# Patient Record
Sex: MALE | Race: WHITE | NOT HISPANIC OR LATINO | URBAN - METROPOLITAN AREA
[De-identification: names, ages, dates, MRNs, and addresses within clinical notes are randomized per-mention and may not be internally consistent; named-entity substitution may affect disease eponyms.]

---

## 2017-01-10 VITALS
SYSTOLIC BLOOD PRESSURE: 141 MMHG | TEMPERATURE: 97.3 F | DIASTOLIC BLOOD PRESSURE: 81 MMHG | DIASTOLIC BLOOD PRESSURE: 69 MMHG | RESPIRATION RATE: 28 BRPM | DIASTOLIC BLOOD PRESSURE: 79 MMHG | HEART RATE: 76 BPM | DIASTOLIC BLOOD PRESSURE: 75 MMHG | DIASTOLIC BLOOD PRESSURE: 56 MMHG | OXYGEN SATURATION: 98 % | RESPIRATION RATE: 16 BRPM | OXYGEN SATURATION: 99 % | SYSTOLIC BLOOD PRESSURE: 117 MMHG | SYSTOLIC BLOOD PRESSURE: 129 MMHG | DIASTOLIC BLOOD PRESSURE: 65 MMHG | HEART RATE: 61 BPM | OXYGEN SATURATION: 97 % | HEART RATE: 70 BPM | SYSTOLIC BLOOD PRESSURE: 109 MMHG | WEIGHT: 265 LBS | RESPIRATION RATE: 20 BRPM | SYSTOLIC BLOOD PRESSURE: 124 MMHG | DIASTOLIC BLOOD PRESSURE: 80 MMHG | HEART RATE: 77 BPM | OXYGEN SATURATION: 96 % | SYSTOLIC BLOOD PRESSURE: 115 MMHG | DIASTOLIC BLOOD PRESSURE: 71 MMHG | HEIGHT: 73 IN | HEART RATE: 74 BPM | DIASTOLIC BLOOD PRESSURE: 72 MMHG | DIASTOLIC BLOOD PRESSURE: 93 MMHG | HEART RATE: 84 BPM | TEMPERATURE: 97.6 F | HEART RATE: 69 BPM | HEART RATE: 75 BPM | OXYGEN SATURATION: 94 % | DIASTOLIC BLOOD PRESSURE: 76 MMHG | SYSTOLIC BLOOD PRESSURE: 139 MMHG | SYSTOLIC BLOOD PRESSURE: 140 MMHG | OXYGEN SATURATION: 93 % | SYSTOLIC BLOOD PRESSURE: 142 MMHG | HEART RATE: 67 BPM | OXYGEN SATURATION: 92 % | DIASTOLIC BLOOD PRESSURE: 82 MMHG | RESPIRATION RATE: 23 BRPM | SYSTOLIC BLOOD PRESSURE: 110 MMHG

## 2017-01-11 ENCOUNTER — OFFICE (AMBULATORY)
Dept: URBAN - METROPOLITAN AREA CLINIC 64 | Facility: CLINIC | Age: 62
End: 2017-01-11

## 2017-01-11 ENCOUNTER — AMBULATORY SURGICAL CENTER (AMBULATORY)
Dept: URBAN - METROPOLITAN AREA SURGERY 17 | Facility: SURGERY | Age: 62
End: 2017-01-11

## 2017-01-11 DIAGNOSIS — R63.4 ABNORMAL WEIGHT LOSS: ICD-10-CM

## 2017-01-11 DIAGNOSIS — K30 FUNCTIONAL DYSPEPSIA: ICD-10-CM

## 2017-01-11 DIAGNOSIS — K92.1 MELENA: ICD-10-CM

## 2017-01-11 DIAGNOSIS — D12.2 BENIGN NEOPLASM OF ASCENDING COLON: ICD-10-CM

## 2017-01-11 DIAGNOSIS — K57.30 DIVERTICULOSIS OF LARGE INTESTINE WITHOUT PERFORATION OR ABS: ICD-10-CM

## 2017-01-11 DIAGNOSIS — K64.4 RESIDUAL HEMORRHOIDAL SKIN TAGS: ICD-10-CM

## 2017-01-11 DIAGNOSIS — D17.79 BENIGN LIPOMATOUS NEOPLASM OF OTHER SITES: ICD-10-CM

## 2017-01-11 DIAGNOSIS — K21.0 GASTRO-ESOPHAGEAL REFLUX DISEASE WITH ESOPHAGITIS: ICD-10-CM

## 2017-01-11 DIAGNOSIS — Z80.0 FAMILY HISTORY OF MALIGNANT NEOPLASM OF DIGESTIVE ORGANS: ICD-10-CM

## 2017-01-11 DIAGNOSIS — K20.9 ESOPHAGITIS, UNSPECIFIED: ICD-10-CM

## 2017-01-11 LAB
GI HISTOLOGY: A. SELECT: (no result)
GI HISTOLOGY: B. UNSPECIFIED: (no result)
GI HISTOLOGY: PDF REPORT: (no result)

## 2017-01-11 PROCEDURE — 88305 TISSUE EXAM BY PATHOLOGIST: CPT | Performed by: INTERNAL MEDICINE

## 2017-01-11 PROCEDURE — 45385 COLONOSCOPY W/LESION REMOVAL: CPT | Performed by: INTERNAL MEDICINE

## 2017-01-11 PROCEDURE — 43239 EGD BIOPSY SINGLE/MULTIPLE: CPT | Performed by: INTERNAL MEDICINE

## 2017-01-11 RX ADMIN — PROPOFOL 100 MG: 10 INJECTION, EMULSION INTRAVENOUS at 11:27

## 2017-01-11 RX ADMIN — PROPOFOL 50 MG: 10 INJECTION, EMULSION INTRAVENOUS at 11:28

## 2017-01-11 RX ADMIN — PROPOFOL 50 MG: 10 INJECTION, EMULSION INTRAVENOUS at 11:40

## 2017-01-11 RX ADMIN — PROPOFOL 50 MG: 10 INJECTION, EMULSION INTRAVENOUS at 11:35

## 2017-01-11 RX ADMIN — PROPOFOL 50 MG: 10 INJECTION, EMULSION INTRAVENOUS at 11:30

## 2017-01-11 RX ADMIN — LIDOCAINE HYDROCHLORIDE 25 MG: 10 INJECTION, SOLUTION EPIDURAL; INFILTRATION; INTRACAUDAL; PERINEURAL at 11:26

## 2017-01-11 NOTE — SERVICENOTES
Patient understands associated risk, benefit of endoscopy including bleeding, infection, missed polyp or missed cancer, perforation, missed lesion, death. Withdrawal time was > / = 6 minutes.

If having any significant hemorrhoid problems he can f/u with surgeon ( today with resolving thrombosed hemorrhoid )
Will ask patient to stay on, continue PPI for now
No signs of worrisome GI bleeding on today's exam.
F/u in office if any other problems

## 2017-02-28 ENCOUNTER — OFFICE VISIT (OUTPATIENT)
Dept: FAMILY MEDICINE CLINIC | Facility: CLINIC | Age: 62
End: 2017-02-28

## 2017-02-28 VITALS
SYSTOLIC BLOOD PRESSURE: 123 MMHG | RESPIRATION RATE: 16 BRPM | BODY MASS INDEX: 38.19 KG/M2 | OXYGEN SATURATION: 99 % | TEMPERATURE: 97.7 F | HEIGHT: 72 IN | DIASTOLIC BLOOD PRESSURE: 71 MMHG | HEART RATE: 69 BPM | WEIGHT: 282 LBS

## 2017-02-28 DIAGNOSIS — I10 ESSENTIAL HYPERTENSION: ICD-10-CM

## 2017-02-28 DIAGNOSIS — M79.671 RIGHT FOOT PAIN: ICD-10-CM

## 2017-02-28 DIAGNOSIS — I25.10 ARTERIOSCLEROTIC HEART DISEASE: ICD-10-CM

## 2017-02-28 DIAGNOSIS — E11.9 CONTROLLED TYPE 2 DIABETES MELLITUS WITHOUT COMPLICATION, WITH LONG-TERM CURRENT USE OF INSULIN (HCC): ICD-10-CM

## 2017-02-28 DIAGNOSIS — Z01.818 PRE-OPERATIVE GENERAL PHYSICAL EXAMINATION: Primary | ICD-10-CM

## 2017-02-28 DIAGNOSIS — Z79.4 CONTROLLED TYPE 2 DIABETES MELLITUS WITHOUT COMPLICATION, WITH LONG-TERM CURRENT USE OF INSULIN (HCC): ICD-10-CM

## 2017-02-28 DIAGNOSIS — M19.071 ARTHRITIS OF FOOT, RIGHT: ICD-10-CM

## 2017-02-28 PROCEDURE — 99214 OFFICE O/P EST MOD 30 MIN: CPT | Performed by: FAMILY MEDICINE

## 2017-02-28 NOTE — PROGRESS NOTES
Subjective   Angel White is a 62 y.o. male.     History of Present Illness   Chief Complaint:   Chief Complaint   Patient presents with   • Foot Pain     right foot-surg clearance        Angel White 62 y.o. male who presents today for surgical clearance for surgery of his right foot. He has surgery scheduled on March 14 at [facility]. The doctor preforming the surgery is Dr. Cervantes.  he has a history of   Patient Active Problem List   Diagnosis   • Arteriosclerotic heart disease   • Essential hypertension   • DDD (degenerative disc disease), lumbosacral   • Osteoarthritis of knee   • Post-traumatic osteoarthritis of multiple joints   • Posterior tibialis tendon insufficiency   • Arthritis of foot, right   • Onychomycosis   • Tired   • Hyperlipidemia   • Controlled type 2 diabetes mellitus without complication, with long-term current use of insulin   .  Since the last visit, he has overall felt well.  he has been compliant with   Current Outpatient Prescriptions:   •  amLODIPine (NORVASC) 5 MG tablet, TAKE 1 TABLET (5 MG TOTAL) BY MOUTH DAILY FOR 90 DAYS., Disp: 90 tablet, Rfl: 1  •  aspirin 81 MG EC tablet, Take 81 mg by mouth daily., Disp: , Rfl:   •  atorvastatin (LIPITOR) 20 MG tablet, TAKE 1 TABLET (20 MG TOTAL) BY MOUTH DAILY FOR 90 DAYS., Disp: 90 tablet, Rfl: 1  •  clopidogrel (PLAVIX) 75 MG tablet, TAKE 1 TABLET (75 MG TOTAL) BY MOUTH DAILY FOR 90 DAYS., Disp: 90 tablet, Rfl: 1  •  irbesartan (AVAPRO) 300 MG tablet, Take 1 tablet by mouth Daily., Disp: 90 tablet, Rfl: 1  •  metFORMIN (GLUCOPHAGE) 500 MG tablet, TAKE ONE TABLET BY MOUTH TWICE DAILY WITH MEALS, Disp: 60 tablet, Rfl: 5  •  nabumetone (RELAFEN) 750 MG tablet, , Disp: , Rfl:   •  TOUJEO SOLOSTAR 300 UNIT/ML solution pen-injector, INJECT 10 TO 40 UNITS SUB-Q DAILY, Disp: , Rfl: 1.  he denies medication side effects.    All of the chronic condition(s) listed above are stable w/o issues.    Visit Vitals   • /71   •  "Pulse 69   • Temp 97.7 °F (36.5 °C) (Oral)   • Resp 16   • Ht 72\" (182.9 cm)   • Wt 282 lb (128 kg)   • SpO2 99%   • BMI 38.25 kg/m2       The following portions of the patient's history were reviewed and updated as appropriate: allergies, current medications, past family history, past medical history, past social history, past surgical history and problem list.    Review of Systems   Constitutional: Negative for activity change, appetite change and unexpected weight change.   Eyes: Negative for visual disturbance.   Respiratory: Negative for chest tightness and shortness of breath.    Cardiovascular: Negative for chest pain and palpitations.   Musculoskeletal:        Right foot pain   Skin: Negative for color change.   Neurological: Negative for syncope and speech difficulty.   Psychiatric/Behavioral: Negative for behavioral problems and confusion.       Objective   Physical Exam   Constitutional: He appears well-developed and well-nourished.   HENT:   Head: Normocephalic.   Right Ear: External ear normal.   Left Ear: External ear normal.   Mouth/Throat: Oropharynx is clear and moist.   Eyes: EOM are normal. Pupils are equal, round, and reactive to light.   Neck: Normal range of motion. Neck supple. No thyromegaly present.   Cardiovascular: Normal rate, regular rhythm, normal heart sounds and intact distal pulses.    Pulmonary/Chest: Effort normal and breath sounds normal.   Musculoskeletal: Normal range of motion.   Neurological: He is alert. He has normal reflexes.   Psychiatric: He has a normal mood and affect. His behavior is normal. Judgment and thought content normal.   Nursing note and vitals reviewed.      Assessment/Plan   Problems Addressed this Visit     None      Visit Diagnoses     Right foot pain    -  Primary                 "

## 2017-02-28 NOTE — PATIENT INSTRUCTIONS
Exercise 30 minutes most days of the week  Sleep 6-8 hours each night if possible  Low fat, low cholesterol diet   we discussed prescribed medications and how to take them   make sure you get results of any labs/studies ordered today  Low glycemic index diet ok for surgery   Need pre op labs  I REVIEWED LABS AND EKG. HE ALREADY GOT NOTE ABOUT STOPPING PLAVIX. SEE BMP, ,EKG  NORMAL.    HE DOES HAVE   TYPE 2 DIABETES    HE IS OK FOR SURGERY.

## 2017-05-11 ENCOUNTER — OFFICE VISIT (OUTPATIENT)
Dept: CARDIOLOGY | Facility: CLINIC | Age: 62
End: 2017-05-11

## 2017-05-11 VITALS
SYSTOLIC BLOOD PRESSURE: 152 MMHG | WEIGHT: 274 LBS | OXYGEN SATURATION: 98 % | HEART RATE: 63 BPM | BODY MASS INDEX: 36.31 KG/M2 | HEIGHT: 73 IN | DIASTOLIC BLOOD PRESSURE: 100 MMHG

## 2017-05-11 DIAGNOSIS — I10 ESSENTIAL HYPERTENSION: ICD-10-CM

## 2017-05-11 DIAGNOSIS — E78.5 HYPERLIPIDEMIA, UNSPECIFIED HYPERLIPIDEMIA TYPE: ICD-10-CM

## 2017-05-11 DIAGNOSIS — Z95.5 HISTORY OF CORONARY ARTERY STENT PLACEMENT: ICD-10-CM

## 2017-05-11 DIAGNOSIS — I25.10 ARTERIOSCLEROTIC HEART DISEASE: Primary | ICD-10-CM

## 2017-05-11 PROCEDURE — 93000 ELECTROCARDIOGRAM COMPLETE: CPT | Performed by: PHYSICIAN ASSISTANT

## 2017-05-11 PROCEDURE — 99214 OFFICE O/P EST MOD 30 MIN: CPT | Performed by: PHYSICIAN ASSISTANT

## 2017-05-17 DIAGNOSIS — I10 ESSENTIAL HYPERTENSION: ICD-10-CM

## 2017-05-17 RX ORDER — IRBESARTAN 300 MG/1
300 TABLET ORAL DAILY
Qty: 90 TABLET | Refills: 3 | Status: SHIPPED | OUTPATIENT
Start: 2017-05-17 | End: 2018-05-12 | Stop reason: SDUPTHER

## 2017-05-17 RX ORDER — AMLODIPINE BESYLATE 5 MG/1
5 TABLET ORAL DAILY
Qty: 90 TABLET | Refills: 3 | Status: SHIPPED | OUTPATIENT
Start: 2017-05-17 | End: 2017-11-06 | Stop reason: SDUPTHER

## 2017-06-07 ENCOUNTER — TELEPHONE (OUTPATIENT)
Dept: CARDIOLOGY | Facility: CLINIC | Age: 62
End: 2017-06-07

## 2017-06-07 DIAGNOSIS — I25.10 ARTERIOSCLEROTIC HEART DISEASE: ICD-10-CM

## 2017-06-07 DIAGNOSIS — I25.10 CORONARY ARTERY DISEASE INVOLVING NATIVE CORONARY ARTERY OF NATIVE HEART WITHOUT ANGINA PECTORIS: Primary | ICD-10-CM

## 2017-06-07 RX ORDER — ATORVASTATIN CALCIUM 20 MG/1
20 TABLET, FILM COATED ORAL DAILY
Qty: 90 TABLET | Refills: 3 | Status: SHIPPED | OUTPATIENT
Start: 2017-06-07 | End: 2017-11-06 | Stop reason: DRUGHIGH

## 2017-06-07 RX ORDER — CLOPIDOGREL BISULFATE 75 MG/1
75 TABLET ORAL DAILY
Qty: 90 TABLET | Refills: 3 | Status: SHIPPED | OUTPATIENT
Start: 2017-06-07 | End: 2018-05-27 | Stop reason: SDUPTHER

## 2017-11-06 ENCOUNTER — OFFICE VISIT (OUTPATIENT)
Dept: INTERNAL MEDICINE | Facility: CLINIC | Age: 62
End: 2017-11-06

## 2017-11-06 VITALS
WEIGHT: 288.6 LBS | HEART RATE: 70 BPM | TEMPERATURE: 98.7 F | BODY MASS INDEX: 38.25 KG/M2 | HEIGHT: 73 IN | DIASTOLIC BLOOD PRESSURE: 82 MMHG | SYSTOLIC BLOOD PRESSURE: 142 MMHG | OXYGEN SATURATION: 97 %

## 2017-11-06 DIAGNOSIS — E78.5 HYPERLIPIDEMIA, UNSPECIFIED HYPERLIPIDEMIA TYPE: ICD-10-CM

## 2017-11-06 DIAGNOSIS — E11.51 CONTROLLED TYPE 2 DIABETES MELLITUS WITH DIABETIC PERIPHERAL ANGIOPATHY WITHOUT GANGRENE, WITHOUT LONG-TERM CURRENT USE OF INSULIN (HCC): ICD-10-CM

## 2017-11-06 DIAGNOSIS — E66.01 MORBID OBESITY (HCC): ICD-10-CM

## 2017-11-06 DIAGNOSIS — E11.59 TYPE 2 DIABETES MELLITUS WITH OTHER CIRCULATORY COMPLICATION, WITHOUT LONG-TERM CURRENT USE OF INSULIN (HCC): ICD-10-CM

## 2017-11-06 DIAGNOSIS — Z95.5 HISTORY OF CORONARY ARTERY STENT PLACEMENT: ICD-10-CM

## 2017-11-06 DIAGNOSIS — I10 ESSENTIAL HYPERTENSION: Primary | ICD-10-CM

## 2017-11-06 PROBLEM — E11.9 DIABETES (HCC): Status: ACTIVE | Noted: 2017-11-06

## 2017-11-06 PROCEDURE — 99214 OFFICE O/P EST MOD 30 MIN: CPT | Performed by: FAMILY MEDICINE

## 2017-11-06 RX ORDER — AMLODIPINE BESYLATE 5 MG/1
5 TABLET ORAL DAILY
Qty: 90 TABLET | Refills: 3 | Status: SHIPPED | OUTPATIENT
Start: 2017-11-06 | End: 2018-10-28 | Stop reason: SDUPTHER

## 2017-11-06 RX ORDER — AMOXICILLIN 500 MG/1
CAPSULE ORAL
COMMUNITY
Start: 2017-08-03 | End: 2018-05-08

## 2017-11-06 RX ORDER — ATORVASTATIN CALCIUM 40 MG/1
40 TABLET, FILM COATED ORAL DAILY
COMMUNITY
End: 2018-08-01 | Stop reason: SDUPTHER

## 2017-11-06 NOTE — PROGRESS NOTES
Angel White is a 62 y.o. male.  Seen 11/06/2017    Assessment/Plan   Problem List Items Addressed This Visit        Cardiovascular and Mediastinum    Essential hypertension - Primary    Relevant Medications    amLODIPine (NORVASC) 5 MG tablet    Other Relevant Orders    Comprehensive Metabolic Panel    Hyperlipidemia    Relevant Medications    atorvastatin (LIPITOR) 40 MG tablet    History of coronary artery stent placement       Digestive    Morbid obesity       Endocrine    Controlled type 2 diabetes mellitus with diabetic peripheral angiopathy without gangrene, without long-term current use of insulin    Diabetes    Relevant Orders    Hemoglobin A1c    Microalbumin / Creatinine Urine Ratio - Urine, Clean Catch           Results of blood work continued attempts at weight loss since she's had successful capacity he thinks he can do it again refill medications as prescribed follow-up in 6 months or as needed.  Exercises shown for bilateral shoulder pain or increasing range of motion    Subjective     Chief Complaint   Patient presents with   • transferring from Dr. Mohan   • Hypertension   • Hyperlipidemia   • bilateral shoulder pain     Social History   Substance Use Topics   • Smoking status: Never Smoker   • Smokeless tobacco: Never Used   • Alcohol use No       History of Present Illness   New to This office Patient comes in for follow-up of hypertension hyperlipidemia diabetes bilateral shoulder pain he has no shortness of breath or fatigue he's been recently recuperating from surgery for foot surgeryand going slow he checks his blood pressure occasionally has been really watching his diet for diabetes and habits last A1c in December 2016 which was 7. 4 October 2016 his A1c was 11.1    Shoulder pain seems to be due to how he sleeps and he's been trying to not aggravate them and subsequently is not using very much is not dropping things and there is no radiating quality and or shortness of breath or  "dizziness specific traumatic event orders  The following portions of the patient's history were reviewed and updated as appropriate:PMHroutine: Social history , Past Medical History, Allergies, Current Medications, Active Problem List and Health Maintenance    Review of Systems   Constitutional: Negative for activity change, appetite change and unexpected weight change.   HENT: Negative for nosebleeds and trouble swallowing.    Eyes: Negative for pain and visual disturbance.   Respiratory: Negative for chest tightness, shortness of breath and wheezing.    Cardiovascular: Negative for chest pain and palpitations.   Gastrointestinal: Negative for abdominal pain and blood in stool.   Endocrine: Negative.    Genitourinary: Negative for difficulty urinating and hematuria.   Musculoskeletal: Positive for arthralgias and gait problem. Negative for joint swelling.   Skin: Negative for color change and rash.   Allergic/Immunologic: Negative.    Neurological: Negative for syncope and speech difficulty.   Hematological: Negative for adenopathy.   Psychiatric/Behavioral: Negative for agitation and confusion.   All other systems reviewed and are negative.      Objective   Vitals:    11/06/17 1432   BP: 142/82   BP Location: Right arm   Patient Position: Sitting   Cuff Size: Large Adult   Pulse: 70   Temp: 98.7 °F (37.1 °C)   TempSrc: Oral   SpO2: 97%   Weight: 288 lb 9.6 oz (131 kg)   Height: 73\" (185.4 cm)     Body mass index is 38.08 kg/(m^2).  Physical Exam   Constitutional: He is oriented to person, place, and time. He appears well-developed and well-nourished. No distress.   HENT:   Head: Normocephalic and atraumatic.   Eyes: Conjunctivae and EOM are normal. Pupils are equal, round, and reactive to light. Right eye exhibits no discharge. Left eye exhibits no discharge. No scleral icterus.   Neck: Normal range of motion. Neck supple. No tracheal deviation present. No thyromegaly present.   Cardiovascular: Normal rate, " regular rhythm, normal heart sounds, intact distal pulses and normal pulses.  Exam reveals no gallop.    No murmur heard.  Pulmonary/Chest: Effort normal and breath sounds normal. No respiratory distress. He has no wheezes. He has no rales.   Musculoskeletal: Normal range of motion.   cast right ankle  Little bit of impingement bilateral shoulders   Neurological: He is alert and oriented to person, place, and time. He exhibits normal muscle tone. Coordination normal.   Skin: Skin is warm. No rash noted. No erythema. No pallor.   Psychiatric: He has a normal mood and affect. His behavior is normal. Judgment and thought content normal.   Nursing note and vitals reviewed.    Reviewed Data:  No visits with results within 1 Month(s) from this visit.  Latest known visit with results is:    Abstract on 11/20/2015   Component Date Value Ref Range Status   •  Colonoscopy 11/08/2010 Dr. Mccall   Final   •  EYE EXAM 05/15/2015 with dilation   Final

## 2018-02-02 ENCOUNTER — TELEPHONE (OUTPATIENT)
Dept: INTERNAL MEDICINE | Facility: CLINIC | Age: 63
End: 2018-02-02

## 2018-02-02 RX ORDER — BETAMETHASONE DIPROPIONATE 0.5 MG/G
OINTMENT TOPICAL
Refills: 1 | COMMUNITY
Start: 2017-11-09 | End: 2018-02-19

## 2018-02-02 RX ORDER — ATORVASTATIN CALCIUM 20 MG/1
TABLET, FILM COATED ORAL
COMMUNITY
Start: 2017-11-30 | End: 2018-02-19 | Stop reason: DRUGHIGH

## 2018-02-02 NOTE — TELEPHONE ENCOUNTER
----- Message from Momo Meza MD sent at 2/2/2018  2:50 PM EST -----  Labs OK continue present meds

## 2018-02-19 ENCOUNTER — OFFICE VISIT (OUTPATIENT)
Dept: INTERNAL MEDICINE | Facility: CLINIC | Age: 63
End: 2018-02-19

## 2018-02-19 VITALS
WEIGHT: 294.8 LBS | SYSTOLIC BLOOD PRESSURE: 144 MMHG | TEMPERATURE: 98.7 F | HEIGHT: 73 IN | BODY MASS INDEX: 39.07 KG/M2 | DIASTOLIC BLOOD PRESSURE: 80 MMHG | OXYGEN SATURATION: 97 % | HEART RATE: 77 BPM

## 2018-02-19 DIAGNOSIS — I10 ESSENTIAL HYPERTENSION: Primary | ICD-10-CM

## 2018-02-19 DIAGNOSIS — E78.5 HYPERLIPIDEMIA, UNSPECIFIED HYPERLIPIDEMIA TYPE: ICD-10-CM

## 2018-02-19 DIAGNOSIS — K59.09 OTHER CONSTIPATION: ICD-10-CM

## 2018-02-19 PROCEDURE — 99214 OFFICE O/P EST MOD 30 MIN: CPT | Performed by: FAMILY MEDICINE

## 2018-02-19 RX ORDER — MELATONIN 10 MG
1 TABLET, SUBLINGUAL SUBLINGUAL DAILY
COMMUNITY
End: 2018-07-18

## 2018-02-19 RX ORDER — HYDROCHLOROTHIAZIDE 25 MG/1
25 TABLET ORAL DAILY
Qty: 90 TABLET | Refills: 1 | Status: SHIPPED | OUTPATIENT
Start: 2018-02-19 | End: 2018-08-12 | Stop reason: SDUPTHER

## 2018-02-19 RX ORDER — EXENATIDE 2 MG/.65ML
INJECTION, SUSPENSION, EXTENDED RELEASE SUBCUTANEOUS
Refills: 3 | COMMUNITY
Start: 2018-01-29 | End: 2018-05-08

## 2018-02-19 RX ORDER — ASCORBIC ACID 500 MG
500 TABLET ORAL 2 TIMES DAILY
COMMUNITY
End: 2018-05-08

## 2018-02-19 NOTE — PROGRESS NOTES
Angel White is a 63 y.o. male.      Assessment/Plan   Problem List Items Addressed This Visit        Cardiovascular and Mediastinum    Essential hypertension - Primary    Relevant Medications    hydrochlorothiazide (HYDRODIURIL) 25 MG tablet    Hyperlipidemia    Relevant Orders    Lipid Panel       Digestive    Other constipation    Relevant Orders    TSH    T4, Free           Chart hydrochlorothiazide 25 mg daily prescription sent locally monitor blood pressure goals so someone 140/90, Metamucil 4 intermittent constipation obtain TSH and T4 for further evaluation with Fasting lipid profile patient will then come in office in 2 months with results of blood work monitoring his blood pressure and results of BMP      Chief Complaint   Patient presents with   • follow up to hyperlipidemia   • follow up to hypertension   Obesity constipation  Social History   Substance Use Topics   • Smoking status: Never Smoker   • Smokeless tobacco: Never Used   • Alcohol use No       History of Present Illness   He comes in for follow-up of chronic medical problems of hyperlipidemia and hypertension he has blood pressure readings greater than 140/90 no chest pain or shortness of breath or increased fatigue he also has lipids managed by his endocrinologist with atorvastatin he has never taken a diuretic.  He'll suspect constipation that can worsen over the last couple months although and assist with small bowel pain he did have a colonoscopy recently and was unrevealing.  He tries to drink plenty of fluids throughout the day followingappropriateADAdiet  The following portions of the patient's history were reviewed and updated as appropriate:PMHroutine: Social history , Past Medical History, Allergies, Current Medications, Active Problem List, Family History and Health Maintenance    Review of Systems   Constitutional: Negative.    HENT: Negative.    Respiratory: Negative.    Cardiovascular: Negative.    Gastrointestinal: Negative.  "   Musculoskeletal: Negative.    Neurological: Negative.        Objective   Vitals:    02/19/18 1310   BP: 144/80   BP Location: Left arm   Patient Position: Sitting   Cuff Size: Large Adult   Pulse: 77   Temp: 98.7 °F (37.1 °C)   TempSrc: Oral   SpO2: 97%   Weight: 134 kg (294 lb 12.8 oz)   Height: 185.4 cm (73\")     Body mass index is 38.89 kg/(m^2).  Physical Exam   Constitutional: He is oriented to person, place, and time. He appears well-developed and well-nourished. No distress.   HENT:   Head: Normocephalic and atraumatic.   Eyes: Conjunctivae and EOM are normal. Pupils are equal, round, and reactive to light. Right eye exhibits no discharge. Left eye exhibits no discharge. No scleral icterus.   Neck: Normal range of motion. Neck supple. No tracheal deviation present. No thyromegaly present.   Cardiovascular: Normal rate, regular rhythm, normal heart sounds, intact distal pulses and normal pulses.  Exam reveals no gallop.    No murmur heard.  Pulmonary/Chest: Effort normal and breath sounds normal. No respiratory distress. He has no wheezes. He has no rales.   Musculoskeletal: Normal range of motion.   Neurological: He is alert and oriented to person, place, and time. He exhibits normal muscle tone. Coordination normal.   Skin: Skin is warm. No rash noted. No erythema. No pallor.   Psychiatric: He has a normal mood and affect. His behavior is normal. Judgment and thought content normal.   Nursing note and vitals reviewed.    Reviewed Data:  No visits with results within 1 Month(s) from this visit.  Latest known visit with results is:    Abstract on 11/20/2015   Component Date Value Ref Range Status   •  Colonoscopy 11/08/2010 Dr. Mccall   Final   •  EYE EXAM 05/15/2015 with dilation   Final         "

## 2018-05-08 ENCOUNTER — OFFICE VISIT (OUTPATIENT)
Dept: INTERNAL MEDICINE | Facility: CLINIC | Age: 63
End: 2018-05-08

## 2018-05-08 VITALS
DIASTOLIC BLOOD PRESSURE: 73 MMHG | RESPIRATION RATE: 18 BRPM | SYSTOLIC BLOOD PRESSURE: 112 MMHG | HEART RATE: 82 BPM | HEIGHT: 73 IN | BODY MASS INDEX: 36.71 KG/M2 | TEMPERATURE: 98.2 F | WEIGHT: 277 LBS | OXYGEN SATURATION: 97 %

## 2018-05-08 DIAGNOSIS — E11.51 CONTROLLED TYPE 2 DIABETES MELLITUS WITH DIABETIC PERIPHERAL ANGIOPATHY WITHOUT GANGRENE, WITHOUT LONG-TERM CURRENT USE OF INSULIN (HCC): ICD-10-CM

## 2018-05-08 DIAGNOSIS — E78.5 HYPERLIPIDEMIA, UNSPECIFIED HYPERLIPIDEMIA TYPE: Primary | ICD-10-CM

## 2018-05-08 DIAGNOSIS — I10 ESSENTIAL HYPERTENSION: ICD-10-CM

## 2018-05-08 PROCEDURE — 99214 OFFICE O/P EST MOD 30 MIN: CPT | Performed by: FAMILY MEDICINE

## 2018-05-08 RX ORDER — BLOOD-GLUCOSE METER
EACH MISCELLANEOUS SEE ADMIN INSTRUCTIONS
Refills: 0 | COMMUNITY
Start: 2018-03-08 | End: 2019-05-03

## 2018-05-08 RX ORDER — HYDROCHLOROTHIAZIDE 25 MG/1
25 TABLET ORAL DAILY
Qty: 90 TABLET | Refills: 1 | Status: CANCELLED | OUTPATIENT
Start: 2018-05-08

## 2018-05-08 RX ORDER — DAPAGLIFLOZIN 5 MG/1
5 TABLET, FILM COATED ORAL DAILY
COMMUNITY
Start: 2018-04-27 | End: 2019-02-07 | Stop reason: SDUPTHER

## 2018-05-08 RX ORDER — AMOXICILLIN 500 MG/1
500 CAPSULE ORAL 3 TIMES DAILY
Qty: 30 CAPSULE | Refills: 0 | Status: SHIPPED | OUTPATIENT
Start: 2018-05-08 | End: 2018-06-25

## 2018-05-08 RX ORDER — LIRAGLUTIDE 6 MG/ML
INJECTION SUBCUTANEOUS
Refills: 2 | COMMUNITY
Start: 2018-03-09 | End: 2018-05-08 | Stop reason: SDDI

## 2018-05-08 RX ORDER — LANCETS
EACH MISCELLANEOUS 2 TIMES DAILY
Refills: 2 | Status: ON HOLD | COMMUNITY
Start: 2018-03-08 | End: 2021-10-29

## 2018-05-08 RX ORDER — ATORVASTATIN CALCIUM 20 MG/1
20 TABLET, FILM COATED ORAL DAILY
Refills: 3 | COMMUNITY
Start: 2018-03-01 | End: 2018-05-08 | Stop reason: DRUGHIGH

## 2018-05-08 RX ORDER — BLOOD SUGAR DIAGNOSTIC
STRIP MISCELLANEOUS 2 TIMES DAILY
Refills: 2 | COMMUNITY
Start: 2018-03-08 | End: 2022-02-02

## 2018-05-08 RX ORDER — AMLODIPINE BESYLATE 5 MG/1
5 TABLET ORAL DAILY
Qty: 90 TABLET | Refills: 3 | Status: CANCELLED | OUTPATIENT
Start: 2018-05-08

## 2018-05-12 DIAGNOSIS — I10 ESSENTIAL HYPERTENSION: ICD-10-CM

## 2018-05-14 RX ORDER — IRBESARTAN 300 MG/1
300 TABLET ORAL DAILY
Qty: 90 TABLET | Refills: 0 | Status: SHIPPED | OUTPATIENT
Start: 2018-05-14 | End: 2018-08-10 | Stop reason: SDUPTHER

## 2018-05-27 DIAGNOSIS — I25.10 ARTERIOSCLEROTIC HEART DISEASE: ICD-10-CM

## 2018-05-29 RX ORDER — ATORVASTATIN CALCIUM 20 MG/1
20 TABLET, FILM COATED ORAL DAILY
Qty: 90 TABLET | Refills: 3 | Status: SHIPPED | OUTPATIENT
Start: 2018-05-29 | End: 2018-07-18 | Stop reason: DRUGHIGH

## 2018-05-29 RX ORDER — CLOPIDOGREL BISULFATE 75 MG/1
75 TABLET ORAL DAILY
Qty: 90 TABLET | Refills: 3 | Status: SHIPPED | OUTPATIENT
Start: 2018-05-29 | End: 2019-05-10 | Stop reason: SDUPTHER

## 2018-05-29 NOTE — PROGRESS NOTES
"Angel White is a 63 y.o. male.      Assessment/Plan   Problem List Items Addressed This Visit        Cardiovascular and Mediastinum    Essential hypertension    Hyperlipidemia - Primary       Endocrine    Controlled type 2 diabetes mellitus with diabetic peripheral angiopathy without gangrene, without long-term current use of insulin    Relevant Medications    FARXIGA 5 MG tablet tablet    Other Relevant Orders    Hemoglobin A1c         Follow up results of blood work and as needed otherwise in 6 months.         Chief Complaint   Patient presents with   • FOLLOW UP FOR HYPERTENSION   • FOLLOW UP FOR CHOLESTEROL   • FOLLOW UP FOR DIABETES   • Sinusitis     PRESSURE, DRAINAGE, EYES WATERING, COUGH, X 2 WEEKS     Social History   Substance Use Topics   • Smoking status: Never Smoker   • Smokeless tobacco: Never Used   • Alcohol use No       History of Present Illness   Patient visit for chronic medical problems. Hypertension, diabetes,lipids. Doing well no acute concerns. No chest pain, shortness of breath, or increasing fatigue. Monitors blood pressure at home, less than 140/90.   The following portions of the patient's history were reviewed and updated as appropriat    :PMHroutine: Social history , Past Medical History, Allergies, Current Medications, Active Problem List and Health Maintenance    Review of Systems   Constitutional: Negative.    HENT: Negative.    Respiratory: Negative.    Cardiovascular: Negative.    Gastrointestinal: Negative.    Musculoskeletal: Negative.    Neurological: Negative.        Objective   Vitals:    05/08/18 1139   BP: 112/73   BP Location: Left arm   Patient Position: Sitting   Cuff Size: Large Adult   Pulse: 82   Resp: 18   Temp: 98.2 °F (36.8 °C)   TempSrc: Oral   SpO2: 97%   Weight: 126 kg (277 lb)   Height: 185.4 cm (73\")     Body mass index is 36.55 kg/m².  Physical Exam   Constitutional: He is oriented to person, place, and time. He appears well-developed and well-nourished. " No distress.   HENT:   Head: Normocephalic and atraumatic.   Eyes: Conjunctivae and EOM are normal. Pupils are equal, round, and reactive to light. Right eye exhibits no discharge. Left eye exhibits no discharge. No scleral icterus.   Neck: Normal range of motion. Neck supple. No tracheal deviation present. No thyromegaly present.   Cardiovascular: Normal rate, regular rhythm, normal heart sounds, intact distal pulses and normal pulses.  Exam reveals no gallop.    No murmur heard.  Pulmonary/Chest: Effort normal and breath sounds normal. No respiratory distress. He has no wheezes. He has no rales.   Musculoskeletal: Normal range of motion.    Angel had a diabetic foot exam performed today.   During the foot exam he had a monofilament test performed.    Neurological Sensory Findings -  Unaltered sharp/dull right ankle/foot discrimination and unaltered sharp/dull left ankle/foot discrimination. No right ankle/foot altered proprioception and no left ankle/foot altered proprioception  Vascular Status -  His right foot exhibits normal foot vasculature  and no edema. His left foot exhibits normal foot vasculature  and no edema.  Skin Integrity  -  His right foot skin is intact.His left foot skin is intact..  Neurological: He is alert and oriented to person, place, and time. He exhibits normal muscle tone. Coordination normal.   Skin: Skin is warm. No rash noted. No erythema. No pallor.   Psychiatric: He has a normal mood and affect. His behavior is normal. Judgment and thought content normal.   Nursing note and vitals reviewed.    Reviewed Data:  No visits with results within 1 Month(s) from this visit.   Latest known visit with results is:   Abstract on 11/20/2015   Component Date Value Ref Range Status   •  Colonoscopy 11/08/2010 Dr. Mccall   Final   •  EYE EXAM 05/15/2015 with dilation   Final

## 2018-06-25 ENCOUNTER — OFFICE VISIT (OUTPATIENT)
Dept: INTERNAL MEDICINE | Facility: CLINIC | Age: 63
End: 2018-06-25

## 2018-06-25 VITALS
BODY MASS INDEX: 36.08 KG/M2 | RESPIRATION RATE: 20 BRPM | DIASTOLIC BLOOD PRESSURE: 72 MMHG | WEIGHT: 272.2 LBS | HEART RATE: 79 BPM | HEIGHT: 73 IN | OXYGEN SATURATION: 97 % | TEMPERATURE: 98 F | SYSTOLIC BLOOD PRESSURE: 131 MMHG

## 2018-06-25 DIAGNOSIS — K52.9 GASTROENTERITIS: Primary | ICD-10-CM

## 2018-06-25 PROBLEM — R11.2 NON-INTRACTABLE VOMITING WITH NAUSEA: Status: ACTIVE | Noted: 2018-06-25

## 2018-06-25 PROCEDURE — 99213 OFFICE O/P EST LOW 20 MIN: CPT | Performed by: FAMILY MEDICINE

## 2018-06-25 RX ORDER — ONDANSETRON HYDROCHLORIDE 8 MG/1
8 TABLET, FILM COATED ORAL EVERY 8 HOURS PRN
Qty: 21 TABLET | Refills: 0 | Status: SHIPPED | OUTPATIENT
Start: 2018-06-25 | End: 2018-11-08

## 2018-06-25 RX ORDER — ONDANSETRON HYDROCHLORIDE 8 MG/1
8 TABLET, FILM COATED ORAL EVERY 8 HOURS PRN
Qty: 21 TABLET | Refills: 0 | Status: SHIPPED | OUTPATIENT
Start: 2018-06-25 | End: 2018-06-25 | Stop reason: SDUPTHER

## 2018-06-25 RX ORDER — ONDANSETRON 4 MG/1
8 TABLET, FILM COATED ORAL EVERY 8 HOURS PRN
Qty: 20 TABLET | Refills: 0 | Status: SHIPPED | OUTPATIENT
Start: 2018-06-25 | End: 2018-06-25 | Stop reason: SDUPTHER

## 2018-06-25 NOTE — PROGRESS NOTES
Angel White is a 63 y.o. male.      Assessment/Plan   Problem List Items Addressed This Visit        Digestive    Gastroenteritis - Primary    Relevant Orders    CBC & Differential    Comprehensive Metabolic Panel           Patient will follow-up results of CBC and CMP since he had an episode of brownish vomit although he does like to drink diet Cokes Zofran for nausea Flonase 1 spray each nostril twice a day for a week then once daily for his head congestion follow-up if no continued improvement or symptoms worsen  Short-term stop hydrochlorothiazide    Chief Complaint   Patient presents with   • Sinusitis     drainage, some cough    • Vomiting     x 5 days   • Constipation     took ducolax last night     Social History   Substance Use Topics   • Smoking status: Never Smoker   • Smokeless tobacco: Never Used   • Alcohol use No       History of Present Illness   She's had 5 days of head congestion he vomited for the first 2 does not have a headache anymore and his been no fever he's been using over-the-counter medication with some minimal success is not had much T because of some nausea.  No diarrhea no constipation or black or bloody stool.  There is no chest pain he feels is on the mend although he still has some head congestion and draining a like medication for some nausea  The following portions of the patient's history were reviewed and updated as appropriate:PMHroutine: Social history , Past Medical History, Allergies, Current Medications, Active Problem List and Health Maintenance    Review of Systems   Constitutional: Negative.    HENT: Negative.    Eyes: Negative.    Respiratory: Negative.    Cardiovascular: Negative.    Gastrointestinal: Positive for nausea. Negative for abdominal pain, constipation, diarrhea and rectal pain.   Genitourinary: Negative.    Musculoskeletal: Negative.    Psychiatric/Behavioral: Negative.        Objective   Vitals:    06/25/18 1122   BP: 131/72   BP Location: Left arm  "  Patient Position: Sitting   Cuff Size: Large Adult   Pulse: 79   Resp: 20   Temp: 98 °F (36.7 °C)   TempSrc: Oral   SpO2: 97%   Weight: 123 kg (272 lb 3.2 oz)   Height: 185.4 cm (73\")     Body mass index is 35.91 kg/m².  Physical Exam   Constitutional: He is oriented to person, place, and time. He appears well-developed and well-nourished. No distress.   HENT:   Head: Normocephalic and atraumatic.   Right Ear: External ear normal.   Left Ear: External ear normal.   Mouth/Throat: Oropharynx is clear and moist.   Eyes: Conjunctivae and EOM are normal. Pupils are equal, round, and reactive to light. Right eye exhibits no discharge. Left eye exhibits no discharge. No scleral icterus.   Neck: Normal range of motion. Neck supple. No tracheal deviation present. No thyromegaly present.   Cardiovascular: Normal rate, regular rhythm, normal heart sounds, intact distal pulses and normal pulses.  Exam reveals no gallop.    No murmur heard.  Pulmonary/Chest: Effort normal and breath sounds normal. No respiratory distress. He has no wheezes. He has no rales.   Abdominal: Soft. Bowel sounds are normal. He exhibits no distension.   Musculoskeletal: Normal range of motion.   Neurological: He is alert and oriented to person, place, and time. He exhibits normal muscle tone. Coordination normal.   Skin: Skin is warm. No rash noted. No erythema. No pallor.   Psychiatric: He has a normal mood and affect. His behavior is normal. Judgment and thought content normal.   Nursing note and vitals reviewed.    Reviewed Data:  No visits with results within 1 Month(s) from this visit.   Latest known visit with results is:   Abstract on 11/20/2015   Component Date Value Ref Range Status   •  Colonoscopy 11/08/2010 Dr. Mccall   Final   •  EYE EXAM 05/15/2015 with dilation   Final         "

## 2018-06-29 ENCOUNTER — TELEPHONE (OUTPATIENT)
Dept: INTERNAL MEDICINE | Facility: CLINIC | Age: 63
End: 2018-06-29

## 2018-07-02 ENCOUNTER — TELEPHONE (OUTPATIENT)
Dept: INTERNAL MEDICINE | Facility: CLINIC | Age: 63
End: 2018-07-02

## 2018-07-18 ENCOUNTER — OFFICE VISIT (OUTPATIENT)
Dept: CARDIOLOGY | Facility: CLINIC | Age: 63
End: 2018-07-18

## 2018-07-18 VITALS
HEART RATE: 72 BPM | HEIGHT: 73 IN | DIASTOLIC BLOOD PRESSURE: 72 MMHG | WEIGHT: 280 LBS | BODY MASS INDEX: 37.11 KG/M2 | SYSTOLIC BLOOD PRESSURE: 134 MMHG

## 2018-07-18 DIAGNOSIS — I10 ESSENTIAL HYPERTENSION: ICD-10-CM

## 2018-07-18 DIAGNOSIS — I25.10 CORONARY ARTERY DISEASE INVOLVING NATIVE CORONARY ARTERY OF NATIVE HEART WITHOUT ANGINA PECTORIS: Primary | ICD-10-CM

## 2018-07-18 DIAGNOSIS — E78.5 HYPERLIPIDEMIA, UNSPECIFIED HYPERLIPIDEMIA TYPE: ICD-10-CM

## 2018-07-18 PROCEDURE — 93000 ELECTROCARDIOGRAM COMPLETE: CPT | Performed by: INTERNAL MEDICINE

## 2018-07-18 PROCEDURE — 99213 OFFICE O/P EST LOW 20 MIN: CPT | Performed by: INTERNAL MEDICINE

## 2018-07-18 RX ORDER — ATORVASTATIN CALCIUM 40 MG/1
40 TABLET, FILM COATED ORAL DAILY
COMMUNITY
End: 2018-08-01 | Stop reason: SDUPTHER

## 2018-07-18 NOTE — PROGRESS NOTES
Subjective:     Encounter Date:07/18/2018      Patient ID: Angel White is a 63 y.o. male.    Chief Complaint: CAD, hyperlipidemia, HTN    History of Present Illness    Dear Dr. Meza,     I had the pleasure of seeing Angel White in cardiac followup today.  As you well know, he is a tamia 63-year-old man with history of coronary artery disease status post multivessel stenting.  He has diabetes, hypertension and hyperlipidemia.      He comes in for his yearly followup.  Since I have last seen him, he has been under a lot of stress because his 40 year old adult son has moved back in with him and is causing a lot of problems with his wife.  Because of the stress, he is over eating and is having a lot of chest pain.      He is otherwise doing okay.  He had foot surgery a year ago and is still healing from that.          Review of Systems   All other systems reviewed and are negative.        ECG 12 Lead  Date/Time: 7/18/2018 9:03 AM  Performed by: DANNI SMITH  Authorized by: DANNI SMITH   Comparison: compared with previous ECG   Similar to previous ECG  Rhythm: sinus rhythm  BPM: 72  Clinical impression: normal ECG               Objective:     Physical Exam   Constitutional: He is oriented to person, place, and time. He appears well-developed and well-nourished.   HENT:   Head: Normocephalic and atraumatic.   Neck: Normal range of motion. Neck supple.   Cardiovascular: Normal rate, regular rhythm and normal heart sounds.    Pulmonary/Chest: Effort normal and breath sounds normal.   Abdominal: Soft. Bowel sounds are normal.   Musculoskeletal: Normal range of motion.   Neurological: He is alert and oriented to person, place, and time.   Skin: Skin is warm and dry.   Psychiatric: He has a normal mood and affect. His behavior is normal. Thought content normal.   Vitals reviewed.      Lab Review:       Assessment:          Diagnosis Plan   1. Coronary artery disease involving native coronary artery of native heart  without angina pectoris     2. Essential hypertension     3. Hyperlipidemia, unspecified hyperlipidemia type            Plan:       It was a pleasure to see your patient in cardiac followup today.  He is doing very well from the cardiac standpoint without any complaints of angina.  I think his symptoms are stress related.  He hopes this will be short-term.      I have made no changes to his medical regimen.  I would encourage him to stay on dual antiplatelet therapy indefinitely due to his multiple stents.  He will see me again in one year or sooner if symptoms warrant.      Coronary Artery Disease  Assessment  • The patient has no angina    Plan  • Lifestyle modifications discussed include adhering to a heart healthy diet, avoidance of tobacco products, maintenance of a healthy weight, medication compliance, regular exercise and regular monitoring of cholesterol and blood pressure    Subjective - Objective  • There is a history of past MI  • There has been a previous stent procedure using VIOLET  • Current antiplatelet therapy includes aspirin 81 mg and clopidogrel 75 mg

## 2018-07-30 ENCOUNTER — TELEPHONE (OUTPATIENT)
Dept: INTERNAL MEDICINE | Facility: CLINIC | Age: 63
End: 2018-07-30

## 2018-07-30 DIAGNOSIS — E78.5 HYPERLIPIDEMIA, UNSPECIFIED HYPERLIPIDEMIA TYPE: Primary | ICD-10-CM

## 2018-08-01 RX ORDER — ATORVASTATIN CALCIUM 40 MG/1
40 TABLET, FILM COATED ORAL DAILY
Qty: 90 TABLET | Refills: 1 | Status: SHIPPED | OUTPATIENT
Start: 2018-08-01 | End: 2019-01-21 | Stop reason: SDUPTHER

## 2018-08-01 NOTE — TELEPHONE ENCOUNTER
Prescription sent to [facility].  Patient will schedule the lab appointment when he sees Dr. Meza in November.

## 2018-08-10 DIAGNOSIS — I10 ESSENTIAL HYPERTENSION: ICD-10-CM

## 2018-08-10 RX ORDER — IRBESARTAN 300 MG/1
300 TABLET ORAL DAILY
Qty: 90 TABLET | Refills: 0 | Status: SHIPPED | OUTPATIENT
Start: 2018-08-10 | End: 2018-11-05 | Stop reason: SDUPTHER

## 2018-08-13 RX ORDER — HYDROCHLOROTHIAZIDE 25 MG/1
25 TABLET ORAL DAILY
Qty: 90 TABLET | Refills: 0 | Status: SHIPPED | OUTPATIENT
Start: 2018-08-13 | End: 2018-11-08 | Stop reason: SDUPTHER

## 2018-10-23 ENCOUNTER — TELEPHONE (OUTPATIENT)
Dept: INTERNAL MEDICINE | Facility: CLINIC | Age: 63
End: 2018-10-23

## 2018-10-23 NOTE — TELEPHONE ENCOUNTER
Adry Hills, RN -  with [facility] ([facility]) [phone] called as a FYI stating that patient has an appointment to see Dr. Meza on November 8, 2018.  She wanted to let Dr. Meza know that patient has been having memory issues but keeps forgetting to let Dr. Meza know at his appointments.

## 2018-10-28 DIAGNOSIS — I10 ESSENTIAL HYPERTENSION: ICD-10-CM

## 2018-10-29 RX ORDER — AMLODIPINE BESYLATE 5 MG/1
5 TABLET ORAL DAILY
Qty: 90 TABLET | Refills: 0 | Status: SHIPPED | OUTPATIENT
Start: 2018-10-29 | End: 2018-11-08 | Stop reason: SDUPTHER

## 2018-11-05 DIAGNOSIS — I10 ESSENTIAL HYPERTENSION: ICD-10-CM

## 2018-11-05 RX ORDER — IRBESARTAN 300 MG/1
300 TABLET ORAL DAILY
Qty: 90 TABLET | Refills: 2 | Status: SHIPPED | OUTPATIENT
Start: 2018-11-05 | End: 2018-11-08 | Stop reason: SDUPTHER

## 2018-11-08 ENCOUNTER — OFFICE VISIT (OUTPATIENT)
Dept: INTERNAL MEDICINE | Facility: CLINIC | Age: 63
End: 2018-11-08

## 2018-11-08 VITALS
HEART RATE: 76 BPM | BODY MASS INDEX: 37.79 KG/M2 | WEIGHT: 285.1 LBS | OXYGEN SATURATION: 98 % | RESPIRATION RATE: 18 BRPM | HEIGHT: 73 IN | SYSTOLIC BLOOD PRESSURE: 128 MMHG | DIASTOLIC BLOOD PRESSURE: 74 MMHG

## 2018-11-08 DIAGNOSIS — E11.51 CONTROLLED TYPE 2 DIABETES MELLITUS WITH DIABETIC PERIPHERAL ANGIOPATHY WITHOUT GANGRENE, WITHOUT LONG-TERM CURRENT USE OF INSULIN (HCC): ICD-10-CM

## 2018-11-08 DIAGNOSIS — E11.59 TYPE 2 DIABETES MELLITUS WITH OTHER CIRCULATORY COMPLICATION, WITHOUT LONG-TERM CURRENT USE OF INSULIN (HCC): ICD-10-CM

## 2018-11-08 DIAGNOSIS — I10 ESSENTIAL HYPERTENSION: ICD-10-CM

## 2018-11-08 DIAGNOSIS — E78.5 HYPERLIPIDEMIA, UNSPECIFIED HYPERLIPIDEMIA TYPE: Primary | ICD-10-CM

## 2018-11-08 PROCEDURE — 99214 OFFICE O/P EST MOD 30 MIN: CPT | Performed by: FAMILY MEDICINE

## 2018-11-08 RX ORDER — DIPHENHYDRAMINE HCL 25 MG
25 CAPSULE ORAL NIGHTLY PRN
COMMUNITY
End: 2020-12-28 | Stop reason: SINTOL

## 2018-11-08 RX ORDER — HYDROCHLOROTHIAZIDE 25 MG/1
25 TABLET ORAL DAILY
Qty: 90 TABLET | Refills: 2 | Status: SHIPPED | OUTPATIENT
Start: 2018-11-08 | End: 2018-11-12 | Stop reason: SDUPTHER

## 2018-11-08 RX ORDER — BENZONATATE 100 MG/1
CAPSULE ORAL
Refills: 0 | COMMUNITY
Start: 2018-09-17 | End: 2018-11-08

## 2018-11-08 RX ORDER — IRBESARTAN 300 MG/1
300 TABLET ORAL DAILY
Qty: 90 TABLET | Refills: 2 | Status: SHIPPED | OUTPATIENT
Start: 2018-11-08 | End: 2019-08-12 | Stop reason: SDUPTHER

## 2018-11-08 RX ORDER — AMLODIPINE BESYLATE 5 MG/1
5 TABLET ORAL DAILY
Qty: 90 TABLET | Refills: 2 | Status: SHIPPED | OUTPATIENT
Start: 2018-11-08 | End: 2019-08-29 | Stop reason: SDUPTHER

## 2018-11-08 RX ORDER — AMOXICILLIN AND CLAVULANATE POTASSIUM 875; 125 MG/1; MG/1
TABLET, FILM COATED ORAL
Refills: 0 | COMMUNITY
Start: 2018-09-17 | End: 2018-11-08

## 2018-11-08 RX ORDER — ALBUTEROL SULFATE 90 UG/1
AEROSOL, METERED RESPIRATORY (INHALATION)
COMMUNITY
Start: 2018-09-17 | End: 2018-11-08

## 2018-11-08 RX ORDER — LIRAGLUTIDE 6 MG/ML
INJECTION SUBCUTANEOUS
Refills: 6 | COMMUNITY
Start: 2018-10-21 | End: 2019-02-07 | Stop reason: SDDI

## 2018-11-08 NOTE — PROGRESS NOTES
Angel White is a 63 y.o. male.      Assessment/Plan   Problem List Items Addressed This Visit        Cardiovascular and Mediastinum    Essential hypertension    Relevant Medications    amLODIPine (NORVASC) 5 MG tablet    hydrochlorothiazide (HYDRODIURIL) 25 MG tablet    irbesartan (AVAPRO) 300 MG tablet    Other Relevant Orders    Comprehensive Metabolic Panel    Hyperlipidemia - Primary    Relevant Orders    Lipid Panel    Controlled type 2 diabetes mellitus with diabetic peripheral angiopathy without gangrene, without long-term current use of insulin (CMS/HCC)    Relevant Medications    dapagliflozin (FARXIGA) 5 MG tablet tablet    metFORMIN (GLUCOPHAGE) 500 MG tablet    VICTOZA 18 MG/3ML solution pen-injector injection       Endocrine    Diabetes (CMS/HCC)    Relevant Medications    dapagliflozin (FARXIGA) 5 MG tablet tablet    metFORMIN (GLUCOPHAGE) 500 MG tablet    VICTOZA 18 MG/3ML solution pen-injector injection    Other Relevant Orders    Hemoglobin A1c    Comprehensive Metabolic Panel           The present management of chronic medical problems as prescribed follow-up results of blood work for ongoing management follow up otherwise as needed or in 6 months recommended ADA 1400-calorie diet      Chief Complaint   Patient presents with   • Follow-up     for hypertension   • Follow-up     for cholesterol   • Follow-up     for diabetes     Social History   Substance Use Topics   • Smoking status: Never Smoker   • Smokeless tobacco: Never Used   • Alcohol use No       History of Present Illness   Patient follow-up for chronic problems of hypertension hyperlipidemia diabetes he is not checking his blood sugars is not losing weight in fact on his way here today he had stopped on pounds from plus biscuit we discussed weight loss is primary intervention for treatment of his diabetes is lipids and his hypertension senna chest pain no shortness of breath or increased fatigue.  Rashes is followed by  "ophthalmology  The following portions of the patient's history were reviewed and updated as appropriate:PMHroutine: Social history , Past Medical History, Surgical history , Allergies, Current Medications, Active Problem List and Health Maintenance    Review of Systems   Constitutional: Negative.    HENT: Negative.    Respiratory: Negative.    Cardiovascular: Negative.    Gastrointestinal: Negative.    Musculoskeletal: Negative.    Neurological: Negative.        Objective   Vitals:    11/08/18 1139   BP: 128/74   BP Location: Left arm   Patient Position: Sitting   Cuff Size: Large Adult   Pulse: 76   Resp: 18   SpO2: 98%   Weight: 129 kg (285 lb 1.6 oz)   Height: 185.4 cm (73\")     Body mass index is 37.61 kg/m².  Physical Exam   Constitutional: He is oriented to person, place, and time. He appears well-developed and well-nourished. No distress.   HENT:   Head: Normocephalic and atraumatic.   Eyes: Pupils are equal, round, and reactive to light. Conjunctivae and EOM are normal. Right eye exhibits no discharge. Left eye exhibits no discharge. No scleral icterus.   Neck: Normal range of motion. Neck supple. No tracheal deviation present. No thyromegaly present.   Cardiovascular: Normal rate, regular rhythm, normal heart sounds, intact distal pulses and normal pulses.  Exam reveals no gallop.    No murmur heard.  Pulmonary/Chest: Effort normal and breath sounds normal. No respiratory distress. He has no wheezes. He has no rales.   Musculoskeletal: Normal range of motion.   Neurological: He is alert and oriented to person, place, and time. He exhibits normal muscle tone. Coordination normal.   Skin: Skin is warm. No rash noted. No erythema. No pallor.   Psychiatric: He has a normal mood and affect. His behavior is normal. Judgment and thought content normal.   Nursing note and vitals reviewed.    Reviewed Data:  No visits with results within 1 Month(s) from this visit.   Latest known visit with results is:   Abstract on " 11/20/2015   Component Date Value Ref Range Status   •  Colonoscopy 11/08/2010 Dr. Mccall   Final   •  EYE EXAM 05/15/2015 with dilation   Final

## 2018-11-12 RX ORDER — HYDROCHLOROTHIAZIDE 25 MG/1
25 TABLET ORAL DAILY
Qty: 90 TABLET | Refills: 0 | Status: SHIPPED | OUTPATIENT
Start: 2018-11-12 | End: 2019-02-21 | Stop reason: SDUPTHER

## 2019-01-21 RX ORDER — ATORVASTATIN CALCIUM 40 MG/1
TABLET, FILM COATED ORAL
Qty: 90 TABLET | Refills: 1 | Status: SHIPPED | OUTPATIENT
Start: 2019-01-21 | End: 2019-05-10 | Stop reason: SDUPTHER

## 2019-01-25 DIAGNOSIS — I10 ESSENTIAL HYPERTENSION: ICD-10-CM

## 2019-01-25 RX ORDER — AMLODIPINE BESYLATE 5 MG/1
5 TABLET ORAL DAILY
Qty: 90 TABLET | Refills: 0 | Status: SHIPPED | OUTPATIENT
Start: 2019-01-25 | End: 2019-02-07 | Stop reason: SDUPTHER

## 2019-02-04 ENCOUNTER — RESULTS ENCOUNTER (OUTPATIENT)
Dept: INTERNAL MEDICINE | Facility: CLINIC | Age: 64
End: 2019-02-04

## 2019-02-04 DIAGNOSIS — E78.5 HYPERLIPIDEMIA, UNSPECIFIED HYPERLIPIDEMIA TYPE: ICD-10-CM

## 2019-02-07 ENCOUNTER — OFFICE VISIT (OUTPATIENT)
Dept: INTERNAL MEDICINE | Facility: CLINIC | Age: 64
End: 2019-02-07

## 2019-02-07 VITALS
TEMPERATURE: 98.3 F | RESPIRATION RATE: 18 BRPM | HEART RATE: 90 BPM | DIASTOLIC BLOOD PRESSURE: 70 MMHG | SYSTOLIC BLOOD PRESSURE: 122 MMHG | OXYGEN SATURATION: 97 % | WEIGHT: 273.6 LBS | BODY MASS INDEX: 36.26 KG/M2 | HEIGHT: 73 IN
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
## 2019-02-07 PROCEDURE — 99213 OFFICE O/P EST LOW 20 MIN: CPT | Performed by: FAMILY MEDICINE

## 2019-02-07 PROCEDURE — 90715 TDAP VACCINE 7 YRS/> IM: CPT | Performed by: FAMILY MEDICINE

## 2019-02-07 PROCEDURE — 90471 IMMUNIZATION ADMIN: CPT | Performed by: FAMILY MEDICINE

## 2019-02-07 RX ORDER — AMOXICILLIN AND CLAVULANATE POTASSIUM 875; 125 MG/1; MG/1
1 TABLET, FILM COATED ORAL EVERY 12 HOURS
COMMUNITY
Start: 2019-02-06 | End: 2019-02-16

## 2019-02-07 RX ORDER — FLURBIPROFEN SODIUM 0.3 MG/ML
SOLUTION/ DROPS OPHTHALMIC
Refills: 5 | COMMUNITY
Start: 2019-01-22 | End: 2019-05-03

## 2019-02-07 RX ORDER — PEN NEEDLE, DIABETIC 32GX 5/32"
NEEDLE, DISPOSABLE MISCELLANEOUS
Refills: 0 | COMMUNITY
Start: 2018-11-26 | End: 2019-05-03

## 2019-02-07 RX ORDER — AMOXICILLIN AND CLAVULANATE POTASSIUM 875; 125 MG/1; MG/1
TABLET, FILM COATED ORAL
COMMUNITY
Start: 2019-02-06 | End: 2019-02-07 | Stop reason: SDUPTHER

## 2019-02-07 RX ORDER — ONDANSETRON 4 MG/1
8 TABLET, FILM COATED ORAL DAILY PRN
COMMUNITY
Start: 2018-09-17 | End: 2020-01-06 | Stop reason: SDUPTHER

## 2019-02-07 NOTE — PROGRESS NOTES
"Angel White is a 64 y.o. male.      Assessment/Plan   Problem List Items Addressed This Visit        Other    Cat bite - Primary         line drawn for extent of erythema progresses patient to [facility] emergency department continue with Augmentin tetanus updated        Chief Complaint   Patient presents with   • Animal Bite     X 2 monday on right arm, yesterday left hand      Social History     Tobacco Use   • Smoking status: Never Smoker   • Smokeless tobacco: Never Used   Substance Use Topics   • Alcohol use: No   • Drug use: No       History of Present Illness   Cat bite yesterday noon then to urgent care 3:30 no tetanus update pateint's cat, aroussed by dog,  recently evaluated by vet for illness eye drop medicine for cat getting better.   Patient to urgent care was started on Augmentin did not received tetanus update  The following portions of the patient's history were reviewed and updated as appropriate:PMHroutine: Social history , Past Medical History, Surgical history , Allergies, Current Medications, Active Problem List and Health Maintenance    Review of Systems   Constitutional: Negative.    HENT: Negative.    Respiratory: Negative.    Cardiovascular: Negative.        Objective   Vitals:    02/07/19 1226   BP: 122/70   BP Location: Right arm   Patient Position: Sitting   Cuff Size: Large Adult   Pulse: 90   Resp: 18   Temp: 98.3 °F (36.8 °C)   TempSrc: Oral   SpO2: 97%   Weight: 124 kg (273 lb 9.6 oz)   Height: 185.4 cm (73\")     Body mass index is 36.1 kg/m².  Physical Exam   Constitutional: He appears well-developed and well-nourished.   Musculoskeletal:        Right wrist: He exhibits tenderness and swelling.   Nursing note and vitals reviewed.    Reviewed Data:  No visits with results within 1 Month(s) from this visit.   Latest known visit with results is:   Abstract on 11/20/2015   Component Date Value Ref Range Status   • HM Colonoscopy 11/08/2010 Dr. Mccall   Final   •  EYE EXAM " 05/15/2015 with dilation   Final

## 2019-02-21 RX ORDER — HYDROCHLOROTHIAZIDE 25 MG/1
25 TABLET ORAL DAILY
Qty: 90 TABLET | Refills: 0 | Status: SHIPPED | OUTPATIENT
Start: 2019-02-21 | End: 2019-05-18 | Stop reason: SDUPTHER

## 2019-04-29 DIAGNOSIS — I10 ESSENTIAL HYPERTENSION: ICD-10-CM

## 2019-04-29 RX ORDER — AMLODIPINE BESYLATE 5 MG/1
5 TABLET ORAL DAILY
Qty: 90 TABLET | Refills: 0 | Status: SHIPPED | OUTPATIENT
Start: 2019-04-29 | End: 2019-05-03 | Stop reason: SDUPTHER

## 2019-05-03 ENCOUNTER — OFFICE VISIT (OUTPATIENT)
Dept: INTERNAL MEDICINE | Facility: CLINIC | Age: 64
End: 2019-05-03

## 2019-05-03 VITALS
TEMPERATURE: 98.1 F | HEART RATE: 67 BPM | BODY MASS INDEX: 37.39 KG/M2 | WEIGHT: 283.4 LBS | DIASTOLIC BLOOD PRESSURE: 82 MMHG | OXYGEN SATURATION: 98 % | SYSTOLIC BLOOD PRESSURE: 120 MMHG

## 2019-05-03 DIAGNOSIS — K52.9 GASTROENTERITIS: Primary | ICD-10-CM

## 2019-05-03 DIAGNOSIS — E66.01 MORBID OBESITY (HCC): ICD-10-CM

## 2019-05-03 DIAGNOSIS — E78.5 HYPERLIPIDEMIA, UNSPECIFIED HYPERLIPIDEMIA TYPE: ICD-10-CM

## 2019-05-03 DIAGNOSIS — J30.9 ALLERGIC RHINITIS, UNSPECIFIED SEASONALITY, UNSPECIFIED TRIGGER: ICD-10-CM

## 2019-05-03 DIAGNOSIS — E11.51 CONTROLLED TYPE 2 DIABETES MELLITUS WITH DIABETIC PERIPHERAL ANGIOPATHY WITHOUT GANGRENE, WITHOUT LONG-TERM CURRENT USE OF INSULIN (HCC): ICD-10-CM

## 2019-05-03 DIAGNOSIS — I10 ESSENTIAL HYPERTENSION: ICD-10-CM

## 2019-05-03 PROBLEM — R11.2 NON-INTRACTABLE VOMITING WITH NAUSEA: Status: RESOLVED | Noted: 2018-06-25 | Resolved: 2019-05-03

## 2019-05-03 PROCEDURE — 99214 OFFICE O/P EST MOD 30 MIN: CPT | Performed by: FAMILY MEDICINE

## 2019-05-03 NOTE — PROGRESS NOTES
Angel White is a 64 y.o. male.      Assessment/Plan   Problem List Items Addressed This Visit        Cardiovascular and Mediastinum    Essential hypertension    Hyperlipidemia    Controlled type 2 diabetes mellitus with diabetic peripheral angiopathy without gangrene, without long-term current use of insulin (CMS/[facility])       Respiratory    Allergic rhinitis       Digestive    Morbid obesity (CMS/HCC)    Gastroenteritis - Primary         flonase pm claritin 10 mg am   0-calorie diet    Return in about 3 months (around 8/3/2019), or if symptoms worsen or fail to improve, for Recheck, Next scheduled follow up.      Chief Complaint   Patient presents with   • sinus congestion     skin issue right arm (inside)   • Vomiting   • Diarrhea   • stomach issues   • stomach feels bloated     Social History     Tobacco Use   • Smoking status: Never Smoker   • Smokeless tobacco: Never Used   Substance Use Topics   • Alcohol use: No   • Drug use: No       History of Present Illness   Vomit 6 times ast 2 weeks  Loose karis 2 tiems last week no black or bloody stool vomit not dark he is actually feeling mostly head congestion and drainage blood pressure is well controlled history labs reviewed from February 2019 all within normal limits based on treatment plan for chronic problems of hypertension hyperlipidemia obesity diabetes  The following portions of the patient's history were reviewed and updated as appropriate:PMHroutine: Social history , Allergies, Current Medications, Active Problem List and Health Maintenance  Doeas not check BS or BP  Review of Systems   Constitutional: Positive for fatigue. Negative for activity change and unexpected weight change.   HENT: Positive for congestion, postnasal drip and sore throat. Negative for ear pain.    Eyes: Negative for pain and discharge.   Respiratory: Positive for cough. Negative for chest tightness, shortness of breath and wheezing.    Cardiovascular: Negative for chest  pain and palpitations.   Gastrointestinal: Negative for abdominal pain, diarrhea and vomiting.   Endocrine: Negative.    Genitourinary: Negative.    Musculoskeletal: Negative for joint swelling.   Skin: Negative for color change, rash and wound.   Allergic/Immunologic: Negative.    Neurological: Negative for seizures and syncope.   Psychiatric/Behavioral: Negative.        Objective   Vitals:    05/03/19 1354   BP: 120/82   BP Location: Right arm   Patient Position: Sitting   Cuff Size: Large Adult   Pulse: 67   Temp: 98.1 °F (36.7 °C)   TempSrc: Oral   SpO2: 98%   Weight: 129 kg (283 lb 6.4 oz)     Body mass index is 37.39 kg/m².  Physical Exam   Constitutional: He is oriented to person, place, and time. He appears well-developed and well-nourished.  Non-toxic appearance. No distress.   HENT:   Head: Normocephalic and atraumatic. Hair is normal.   Right Ear: External ear normal. No drainage, swelling or tenderness. Tympanic membrane is retracted.   Left Ear: External ear normal. No drainage, swelling or tenderness. Tympanic membrane is retracted.   Nose: Mucosal edema present. No epistaxis.   Mouth/Throat: Uvula is midline and mucous membranes are normal. No oral lesions. No uvula swelling. Posterior oropharyngeal erythema present. No oropharyngeal exudate.   Eyes: Conjunctivae and EOM are normal. Pupils are equal, round, and reactive to light. Right eye exhibits no discharge. Left eye exhibits no discharge. No scleral icterus.   Neck: Normal range of motion. Neck supple.   Cardiovascular: Normal rate, regular rhythm and normal heart sounds. Exam reveals no gallop.   No murmur heard.  Pulmonary/Chest: Breath sounds normal. No stridor. No respiratory distress. He has no wheezes. He has no rales. He exhibits no tenderness.   Abdominal: Soft. There is no tenderness.   Lymphadenopathy:     He has cervical adenopathy.   Neurological: He is alert and oriented to person, place, and time. He exhibits normal muscle tone.    Skin: Skin is warm and dry. No rash noted. He is not diaphoretic.   Psychiatric: He has a normal mood and affect. His behavior is normal. Judgment and thought content normal.   Nursing note and vitals reviewed.    Reviewed Data:  No visits with results within 1 Month(s) from this visit.   Latest known visit with results is:   Abstract on 11/20/2015   Component Date Value Ref Range Status   •  Colonoscopy 11/08/2010 Dr. Mccall   Final   •  EYE EXAM 05/15/2015 with dilation   Final

## 2019-05-10 DIAGNOSIS — I25.10 ARTERIOSCLEROTIC HEART DISEASE: ICD-10-CM

## 2019-05-10 RX ORDER — CLOPIDOGREL BISULFATE 75 MG/1
75 TABLET ORAL DAILY
Qty: 90 TABLET | Refills: 1 | Status: SHIPPED | OUTPATIENT
Start: 2019-05-10 | End: 2019-11-07 | Stop reason: SDUPTHER

## 2019-05-10 RX ORDER — ATORVASTATIN CALCIUM 40 MG/1
40 TABLET, FILM COATED ORAL DAILY
Qty: 90 TABLET | Refills: 1 | Status: SHIPPED | OUTPATIENT
Start: 2019-05-10 | End: 2020-01-06 | Stop reason: SDUPTHER

## 2019-05-10 RX ORDER — ATORVASTATIN CALCIUM 20 MG/1
20 TABLET, FILM COATED ORAL DAILY
Qty: 90 TABLET | Refills: 1 | OUTPATIENT
Start: 2019-05-10

## 2019-05-16 ENCOUNTER — TELEPHONE (OUTPATIENT)
Dept: INTERNAL MEDICINE | Facility: CLINIC | Age: 64
End: 2019-05-16

## 2019-05-16 NOTE — TELEPHONE ENCOUNTER
----- Message from Momo Meza MD sent at 5/16/2019  5:03 PM EDT -----  Labs all good continue same medications follow-up in 6 months

## 2019-05-16 NOTE — TELEPHONE ENCOUNTER
Patient's wife, Jaylene, notified.  Results were gone over with patient at his office visit with Dr. Meza.

## 2019-05-20 RX ORDER — HYDROCHLOROTHIAZIDE 25 MG/1
25 TABLET ORAL DAILY
Qty: 90 TABLET | Refills: 0 | Status: SHIPPED | OUTPATIENT
Start: 2019-05-20 | End: 2019-08-15 | Stop reason: SDUPTHER

## 2019-07-15 ENCOUNTER — OFFICE VISIT (OUTPATIENT)
Dept: INTERNAL MEDICINE | Facility: CLINIC | Age: 64
End: 2019-07-15

## 2019-07-15 VITALS
OXYGEN SATURATION: 97 % | WEIGHT: 286 LBS | TEMPERATURE: 98.4 F | DIASTOLIC BLOOD PRESSURE: 80 MMHG | HEART RATE: 69 BPM | SYSTOLIC BLOOD PRESSURE: 114 MMHG | BODY MASS INDEX: 37.73 KG/M2

## 2019-07-15 DIAGNOSIS — I10 ESSENTIAL HYPERTENSION: Primary | ICD-10-CM

## 2019-07-15 DIAGNOSIS — E66.01 MORBID OBESITY (HCC): ICD-10-CM

## 2019-07-15 DIAGNOSIS — R10.11 RIGHT UPPER QUADRANT ABDOMINAL PAIN: ICD-10-CM

## 2019-07-15 PROBLEM — K52.9 GASTROENTERITIS: Status: RESOLVED | Noted: 2018-06-25 | Resolved: 2019-07-15

## 2019-07-15 PROCEDURE — 99214 OFFICE O/P EST MOD 30 MIN: CPT | Performed by: FAMILY MEDICINE

## 2019-07-15 RX ORDER — LIRAGLUTIDE 6 MG/ML
INJECTION SUBCUTANEOUS
Refills: 2 | COMMUNITY
Start: 2019-05-04 | End: 2019-07-15 | Stop reason: SINTOL

## 2019-07-15 RX ORDER — FLURBIPROFEN SODIUM 0.3 MG/ML
SOLUTION/ DROPS OPHTHALMIC
Refills: 5 | Status: ON HOLD | COMMUNITY
Start: 2019-05-06 | End: 2021-10-29

## 2019-07-15 NOTE — PROGRESS NOTES
Angel White Jr. is a 64 y.o. male.      Assessment/Plan   Problem List Items Addressed This Visit        Cardiovascular and Mediastinum    Essential hypertension - Primary    Relevant Orders    Comprehensive Metabolic Panel       Digestive    Morbid obesity (CMS/HCC)       Nervous and Auditory    Right upper quadrant abdominal pain    Relevant Orders    US Gallbladder    CBC & Differential         Start Flonase stop afrin   Benadryl at night, claritin in AM  Stop victoza       Return in about 1 week (around 7/22/2019), or if symptoms worsen or fail to improve, for Recheck, Next scheduled follow up.      Chief Complaint   Patient presents with   • stomach issues - vomiting in the morning about every other d   • sinus drainage     Social History     Tobacco Use   • Smoking status: Never Smoker   • Smokeless tobacco: Never Used   Substance Use Topics   • Alcohol use: No   • Drug use: No       History of Present Illness   Patient follows up appointment for persistent morning nausea and vomiting although he has not lost any weight he has no fever no sweats or chills he doses his Victoza in the evening he also takes NyQuil and Benadryl for sleep to help dry his draining he uses Afrin daily to help with his postnasal drip he does not have any blood pressure elevated readings there is no recurrence of symptoms consistent with his coronary disease relatable to nausea usually labor chest pain chest pressure and he has not had any since his stent placement  He does have some intermittent abdominal pain mostly related to meals so he eats small meals throughout the day no family history of gallbladder disease  The following portions of the patient's history were reviewed and updated as appropriate:PMHroutine: Social history , Allergies, Current Medications, Active Problem List and Health Maintenance    Review of Systems   Constitutional: Negative.    HENT: Positive for congestion and postnasal drip. Negative for dental  problem, ear pain and trouble swallowing.    Respiratory: Negative.    Cardiovascular: Negative.    Gastrointestinal: Positive for abdominal pain, nausea and vomiting. Negative for abdominal distention.   Genitourinary: Negative.    Musculoskeletal: Negative.    Hematological: Negative.    Psychiatric/Behavioral: Negative.        Objective   Vitals:    07/15/19 1259   BP: 114/80   BP Location: Right arm   Patient Position: Sitting   Cuff Size: Large Adult   Pulse: 69   Temp: 98.4 °F (36.9 °C)   TempSrc: Oral   SpO2: 97%   Weight: 130 kg (286 lb)     Body mass index is 37.73 kg/m².  Physical Exam   Constitutional: He is oriented to person, place, and time. He appears well-developed and well-nourished.   HENT:   Head: Normocephalic and atraumatic.   Eyes: Conjunctivae are normal. Pupils are equal, round, and reactive to light. Right eye exhibits no discharge. Left eye exhibits no discharge. No scleral icterus.   Neck: Normal range of motion. No thyromegaly present.   Cardiovascular: Normal rate and regular rhythm.   Pulmonary/Chest: Effort normal and breath sounds normal.   Abdominal: Soft. Bowel sounds are normal. He exhibits no distension and no mass. There is tenderness. There is no guarding.   Musculoskeletal: He exhibits no edema.   Neurological: He is alert and oriented to person, place, and time.   Skin: Skin is warm.   Psychiatric: He has a normal mood and affect. His behavior is normal. Judgment and thought content normal.   Nursing note and vitals reviewed.    Reviewed Data:  No visits with results within 1 Month(s) from this visit.   Latest known visit with results is:   Abstract on 11/20/2015   Component Date Value Ref Range Status   •  Colonoscopy 11/08/2010 Dr. Mccall   Final   •  EYE EXAM 05/15/2015 with dilation   Final

## 2019-07-16 LAB
ALBUMIN SERPL-MCNC: 4.5 G/DL (ref 3.5–5.2)
ALBUMIN/GLOB SERPL: 2.4 G/DL
ALP SERPL-CCNC: 93 U/L (ref 39–117)
ALT SERPL-CCNC: 16 U/L (ref 1–41)
AST SERPL-CCNC: 12 U/L (ref 1–40)
BASOPHILS # BLD AUTO: 0.1 10*3/MM3 (ref 0–0.2)
BASOPHILS NFR BLD AUTO: 1.2 % (ref 0–1.5)
BILIRUB SERPL-MCNC: 0.4 MG/DL (ref 0.2–1.2)
BUN SERPL-MCNC: 9 MG/DL (ref 8–23)
BUN/CREAT SERPL: 9.3 (ref 7–25)
CALCIUM SERPL-MCNC: 9.3 MG/DL (ref 8.6–10.5)
CHLORIDE SERPL-SCNC: 102 MMOL/L (ref 98–107)
CO2 SERPL-SCNC: 30.1 MMOL/L (ref 22–29)
CREAT SERPL-MCNC: 0.97 MG/DL (ref 0.76–1.27)
EOSINOPHIL # BLD AUTO: 0.46 10*3/MM3 (ref 0–0.4)
EOSINOPHIL NFR BLD AUTO: 5.5 % (ref 0.3–6.2)
ERYTHROCYTE [DISTWIDTH] IN BLOOD BY AUTOMATED COUNT: 13.2 % (ref 12.3–15.4)
GLOBULIN SER CALC-MCNC: 1.9 GM/DL
GLUCOSE SERPL-MCNC: 102 MG/DL (ref 65–99)
HCT VFR BLD AUTO: 43.9 % (ref 37.5–51)
HGB BLD-MCNC: 14.2 G/DL (ref 13–17.7)
IMM GRANULOCYTES # BLD AUTO: 0.02 10*3/MM3 (ref 0–0.05)
IMM GRANULOCYTES NFR BLD AUTO: 0.2 % (ref 0–0.5)
LYMPHOCYTES # BLD AUTO: 1.84 10*3/MM3 (ref 0.7–3.1)
LYMPHOCYTES NFR BLD AUTO: 22 % (ref 19.6–45.3)
MCH RBC QN AUTO: 28.8 PG (ref 26.6–33)
MCHC RBC AUTO-ENTMCNC: 32.3 G/DL (ref 31.5–35.7)
MCV RBC AUTO: 89 FL (ref 79–97)
MONOCYTES # BLD AUTO: 0.56 10*3/MM3 (ref 0.1–0.9)
MONOCYTES NFR BLD AUTO: 6.7 % (ref 5–12)
NEUTROPHILS # BLD AUTO: 5.39 10*3/MM3 (ref 1.7–7)
NEUTROPHILS NFR BLD AUTO: 64.4 % (ref 42.7–76)
NRBC BLD AUTO-RTO: 0 /100 WBC (ref 0–0.2)
PLATELET # BLD AUTO: 282 10*3/MM3 (ref 140–450)
POTASSIUM SERPL-SCNC: 4.5 MMOL/L (ref 3.5–5.2)
PROT SERPL-MCNC: 6.4 G/DL (ref 6–8.5)
RBC # BLD AUTO: 4.93 10*6/MM3 (ref 4.14–5.8)
SODIUM SERPL-SCNC: 143 MMOL/L (ref 136–145)
WBC # BLD AUTO: 8.37 10*3/MM3 (ref 3.4–10.8)

## 2019-07-19 ENCOUNTER — HOSPITAL ENCOUNTER (OUTPATIENT)
Dept: ULTRASOUND IMAGING | Facility: HOSPITAL | Age: 64
Discharge: HOME | End: 2019-07-19
Admitting: FAMILY MEDICINE

## 2019-07-19 DIAGNOSIS — R10.11 RIGHT UPPER QUADRANT ABDOMINAL PAIN: ICD-10-CM

## 2019-07-19 PROCEDURE — 76705 ECHO EXAM OF ABDOMEN: CPT

## 2019-07-22 ENCOUNTER — OFFICE VISIT (OUTPATIENT)
Dept: CARDIOLOGY | Facility: CLINIC | Age: 64
End: 2019-07-22

## 2019-07-22 VITALS
HEIGHT: 73 IN | HEART RATE: 70 BPM | BODY MASS INDEX: 38.04 KG/M2 | SYSTOLIC BLOOD PRESSURE: 130 MMHG | OXYGEN SATURATION: 98 % | DIASTOLIC BLOOD PRESSURE: 90 MMHG | WEIGHT: 287 LBS

## 2019-07-22 DIAGNOSIS — E78.5 HYPERLIPIDEMIA, UNSPECIFIED HYPERLIPIDEMIA TYPE: ICD-10-CM

## 2019-07-22 DIAGNOSIS — I25.10 CORONARY ARTERY DISEASE INVOLVING NATIVE CORONARY ARTERY OF NATIVE HEART WITHOUT ANGINA PECTORIS: Primary | ICD-10-CM

## 2019-07-22 DIAGNOSIS — I10 ESSENTIAL HYPERTENSION: ICD-10-CM

## 2019-07-22 PROCEDURE — 93000 ELECTROCARDIOGRAM COMPLETE: CPT | Performed by: NURSE PRACTITIONER

## 2019-07-22 PROCEDURE — 99214 OFFICE O/P EST MOD 30 MIN: CPT | Performed by: NURSE PRACTITIONER

## 2019-07-22 NOTE — PROGRESS NOTES
Date of Office Visit: 2019  Encounter Provider: TREY Matamoros  Place of Service: [facility] CARDIOLOGY  Patient Name: Angel White Jr.  :1955    Chief Complaint   Patient presents with   • Coronary Artery Disease     1 yr f/u   :     HPI: Angel White Jr. is a 64 y.o. male, new to me, who presents today for follow-up.  Old records have been obtained and reviewed by me.  He is a former patient of Dr. Hall's with a past cardiac history significant for hypertension, hyperlipidemia, and coronary artery disease.  In 2015, he presented with unstable angina and cardiac catheterization revealed an EF of 45%, normal left main, 80% proximal LAD, 80% mid LAD, totally occluded ramus, 20% diffuse circumflex stenosis, 80% OM1, 80% proximal RCA, and 80% PDA.  He underwent successful drug-eluting stenting of the proximal to mid LAD as well as the ramus.  On 2015, he returned for successful stenting of the PDA and proximal RCA.  He was last seen in the office on 2018 at which time he was doing well from a cardiac standpoint with no complaints of angina or heart failure.  He was under a lot of stress at home which was causing him to overeat and experience a lot of chest pain.  Dr. Hall felt his symptoms were completely related to stress and no changes were made to his medical regimen.  Dr. Hall recommended him staying on dual antiplatelet therapy indefinitely due to his multiple stents.  He was advised to follow-up in 1 year.   Over the past year he has been doing well from a cardiac standpoint.  He denies any chest pain, shortness of air, palpitations, edema, dizziness, or syncope.  He denies any bleeding difficulties.  He has recently been struggling with some sinus issues for which she has seen his PCP.  He has since started on Claritin and Flonase which she states have helped tremendously.  He has also been having some stomach issues for which he underwent a  gallbladder ultrasound.  Evidently he has been diagnosed with a fatty liver.  He admits that his diet has overall not been not great.  He is interested in maybe starting one of the diet regimens where they send you prepared meals.  He has a 90 pound goldendoodle who he takes on walks every day.  He is able to do this without any difficulty.    Past Medical History:   Diagnosis Date   • AP (angina pectoris) (CMS/HCC)     unstable   • CAD (coronary artery disease)    • DDD (degenerative disc disease), lumbosacral    • Diabetes (CMS/HCC)    • Essential hypertension    • Fatty liver    • Osteoarthritis of knee    • Screening for prostate cancer 2010    normal   • Sleep apnea     started wearing a CPAP on 10/19/16       Past Surgical History:   Procedure Laterality Date   • CORONARY ANGIOPLASTY WITH STENT PLACEMENT  08/28/2015    6 stents   • FOOT SURGERY Right 05/23/2017   • REPLACEMENT TOTAL KNEE Left 2010   • REPLACEMENT TOTAL KNEE Right 2009   • SKIN CANCER EXCISION Left 03/30/2016    left ear and left eyebrow, graft took from face, placed on ear       Social History     Socioeconomic History   • Marital status:      Spouse name: Not on file   • Number of children: Not on file   • Years of education: Not on file   • Highest education level: Not on file   Tobacco Use   • Smoking status: Never Smoker   • Smokeless tobacco: Never Used   Substance and Sexual Activity   • Alcohol use: No   • Drug use: No   • Sexual activity: Yes     Partners: Female       Family History   Problem Relation Age of Onset   • Hypertension Mother    • Heart disease Father    • Hypertension Father    • Macular degeneration Father    • Arthritis Maternal Grandmother    • Heart disease Maternal Grandmother    • Arthritis Maternal Grandfather    • Heart disease Maternal Grandfather    • Diabetes Maternal Grandfather    • Hypertension Maternal Grandfather    • Cancer Maternal Grandfather         colon?   • Arthritis Paternal Grandmother    •  Heart disease Paternal Grandmother    • Diabetes Paternal Grandmother    • Hypertension Paternal Grandmother    • Stroke Paternal Grandmother    • Arthritis Paternal Grandfather    • Heart disease Paternal Grandfather    • Macular degeneration Sister        Review of Systems   Constitution: Positive for malaise/fatigue. Negative for chills and fever.   Cardiovascular: Negative for chest pain, dyspnea on exertion, leg swelling, near-syncope, orthopnea, palpitations, paroxysmal nocturnal dyspnea and syncope.   Respiratory: Positive for shortness of breath. Negative for cough.    Musculoskeletal: Negative for joint pain, joint swelling and myalgias.   Gastrointestinal: Negative for abdominal pain, diarrhea, melena, nausea and vomiting.   Genitourinary: Negative for frequency and hematuria.   Neurological: Negative for light-headedness, numbness, paresthesias and seizures.   Allergic/Immunologic: Negative.    All other systems reviewed and are negative.      Allergies   Allergen Reactions   • Morphine And Related Itching         Current Outpatient Medications:   •  amLODIPine (NORVASC) 5 MG tablet, Take 1 tablet by mouth Daily., Disp: 90 tablet, Rfl: 2  •  aspirin 81 MG EC tablet, Take 81 mg by mouth daily., Disp: , Rfl:   •  atorvastatin (LIPITOR) 40 MG tablet, Take 1 tablet by mouth Daily., Disp: 90 tablet, Rfl: 1  •  B-D UF III MINI PEN NEEDLES 31G X 5 MM misc, INJECT VICTOZA SUB-Q DAILY, Disp: , Rfl: 5  •  clopidogrel (PLAVIX) 75 MG tablet, TAKE 1 TABLET BY MOUTH DAILY., Disp: 90 tablet, Rfl: 1  •  diphenhydrAMINE (BENADRYL) 25 mg capsule, Take 25 mg by mouth At Night As Needed for Itching., Disp: , Rfl:   •  hydrochlorothiazide (HYDRODIURIL) 25 MG tablet, TAKE 1 TABLET BY MOUTH DAILY., Disp: 90 tablet, Rfl: 0  •  irbesartan (AVAPRO) 300 MG tablet, Take 1 tablet by mouth Daily., Disp: 90 tablet, Rfl: 2  •  Lancets (ONETOUCH ULTRASOFT) lancets, 2 (Two) Times a Day. for testing, Disp: , Rfl: 2  •  metFORMIN  "(GLUCOPHAGE) 500 MG tablet, TAKE ONE TABLET BY MOUTH TWICE DAILY WITH MEALS, Disp: 60 tablet, Rfl: 5  •  ondansetron (ZOFRAN) 4 MG tablet, Take 8 mg by mouth Daily As Needed., Disp: , Rfl:   •  ONETOUCH VERIO test strip, 2 (Two) Times a Day. for testing, Disp: , Rfl: 2      Objective:     Vitals:    07/22/19 0949 07/22/19 0959   BP: 132/88 130/90   BP Location: Right arm Left arm   Pulse: 70    SpO2: 98%    Weight: 130 kg (287 lb)    Height: 185.4 cm (73\")      Body mass index is 37.87 kg/m².    PHYSICAL EXAM:    Physical Exam   Constitutional: He is oriented to person, place, and time. He appears well-developed and well-nourished. No distress.   HENT:   Head: Normocephalic and atraumatic.   Eyes: Pupils are equal, round, and reactive to light.   Neck: No JVD present. No thyromegaly present.   Cardiovascular: Normal rate, regular rhythm, normal heart sounds and intact distal pulses.   No murmur heard.  Pulmonary/Chest: Effort normal and breath sounds normal. No respiratory distress.   Abdominal: Soft. Bowel sounds are normal. He exhibits no distension. There is no splenomegaly or hepatomegaly. There is no tenderness.   Musculoskeletal: Normal range of motion. He exhibits no edema.   Neurological: He is alert and oriented to person, place, and time.   Skin: Skin is warm and dry. He is not diaphoretic. No erythema.   Psychiatric: He has a normal mood and affect. His behavior is normal. Judgment normal.         ECG 12 Lead  Date/Time: 7/22/2019 10:04 AM  Performed by: Yudelka Baer APRN  Authorized by: Yudelka Baer APRN   Comparison: compared with previous ECG from 7/18/2018  Similar to previous ECG  Rhythm: sinus rhythm  Rate: normal  BPM: 64    Clinical impression: normal ECG  Comments: Indication: CAD              Assessment:       Diagnosis Plan   1. Coronary artery disease involving native coronary artery of native heart without angina pectoris  ECG 12 Lead   2. Essential hypertension     3. " Hyperlipidemia, unspecified hyperlipidemia type       Orders Placed This Encounter   Procedures   • ECG 12 Lead     This order was created via procedure documentation          Plan:       1. Coronary Artery Disease  Assessment  • The patient has no angina  • The patient is having symptoms consistent with unstable angina     Plan  • Lifestyle modifications discussed include adhering to a heart healthy diet, maintenance of a healthy weight, medication compliance and regular exercise    Subjective - Objective  • There has been a previous stent procedure using VIOLET  • Current antiplatelet therapy includes aspirin 81 mg and clopidogrel 75 mg        2.  Hypertension.  His blood pressure today looks good.  Continue current regimen.      3.  Hyperlipidemia.  His last lipid panel was on 2/27/2018.  It revealed a LDL of 67 and HDL of 58.  Continue current regimen.      Overall I think he is doing well from a cardiac standpoint.  He denies any symptoms of angina or heart failure.  I did tell him to be careful with the diet plans that send you prepared meals because they tend to be loaded with a lot of sodium.  I am not going to make any changes to his medical regimen and he will follow-up with Dr. Dumont as a transference of care in 1 year or sooner if needed.      As always, it has been a pleasure to participate in your patient's care.      Sincerely,         TREY Barrera

## 2019-07-28 DIAGNOSIS — I10 ESSENTIAL HYPERTENSION: ICD-10-CM

## 2019-07-29 RX ORDER — AMLODIPINE BESYLATE 5 MG/1
5 TABLET ORAL DAILY
Qty: 90 TABLET | Refills: 0 | Status: SHIPPED | OUTPATIENT
Start: 2019-07-29 | End: 2019-12-05 | Stop reason: SDUPTHER

## 2019-08-08 ENCOUNTER — OFFICE VISIT (OUTPATIENT)
Dept: INTERNAL MEDICINE | Facility: CLINIC | Age: 64
End: 2019-08-08

## 2019-08-08 VITALS
DIASTOLIC BLOOD PRESSURE: 60 MMHG | TEMPERATURE: 98.3 F | BODY MASS INDEX: 36.98 KG/M2 | OXYGEN SATURATION: 95 % | HEART RATE: 73 BPM | SYSTOLIC BLOOD PRESSURE: 114 MMHG | WEIGHT: 280.3 LBS

## 2019-08-08 DIAGNOSIS — R10.11 RIGHT UPPER QUADRANT ABDOMINAL PAIN: Primary | ICD-10-CM

## 2019-08-08 DIAGNOSIS — J30.9 ALLERGIC RHINITIS, UNSPECIFIED SEASONALITY, UNSPECIFIED TRIGGER: ICD-10-CM

## 2019-08-08 DIAGNOSIS — E66.01 MORBID OBESITY (HCC): ICD-10-CM
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
## 2019-08-08 PROBLEM — K59.09 OTHER CONSTIPATION: Status: RESOLVED | Noted: 2018-02-19 | Resolved: 2019-08-08

## 2019-08-08 PROCEDURE — 99214 OFFICE O/P EST MOD 30 MIN: CPT | Performed by: FAMILY MEDICINE

## 2019-08-08 RX ORDER — FAMOTIDINE 20 MG/1
20 TABLET, FILM COATED ORAL 2 TIMES DAILY
Qty: 60 TABLET | Refills: 1 | Status: SHIPPED | OUTPATIENT
Start: 2019-08-08 | End: 2019-08-31 | Stop reason: SDUPTHER

## 2019-08-08 RX ORDER — LIRAGLUTIDE 6 MG/ML
INJECTION SUBCUTANEOUS
Refills: 2 | COMMUNITY
Start: 2019-07-29 | End: 2020-09-04 | Stop reason: ALTCHOICE

## 2019-08-08 NOTE — PROGRESS NOTES
Angel White Jr. is a 64 y.o. male.      Assessment/Plan   Problem List Items Addressed This Visit        Respiratory    Allergic rhinitis       Digestive    Morbid obesity (CMS/HCC)       Nervous and Auditory    Right upper quadrant abdominal pain - Primary           Continue times a weight loss calorie restriction diet as well as over-the-counter remedies for allergic rhinitis trial of Pepcid for GERD symptoms follow-up if no improvement otherwise    Return in about 6 months (around 2/8/2020), or if symptoms worsen or fail to improve, for Recheck, Next scheduled follow up.      Chief Complaint   Patient presents with   • follow up to gallbladder ultrasound   Hypertension rhinitis obesity  Social History     Tobacco Use   • Smoking status: Never Smoker   • Smokeless tobacco: Never Used   Substance Use Topics   • Alcohol use: No   • Drug use: No       History of Present Illness   Appointment chronic problems of right upper quadrant abdominal pain morbid obesity allergic rhinitis he still has some intermittent persistent pain of his right upper quadrant no change in bowel habits does not change with meals but is seems to be associated he has used intermittent over-the-counter remedies for reflux and sometimes have been helpful his allergies are well controlled like continue same management he is going to try to continue weight loss with calorie restriction diet but having little benefit, ultrasound of his gallbladder which shows some diffuse fatty liver changes otherwise normal gallbladder with no gallstones  The following portions of the patient's history were reviewed and updated as appropriate:PMHroutine: Social history , Allergies, Current Medications, Active Problem List and Health Maintenance    Review of Systems   Constitutional: Negative.    HENT: Negative.  Negative for dental problem, ear pain and trouble swallowing.    Eyes: Negative.    Respiratory: Negative.    Cardiovascular: Negative.     Genitourinary: Negative.    Musculoskeletal: Negative.    Hematological: Negative.    Psychiatric/Behavioral: Negative.        Objective   Vitals:    08/08/19 1254   BP: 114/60   BP Location: Left arm   Patient Position: Sitting   Cuff Size: Large Adult   Pulse: 73   Temp: 98.3 °F (36.8 °C)   TempSrc: Oral   SpO2: 95%   Weight: 127 kg (280 lb 4.8 oz)     Body mass index is 36.98 kg/m².  Physical Exam   Constitutional: He is oriented to person, place, and time. He appears well-developed and well-nourished.   HENT:   Head: Normocephalic and atraumatic.   Eyes: Conjunctivae are normal. Pupils are equal, round, and reactive to light. Right eye exhibits no discharge. Left eye exhibits no discharge. No scleral icterus.   Neck: Normal range of motion. No thyromegaly present.   Cardiovascular: Normal rate and regular rhythm.   Pulmonary/Chest: Effort normal and breath sounds normal.   Abdominal: Soft. Bowel sounds are normal. He exhibits no distension and no mass. There is tenderness. There is no guarding.   Musculoskeletal: He exhibits no edema.   Neurological: He is alert and oriented to person, place, and time.   Skin: Skin is warm.   Psychiatric: He has a normal mood and affect. His behavior is normal. Judgment and thought content normal.   Nursing note and vitals reviewed.    Reviewed Data:  Office Visit on 07/15/2019   Component Date Value Ref Range Status   • Glucose 07/15/2019 102* 65 - 99 mg/dL Final   • BUN 07/15/2019 9  8 - 23 mg/dL Final   • Creatinine 07/15/2019 0.97  0.76 - 1.27 mg/dL Final   • eGFR Non African Am 07/15/2019 78  >60 mL/min/1.73 Final   • eGFR African Am 07/15/2019 95  >60 mL/min/1.73 Final   • BUN/Creatinine Ratio 07/15/2019 9.3  7.0 - 25.0 Final   • Sodium 07/15/2019 143  136 - 145 mmol/L Final   • Potassium 07/15/2019 4.5  3.5 - 5.2 mmol/L Final   • Chloride 07/15/2019 102  98 - 107 mmol/L Final   • Total CO2 07/15/2019 30.1* 22.0 - 29.0 mmol/L Final   • Calcium 07/15/2019 9.3  8.6 - 10.5  mg/dL Final   • Total Protein 07/15/2019 6.4  6.0 - 8.5 g/dL Final   • Albumin 07/15/2019 4.50  3.50 - 5.20 g/dL Final   • Globulin 07/15/2019 1.9  gm/dL Final   • A/G Ratio 07/15/2019 2.4  g/dL Final   • Total Bilirubin 07/15/2019 0.4  0.2 - 1.2 mg/dL Final   • Alkaline Phosphatase 07/15/2019 93  39 - 117 U/L Final   • AST (SGOT) 07/15/2019 12  1 - 40 U/L Final   • ALT (SGPT) 07/15/2019 16  1 - 41 U/L Final   • WBC 07/15/2019 8.37  3.40 - 10.80 10*3/mm3 Final   • RBC 07/15/2019 4.93  4.14 - 5.80 10*6/mm3 Final   • Hemoglobin 07/15/2019 14.2  13.0 - 17.7 g/dL Final   • Hematocrit 07/15/2019 43.9  37.5 - 51.0 % Final   • MCV 07/15/2019 89.0  79.0 - 97.0 fL Final   • MCH 07/15/2019 28.8  26.6 - 33.0 pg Final   • MCHC 07/15/2019 32.3  31.5 - 35.7 g/dL Final   • RDW 07/15/2019 13.2  12.3 - 15.4 % Final   • Platelets 07/15/2019 282  140 - 450 10*3/mm3 Final   • Neutrophil Rel % 07/15/2019 64.4  42.7 - 76.0 % Final   • Lymphocyte Rel % 07/15/2019 22.0  19.6 - 45.3 % Final   • Monocyte Rel % 07/15/2019 6.7  5.0 - 12.0 % Final   • Eosinophil Rel % 07/15/2019 5.5  0.3 - 6.2 % Final   • Basophil Rel % 07/15/2019 1.2  0.0 - 1.5 % Final   • Neutrophils Absolute 07/15/2019 5.39  1.70 - 7.00 10*3/mm3 Final   • Lymphocytes Absolute 07/15/2019 1.84  0.70 - 3.10 10*3/mm3 Final   • Monocytes Absolute 07/15/2019 0.56  0.10 - 0.90 10*3/mm3 Final   • Eosinophils Absolute 07/15/2019 0.46* 0.00 - 0.40 10*3/mm3 Final   • Basophils Absolute 07/15/2019 0.10  0.00 - 0.20 10*3/mm3 Final   • Immature Granulocyte Rel % 07/15/2019 0.2  0.0 - 0.5 % Final   • Immature Grans Absolute 07/15/2019 0.02  0.00 - 0.05 10*3/mm3 Final   • nRBC 07/15/2019 0.0  0.0 - 0.2 /100 WBC Final

## 2019-08-10 DIAGNOSIS — I10 ESSENTIAL HYPERTENSION: ICD-10-CM

## 2019-08-12 RX ORDER — IRBESARTAN 300 MG/1
300 TABLET ORAL DAILY
Qty: 90 TABLET | Refills: 3 | Status: SHIPPED | OUTPATIENT
Start: 2019-08-12 | End: 2019-08-13 | Stop reason: ALTCHOICE

## 2019-08-13 ENCOUNTER — TELEPHONE (OUTPATIENT)
Dept: CARDIOLOGY | Facility: CLINIC | Age: 64
End: 2019-08-13

## 2019-08-13 RX ORDER — LOSARTAN POTASSIUM 100 MG/1
100 TABLET ORAL DAILY
Qty: 30 TABLET | Refills: 11 | Status: SHIPPED | OUTPATIENT
Start: 2019-08-13 | End: 2020-01-06 | Stop reason: SDUPTHER

## 2019-08-13 NOTE — TELEPHONE ENCOUNTER
Called to ensure pharmacy received prescription for losartan 100 mg daily. Pharm. Tech did confirm they received Rx and that it's ready for . Pharmacy called pt to inform.    Laura

## 2019-08-13 NOTE — TELEPHONE ENCOUNTER
[facility] Faxed a form stating irbesartan 300 mg is on back order and requesting an alternative med.  Please advise.    Laura

## 2019-08-13 NOTE — TELEPHONE ENCOUNTER
I sent a prescription for Losartan 100 mg daily, which will replace the irbesartan.  Please ensure the pharmacy received it.

## 2019-08-15 RX ORDER — HYDROCHLOROTHIAZIDE 25 MG/1
25 TABLET ORAL DAILY
Qty: 90 TABLET | Refills: 0 | Status: SHIPPED | OUTPATIENT
Start: 2019-08-15 | End: 2019-11-10 | Stop reason: SDUPTHER

## 2019-08-19 DIAGNOSIS — I10 ESSENTIAL HYPERTENSION: ICD-10-CM

## 2019-08-19 RX ORDER — IRBESARTAN 300 MG/1
300 TABLET ORAL DAILY
Qty: 90 TABLET | Refills: 3 | Status: SHIPPED | OUTPATIENT
Start: 2019-08-19 | End: 2020-01-06 | Stop reason: ALTCHOICE

## 2019-08-19 NOTE — TELEPHONE ENCOUNTER
S/W pt re: change in medication. Pt stated his heart meds were a different Rx than his original irbesartan 300 mg. I stated to him that I had received notification from [facility] that irbesartan was on back order and needed an alternative to administer. I stated that's why Yudelka gave him the Losartan 100 mg Daily. Pt verbalized understanding.    GENEVA Sanchez

## 2019-08-27 ENCOUNTER — TELEPHONE (OUTPATIENT)
Dept: CARDIOLOGY | Facility: CLINIC | Age: 64
End: 2019-08-27

## 2019-08-27 NOTE — TELEPHONE ENCOUNTER
I spoke with the patient. Other than the slight headaches he is denying any other symptoms.  He denies a change in his diet or high sodium intake.   He states that he has had this blood pressure cuff for several years and has not used it in quite a while.  I encouraged him to go to the drugstore to check his blood pressure and bring his cuff with him to compare the readings.  It is possible his cuff is inaccurate and he may need a new one.  He will call me with an update.

## 2019-08-27 NOTE — TELEPHONE ENCOUNTER
"08/27/19  12:10 PM  Angel White Jr.  1955  Home Phone [phone]   Mobile [phone]   Home Phone [phone]       Angel White Jr. is a former patient of DR Hall who was last seen by Yudelka.  He explains that on 8/13 his irbesartan was changed to losartan.  He said that he has not been checking his bp or hr regularly since the does change.  Today he started with a headache and was \"not feeling well\" so he checked his bp.    Left arm 162/117 hr 82  Rt arm 129/111 hr 64    He also is c/o feeling faint when ever he bends over, but has not passed out.    Cardiac meds reviewed  Hydrochlorothiazide 25mg daily  Losartan 100mg daily  Amlodipine 5mg daily  lipitor  plavix 75mg daily  ASA 81    Please let me know how you would like to proceed  Jacque Christie RN  Triage nurse    "

## 2019-09-03 RX ORDER — FAMOTIDINE 20 MG/1
TABLET, FILM COATED ORAL
Qty: 60 TABLET | Refills: 1 | Status: SHIPPED | OUTPATIENT
Start: 2019-09-03 | End: 2019-09-25 | Stop reason: SDUPTHER

## 2019-09-25 RX ORDER — FAMOTIDINE 20 MG/1
TABLET, FILM COATED ORAL
Qty: 60 TABLET | Refills: 1 | Status: SHIPPED | OUTPATIENT
Start: 2019-09-25 | End: 2019-11-22 | Stop reason: SDUPTHER

## 2019-09-27 RX ORDER — ONDANSETRON HYDROCHLORIDE 8 MG/1
8 TABLET, FILM COATED ORAL EVERY 8 HOURS PRN
Qty: 21 TABLET | Refills: 0 | Status: SHIPPED | OUTPATIENT
Start: 2019-09-27 | End: 2020-12-28

## 2019-10-17 ENCOUNTER — OFFICE VISIT (OUTPATIENT)
Dept: INTERNAL MEDICINE | Facility: CLINIC | Age: 64
End: 2019-10-17

## 2019-10-17 VITALS
BODY MASS INDEX: 37.11 KG/M2 | WEIGHT: 279.98 LBS | DIASTOLIC BLOOD PRESSURE: 74 MMHG | HEIGHT: 73 IN | SYSTOLIC BLOOD PRESSURE: 112 MMHG | OXYGEN SATURATION: 96 % | HEART RATE: 78 BPM | TEMPERATURE: 98.9 F

## 2019-10-17 DIAGNOSIS — E66.01 MORBID OBESITY (HCC): ICD-10-CM

## 2019-10-17 DIAGNOSIS — M65.311 TRIGGER FINGER OF RIGHT THUMB: ICD-10-CM

## 2019-10-17 DIAGNOSIS — J30.9 ALLERGIC RHINITIS, UNSPECIFIED SEASONALITY, UNSPECIFIED TRIGGER: Primary | ICD-10-CM

## 2019-10-17 PROBLEM — R10.11 RIGHT UPPER QUADRANT ABDOMINAL PAIN: Status: RESOLVED | Noted: 2019-07-15 | Resolved: 2019-10-17

## 2019-10-17 PROCEDURE — 99214 OFFICE O/P EST MOD 30 MIN: CPT | Performed by: FAMILY MEDICINE

## 2019-10-17 NOTE — PROGRESS NOTES
Angel White Jr. is a 64 y.o. male.      Assessment/Plan   Problem List Items Addressed This Visit        Respiratory    Allergic rhinitis - Primary       Digestive    Morbid obesity (CMS/HCC)       Musculoskeletal and Integument    Trigger finger of right thumb           Continue antihistamine daily for allergic symptoms attempts at weight loss with calorie restriction diet patient having little effect anti-inflammatory ice for trigger finger if symptoms worsen he can consult hand surgery recommended annual eye exam otherwise follow-up as needed or  Return in about 6 months (around 4/17/2020), or if symptoms worsen or fail to improve, for Recheck, Next scheduled follow up.      Chief Complaint   Patient presents with   • sinus drainage   • Cough   • right thumb issue     Social History     Tobacco Use   • Smoking status: Never Smoker   • Smokeless tobacco: Never Used   Substance Use Topics   • Alcohol use: No   • Drug use: No       History of Present Illness   Patient follows up for chronic problems of head congestion cough no fever with right thumb clicking intermittently no specific trauma or injury as well as difficulty with losing weight with calorie restriction diet but is not following despite recommendations to help control his type 2 diabetes  The following portions of the patient's history were reviewed and updated as appropriate:PMHroutine: Social history , Allergies, Current Medications, Active Problem List and Health Maintenance    Review of Systems   Constitutional: Negative.    Respiratory: Negative.    Cardiovascular: Negative.    Gastrointestinal: Negative.    Genitourinary: Negative.    Neurological: Negative.    Hematological: Negative.    Psychiatric/Behavioral: Negative.        Objective   Vitals:    10/17/19 1427   BP: 112/74   BP Location: Right arm   Patient Position: Sitting   Cuff Size: Large Adult   Pulse: 78   Temp: 98.9 °F (37.2 °C)   TempSrc: Oral   SpO2: 96%   Weight: 127 kg (279 lb  "15.8 oz)   Height: 185.4 cm (73\")     Body mass index is 36.94 kg/m².  Physical Exam   Constitutional: He appears well-developed and well-nourished.   HENT:   Head: Normocephalic and atraumatic.   Right Ear: External ear normal.   Left Ear: External ear normal.   Mouth/Throat: Oropharynx is clear and moist.   Eyes: Conjunctivae are normal. No scleral icterus.   Cardiovascular: Normal rate and regular rhythm.   Pulmonary/Chest: Effort normal and breath sounds normal.   Musculoskeletal: Normal range of motion. He exhibits deformity. He exhibits no edema or tenderness.   Psychiatric: He has a normal mood and affect. His behavior is normal. Judgment and thought content normal.   Nursing note and vitals reviewed.    Reviewed Data:  No visits with results within 1 Month(s) from this visit.   Latest known visit with results is:   Office Visit on 07/15/2019   Component Date Value Ref Range Status   • Glucose 07/15/2019 102* 65 - 99 mg/dL Final   • BUN 07/15/2019 9  8 - 23 mg/dL Final   • Creatinine 07/15/2019 0.97  0.76 - 1.27 mg/dL Final   • eGFR Non African Am 07/15/2019 78  >60 mL/min/1.73 Final   • eGFR African Am 07/15/2019 95  >60 mL/min/1.73 Final   • BUN/Creatinine Ratio 07/15/2019 9.3  7.0 - 25.0 Final   • Sodium 07/15/2019 143  136 - 145 mmol/L Final   • Potassium 07/15/2019 4.5  3.5 - 5.2 mmol/L Final   • Chloride 07/15/2019 102  98 - 107 mmol/L Final   • Total CO2 07/15/2019 30.1* 22.0 - 29.0 mmol/L Final   • Calcium 07/15/2019 9.3  8.6 - 10.5 mg/dL Final   • Total Protein 07/15/2019 6.4  6.0 - 8.5 g/dL Final   • Albumin 07/15/2019 4.50  3.50 - 5.20 g/dL Final   • Globulin 07/15/2019 1.9  gm/dL Final   • A/G Ratio 07/15/2019 2.4  g/dL Final   • Total Bilirubin 07/15/2019 0.4  0.2 - 1.2 mg/dL Final   • Alkaline Phosphatase 07/15/2019 93  39 - 117 U/L Final   • AST (SGOT) 07/15/2019 12  1 - 40 U/L Final   • ALT (SGPT) 07/15/2019 16  1 - 41 U/L Final   • WBC 07/15/2019 8.37  3.40 - 10.80 10*3/mm3 Final   • RBC " 07/15/2019 4.93  4.14 - 5.80 10*6/mm3 Final   • Hemoglobin 07/15/2019 14.2  13.0 - 17.7 g/dL Final   • Hematocrit 07/15/2019 43.9  37.5 - 51.0 % Final   • MCV 07/15/2019 89.0  79.0 - 97.0 fL Final   • MCH 07/15/2019 28.8  26.6 - 33.0 pg Final   • MCHC 07/15/2019 32.3  31.5 - 35.7 g/dL Final   • RDW 07/15/2019 13.2  12.3 - 15.4 % Final   • Platelets 07/15/2019 282  140 - 450 10*3/mm3 Final   • Neutrophil Rel % 07/15/2019 64.4  42.7 - 76.0 % Final   • Lymphocyte Rel % 07/15/2019 22.0  19.6 - 45.3 % Final   • Monocyte Rel % 07/15/2019 6.7  5.0 - 12.0 % Final   • Eosinophil Rel % 07/15/2019 5.5  0.3 - 6.2 % Final   • Basophil Rel % 07/15/2019 1.2  0.0 - 1.5 % Final   • Neutrophils Absolute 07/15/2019 5.39  1.70 - 7.00 10*3/mm3 Final   • Lymphocytes Absolute 07/15/2019 1.84  0.70 - 3.10 10*3/mm3 Final   • Monocytes Absolute 07/15/2019 0.56  0.10 - 0.90 10*3/mm3 Final   • Eosinophils Absolute 07/15/2019 0.46* 0.00 - 0.40 10*3/mm3 Final   • Basophils Absolute 07/15/2019 0.10  0.00 - 0.20 10*3/mm3 Final   • Immature Granulocyte Rel % 07/15/2019 0.2  0.0 - 0.5 % Final   • Immature Grans Absolute 07/15/2019 0.02  0.00 - 0.05 10*3/mm3 Final   • nRBC 07/15/2019 0.0  0.0 - 0.2 /100 WBC Final

## 2019-11-07 DIAGNOSIS — I25.10 ARTERIOSCLEROTIC HEART DISEASE: ICD-10-CM

## 2019-11-07 RX ORDER — CLOPIDOGREL BISULFATE 75 MG/1
75 TABLET ORAL DAILY
Qty: 90 TABLET | Refills: 1 | Status: SHIPPED | OUTPATIENT
Start: 2019-11-07 | End: 2020-04-30

## 2019-11-11 RX ORDER — HYDROCHLOROTHIAZIDE 25 MG/1
25 TABLET ORAL DAILY
Qty: 90 TABLET | Refills: 0 | Status: SHIPPED | OUTPATIENT
Start: 2019-11-11 | End: 2020-01-06 | Stop reason: SDUPTHER

## 2019-11-22 RX ORDER — FAMOTIDINE 20 MG/1
TABLET, FILM COATED ORAL
Qty: 60 TABLET | Refills: 1 | Status: SHIPPED | OUTPATIENT
Start: 2019-11-22 | End: 2020-01-06

## 2019-12-05 DIAGNOSIS — I10 ESSENTIAL HYPERTENSION: ICD-10-CM

## 2019-12-05 RX ORDER — AMLODIPINE BESYLATE 5 MG/1
5 TABLET ORAL DAILY
Qty: 90 TABLET | Refills: 0 | Status: SHIPPED | OUTPATIENT
Start: 2019-12-05 | End: 2020-01-06 | Stop reason: SDUPTHER

## 2019-12-23 ENCOUNTER — CONSULT (OUTPATIENT)
Dept: ORTHOPEDIC SURGERY | Facility: CLINIC | Age: 64
End: 2019-12-23

## 2019-12-23 VITALS — WEIGHT: 279 LBS | BODY MASS INDEX: 36.98 KG/M2 | HEIGHT: 73 IN | TEMPERATURE: 98 F

## 2019-12-23 DIAGNOSIS — G89.29 CHRONIC PAIN OF RIGHT THUMB: ICD-10-CM

## 2019-12-23 DIAGNOSIS — M79.641 RIGHT HAND PAIN: Primary | ICD-10-CM

## 2019-12-23 DIAGNOSIS — M79.644 CHRONIC PAIN OF RIGHT THUMB: ICD-10-CM

## 2019-12-23 PROCEDURE — 99203 OFFICE O/P NEW LOW 30 MIN: CPT | Performed by: ORTHOPAEDIC SURGERY

## 2019-12-23 PROCEDURE — 20550 NJX 1 TENDON SHEATH/LIGAMENT: CPT | Performed by: ORTHOPAEDIC SURGERY

## 2019-12-23 PROCEDURE — 73130 X-RAY EXAM OF HAND: CPT | Performed by: ORTHOPAEDIC SURGERY

## 2019-12-23 RX ADMIN — METHYLPREDNISOLONE ACETATE 160 MG: 80 INJECTION, SUSPENSION INTRA-ARTICULAR; INTRALESIONAL; INTRAMUSCULAR; SOFT TISSUE at 11:38

## 2019-12-23 NOTE — PROGRESS NOTES
Patient: Angel White Jr.    YOB: 1955    Medical Record Number: [id number]    Chief Complaints: Right thumb pain and catching    History of Present Illness:     64 y.o. male patient who presents for his right thumb.  He reports a 1 year history of worsening pain and intermittent catching.  He reports some associated swelling.  Pain is moderate, intermittent and stabbing.  The pain is associated with catching of the thumb.  He is getting to the point where he is having trouble moving the thumb.  Denies any weakness, numbness or other associated symptoms.    Allergies:   Allergies   Allergen Reactions   • Morphine And Related Itching       Home Medications:      Current Outpatient Medications:   •  amLODIPine (NORVASC) 5 MG tablet, TAKE 1 TABLET BY MOUTH DAILY., Disp: 90 tablet, Rfl: 0  •  aspirin 81 MG EC tablet, Take 81 mg by mouth daily., Disp: , Rfl:   •  atorvastatin (LIPITOR) 40 MG tablet, Take 1 tablet by mouth Daily., Disp: 90 tablet, Rfl: 1  •  B-D UF III MINI PEN NEEDLES 31G X 5 MM misc, INJECT VICTOZA SUB-Q DAILY, Disp: , Rfl: 5  •  clopidogrel (PLAVIX) 75 MG tablet, TAKE 1 TABLET BY MOUTH DAILY., Disp: 90 tablet, Rfl: 1  •  diphenhydrAMINE (BENADRYL) 25 mg capsule, Take 25 mg by mouth At Night As Needed for Itching., Disp: , Rfl:   •  famotidine (PEPCID) 20 MG tablet, TAKE 1 TABLET BY MOUTH TWICE A DAY, Disp: 60 tablet, Rfl: 1  •  hydroCHLOROthiazide (HYDRODIURIL) 25 MG tablet, TAKE 1 TABLET BY MOUTH DAILY., Disp: 90 tablet, Rfl: 0  •  irbesartan (AVAPRO) 300 MG tablet, TAKE 1 TABLET BY MOUTH DAILY., Disp: 90 tablet, Rfl: 3  •  Lancets (ONETOUCH ULTRASOFT) lancets, 2 (Two) Times a Day. for testing, Disp: , Rfl: 2  •  losartan (COZAAR) 100 MG tablet, Take 1 tablet by mouth Daily., Disp: 30 tablet, Rfl: 11  •  metFORMIN (GLUCOPHAGE) 500 MG tablet, TAKE ONE TABLET BY MOUTH TWICE DAILY WITH MEALS, Disp: 60 tablet, Rfl: 5  •  ondansetron (ZOFRAN) 8 MG tablet, TAKE 1 TABLET BY MOUTH EVERY 8  (EIGHT) HOURS AS NEEDED FOR NAUSEA OR VOMITING., Disp: 21 tablet, Rfl: 0  •  ONETOUCH VERIO test strip, 2 (Two) Times a Day. for testing, Disp: , Rfl: 2  •  VICTOZA 18 MG/3ML solution pen-injector injection, INJECT 1.8 MG SUB-Q DAILY, Disp: , Rfl: 2  •  ondansetron (ZOFRAN) 4 MG tablet, Take 8 mg by mouth Daily As Needed., Disp: , Rfl:     Past Medical History:   Diagnosis Date   • AP (angina pectoris) (CMS/HCC)     unstable   • CAD (coronary artery disease)    • DDD (degenerative disc disease), lumbosacral    • Diabetes (CMS/HCC)    • Essential hypertension    • Fatty liver    • Osteoarthritis of knee    • Screening for prostate cancer 2010    normal   • Sleep apnea     started wearing a CPAP on 10/19/16       Past Surgical History:   Procedure Laterality Date   • CORONARY ANGIOPLASTY WITH STENT PLACEMENT  08/28/2015    6 stents   • FOOT SURGERY Right 05/23/2017   • REPLACEMENT TOTAL KNEE Left 2010   • REPLACEMENT TOTAL KNEE Right 2009   • SKIN CANCER EXCISION Left 03/30/2016    left ear and left eyebrow, graft took from face, placed on ear       Social History     Occupational History   • Not on file   Tobacco Use   • Smoking status: Never Smoker   • Smokeless tobacco: Never Used   Substance and Sexual Activity   • Alcohol use: No   • Drug use: No   • Sexual activity: Yes     Partners: Female      Social History     Social History Narrative   • Not on file       Family History   Problem Relation Age of Onset   • Hypertension Mother    • Heart disease Father    • Hypertension Father    • Macular degeneration Father    • Arthritis Maternal Grandmother    • Heart disease Maternal Grandmother    • Arthritis Maternal Grandfather    • Heart disease Maternal Grandfather    • Diabetes Maternal Grandfather    • Hypertension Maternal Grandfather    • Cancer Maternal Grandfather         colon?   • Arthritis Paternal Grandmother    • Heart disease Paternal Grandmother    • Diabetes Paternal Grandmother    • Hypertension  "Paternal Grandmother    • Stroke Paternal Grandmother    • Arthritis Paternal Grandfather    • Heart disease Paternal Grandfather    • Macular degeneration Sister        Review of Systems:      Constitutional: Denies fever, shaking or chills   Eyes: Denies change in visual acuity   HEENT: Denies nasal congestion or sore throat   Respiratory: Denies cough or shortness of breath   Cardiovascular: Denies chest pain or edema  Endocrine: Denies tremors, palpitations, intolerance of heat or cold, polyuria, polydipsia.  GI: Denies abdominal pain, nausea, vomiting, bloody stools or diarrhea  : Denies frequency, urgency, incontinence, retention, or nocturia.  Musculoskeletal: Denies numbness, tingling or loss of motor function except as above  Integument: Denies rash, lesion or ulceration   Neurologic: Denies headache or focal weakness, deficits  Heme: Denies spontaneous or excessive bleeding, epistaxis, hematuria, melena, fatigue, enlarged or tender lymph nodes.      All other pertinent positives and negatives as noted above in HPI.    Physical Exam: 64 y.o. male    Vitals:    12/23/19 1104   Temp: 98 °F (36.7 °C)   Weight: 127 kg (279 lb)   Height: 185.4 cm (72.99\")       General:  Patient is awake and alert.  Appears in no acute distress or discomfort.    Psych:  Affect and demeanor are appropriate.    Eyes:  Conjunctiva and sclera appear grossly normal.  Eyes track well and EOM seem to be intact.    Ears:  No gross abnormalities.  Hearing adequate for the exam.    Cardiovascular:  Regular rate and rhythm.    Lungs:  Good chest expansion.  Breathing unlabored.    Lymph:  No palpable masses or adenopathy in the affected extremity    Extremities: Right thumb is examined.  Skin is benign.  He appears to have some subtle boutonniere deformity of the thumb.  He does have prominence and swelling along the volar aspect of the base of the thumb.  No skin breakdown.  He is focally tender over the A1 pulley.  No discrete mass in " this area but he does have some prominence and swelling there relative to the contralateral side. It is painful for him to bend the thumb IP joint.  He can do it with my assistance.  Once he gets it flexed, he is unable to fully extend the thumb without my assistance.  The thumb triggers with this maneuver and it reproduces his typical pain.  Intact sensation.  Good  and pinch strength although  is uncomfortable.  Normal sensation.  Brisk capillary refill.  Palpable radial pulse.         Radiology:   AP, oblique and lateral views of the right hand are ordered and reviewed.  No comparison films are immediately available.  He has what appears to be moderate osteoarthritis of the thumb carpometacarpal articulation.  No other significant findings noted.    Assessment/Plan: Right thumb stenosing tenosynovitis    He appears to have severe stenosing tenosynovitis.  I recommend that we try an injection but I did caution him that the severity of his trigger thumb is such that the injection is going to be a little less likely to work in my opinion.  I told him to give it about 2 weeks.  If his symptoms are not resolved, we need to get him to a hand specialist to discuss surgical options.  He is in agreement with that plan.  The risk, benefits and alternatives to the injection were discussed.  He acknowledged understanding and the injection was performed as described below.  He will follow-up as needed.    Morteza Dominguez MD    12/23/2019    CC to Momo Meza MD      Small Joint Arthrocentesis: R thumb IP  Site marked: site marked  Timeout: Immediately prior to procedure a time out was called to verify the correct patient, procedure, equipment, support staff and site/side marked as required   Supporting Documentation  Indications: pain   Procedure Details  Location: thumb (A1 pulley and flexor tendon sheath) - R thumb IP  Preparation: Patient was prepped and draped in the usual sterile fashion  Needle size: 27  G  Approach: volar  Medications administered: 100 mg lidocaine (cardiac); 160 mg methylPREDNISolone acetate 80 MG/ML  Patient tolerance: patient tolerated the procedure well with no immediate complications

## 2019-12-29 RX ORDER — METHYLPREDNISOLONE ACETATE 80 MG/ML
160 INJECTION, SUSPENSION INTRA-ARTICULAR; INTRALESIONAL; INTRAMUSCULAR; SOFT TISSUE
Status: COMPLETED | OUTPATIENT
Start: 2019-12-23 | End: 2019-12-23

## 2020-01-06 ENCOUNTER — OFFICE VISIT (OUTPATIENT)
Dept: INTERNAL MEDICINE | Facility: CLINIC | Age: 65
End: 2020-01-06

## 2020-01-06 VITALS
DIASTOLIC BLOOD PRESSURE: 82 MMHG | HEART RATE: 64 BPM | SYSTOLIC BLOOD PRESSURE: 138 MMHG | TEMPERATURE: 98.1 F | BODY MASS INDEX: 37.96 KG/M2 | HEIGHT: 73 IN | OXYGEN SATURATION: 97 % | WEIGHT: 286.4 LBS

## 2020-01-06 DIAGNOSIS — I10 ESSENTIAL HYPERTENSION: ICD-10-CM

## 2020-01-06 DIAGNOSIS — E66.01 MORBID OBESITY (HCC): ICD-10-CM

## 2020-01-06 DIAGNOSIS — E78.5 HYPERLIPIDEMIA, UNSPECIFIED HYPERLIPIDEMIA TYPE: ICD-10-CM

## 2020-01-06 DIAGNOSIS — I25.10 CORONARY ARTERY DISEASE INVOLVING NATIVE CORONARY ARTERY OF NATIVE HEART WITHOUT ANGINA PECTORIS: Primary | ICD-10-CM

## 2020-01-06 PROCEDURE — 99214 OFFICE O/P EST MOD 30 MIN: CPT | Performed by: FAMILY MEDICINE

## 2020-01-06 RX ORDER — ATORVASTATIN CALCIUM 40 MG/1
40 TABLET, FILM COATED ORAL DAILY
Qty: 90 TABLET | Refills: 2 | Status: SHIPPED | OUTPATIENT
Start: 2020-01-06 | End: 2020-01-06 | Stop reason: SDUPTHER

## 2020-01-06 RX ORDER — AMLODIPINE BESYLATE 5 MG/1
5 TABLET ORAL DAILY
Qty: 90 TABLET | Refills: 2 | Status: SHIPPED | OUTPATIENT
Start: 2020-01-06 | End: 2020-09-01 | Stop reason: SDUPTHER

## 2020-01-06 RX ORDER — ATORVASTATIN CALCIUM 40 MG/1
40 TABLET, FILM COATED ORAL DAILY
Qty: 90 TABLET | Refills: 1 | Status: SHIPPED | OUTPATIENT
Start: 2020-01-06 | End: 2020-09-01 | Stop reason: SDUPTHER

## 2020-01-06 RX ORDER — LOSARTAN POTASSIUM 100 MG/1
100 TABLET ORAL DAILY
Qty: 90 TABLET | Refills: 2 | Status: SHIPPED | OUTPATIENT
Start: 2020-01-06 | End: 2020-09-01 | Stop reason: SDUPTHER

## 2020-01-06 RX ORDER — HYDROCHLOROTHIAZIDE 25 MG/1
25 TABLET ORAL DAILY
Qty: 90 TABLET | Refills: 1 | Status: SHIPPED | OUTPATIENT
Start: 2020-01-06 | End: 2020-09-01 | Stop reason: SDUPTHER

## 2020-01-06 NOTE — PROGRESS NOTES
Angel White Jr. is a 64 y.o. male.      Assessment/Plan   Problem List Items Addressed This Visit        Cardiovascular and Mediastinum    CAD (coronary artery disease) - Primary    Relevant Medications    amLODIPine (NORVASC) 5 MG tablet    Essential hypertension    Relevant Medications    amLODIPine (NORVASC) 5 MG tablet    hydroCHLOROthiazide (HYDRODIURIL) 25 MG tablet    losartan (COZAAR) 100 MG tablet    Hyperlipidemia       Digestive    Morbid obesity (CMS/HCC)         Continue present treatment of chronic problems as prescribed with amlodipine 5 mg hydrochlorothiazide 25 and losartan 100 clarification patient not taking irbesartan he is reminded to bring his medications for future appointments attempts at weight loss calorie restriction diet    Return in about 6 months (around 7/6/2020), or if symptoms worsen or fail to improve, for Recheck, Next scheduled follow up.      Chief Complaint   Patient presents with   • follow up to hypertension   • follow up to GERD   • Constipation     Social History     Tobacco Use   • Smoking status: Never Smoker   • Smokeless tobacco: Never Used   Substance Use Topics   • Alcohol use: No   • Drug use: No       History of Present Illness   Patient follows up for chronic problems of coronary disease hypertension hyperlipidemia obesity medications were clarified he has good blood pressure control no chest pain shortness of breath or increased fatigue or symptomology relatable to his cardiac ischemia he is chronically trying to lose weight we had long discussion regarding best approach for weight loss with calorie restriction diet with foods that he can eat he is going to try and cut out his's sodas.  Recommended to take Metamucil regularly for his constipation with light of normal recent colonoscopy  The following portions of the patient's history were reviewed and updated as appropriate:PMHroutine: Social history , Allergies, Current Medications, Active Problem List and  "Health Maintenance    Review of Systems   Constitutional: Negative.    HENT: Negative for congestion, dental problem, ear pain, postnasal drip and trouble swallowing.    Respiratory: Negative.    Cardiovascular: Negative.    Gastrointestinal: Negative for abdominal distention, abdominal pain, nausea and vomiting.   Genitourinary: Negative.    Musculoskeletal: Negative.    Hematological: Negative.    Psychiatric/Behavioral: Negative.        Objective   Vitals:    01/06/20 1130   BP: 138/82   BP Location: Right arm   Patient Position: Sitting   Cuff Size: Large Adult   Pulse: 64   Temp: 98.1 °F (36.7 °C)   TempSrc: Oral   SpO2: 97%   Weight: 130 kg (286 lb 6.4 oz)   Height: 185.4 cm (72.99\")     Body mass index is 37.8 kg/m².  Physical Exam   Constitutional: He is oriented to person, place, and time. He appears well-developed and well-nourished. No distress.   HENT:   Head: Normocephalic and atraumatic.   Right Ear: External ear normal.   Left Ear: External ear normal.   Mouth/Throat: Oropharynx is clear and moist.   Eyes: Pupils are equal, round, and reactive to light. Conjunctivae and EOM are normal. Right eye exhibits no discharge. Left eye exhibits no discharge. No scleral icterus.   Neck: Normal range of motion. Neck supple. No tracheal deviation present. No thyromegaly present.   Cardiovascular: Normal rate, regular rhythm, normal heart sounds, intact distal pulses and normal pulses. Exam reveals no gallop.   No murmur heard.  Pulmonary/Chest: Effort normal and breath sounds normal. No respiratory distress. He has no wheezes. He has no rales.   Musculoskeletal: Normal range of motion. He exhibits no edema.   Neurological: He is alert and oriented to person, place, and time. He exhibits normal muscle tone. Coordination normal.   Skin: Skin is warm. No rash noted. No erythema. No pallor.   Psychiatric: He has a normal mood and affect. His behavior is normal. Judgment and thought content normal.   Nursing note and " vitals reviewed.    Reviewed Data:  No visits with results within 1 Month(s) from this visit.   Latest known visit with results is:   Office Visit on 07/15/2019   Component Date Value Ref Range Status   • Glucose 07/15/2019 102* 65 - 99 mg/dL Final   • BUN 07/15/2019 9  8 - 23 mg/dL Final   • Creatinine 07/15/2019 0.97  0.76 - 1.27 mg/dL Final   • eGFR Non African Am 07/15/2019 78  >60 mL/min/1.73 Final   • eGFR African Am 07/15/2019 95  >60 mL/min/1.73 Final   • BUN/Creatinine Ratio 07/15/2019 9.3  7.0 - 25.0 Final   • Sodium 07/15/2019 143  136 - 145 mmol/L Final   • Potassium 07/15/2019 4.5  3.5 - 5.2 mmol/L Final   • Chloride 07/15/2019 102  98 - 107 mmol/L Final   • Total CO2 07/15/2019 30.1* 22.0 - 29.0 mmol/L Final   • Calcium 07/15/2019 9.3  8.6 - 10.5 mg/dL Final   • Total Protein 07/15/2019 6.4  6.0 - 8.5 g/dL Final   • Albumin 07/15/2019 4.50  3.50 - 5.20 g/dL Final   • Globulin 07/15/2019 1.9  gm/dL Final   • A/G Ratio 07/15/2019 2.4  g/dL Final   • Total Bilirubin 07/15/2019 0.4  0.2 - 1.2 mg/dL Final   • Alkaline Phosphatase 07/15/2019 93  39 - 117 U/L Final   • AST (SGOT) 07/15/2019 12  1 - 40 U/L Final   • ALT (SGPT) 07/15/2019 16  1 - 41 U/L Final   • WBC 07/15/2019 8.37  3.40 - 10.80 10*3/mm3 Final   • RBC 07/15/2019 4.93  4.14 - 5.80 10*6/mm3 Final   • Hemoglobin 07/15/2019 14.2  13.0 - 17.7 g/dL Final   • Hematocrit 07/15/2019 43.9  37.5 - 51.0 % Final   • MCV 07/15/2019 89.0  79.0 - 97.0 fL Final   • MCH 07/15/2019 28.8  26.6 - 33.0 pg Final   • MCHC 07/15/2019 32.3  31.5 - 35.7 g/dL Final   • RDW 07/15/2019 13.2  12.3 - 15.4 % Final   • Platelets 07/15/2019 282  140 - 450 10*3/mm3 Final   • Neutrophil Rel % 07/15/2019 64.4  42.7 - 76.0 % Final   • Lymphocyte Rel % 07/15/2019 22.0  19.6 - 45.3 % Final   • Monocyte Rel % 07/15/2019 6.7  5.0 - 12.0 % Final   • Eosinophil Rel % 07/15/2019 5.5  0.3 - 6.2 % Final   • Basophil Rel % 07/15/2019 1.2  0.0 - 1.5 % Final   • Neutrophils Absolute 07/15/2019  5.39  1.70 - 7.00 10*3/mm3 Final   • Lymphocytes Absolute 07/15/2019 1.84  0.70 - 3.10 10*3/mm3 Final   • Monocytes Absolute 07/15/2019 0.56  0.10 - 0.90 10*3/mm3 Final   • Eosinophils Absolute 07/15/2019 0.46* 0.00 - 0.40 10*3/mm3 Final   • Basophils Absolute 07/15/2019 0.10  0.00 - 0.20 10*3/mm3 Final   • Immature Granulocyte Rel % 07/15/2019 0.2  0.0 - 0.5 % Final   • Immature Grans Absolute 07/15/2019 0.02  0.00 - 0.05 10*3/mm3 Final   • nRBC 07/15/2019 0.0  0.0 - 0.2 /100 WBC Final

## 2020-01-22 RX ORDER — FAMOTIDINE 20 MG/1
TABLET, FILM COATED ORAL
Qty: 60 TABLET | Refills: 1 | OUTPATIENT
Start: 2020-01-22

## 2020-02-17 ENCOUNTER — OFFICE VISIT (OUTPATIENT)
Dept: GASTROENTEROLOGY | Facility: CLINIC | Age: 65
End: 2020-02-17

## 2020-02-17 VITALS
DIASTOLIC BLOOD PRESSURE: 84 MMHG | BODY MASS INDEX: 38.42 KG/M2 | SYSTOLIC BLOOD PRESSURE: 132 MMHG | HEIGHT: 73 IN | TEMPERATURE: 97.8 F | WEIGHT: 289.9 LBS

## 2020-02-17 DIAGNOSIS — R19.4 CHANGE IN BOWEL HABITS: ICD-10-CM

## 2020-02-17 DIAGNOSIS — Z86.010 PERSONAL HISTORY OF COLONIC POLYPS: ICD-10-CM

## 2020-02-17 DIAGNOSIS — K59.09 OTHER CONSTIPATION: Primary | ICD-10-CM

## 2020-02-17 PROBLEM — Z86.0100 PERSONAL HISTORY OF COLONIC POLYPS: Status: ACTIVE | Noted: 2020-02-17

## 2020-02-17 PROCEDURE — 99204 OFFICE O/P NEW MOD 45 MIN: CPT | Performed by: INTERNAL MEDICINE

## 2020-02-17 RX ORDER — SODIUM CHLORIDE, SODIUM LACTATE, POTASSIUM CHLORIDE, CALCIUM CHLORIDE 600; 310; 30; 20 MG/100ML; MG/100ML; MG/100ML; MG/100ML
30 INJECTION, SOLUTION INTRAVENOUS CONTINUOUS
Status: CANCELLED | OUTPATIENT
Start: 2020-04-10

## 2020-02-17 NOTE — PROGRESS NOTES
Chief Complaint   Patient presents with   • Constipation       Subjective     HPI    Angel White Jr. is a 65 y.o. male with a past medical history noted below who presents for evaluation of constipation.  This started about 6 weeks ago.  He went from having mostly regular BMs to going up to 4 days with no BMs.  Associated with abdominal fullness and discomfort.  Notices a change in stool consistency-as she feels they are more pasty.  The only potential triggers that he can identify are that he stop smoking marijuana around the same time.  He has been taking over-the-counter Dulcolax.  He is taking anywhere from 1-4 a day but he is not getting consistent results with having bowel movements with this.  He denies seeing any blood in his stool.  No associated nausea or vomiting.    Last colonoscopy was 1/2017--this was done with Dr. Dyer at [facility]; the report has been scanned in, he had diverticulosis, hemorrhoids and a 7 mm ascending adenomatous polyp.  EGD at the same time with mild reflux esophagitis, no Gonzalez's    Maternal grandfather with CRC.    No abdominal surgeries.    Stopped smoking MJ at the beginning of the year.  No excess ETOH, no cigarettes.    Retired from [employer].    He is on clopidogrel for cardiac stents (6) in 2015.  Previously saw Dr Hall.          Past Medical History:   Diagnosis Date   • AP (angina pectoris) (CMS/HCC)     unstable   • CAD (coronary artery disease)    • DDD (degenerative disc disease), lumbosacral    • Diabetes (CMS/HCC)    • Essential hypertension    • Fatty liver    • Osteoarthritis of knee    • Screening for prostate cancer 2010    normal   • Sleep apnea     started wearing a CPAP on 10/19/16         Current Outpatient Medications:   •  amLODIPine (NORVASC) 5 MG tablet, Take 1 tablet by mouth Daily., Disp: 90 tablet, Rfl: 2  •  aspirin 81 MG EC tablet, Take 81 mg by mouth daily., Disp: , Rfl:   •  atorvastatin (LIPITOR) 40 MG tablet, Take 1 tablet by mouth Daily., Disp: 90  tablet, Rfl: 1  •  B-D UF III MINI PEN NEEDLES 31G X 5 MM misc, INJECT VICTOZA SUB-Q DAILY, Disp: , Rfl: 5  •  clopidogrel (PLAVIX) 75 MG tablet, TAKE 1 TABLET BY MOUTH DAILY., Disp: 90 tablet, Rfl: 1  •  diphenhydrAMINE (BENADRYL) 25 mg capsule, Take 25 mg by mouth At Night As Needed for Itching., Disp: , Rfl:   •  hydroCHLOROthiazide (HYDRODIURIL) 25 MG tablet, Take 1 tablet by mouth Daily., Disp: 90 tablet, Rfl: 1  •  Lancets (ONETOUCH ULTRASOFT) lancets, 2 (Two) Times a Day. for testing, Disp: , Rfl: 2  •  losartan (COZAAR) 100 MG tablet, Take 1 tablet by mouth Daily., Disp: 90 tablet, Rfl: 2  •  metFORMIN (GLUCOPHAGE) 500 MG tablet, TAKE ONE TABLET BY MOUTH TWICE DAILY WITH MEALS, Disp: 60 tablet, Rfl: 5  •  ondansetron (ZOFRAN) 8 MG tablet, TAKE 1 TABLET BY MOUTH EVERY 8 (EIGHT) HOURS AS NEEDED FOR NAUSEA OR VOMITING., Disp: 21 tablet, Rfl: 0  •  ONETOUCH VERIO test strip, 2 (Two) Times a Day. for testing, Disp: , Rfl: 2  •  VICTOZA 18 MG/3ML solution pen-injector injection, INJECT 1.8 MG SUB-Q DAILY, Disp: , Rfl: 2    Allergies   Allergen Reactions   • Morphine And Related Itching       Social History     Socioeconomic History   • Marital status:      Spouse name: Not on file   • Number of children: Not on file   • Years of education: Not on file   • Highest education level: Not on file   Tobacco Use   • Smoking status: Never Smoker   • Smokeless tobacco: Never Used   Substance and Sexual Activity   • Alcohol use: No   • Drug use: No   • Sexual activity: Yes     Partners: Female       Family History   Problem Relation Age of Onset   • Hypertension Mother    • Heart disease Father    • Hypertension Father    • Macular degeneration Father    • Arthritis Maternal Grandmother    • Heart disease Maternal Grandmother    • Arthritis Maternal Grandfather    • Heart disease Maternal Grandfather    • Diabetes Maternal Grandfather    • Hypertension Maternal Grandfather    • Cancer Maternal Grandfather          colon?   • Arthritis Paternal Grandmother    • Heart disease Paternal Grandmother    • Diabetes Paternal Grandmother    • Hypertension Paternal Grandmother    • Stroke Paternal Grandmother    • Arthritis Paternal Grandfather    • Heart disease Paternal Grandfather    • Macular degeneration Sister        Review of Systems   Constitutional: Negative for activity change, appetite change and fatigue.   HENT: Negative for sore throat and trouble swallowing.    Respiratory: Negative.    Cardiovascular: Negative.    Gastrointestinal: Positive for constipation. Negative for abdominal distention, abdominal pain and blood in stool.   Endocrine: Negative for cold intolerance and heat intolerance.   Genitourinary: Negative for difficulty urinating, dysuria and frequency.   Musculoskeletal: Negative for arthralgias, back pain and myalgias.   Skin: Negative.    Hematological: Negative for adenopathy. Does not bruise/bleed easily.   All other systems reviewed and are negative.      Objective     Vitals:    02/17/20 0849   BP: 132/84   Temp: 97.8 °F (36.6 °C)         02/17/20  0849   Weight: 131 kg (289 lb 14.4 oz)     Body mass index is 38.25 kg/m².    Physical Exam   Constitutional: He is oriented to person, place, and time. He appears well-developed and well-nourished. No distress.   HENT:   Head: Normocephalic and atraumatic.   Right Ear: External ear normal.   Left Ear: External ear normal.   Nose: Nose normal.   Mouth/Throat: Oropharynx is clear and moist.   Eyes: Conjunctivae and EOM are normal. Right eye exhibits no discharge. Left eye exhibits no discharge. No scleral icterus.   Neck: Normal range of motion. Neck supple. No thyromegaly present.   No supraclavicular adenopathy   Cardiovascular: Normal rate, regular rhythm, normal heart sounds and intact distal pulses. Exam reveals no gallop.   No murmur heard.  No lower extremity edema   Pulmonary/Chest: Effort normal and breath sounds normal. No respiratory distress. He  has no wheezes.   Abdominal: Soft. Normal appearance and bowel sounds are normal. He exhibits no distension and no mass. There is no hepatosplenomegaly. There is no tenderness. There is no rigidity, no rebound and no guarding. No hernia.   Genitourinary:   Genitourinary Comments: Rectal exam deferred   Musculoskeletal: Normal range of motion. He exhibits no edema or tenderness.   No atrophy of upper or lower extremities.  Normal digits and nails of both hands.   Lymphadenopathy:     He has no cervical adenopathy.   Neurological: He is alert and oriented to person, place, and time. He displays no atrophy. Coordination normal.   Skin: Skin is warm and dry. No rash noted. He is not diaphoretic. No erythema.   Psychiatric: He has a normal mood and affect. His behavior is normal. Judgment and thought content normal.   Vitals reviewed.      WBC   Date Value Ref Range Status   07/15/2019 8.37 3.40 - 10.80 10*3/mm3 Final     RBC   Date Value Ref Range Status   07/15/2019 4.93 4.14 - 5.80 10*6/mm3 Final     Hemoglobin   Date Value Ref Range Status   07/15/2019 14.2 13.0 - 17.7 g/dL Final     Hematocrit   Date Value Ref Range Status   07/15/2019 43.9 37.5 - 51.0 % Final     MCV   Date Value Ref Range Status   07/15/2019 89.0 79.0 - 97.0 fL Final     MCH   Date Value Ref Range Status   07/15/2019 28.8 26.6 - 33.0 pg Final     MCHC   Date Value Ref Range Status   07/15/2019 32.3 31.5 - 35.7 g/dL Final     RDW   Date Value Ref Range Status   07/15/2019 13.2 12.3 - 15.4 % Final     Platelets   Date Value Ref Range Status   07/15/2019 282 140 - 450 10*3/mm3 Final     Neutrophil Rel %   Date Value Ref Range Status   07/15/2019 64.4 42.7 - 76.0 % Final     Lymphocyte Rel %   Date Value Ref Range Status   07/15/2019 22.0 19.6 - 45.3 % Final     Monocyte Rel %   Date Value Ref Range Status   07/15/2019 6.7 5.0 - 12.0 % Final     Eosinophil Rel %   Date Value Ref Range Status   07/15/2019 5.5 0.3 - 6.2 % Final     Basophil Rel %   Date  Value Ref Range Status   07/15/2019 1.2 0.0 - 1.5 % Final     Neutrophils Absolute   Date Value Ref Range Status   07/15/2019 5.39 1.70 - 7.00 10*3/mm3 Final     Lymphocytes Absolute   Date Value Ref Range Status   07/15/2019 1.84 0.70 - 3.10 10*3/mm3 Final     Monocytes Absolute   Date Value Ref Range Status   07/15/2019 0.56 0.10 - 0.90 10*3/mm3 Final     Eosinophils Absolute   Date Value Ref Range Status   07/15/2019 0.46 (H) 0.00 - 0.40 10*3/mm3 Final     Basophils Absolute   Date Value Ref Range Status   07/15/2019 0.10 0.00 - 0.20 10*3/mm3 Final     nRBC   Date Value Ref Range Status   07/15/2019 0.0 0.0 - 0.2 /100 WBC Final       Glucose   Date Value Ref Range Status   09/05/2015 154 (H) 65 - 99 mg/dL Final     Sodium   Date Value Ref Range Status   07/15/2019 143 136 - 145 mmol/L Final     Potassium   Date Value Ref Range Status   07/15/2019 4.5 3.5 - 5.2 mmol/L Final     Total CO2   Date Value Ref Range Status   07/15/2019 30.1 (H) 22.0 - 29.0 mmol/L Final     Chloride   Date Value Ref Range Status   07/15/2019 102 98 - 107 mmol/L Final     Creatinine   Date Value Ref Range Status   07/15/2019 0.97 0.76 - 1.27 mg/dL Final     BUN   Date Value Ref Range Status   07/15/2019 9 8 - 23 mg/dL Final     BUN/Creatinine Ratio   Date Value Ref Range Status   07/15/2019 9.3 7.0 - 25.0 Final     Calcium   Date Value Ref Range Status   07/15/2019 9.3 8.6 - 10.5 mg/dL Final     eGFR Non  Am   Date Value Ref Range Status   07/15/2019 78 >60 mL/min/1.73 Final     Alkaline Phosphatase   Date Value Ref Range Status   07/15/2019 93 39 - 117 U/L Final     Total Protein   Date Value Ref Range Status   08/28/2015 7.0 6.0 - 8.5 g/dL Final     ALT (SGPT)   Date Value Ref Range Status   07/15/2019 16 1 - 41 U/L Final     AST (SGOT)   Date Value Ref Range Status   07/15/2019 12 1 - 40 U/L Final     Total Bilirubin   Date Value Ref Range Status   07/15/2019 0.4 0.2 - 1.2 mg/dL Final     Albumin   Date Value Ref Range Status    07/15/2019 4.50 3.50 - 5.20 g/dL Final     A/G Ratio   Date Value Ref Range Status   07/15/2019 2.4 g/dL Final         Imaging Results (Last 7 Days)     ** No results found for the last 168 hours. **            No notes on file    Assessment/Plan    Constipation: Acute episode over the past at least 6 weeks.  Not responding well to OTC Dulcolax    Change in bowel habits: As above.  I do not think this would really be related to stopping marijuana use    Personal history of colon polyps: 7 mm polyp on his 2017 colonoscopy; pathology consistent with a tubular adenoma    Plan  Unfortunately, no good explanation to child his change in bowel habits up to.  I discussed with him our options but feel that repeating his colonoscopy is the most conservative approach, additionally he has a history of adenomatous colon polyps.    We will proceed with colonoscopy for further evaluation.  Will discuss with his cardiologist office to make sure it is okay for him to hold Plavix 5 days prior.    I have given him some samples of Trulance to use in the meantime.  If he finds this efficacious, he will call me and I can order him a prescription for this.    Angel was seen today for constipation.    Diagnoses and all orders for this visit:    Other constipation  -     Case Request; Standing  -     Follow Anesthesia Guidelines / Standing Orders; Future  -     Obtain Informed Consent; Future  -     Case Request    Change in bowel habits  -     Case Request; Standing  -     Follow Anesthesia Guidelines / Standing Orders; Future  -     Obtain Informed Consent; Future  -     Case Request    Personal history of colonic polyps        I have discussed the above plan with the patient.  They verbalize understanding and are in agreement with the plan.  They have been advised to contact the office for any questions, concerns, or changes related to their health.    Dictated utilizing Dragon dictation

## 2020-02-19 ENCOUNTER — TELEPHONE (OUTPATIENT)
Dept: GASTROENTEROLOGY | Facility: CLINIC | Age: 65
End: 2020-02-19

## 2020-02-19 NOTE — TELEPHONE ENCOUNTER
Called pt and advised per Dr Hart that her  can hold his plavis for 5 days prior to his c/s on 04/10.  ADvised last day to take plavix would be 04/04.  Pt's wife verb understanding and update sent to Dr Ashraf.

## 2020-02-19 NOTE — TELEPHONE ENCOUNTER
Message sent to Dr Hart thru [facility] regarding approval for pt to hold plavix for 5 days prior to upcoming colonoscopy on 04/10 with Dr Ashraf.

## 2020-02-19 NOTE — TELEPHONE ENCOUNTER
----- Message from Tonya Moore sent at 2/19/2020  2:51 PM EST -----      ----- Message -----  From: Kelly Ashraf MD  Sent: 2/17/2020   9:53 AM EST  To: [facility]    Can we get permission from Dr Dumont regarding holding plavix 5 days before his colonosocpy and notify him, thanks

## 2020-03-09 ENCOUNTER — TELEPHONE (OUTPATIENT)
Dept: INTERNAL MEDICINE | Facility: CLINIC | Age: 65
End: 2020-03-09

## 2020-03-09 NOTE — TELEPHONE ENCOUNTER
Patient's wife, Jaylene, called stating that their son was diagnosed with the flu today.  She wanted to know if Dr. Meza would prescribe something to help patient from getting this - [facility].  Please advise.

## 2020-03-10 RX ORDER — OSELTAMIVIR PHOSPHATE 75 MG/1
75 CAPSULE ORAL DAILY
Qty: 10 CAPSULE | Refills: 0 | Status: SHIPPED | OUTPATIENT
Start: 2020-03-10 | End: 2020-07-01

## 2020-03-13 ENCOUNTER — TELEPHONE (OUTPATIENT)
Dept: GASTROENTEROLOGY | Facility: CLINIC | Age: 65
End: 2020-03-13

## 2020-03-13 NOTE — TELEPHONE ENCOUNTER
Called pt and pt report that he was given samples of Trulance for 2 wks.  He reports that it gave him watery stools. He states he never had a solid stool.  He is currently taking dulcolax one pill daily. Pt reports he is having one bm per day, but feels like he is not emptying completely. Advised Dr Ashraf is out of the office but will send message to Marielena PHILLIPS.

## 2020-03-13 NOTE — TELEPHONE ENCOUNTER
Is he taking any fiber daily with the stool softeners? I would have him add a daily fiber supplement and drink plenty of water. Thanks.

## 2020-03-13 NOTE — TELEPHONE ENCOUNTER
----- Message from Isela Major sent at 3/13/2020 12:36 PM EDT -----  Regarding: Iwona   Contact: [phone]  Pt is calling because the medication is not working and also his wife Jaylene White [birth year] said she has been calling and trying to talk to a nurse about some issues with her but could not get a hold of anyone. She did not say what was going on with her just that she wanted nurse to call them back.

## 2020-03-18 NOTE — TELEPHONE ENCOUNTER
Called pt and spoke with his wife who is on the hipaa form and she reports that the pt is taking a daily fiber supp and she makes sure he drinks plenty of water.  Advised will update Marielena CASTANEDA.

## 2020-03-18 NOTE — TELEPHONE ENCOUNTER
I would have him increase the fiber to 2 daily and see if that does not help move things along better.  Continue the Dulcolax as well.  Thanks

## 2020-04-30 DIAGNOSIS — I25.10 ARTERIOSCLEROTIC HEART DISEASE: ICD-10-CM

## 2020-04-30 RX ORDER — CLOPIDOGREL BISULFATE 75 MG/1
TABLET ORAL
Qty: 90 TABLET | Refills: 1 | Status: SHIPPED | OUTPATIENT
Start: 2020-04-30 | End: 2020-09-22

## 2020-06-29 ENCOUNTER — TELEPHONE (OUTPATIENT)
Dept: INTERNAL MEDICINE | Facility: CLINIC | Age: 65
End: 2020-06-29

## 2020-06-29 NOTE — TELEPHONE ENCOUNTER
Pt is scheduled for a telephone visit on 7/1 for nausea that he has had for 2 weeks. Pt would like to know if he can michelle something called into the pharmacy to help with that prior to his appt?    Call back # [phone]    [id number] [place] [facility] [address] AT [facility] [phone] [facility] [phone] FX

## 2020-07-01 ENCOUNTER — OFFICE VISIT (OUTPATIENT)
Dept: INTERNAL MEDICINE | Facility: CLINIC | Age: 65
End: 2020-07-01

## 2020-07-01 ENCOUNTER — LAB (OUTPATIENT)
Dept: LAB | Facility: HOSPITAL | Age: 65
End: 2020-07-01

## 2020-07-01 DIAGNOSIS — E86.0 DEHYDRATION: ICD-10-CM

## 2020-07-01 DIAGNOSIS — I10 ESSENTIAL HYPERTENSION: ICD-10-CM

## 2020-07-01 DIAGNOSIS — E11.59 TYPE 2 DIABETES MELLITUS WITH OTHER CIRCULATORY COMPLICATION, WITHOUT LONG-TERM CURRENT USE OF INSULIN (HCC): Primary | ICD-10-CM

## 2020-07-01 DIAGNOSIS — E66.01 MORBID OBESITY (HCC): ICD-10-CM

## 2020-07-01 DIAGNOSIS — E78.2 MIXED HYPERLIPIDEMIA: ICD-10-CM

## 2020-07-01 LAB
ALBUMIN SERPL-MCNC: 4.5 G/DL (ref 3.5–5.2)
ALBUMIN/GLOB SERPL: 1.4 G/DL
ALP SERPL-CCNC: 69 U/L (ref 39–117)
ALT SERPL W P-5'-P-CCNC: 53 U/L (ref 1–41)
ANION GAP SERPL CALCULATED.3IONS-SCNC: 13.3 MMOL/L (ref 5–15)
AST SERPL-CCNC: 37 U/L (ref 1–40)
BASOPHILS # BLD AUTO: 0.1 10*3/MM3 (ref 0–0.2)
BASOPHILS NFR BLD AUTO: 1 % (ref 0–1.5)
BILIRUB SERPL-MCNC: 0.9 MG/DL (ref 0.2–1.2)
BUN SERPL-MCNC: 19 MG/DL (ref 8–23)
BUN/CREAT SERPL: 13.1 (ref 7–25)
CALCIUM SPEC-SCNC: 9.6 MG/DL (ref 8.6–10.5)
CHLORIDE SERPL-SCNC: 98 MMOL/L (ref 98–107)
CO2 SERPL-SCNC: 23.7 MMOL/L (ref 22–29)
CREAT SERPL-MCNC: 1.45 MG/DL (ref 0.76–1.27)
DEPRECATED RDW RBC AUTO: 42.9 FL (ref 37–54)
EOSINOPHIL # BLD AUTO: 0.09 10*3/MM3 (ref 0–0.4)
EOSINOPHIL NFR BLD AUTO: 0.9 % (ref 0.3–6.2)
ERYTHROCYTE [DISTWIDTH] IN BLOOD BY AUTOMATED COUNT: 13.8 % (ref 12.3–15.4)
GFR SERPL CREATININE-BSD FRML MDRD: 49 ML/MIN/1.73
GLOBULIN UR ELPH-MCNC: 3.2 GM/DL
GLUCOSE SERPL-MCNC: 144 MG/DL (ref 65–99)
HBA1C MFR BLD: 7.3 % (ref 4.8–5.6)
HCT VFR BLD AUTO: 46.7 % (ref 37.5–51)
HGB BLD-MCNC: 15.6 G/DL (ref 13–17.7)
IMM GRANULOCYTES # BLD AUTO: 0.05 10*3/MM3 (ref 0–0.05)
IMM GRANULOCYTES NFR BLD AUTO: 0.5 % (ref 0–0.5)
LYMPHOCYTES # BLD AUTO: 2.19 10*3/MM3 (ref 0.7–3.1)
LYMPHOCYTES NFR BLD AUTO: 21.4 % (ref 19.6–45.3)
MCH RBC QN AUTO: 28.9 PG (ref 26.6–33)
MCHC RBC AUTO-ENTMCNC: 33.4 G/DL (ref 31.5–35.7)
MCV RBC AUTO: 86.5 FL (ref 79–97)
MONOCYTES # BLD AUTO: 0.8 10*3/MM3 (ref 0.1–0.9)
MONOCYTES NFR BLD AUTO: 7.8 % (ref 5–12)
NEUTROPHILS NFR BLD AUTO: 68.4 % (ref 42.7–76)
NEUTROPHILS NFR BLD AUTO: 7.02 10*3/MM3 (ref 1.7–7)
NRBC BLD AUTO-RTO: 0 /100 WBC (ref 0–0.2)
PLATELET # BLD AUTO: 274 10*3/MM3 (ref 140–450)
PMV BLD AUTO: 9.8 FL (ref 6–12)
POTASSIUM SERPL-SCNC: 4 MMOL/L (ref 3.5–5.2)
PROT SERPL-MCNC: 7.7 G/DL (ref 6–8.5)
RBC # BLD AUTO: 5.4 10*6/MM3 (ref 4.14–5.8)
SODIUM SERPL-SCNC: 135 MMOL/L (ref 136–145)
WBC # BLD AUTO: 10.25 10*3/MM3 (ref 3.4–10.8)

## 2020-07-01 PROCEDURE — 80053 COMPREHEN METABOLIC PANEL: CPT | Performed by: FAMILY MEDICINE

## 2020-07-01 PROCEDURE — 99214 OFFICE O/P EST MOD 30 MIN: CPT | Performed by: FAMILY MEDICINE

## 2020-07-01 PROCEDURE — 85025 COMPLETE CBC W/AUTO DIFF WBC: CPT | Performed by: FAMILY MEDICINE

## 2020-07-01 PROCEDURE — 36415 COLL VENOUS BLD VENIPUNCTURE: CPT | Performed by: FAMILY MEDICINE

## 2020-07-01 PROCEDURE — 83036 HEMOGLOBIN GLYCOSYLATED A1C: CPT | Performed by: FAMILY MEDICINE

## 2020-07-01 RX ORDER — PROMETHAZINE HYDROCHLORIDE 25 MG/1
25 TABLET ORAL EVERY 8 HOURS PRN
Qty: 21 TABLET | Refills: 1 | Status: SHIPPED | OUTPATIENT
Start: 2020-07-01 | End: 2020-12-28 | Stop reason: SINTOL

## 2020-07-01 NOTE — PROGRESS NOTES
Angel White Jr. is a 65 y.o. male.      Assessment/Plan   Problem List Items Addressed This Visit        Cardiovascular and Mediastinum    Essential hypertension    Hyperlipidemia       Digestive    Morbid obesity (CMS/[facility])       Endocrine    Diabetes (CMS/[facility]) - Primary    Relevant Orders    Comprehensive Metabolic Panel    Hemoglobin A1c       Other    Dehydration    Relevant Medications    promethazine (PHENERGAN) 25 MG tablet    Other Relevant Orders    Comprehensive Metabolic Panel    CBC & Differential         Stop Hydrochlorothiazide Benadryl increase water do not drink Coke monitor blood pressure  Have labs drawn, Phenergan for nausea    Follow-up results  If worsening symptoms to [facility] [place] emergency department  Return if symptoms worsen or fail to improve, for Recheck.      Chief Complaint   Patient presents with   • Nasal Congestion   • Nausea   • heat intolerant   • Diabetes   • Hypertension     Social History     Tobacco Use   • Smoking status: Never Smoker   • Smokeless tobacco: Never Used   Substance Use Topics   • Alcohol use: No   • Drug use: No   You have chosen to receive care through a telephone visit. Do you consent to use a telephone visit for your medical care today? Yes  This visit has been rescheduled as a phone visit to comply with patient safety concerns in accordance with CDC recommendations. Total time of discussion was 25 minutes.      History of Present Illness   Appointment to discuss chronic medical problems diabetes hypertension hyperlipidemia obesity which is been very stable however he is developed some nausea over the last 2 weeks worsening over the last 4 days some increasing sweats specifically with outside nauseous blood sugars morning 129 not had any diarrhea intermittent trouble keeping fluids down specifically pain control.  He is drinking plenty of water he says he is only urinating 5 times and very yellow he has no headache no chest pain no shortness of breath or  cough.  He feels he has had about 6 pound weight loss.  No known exposures.  Viral illnesses he has not been traveling  The following portions of the patient's history were reviewed and updated as appropriate:PMHroutine: Social history , Allergies, Current Medications, Active Problem List and Health Maintenance    Review of Systems   Constitutional: Positive for activity change, appetite change, chills, diaphoresis and fatigue. Negative for fever and unexpected weight change.   HENT: Positive for congestion and sinus pressure. Negative for facial swelling, postnasal drip, sinus pain and sore throat.    Respiratory: Negative.    Cardiovascular: Negative.    Gastrointestinal: Positive for nausea.   Genitourinary: Negative.    Musculoskeletal: Negative.    Neurological: Negative.  Negative for dizziness, weakness and headaches.       Objective   There were no vitals filed for this visit.  There is no height or weight on file to calculate BMI.  Physical Exam  Reviewed Data:  No visits with results within 1 Month(s) from this visit.   Latest known visit with results is:   Office Visit on 07/15/2019   Component Date Value Ref Range Status   • Glucose 07/15/2019 102* 65 - 99 mg/dL Final   • BUN 07/15/2019 9  8 - 23 mg/dL Final   • Creatinine 07/15/2019 0.97  0.76 - 1.27 mg/dL Final   • eGFR Non African Am 07/15/2019 78  >60 mL/min/1.73 Final   • eGFR African Am 07/15/2019 95  >60 mL/min/1.73 Final   • BUN/Creatinine Ratio 07/15/2019 9.3  7.0 - 25.0 Final   • Sodium 07/15/2019 143  136 - 145 mmol/L Final   • Potassium 07/15/2019 4.5  3.5 - 5.2 mmol/L Final   • Chloride 07/15/2019 102  98 - 107 mmol/L Final   • Total CO2 07/15/2019 30.1* 22.0 - 29.0 mmol/L Final   • Calcium 07/15/2019 9.3  8.6 - 10.5 mg/dL Final   • Total Protein 07/15/2019 6.4  6.0 - 8.5 g/dL Final   • Albumin 07/15/2019 4.50  3.50 - 5.20 g/dL Final   • Globulin 07/15/2019 1.9  gm/dL Final   • A/G Ratio 07/15/2019 2.4  g/dL Final   • Total Bilirubin  07/15/2019 0.4  0.2 - 1.2 mg/dL Final   • Alkaline Phosphatase 07/15/2019 93  39 - 117 U/L Final   • AST (SGOT) 07/15/2019 12  1 - 40 U/L Final   • ALT (SGPT) 07/15/2019 16  1 - 41 U/L Final   • WBC 07/15/2019 8.37  3.40 - 10.80 10*3/mm3 Final   • RBC 07/15/2019 4.93  4.14 - 5.80 10*6/mm3 Final   • Hemoglobin 07/15/2019 14.2  13.0 - 17.7 g/dL Final   • Hematocrit 07/15/2019 43.9  37.5 - 51.0 % Final   • MCV 07/15/2019 89.0  79.0 - 97.0 fL Final   • MCH 07/15/2019 28.8  26.6 - 33.0 pg Final   • MCHC 07/15/2019 32.3  31.5 - 35.7 g/dL Final   • RDW 07/15/2019 13.2  12.3 - 15.4 % Final   • Platelets 07/15/2019 282  140 - 450 10*3/mm3 Final   • Neutrophil Rel % 07/15/2019 64.4  42.7 - 76.0 % Final   • Lymphocyte Rel % 07/15/2019 22.0  19.6 - 45.3 % Final   • Monocyte Rel % 07/15/2019 6.7  5.0 - 12.0 % Final   • Eosinophil Rel % 07/15/2019 5.5  0.3 - 6.2 % Final   • Basophil Rel % 07/15/2019 1.2  0.0 - 1.5 % Final   • Neutrophils Absolute 07/15/2019 5.39  1.70 - 7.00 10*3/mm3 Final   • Lymphocytes Absolute 07/15/2019 1.84  0.70 - 3.10 10*3/mm3 Final   • Monocytes Absolute 07/15/2019 0.56  0.10 - 0.90 10*3/mm3 Final   • Eosinophils Absolute 07/15/2019 0.46* 0.00 - 0.40 10*3/mm3 Final   • Basophils Absolute 07/15/2019 0.10  0.00 - 0.20 10*3/mm3 Final   • Immature Granulocyte Rel % 07/15/2019 0.2  0.0 - 0.5 % Final   • Immature Grans Absolute 07/15/2019 0.02  0.00 - 0.05 10*3/mm3 Final   • nRBC 07/15/2019 0.0  0.0 - 0.2 /100 WBC Final

## 2020-07-03 DIAGNOSIS — E86.0 DEHYDRATION, MODERATE: Primary | ICD-10-CM

## 2020-07-06 ENCOUNTER — LAB (OUTPATIENT)
Dept: LAB | Facility: HOSPITAL | Age: 65
End: 2020-07-06

## 2020-07-06 DIAGNOSIS — E86.0 DEHYDRATION, MODERATE: ICD-10-CM

## 2020-07-06 LAB
ANION GAP SERPL CALCULATED.3IONS-SCNC: 9.2 MMOL/L (ref 5–15)
BUN SERPL-MCNC: 12 MG/DL (ref 8–23)
BUN/CREAT SERPL: 10.6 (ref 7–25)
CALCIUM SPEC-SCNC: 9.4 MG/DL (ref 8.6–10.5)
CHLORIDE SERPL-SCNC: 103 MMOL/L (ref 98–107)
CO2 SERPL-SCNC: 24.8 MMOL/L (ref 22–29)
CREAT SERPL-MCNC: 1.13 MG/DL (ref 0.76–1.27)
GFR SERPL CREATININE-BSD FRML MDRD: 65 ML/MIN/1.73
GLUCOSE SERPL-MCNC: 107 MG/DL (ref 65–99)
POTASSIUM SERPL-SCNC: 4.3 MMOL/L (ref 3.5–5.2)
SODIUM SERPL-SCNC: 137 MMOL/L (ref 136–145)

## 2020-07-06 PROCEDURE — 36415 COLL VENOUS BLD VENIPUNCTURE: CPT

## 2020-07-06 PROCEDURE — 80048 BASIC METABOLIC PNL TOTAL CA: CPT | Performed by: FAMILY MEDICINE

## 2020-07-28 ENCOUNTER — TRANSCRIBE ORDERS (OUTPATIENT)
Dept: SLEEP MEDICINE | Facility: HOSPITAL | Age: 65
End: 2020-07-28

## 2020-07-28 DIAGNOSIS — Z01.818 OTHER SPECIFIED PRE-OPERATIVE EXAMINATION: Primary | ICD-10-CM

## 2020-07-31 ENCOUNTER — LAB (OUTPATIENT)
Dept: LAB | Facility: HOSPITAL | Age: 65
End: 2020-07-31

## 2020-07-31 DIAGNOSIS — Z01.818 OTHER SPECIFIED PRE-OPERATIVE EXAMINATION: ICD-10-CM

## 2020-07-31 PROCEDURE — C9803 HOPD COVID-19 SPEC COLLECT: HCPCS

## 2020-07-31 PROCEDURE — U0004 COV-19 TEST NON-CDC HGH THRU: HCPCS

## 2020-08-02 LAB
REF LAB TEST METHOD: NORMAL
SARS-COV-2 RNA RESP QL NAA+PROBE: NOT DETECTED

## 2020-08-03 ENCOUNTER — ANESTHESIA EVENT (OUTPATIENT)
Dept: GASTROENTEROLOGY | Facility: HOSPITAL | Age: 65
End: 2020-08-03

## 2020-08-03 ENCOUNTER — HOSPITAL ENCOUNTER (OUTPATIENT)
Facility: HOSPITAL | Age: 65
Setting detail: HOSPITAL OUTPATIENT SURGERY
Discharge: HOME | End: 2020-08-03
Attending: INTERNAL MEDICINE | Admitting: INTERNAL MEDICINE

## 2020-08-03 ENCOUNTER — ANESTHESIA (OUTPATIENT)
Dept: GASTROENTEROLOGY | Facility: HOSPITAL | Age: 65
End: 2020-08-03

## 2020-08-03 VITALS
OXYGEN SATURATION: 96 % | SYSTOLIC BLOOD PRESSURE: 119 MMHG | WEIGHT: 283 LBS | BODY MASS INDEX: 37.51 KG/M2 | RESPIRATION RATE: 18 BRPM | TEMPERATURE: 99 F | HEART RATE: 118 BPM | HEIGHT: 73 IN | DIASTOLIC BLOOD PRESSURE: 92 MMHG

## 2020-08-03 DIAGNOSIS — K59.09 OTHER CONSTIPATION: ICD-10-CM

## 2020-08-03 DIAGNOSIS — R19.4 CHANGE IN BOWEL HABITS: ICD-10-CM

## 2020-08-03 LAB — GLUCOSE BLDC GLUCOMTR-MCNC: 123 MG/DL (ref 70–130)

## 2020-08-03 PROCEDURE — 45380 COLONOSCOPY AND BIOPSY: CPT | Performed by: INTERNAL MEDICINE

## 2020-08-03 PROCEDURE — 88305 TISSUE EXAM BY PATHOLOGIST: CPT | Performed by: INTERNAL MEDICINE

## 2020-08-03 PROCEDURE — S0260 H&P FOR SURGERY: HCPCS | Performed by: INTERNAL MEDICINE

## 2020-08-03 PROCEDURE — 82962 GLUCOSE BLOOD TEST: CPT

## 2020-08-03 PROCEDURE — 25010000002 PROPOFOL 10 MG/ML EMULSION: Performed by: NURSE ANESTHETIST, CERTIFIED REGISTERED

## 2020-08-03 RX ORDER — PROPOFOL 10 MG/ML
VIAL (ML) INTRAVENOUS AS NEEDED
Status: DISCONTINUED | OUTPATIENT
Start: 2020-08-03 | End: 2020-08-03 | Stop reason: SURG

## 2020-08-03 RX ORDER — LIDOCAINE HYDROCHLORIDE 10 MG/ML
0.5 INJECTION, SOLUTION INFILTRATION; PERINEURAL ONCE AS NEEDED
Status: DISCONTINUED | OUTPATIENT
Start: 2020-08-03 | End: 2020-08-03 | Stop reason: HOSPADM

## 2020-08-03 RX ORDER — SODIUM CHLORIDE, SODIUM LACTATE, POTASSIUM CHLORIDE, CALCIUM CHLORIDE 600; 310; 30; 20 MG/100ML; MG/100ML; MG/100ML; MG/100ML
30 INJECTION, SOLUTION INTRAVENOUS CONTINUOUS
Status: DISCONTINUED | OUTPATIENT
Start: 2020-08-03 | End: 2020-08-03 | Stop reason: HOSPADM

## 2020-08-03 RX ORDER — LIDOCAINE HYDROCHLORIDE 20 MG/ML
INJECTION, SOLUTION INFILTRATION; PERINEURAL AS NEEDED
Status: DISCONTINUED | OUTPATIENT
Start: 2020-08-03 | End: 2020-08-03 | Stop reason: SURG

## 2020-08-03 RX ORDER — SODIUM CHLORIDE, SODIUM LACTATE, POTASSIUM CHLORIDE, CALCIUM CHLORIDE 600; 310; 30; 20 MG/100ML; MG/100ML; MG/100ML; MG/100ML
1000 INJECTION, SOLUTION INTRAVENOUS CONTINUOUS
Status: DISCONTINUED | OUTPATIENT
Start: 2020-08-03 | End: 2020-08-03 | Stop reason: HOSPADM

## 2020-08-03 RX ORDER — PROPOFOL 10 MG/ML
VIAL (ML) INTRAVENOUS CONTINUOUS PRN
Status: DISCONTINUED | OUTPATIENT
Start: 2020-08-03 | End: 2020-08-03 | Stop reason: SURG

## 2020-08-03 RX ORDER — SODIUM CHLORIDE 0.9 % (FLUSH) 0.9 %
10 SYRINGE (ML) INJECTION AS NEEDED
Status: DISCONTINUED | OUTPATIENT
Start: 2020-08-03 | End: 2020-08-03 | Stop reason: HOSPADM

## 2020-08-03 RX ADMIN — LIDOCAINE HYDROCHLORIDE 60 MG: 20 INJECTION, SOLUTION INFILTRATION; PERINEURAL at 13:30

## 2020-08-03 RX ADMIN — PROPOFOL 200 MCG/KG/MIN: 10 INJECTION, EMULSION INTRAVENOUS at 13:30

## 2020-08-03 RX ADMIN — PROPOFOL 100 MG: 10 INJECTION, EMULSION INTRAVENOUS at 13:30

## 2020-08-03 RX ADMIN — SODIUM CHLORIDE, POTASSIUM CHLORIDE, SODIUM LACTATE AND CALCIUM CHLORIDE 30 ML/HR: 600; 310; 30; 20 INJECTION, SOLUTION INTRAVENOUS at 13:07

## 2020-08-03 NOTE — ANESTHESIA POSTPROCEDURE EVALUATION
"Patient: Angel White Jr.    Procedure Summary     Date:  08/03/20 Room / Location:  [facility] ENDOSCOPY 7 /  [facility]    Anesthesia Start:  1325 Anesthesia Stop:  1401    Procedure:  COLONOSCOPY to cecum and TI with cold biopsies (N/A ) Diagnosis:       Other constipation      Change in bowel habits      (Other constipation [K59.09])      (Change in bowel habits [R19.4])    Surgeon:  Kelly Ashraf MD Provider:  Leonel Thurston MD    Anesthesia Type:  MAC ASA Status:  3          Anesthesia Type: MAC    Vitals  Vitals Value Taken Time   /92 8/3/2020  2:21 PM   Temp     Pulse 118 8/3/2020  2:21 PM   Resp 18 8/3/2020  2:21 PM   SpO2 96 % 8/3/2020  2:21 PM           Post Anesthesia Care and Evaluation    Patient location during evaluation: PACU  Patient participation: complete - patient participated  Level of consciousness: awake  Pain score: 0  Pain management: adequate  Airway patency: patent  Anesthetic complications: No anesthetic complications  PONV Status: none  Cardiovascular status: acceptable  Respiratory status: acceptable  Hydration status: acceptable    Comments: /92 (BP Location: Left arm, Patient Position: Lying)   Pulse 118   Temp 37.2 °C (99 °F) (Oral)   Resp 18   Ht 185.4 cm (73\")   Wt 128 kg (283 lb)   SpO2 96%   BMI 37.34 kg/m²       "

## 2020-08-03 NOTE — NURSING NOTE
Pt currently in AFIB, ayptomatic- Dr Thurston is aware and is ok for procedure to go ahead.  Pt states that he has appointment with his cardiologist next month. Highest heart rate has been 112.

## 2020-08-03 NOTE — ANESTHESIA PREPROCEDURE EVALUATION
Anesthesia Evaluation     Patient summary reviewed and Nursing notes reviewed   no history of anesthetic complications:  NPO Solid Status: > 6 hours  NPO Liquid Status: > 6 hours           Airway   Mallampati: I  TM distance: >3 FB  Neck ROM: full  No difficulty expected  Dental - normal exam     Pulmonary - normal exam    breath sounds clear to auscultation  (+) sleep apnea,   (-) rhonchi, decreased breath sounds, wheezes, rales, stridor  Cardiovascular - normal exam    NYHA Classification: I  Rhythm: regular  Rate: normal    (+) hypertension, CAD, cardiac stents more than 12 months ago angina, PVD, hyperlipidemia,   (-) murmur, weak pulses, friction rub, systolic click, carotid bruits, JVD, peripheral edema      Neuro/Psych- negative ROS  GI/Hepatic/Renal/Endo    (+) obesity, morbid obesity,  liver disease, diabetes mellitus type 2 well controlled,     Musculoskeletal     Abdominal  - normal exam    Abdomen: soft.  Bowel sounds: normal.   Substance History - negative use     OB/GYN negative ob/gyn ROS         Other   arthritis,                    Anesthesia Plan    ASA 3     MAC   (Pt has a fib today. Rate . Will bolus ivfluid and proceed. Pt to mention to heart md next appt.)  intravenous induction     Anesthetic plan, all risks, benefits, and alternatives have been provided, discussed and informed consent has been obtained with: patient.

## 2020-08-03 NOTE — H&P
[facility]  Pre Procedure History & Physical    Chief Complaint: Personal history of colon polyps, change in bowel habits      HPI: 65 y.o. male with a past medical history noted below who presents for evaluation of constipation.  This started about 6 weeks ago.  He went from having mostly regular BMs to going up to 4 days with no BMs.  Associated with abdominal fullness and discomfort.  Notices a change in stool consistency-as she feels they are more pasty.  The only potential triggers that he can identify are that he stop smoking marijuana around the same time.  He has been taking over-the-counter Dulcolax.  He is taking anywhere from 1-4 a day but he is not getting consistent results with having bowel movements with this.  He denies seeing any blood in his stool.  No associated nausea or vomiting.     Last colonoscopy was 1/2017--this was done with Dr. Dyer at [facility]; the report has been scanned in, he had diverticulosis, hemorrhoids and a 7 mm ascending adenomatous polyp.  EGD at the same time with mild reflux esophagitis, no Gonzalez's     Maternal grandfather with CRC.     No abdominal surgeries.     Stopped smoking MJ at the beginning of the year.  No excess ETOH, no cigarettes.     Retired from [employer].     He is on clopidogrel for cardiac stents (6) in 2015.  Previously saw Dr Hall.    Past Medical History:   Past Medical History:   Diagnosis Date   • AP (angina pectoris) (CMS/HCC)     unstable   • CAD (coronary artery disease)    • DDD (degenerative disc disease), lumbosacral    • Diabetes (CMS/HCC)    • Essential hypertension    • Fatty liver    • Osteoarthritis of knee    • Screening for prostate cancer 2010    normal   • Sleep apnea     started wearing a CPAP on 10/19/16       Family History:  Family History   Problem Relation Age of Onset   • Hypertension Mother    • Heart disease Father    • Hypertension Father    • Macular degeneration Father    • Arthritis Maternal Grandmother    •  Heart disease Maternal Grandmother    • Arthritis Maternal Grandfather    • Heart disease Maternal Grandfather    • Diabetes Maternal Grandfather    • Hypertension Maternal Grandfather    • Cancer Maternal Grandfather         colon?   • Arthritis Paternal Grandmother    • Heart disease Paternal Grandmother    • Diabetes Paternal Grandmother    • Hypertension Paternal Grandmother    • Stroke Paternal Grandmother    • Arthritis Paternal Grandfather    • Heart disease Paternal Grandfather    • Macular degeneration Sister        Social History:   reports that he has never smoked. He has never used smokeless tobacco. He reports that he does not drink alcohol or use drugs.    Medications:   Medications Prior to Admission   Medication Sig Dispense Refill Last Dose   • amLODIPine (NORVASC) 5 MG tablet Take 1 tablet by mouth Daily. 90 tablet 2 Taking   • aspirin 81 MG EC tablet Take 81 mg by mouth daily.   Taking   • atorvastatin (LIPITOR) 40 MG tablet Take 1 tablet by mouth Daily. 90 tablet 1 Taking   • B-D UF III MINI PEN NEEDLES 31G X 5 MM misc INJECT VICTOZA SUB-Q DAILY  5 Taking   • clopidogrel (PLAVIX) 75 MG tablet TAKE 1 TABLET BY MOUTH EVERY DAY 90 tablet 1    • diphenhydrAMINE (BENADRYL) 25 mg capsule Take 25 mg by mouth At Night As Needed for Itching.   Taking   • hydroCHLOROthiazide (HYDRODIURIL) 25 MG tablet Take 1 tablet by mouth Daily. 90 tablet 1 Taking   • Lancets (ONETOUCH ULTRASOFT) lancets 2 (Two) Times a Day. for testing  2 Taking   • losartan (COZAAR) 100 MG tablet Take 1 tablet by mouth Daily. 90 tablet 2 Taking   • metFORMIN (GLUCOPHAGE) 500 MG tablet TAKE ONE TABLET BY MOUTH TWICE DAILY WITH MEALS 60 tablet 5 Taking   • ondansetron (ZOFRAN) 8 MG tablet TAKE 1 TABLET BY MOUTH EVERY 8 (EIGHT) HOURS AS NEEDED FOR NAUSEA OR VOMITING. 21 tablet 0 Taking   • ONETOUCH VERIO test strip 2 (Two) Times a Day. for testing  2 Taking   • promethazine (PHENERGAN) 25 MG tablet Take 1 tablet by mouth Every 8 (Eight)  Hours As Needed for Nausea or Vomiting. 21 tablet 1    • VICTOZA 18 MG/3ML solution pen-injector injection INJECT 1.8 MG SUB-Q DAILY  2 Taking       Allergies:  Morphine and related    ROS:    Pertinent items are noted in HPI     Objective     There were no vitals taken for this visit.    Physical Exam   Constitutional: Pt is oriented to person, place, and time and well-developed, well-nourished, and in no distress.   HENT:   Mouth/Throat: Oropharynx is clear and moist.   Neck: Normal range of motion. Neck supple.   Cardiovascular: Normal rate, regular rhythm and normal heart sounds.    Pulmonary/Chest: Effort normal and breath sounds normal. No respiratory distress. No  wheezes.   Abdominal: Soft. Bowel sounds are normal.   Skin: Skin is warm and dry.   Psychiatric: Mood, memory, affect and judgment normal.     Assessment/Plan     Diagnosis: Personal history of colon polyps, change in bowel habits      Anticipated Surgical Procedure:    Colonoscopy    The risks, benefits, and alternatives of this procedure have been discussed with the patient or the responsible party- the patient understands and agrees to proceed.

## 2020-08-04 ENCOUNTER — TELEPHONE (OUTPATIENT)
Dept: CARDIOLOGY | Facility: CLINIC | Age: 65
End: 2020-08-04

## 2020-08-04 LAB
CYTO UR: NORMAL
LAB AP CASE REPORT: NORMAL
PATH REPORT.FINAL DX SPEC: NORMAL
PATH REPORT.GROSS SPEC: NORMAL

## 2020-08-04 NOTE — TELEPHONE ENCOUNTER
Pt called stating he had a colonoscopy yesterday and was told to call our office due to abnormal heart beat.    From GI office------ Pt currently in AFIB, aysmptomatic- Dr Thurston is aware and is ok for procedure to go ahead.     Pt has an appt on 09/04/20

## 2020-08-05 DIAGNOSIS — I48.0 AF (PAROXYSMAL ATRIAL FIBRILLATION) (HCC): Primary | ICD-10-CM

## 2020-08-06 NOTE — PROGRESS NOTES
The tissue biopsy from the cecum was consistent with a hyperplastic polyp    His next colonoscopy should be in 5 years, place in recall    He can schedule follow-up at his convenience to discuss other options for treating his constipation.

## 2020-08-19 ENCOUNTER — TELEPHONE (OUTPATIENT)
Dept: GASTROENTEROLOGY | Facility: CLINIC | Age: 65
End: 2020-08-19

## 2020-08-19 NOTE — TELEPHONE ENCOUNTER
----- Message from Kelly Ahsraf MD sent at 8/6/2020  5:09 PM EDT -----  The tissue biopsy from the cecum was consistent with a hyperplastic polyp    His next colonoscopy should be in 5 years, place in recall    He can schedule follow-up at his convenience to discuss other options for treating his constipation.

## 2020-08-19 NOTE — TELEPHONE ENCOUNTER
Called pt and spoke with pt's wife (on hipaa report) and advised of Dr Ashraf note. She verb understanding.       C/s placed in recall for 08/03/2025.

## 2020-09-01 ENCOUNTER — OFFICE VISIT (OUTPATIENT)
Dept: INTERNAL MEDICINE | Facility: CLINIC | Age: 65
End: 2020-09-01

## 2020-09-01 VITALS
DIASTOLIC BLOOD PRESSURE: 72 MMHG | OXYGEN SATURATION: 98 % | SYSTOLIC BLOOD PRESSURE: 118 MMHG | WEIGHT: 291.7 LBS | HEIGHT: 73 IN | BODY MASS INDEX: 38.66 KG/M2 | HEART RATE: 65 BPM

## 2020-09-01 DIAGNOSIS — E11.51 CONTROLLED TYPE 2 DIABETES MELLITUS WITH DIABETIC PERIPHERAL ANGIOPATHY WITHOUT GANGRENE, WITHOUT LONG-TERM CURRENT USE OF INSULIN (HCC): Primary | ICD-10-CM

## 2020-09-01 DIAGNOSIS — I10 ESSENTIAL HYPERTENSION: ICD-10-CM

## 2020-09-01 DIAGNOSIS — E78.2 MIXED HYPERLIPIDEMIA: ICD-10-CM

## 2020-09-01 PROBLEM — E86.0 DEHYDRATION: Status: RESOLVED | Noted: 2020-07-01 | Resolved: 2020-09-01

## 2020-09-01 PROCEDURE — 99214 OFFICE O/P EST MOD 30 MIN: CPT | Performed by: FAMILY MEDICINE

## 2020-09-01 RX ORDER — LOSARTAN POTASSIUM 100 MG/1
100 TABLET ORAL DAILY
Qty: 90 TABLET | Refills: 2 | Status: SHIPPED | OUTPATIENT
Start: 2020-09-01 | End: 2021-06-23

## 2020-09-01 RX ORDER — AMLODIPINE BESYLATE 5 MG/1
5 TABLET ORAL DAILY
Qty: 90 TABLET | Refills: 2 | Status: SHIPPED | OUTPATIENT
Start: 2020-09-01 | End: 2021-09-23

## 2020-09-01 RX ORDER — ATORVASTATIN CALCIUM 40 MG/1
40 TABLET, FILM COATED ORAL DAILY
Qty: 90 TABLET | Refills: 1 | Status: SHIPPED | OUTPATIENT
Start: 2020-09-01 | End: 2021-03-15

## 2020-09-01 RX ORDER — HYDROCHLOROTHIAZIDE 25 MG/1
25 TABLET ORAL EVERY OTHER DAY
Qty: 90 TABLET | Refills: 1
Start: 2020-09-01 | End: 2020-09-18

## 2020-09-01 NOTE — PROGRESS NOTES
Angel White Jr. is a 65 y.o. male.      Assessment/Plan   Problem List Items Addressed This Visit        Cardiovascular and Mediastinum    Essential hypertension    Relevant Medications    losartan (Cozaar) 100 MG tablet    amLODIPine (NORVASC) 5 MG tablet    hydroCHLOROthiazide (HYDRODIURIL) 25 MG tablet    Hyperlipidemia    Relevant Medications    atorvastatin (LIPITOR) 40 MG tablet    Controlled type 2 diabetes mellitus with diabetic peripheral angiopathy without gangrene, without long-term current use of insulin (CMS/[facility]) - Primary           Continue present treatment of hypertension hyperlipidemia diabetes as prescribed follow-up otherwise as needed for acute care or  Return in about 6 months (around 3/1/2021), or if symptoms worsen or fail to improve, for Recheck.      Chief Complaint   Patient presents with   • follow up to diabetes   • follow up to hypertension   • follow up to hyperlipidemia     Social History     Tobacco Use   • Smoking status: Never Smoker   • Smokeless tobacco: Never Used   Substance Use Topics   • Alcohol use: No   • Drug use: No       History of Present Illness   Patient follows up for diabetes hypertension hyperlipidemia obesity he is having difficulty losing weight he has no unwanted side effects of medication blood pressures well controlled no chest pain shortness of breath or increased fatigue but COVID seems to be keeping him inside is less active  The following portions of the patient's history were reviewed and updated as appropriate:PMHroutine: Social history , Allergies, Current Medications, Active Problem List and Health Maintenance    Review of Systems   Constitutional: Negative.    HENT: Negative.    Respiratory: Negative.    Cardiovascular: Negative.    Neurological: Negative.        Objective   Vitals:    09/01/20 1312   BP: 118/72   BP Location: Left arm   Patient Position: Sitting   Cuff Size: Large Adult   Pulse: 65   SpO2: 98%   Weight: 132 kg (291 lb 11.2 oz)  "  Height: 185.4 cm (73\")     Body mass index is 38.49 kg/m².  Physical Exam   Constitutional: He is oriented to person, place, and time. He appears well-developed and well-nourished.   HENT:   Head: Normocephalic and atraumatic.   Eyes: EOM are normal. No scleral icterus.   Cardiovascular: Normal rate and regular rhythm.   Pulmonary/Chest: Effort normal and breath sounds normal.   Musculoskeletal: He exhibits no edema or tenderness.   Neurological: He is alert and oriented to person, place, and time.   Skin: Skin is warm and dry.   Psychiatric: He has a normal mood and affect. His behavior is normal. Judgment and thought content normal.   Nursing note and vitals reviewed.    Reviewed Data:  Admission on 08/03/2020, Discharged on 08/03/2020   Component Date Value Ref Range Status   • Glucose 08/03/2020 123  70 - 130 mg/dL Final   • Case Report 08/03/2020    Final                    Value:Surgical Pathology Report                         Case: [id number]                                  Authorizing Provider:  Kelly Ashraf MD        Collected:           08/03/2020 01:41 PM          Ordering Location:     [facility]  Received:            08/03/2020 02:16 PM                                 ENDO SUITES                                                                  Pathologist:           Miguel Ángel Fuentes MD                                                         Specimen:    Large Intestine, Cecum, abnormal cecum tissue biopsies                                    • Final Diagnosis 08/03/2020    Final                    Value:This result contains rich text formatting which cannot be displayed here.   • Gross Description 08/03/2020    Final                    Value:This result contains rich text formatting which cannot be displayed here.   • Microscopic Description 08/03/2020    Final                    Value:This result contains rich text formatting which cannot be displayed here.     "

## 2020-09-04 ENCOUNTER — OFFICE VISIT (OUTPATIENT)
Dept: CARDIOLOGY | Facility: CLINIC | Age: 65
End: 2020-09-04

## 2020-09-04 VITALS
SYSTOLIC BLOOD PRESSURE: 142 MMHG | HEART RATE: 65 BPM | DIASTOLIC BLOOD PRESSURE: 90 MMHG | BODY MASS INDEX: 38.17 KG/M2 | HEIGHT: 73 IN | WEIGHT: 288 LBS

## 2020-09-04 DIAGNOSIS — I10 ESSENTIAL HYPERTENSION: ICD-10-CM

## 2020-09-04 DIAGNOSIS — E78.2 MIXED HYPERLIPIDEMIA: ICD-10-CM

## 2020-09-04 DIAGNOSIS — I48.0 PAROXYSMAL ATRIAL FIBRILLATION (HCC): ICD-10-CM

## 2020-09-04 DIAGNOSIS — E11.51 CONTROLLED TYPE 2 DIABETES MELLITUS WITH DIABETIC PERIPHERAL ANGIOPATHY WITHOUT GANGRENE, WITHOUT LONG-TERM CURRENT USE OF INSULIN (HCC): ICD-10-CM

## 2020-09-04 DIAGNOSIS — I25.10 CORONARY ARTERY DISEASE INVOLVING NATIVE CORONARY ARTERY OF NATIVE HEART WITHOUT ANGINA PECTORIS: Primary | ICD-10-CM

## 2020-09-04 PROCEDURE — 93000 ELECTROCARDIOGRAM COMPLETE: CPT | Performed by: INTERNAL MEDICINE

## 2020-09-04 PROCEDURE — 99214 OFFICE O/P EST MOD 30 MIN: CPT | Performed by: INTERNAL MEDICINE

## 2020-09-04 NOTE — PROGRESS NOTES
Subjective:     Encounter Date:09/04/2020      Patient ID: Angle White Jr. is a 65 y.o. male.    Chief Complaint: CAD    HPI:   This is a 65-year-old man who was previously followed by Dr. Hall.  He has a history of multivessel coronary disease, hypertension, hyperlipidemia, NAFLD, diabetes.  In 2015 he had unstable angina and underwent drug-eluting stenting of the proximal to mid LAD as well as the ramus.  He also had stenting of the PDA and proximal RCA in 2015.     I am meeting him for the first time today.  His blood pressures have been well controlled on losartan and amlodipine.  He has been taking aspirin and Plavix since his PCI in 2015.  He has had no bleeding issues.  Earlier this summer he had a colonoscopy with Dr. Ashraf.  Apparently during that time there was possible atrial fibrillation noted on the telemetry.  No twelve-lead EKG was performed.  I had him wear a 48-hour Holter monitor then, there was no atrial fibrillation but there was a short 7 beat run of SVT.  He has been pretty much asymptomatic from a palpitations and tachycardia perspective.  He has never had a stroke or TIA before    The following portions of the patient's history were reviewed and updated as appropriate: allergies, current medications, past family history, past medical history, past social history, past surgical history and problem list.     REVIEW OF SYSTEMS:   All systems reviewed.  Pertinent positives identified in HPI.  All other systems are negative.    Past Medical History:   Diagnosis Date   • AP (angina pectoris) (CMS/HCC)     unstable   • CAD (coronary artery disease)     6 stents placed   • DDD (degenerative disc disease), lumbosacral    • Diabetes (CMS/HCC)    • Essential hypertension    • Fatty liver    • Osteoarthritis of knee    • Screening for prostate cancer 2010    normal   • Sleep apnea     started wearing a CPAP on 10/19/16       Family History   Problem Relation Age of Onset   • Hypertension Mother     • Heart disease Father    • Hypertension Father    • Macular degeneration Father    • Arthritis Maternal Grandmother    • Heart disease Maternal Grandmother    • Arthritis Maternal Grandfather    • Heart disease Maternal Grandfather    • Diabetes Maternal Grandfather    • Hypertension Maternal Grandfather    • Cancer Maternal Grandfather         colon?   • Arthritis Paternal Grandmother    • Heart disease Paternal Grandmother    • Diabetes Paternal Grandmother    • Hypertension Paternal Grandmother    • Stroke Paternal Grandmother    • Arthritis Paternal Grandfather    • Heart disease Paternal Grandfather    • Macular degeneration Sister        Social History     Socioeconomic History   • Marital status:      Spouse name: Not on file   • Number of children: Not on file   • Years of education: Not on file   • Highest education level: Not on file   Tobacco Use   • Smoking status: Never Smoker   • Smokeless tobacco: Never Used   Substance and Sexual Activity   • Alcohol use: No   • Drug use: No   • Sexual activity: Yes     Partners: Female       Allergies   Allergen Reactions   • Morphine And Related Itching       Past Surgical History:   Procedure Laterality Date   • COLONOSCOPY  2016    normal per patient   • COLONOSCOPY N/A 8/3/2020    Procedure: COLONOSCOPY to cecum and TI with cold biopsies;  Surgeon: Kelly Ashraf MD;  Location: [facility] ENDOSCOPY;  Service: Gastroenterology;  Laterality: N/A;  pre-change in bowel habits, hx polyps   post-hemorrhoids, lipomas, abnormal cecal tissue    • CORONARY ANGIOPLASTY WITH STENT PLACEMENT  08/28/2015    6 stents   • FOOT SURGERY Right 05/23/2017   • REPLACEMENT TOTAL KNEE Left 2010   • REPLACEMENT TOTAL KNEE Right 2009   • SKIN CANCER EXCISION Left 03/30/2016    left ear and left eyebrow, graft took from face, placed on ear         ECG 12 Lead  Date/Time: 9/4/2020 11:17 AM  Performed by: Crys Dumont MD  Authorized by: Crys Dumont MD   Comparison: compared  with previous ECG from 7/22/2019  Similar to previous ECG  Rhythm: sinus rhythm  Rate: normal  Conduction: conduction normal  ST Segments: ST segments normal  T Waves: T waves normal  QRS axis: normal  Other: no other findings    Clinical impression: normal ECG               Objective:         PHYSICAL EXAM:  GEN: VSS, no distress,   Eyes: normal sclera, normal lids and lashes  HENT: moist mucus membranes,   Respiratory: CTAB, no rales or wheezes  CV: RRR, no murmurs, , +2 DP and 2+ carotid pulses b/l  GI: NABS, soft,  Nontender, nondistended  MSK: +1 lower extremity edema with chronic venous stasis changes, no scoliosis or kyphosis  Skin: no rash, warm, dry  Heme/Lymph: no bruising or bleeding  Psych: organized thought, normal behavior and affect  Neuro: Cranial nerves grossly intact, Alert and Oriented x 3.         Assessment:          Diagnosis Plan   1. Coronary artery disease involving native coronary artery of native heart without angina pectoris  Lipid Panel   2. Essential hypertension     3. Mixed hyperlipidemia     4. Controlled type 2 diabetes mellitus with diabetic peripheral angiopathy without gangrene, without long-term current use of insulin (CMS/[facility])     5. Paroxysmal atrial fibrillation (CMS/[facility])  Holter Monitor - 72 Hour Up To 21 Days          Plan:       1.  Coronary artery disease: Remote stenting of the LAD, ramus, right coronary artery in the setting of unstable angina in 2015.  He has been maintained on dual antiplatelet therapy since then without any bleeding complications.  His only antianginal medication is amlodipine 5 mg.  2.  Hypertension: Well-controlled on amlodipine HCTZ and losartan  3.  Hyperlipidemia: On Lipitor 40.  No recent lipids in the system, I will order this today.  4.  Diabetes: On high intensity statin.  5.  Arrhythmia: He had a reported episode of atrial fibrillation during a colonoscopy but I have not seen any strips or documentation of that.  He wore a 48-hour Holter  monitor which showed 1 short run of SVT of 7 beats.  He has no palpitations.  I will have him wear a two-week ZIO patch to further investigate occult A. fib    Dr. Meza, thank you very much for referring this kind patient to me. Please call me with any questions or concerns. I will see the patient again in the office in 6 months.         Crys Dumont MD  09/04/20  [facility]    Outpatient Encounter Medications as of 9/4/2020   Medication Sig Dispense Refill   • amLODIPine (NORVASC) 5 MG tablet Take 1 tablet by mouth Daily. 90 tablet 2   • aspirin 81 MG EC tablet Take 81 mg by mouth daily.     • atorvastatin (LIPITOR) 40 MG tablet Take 1 tablet by mouth Daily. 90 tablet 1   • B-D UF III MINI PEN NEEDLES 31G X 5 MM misc INJECT VICTOZA SUB-Q DAILY  5   • clopidogrel (PLAVIX) 75 MG tablet TAKE 1 TABLET BY MOUTH EVERY DAY 90 tablet 1   • diphenhydrAMINE (BENADRYL) 25 mg capsule Take 25 mg by mouth At Night As Needed for Itching.     • hydroCHLOROthiazide (HYDRODIURIL) 25 MG tablet Take 1 tablet by mouth Every Other Day. 90 tablet 1   • Lancets (ONETOUCH ULTRASOFT) lancets 2 (Two) Times a Day. for testing  2   • losartan (Cozaar) 100 MG tablet Take 1 tablet by mouth Daily. 90 tablet 2   • metFORMIN (GLUCOPHAGE) 500 MG tablet TAKE ONE TABLET BY MOUTH TWICE DAILY WITH MEALS 60 tablet 5   • ondansetron (ZOFRAN) 8 MG tablet TAKE 1 TABLET BY MOUTH EVERY 8 (EIGHT) HOURS AS NEEDED FOR NAUSEA OR VOMITING. 21 tablet 0   • ONETOUCH VERIO test strip 2 (Two) Times a Day. for testing  2   • promethazine (PHENERGAN) 25 MG tablet Take 1 tablet by mouth Every 8 (Eight) Hours As Needed for Nausea or Vomiting. 21 tablet 1   • VICTOZA 18 MG/3ML solution pen-injector injection INJECT 1.8 MG SUB-Q DAILY  2     No facility-administered encounter medications on file as of 9/4/2020.

## 2020-09-10 ENCOUNTER — LAB (OUTPATIENT)
Dept: LAB | Facility: HOSPITAL | Age: 65
End: 2020-09-10

## 2020-09-10 ENCOUNTER — TRANSCRIBE ORDERS (OUTPATIENT)
Dept: ADMINISTRATIVE | Facility: HOSPITAL | Age: 65
End: 2020-09-10

## 2020-09-10 DIAGNOSIS — E11.69 TYPE 2 DIABETES MELLITUS WITH OTHER SPECIFIED COMPLICATION, WITH LONG-TERM CURRENT USE OF INSULIN (HCC): Primary | ICD-10-CM

## 2020-09-10 DIAGNOSIS — Z79.4 TYPE 2 DIABETES MELLITUS WITH OTHER SPECIFIED COMPLICATION, WITH LONG-TERM CURRENT USE OF INSULIN (HCC): ICD-10-CM

## 2020-09-10 DIAGNOSIS — Z79.4 TYPE 2 DIABETES MELLITUS WITH OTHER SPECIFIED COMPLICATION, WITH LONG-TERM CURRENT USE OF INSULIN (HCC): Primary | ICD-10-CM

## 2020-09-10 DIAGNOSIS — I25.10 CORONARY ARTERY DISEASE INVOLVING NATIVE CORONARY ARTERY OF NATIVE HEART WITHOUT ANGINA PECTORIS: ICD-10-CM

## 2020-09-10 DIAGNOSIS — E11.69 TYPE 2 DIABETES MELLITUS WITH OTHER SPECIFIED COMPLICATION, WITH LONG-TERM CURRENT USE OF INSULIN (HCC): ICD-10-CM

## 2020-09-10 LAB
CHOLEST SERPL-MCNC: 136 MG/DL (ref 0–200)
HBA1C MFR BLD: 7.18 % (ref 4.8–5.6)
HDLC SERPL-MCNC: 56 MG/DL (ref 40–60)
LDLC SERPL CALC-MCNC: 58 MG/DL (ref 0–100)
LDLC/HDLC SERPL: 1.04 {RATIO}
TRIGL SERPL-MCNC: 108 MG/DL (ref 0–150)
VLDLC SERPL-MCNC: 21.6 MG/DL (ref 5–40)

## 2020-09-10 PROCEDURE — 83036 HEMOGLOBIN GLYCOSYLATED A1C: CPT | Performed by: INTERNAL MEDICINE

## 2020-09-10 PROCEDURE — 80061 LIPID PANEL: CPT | Performed by: INTERNAL MEDICINE

## 2020-09-10 PROCEDURE — 36415 COLL VENOUS BLD VENIPUNCTURE: CPT

## 2020-09-16 ENCOUNTER — TELEPHONE (OUTPATIENT)
Dept: CARDIOLOGY | Facility: CLINIC | Age: 65
End: 2020-09-16

## 2020-09-16 NOTE — TELEPHONE ENCOUNTER
Pt notified of normal results and voiced understanding. he had no further questions at this time.

## 2020-09-16 NOTE — TELEPHONE ENCOUNTER
----- Message from Crys Dumont MD sent at 9/14/2020  5:13 PM EDT -----  Please call patient with their normal test results.

## 2020-09-18 RX ORDER — HYDROCHLOROTHIAZIDE 25 MG/1
TABLET ORAL
Qty: 90 TABLET | Refills: 1 | Status: SHIPPED | OUTPATIENT
Start: 2020-09-18 | End: 2021-03-17

## 2020-09-22 DIAGNOSIS — I25.10 ARTERIOSCLEROTIC HEART DISEASE: ICD-10-CM

## 2020-09-22 RX ORDER — CLOPIDOGREL BISULFATE 75 MG/1
TABLET ORAL
Qty: 90 TABLET | Refills: 1 | Status: SHIPPED | OUTPATIENT
Start: 2020-09-22 | End: 2021-03-26

## 2020-10-06 ENCOUNTER — TELEPHONE (OUTPATIENT)
Dept: INTERNAL MEDICINE | Facility: CLINIC | Age: 65
End: 2020-10-06

## 2020-10-06 DIAGNOSIS — E11.51 CONTROLLED TYPE 2 DIABETES MELLITUS WITH DIABETIC PERIPHERAL ANGIOPATHY WITHOUT GANGRENE, WITHOUT LONG-TERM CURRENT USE OF INSULIN (HCC): Primary | ICD-10-CM

## 2020-10-06 NOTE — TELEPHONE ENCOUNTER
Patient's wife stated that patient needs a referral to podiatry to have his toenails clipped and diabetic check.  Please advise.

## 2020-11-17 ENCOUNTER — OFFICE VISIT (OUTPATIENT)
Dept: GASTROENTEROLOGY | Facility: CLINIC | Age: 65
End: 2020-11-17

## 2020-11-17 VITALS — HEIGHT: 74 IN | WEIGHT: 285 LBS | BODY MASS INDEX: 36.57 KG/M2 | TEMPERATURE: 96.2 F

## 2020-11-17 DIAGNOSIS — D12.6 ADENOMATOUS POLYP OF COLON, UNSPECIFIED PART OF COLON: ICD-10-CM

## 2020-11-17 DIAGNOSIS — K59.09 OTHER CONSTIPATION: Primary | ICD-10-CM

## 2020-11-17 DIAGNOSIS — K21.9 GASTROESOPHAGEAL REFLUX DISEASE, UNSPECIFIED WHETHER ESOPHAGITIS PRESENT: ICD-10-CM

## 2020-11-17 PROCEDURE — 99214 OFFICE O/P EST MOD 30 MIN: CPT | Performed by: NURSE PRACTITIONER

## 2020-11-17 NOTE — PROGRESS NOTES
Chief Complaint   Patient presents with   • Constipation       Angel White Jr. is a  65 y.o. male here for a follow up visit for constipation.    HPI  65-year-old male presents today for follow-up visit for constipation.  He is a patient of Dr. Ashraf.  He is new to me.  He was last seen in the office on 2/17/2020.  He underwent colonoscopy on 8/3/2020.  It showed hyperplastic colon polyps.  Recall in 5 years.  He does have a history of chronic constipation and admits lately his bowels are just getting slower and slower.  He is tried over-the-counter remedies and nothing really seems to help very well.  He does not take any probiotics or fiber.  He admits he does not eat a high-fiber diet.  He tells me he eats a lot of junk.  He also admits he has had episodes of reflux especially after drinking water and eating certain foods.  He does not take anything routinely for reflux.  He denies any dysphagia, abdominal pain, nausea and vomiting, diarrhea, rectal bleeding or melena.  He admits his appetite is good and his weight is stable.  He does have a history of adenomatous colon polyps.  Past Medical History:   Diagnosis Date   • AP (angina pectoris) (CMS/HCC)     unstable   • CAD (coronary artery disease)     6 stents placed   • DDD (degenerative disc disease), lumbosacral    • Diabetes (CMS/HCC)    • Essential hypertension    • Fatty liver    • Osteoarthritis of knee    • Screening for prostate cancer 2010    normal   • Sleep apnea     started wearing a CPAP on 10/19/16       Past Surgical History:   Procedure Laterality Date   • COLONOSCOPY  2016    normal per patient   • COLONOSCOPY N/A 8/3/2020    Procedure: COLONOSCOPY to cecum and TI with cold biopsies;  Surgeon: Kelly Ashraf MD;  Location: [facility] ENDOSCOPY;  Service: Gastroenterology;  Laterality: N/A;  pre-change in bowel habits, hx polyps   post-hemorrhoids, lipomas, abnormal cecal tissue    • CORONARY ANGIOPLASTY WITH STENT PLACEMENT  08/28/2015    6  stents   • FOOT SURGERY Right 05/23/2017   • REPLACEMENT TOTAL KNEE Left 2010   • REPLACEMENT TOTAL KNEE Right 2009   • SKIN CANCER EXCISION Left 03/30/2016    left ear and left eyebrow, graft took from face, placed on ear       Scheduled Meds:    Continuous Infusions:No current facility-administered medications for this visit.       PRN Meds:.    Allergies   Allergen Reactions   • Morphine And Related Itching       Social History     Socioeconomic History   • Marital status:      Spouse name: Not on file   • Number of children: Not on file   • Years of education: Not on file   • Highest education level: Not on file   Tobacco Use   • Smoking status: Never Smoker   • Smokeless tobacco: Never Used   Substance and Sexual Activity   • Alcohol use: No   • Drug use: No   • Sexual activity: Yes     Partners: Female       Family History   Problem Relation Age of Onset   • Hypertension Mother    • Heart disease Father    • Hypertension Father    • Macular degeneration Father    • Arthritis Maternal Grandmother    • Heart disease Maternal Grandmother    • Arthritis Maternal Grandfather    • Heart disease Maternal Grandfather    • Diabetes Maternal Grandfather    • Hypertension Maternal Grandfather    • Cancer Maternal Grandfather         colon?   • Arthritis Paternal Grandmother    • Heart disease Paternal Grandmother    • Diabetes Paternal Grandmother    • Hypertension Paternal Grandmother    • Stroke Paternal Grandmother    • Arthritis Paternal Grandfather    • Heart disease Paternal Grandfather    • Macular degeneration Sister        Review of Systems   Constitutional: Negative for appetite change, chills, diaphoresis, fatigue, fever and unexpected weight change.   HENT: Positive for postnasal drip. Negative for nosebleeds, sore throat, trouble swallowing and voice change.    Respiratory: Negative for cough, choking, chest tightness, shortness of breath and wheezing.    Cardiovascular: Negative for chest pain,  palpitations and leg swelling.   Gastrointestinal: Positive for abdominal distention and constipation. Negative for abdominal pain, anal bleeding, blood in stool, diarrhea, nausea, rectal pain and vomiting.   Endocrine: Negative for polydipsia, polyphagia and polyuria.   Musculoskeletal: Negative for gait problem.   Skin: Negative for rash and wound.   Allergic/Immunologic: Negative for food allergies.   Neurological: Negative for dizziness, speech difficulty and light-headedness.   Psychiatric/Behavioral: Negative for confusion, self-injury, sleep disturbance and suicidal ideas.       Vitals:    11/17/20 1127   Temp: 96.2 °F (35.7 °C)       Physical Exam  Constitutional:       General: He is not in acute distress.     Appearance: He is well-developed. He is not ill-appearing.   HENT:      Head: Normocephalic.   Eyes:      Pupils: Pupils are equal, round, and reactive to light.   Cardiovascular:      Rate and Rhythm: Normal rate and regular rhythm.      Heart sounds: Normal heart sounds.   Pulmonary:      Effort: Pulmonary effort is normal.      Breath sounds: Normal breath sounds.   Abdominal:      General: Bowel sounds are normal. There is distension.      Palpations: Abdomen is soft. There is no mass.      Tenderness: There is no abdominal tenderness. There is no guarding or rebound.      Hernia: No hernia is present.   Musculoskeletal: Normal range of motion.   Skin:     General: Skin is warm and dry.   Neurological:      Mental Status: He is alert and oriented to person, place, and time.   Psychiatric:         Speech: Speech normal.         Behavior: Behavior normal.         Judgment: Judgment normal.         No radiology results for the last 7 days     Assessment and plan    1. Other constipation    2. Gastroesophageal reflux disease, unspecified whether esophagitis present    3. Adenomatous polyp of colon, unspecified part of colon    Reviewed colonoscopy results with him today.  Recall in 5 years.   Constipation is still not well controlled.  I want him to start a daily fiber supplement like FiberCon or Benefiber.  Increase his water and exercise as tolerated.  Needs to eat a high-fiber diet.  I think he is having reflux episodes so I want him to start Pepcid AC complete 1-2 tabs daily.  Continue GERD precautions.  Patient to call the office next week with an update.  Patient to follow-up with me in 1 year.

## 2020-12-28 ENCOUNTER — OFFICE VISIT (OUTPATIENT)
Dept: INTERNAL MEDICINE | Facility: CLINIC | Age: 65
End: 2020-12-28

## 2020-12-28 VITALS
BODY MASS INDEX: 36.38 KG/M2 | OXYGEN SATURATION: 98 % | SYSTOLIC BLOOD PRESSURE: 114 MMHG | HEIGHT: 74 IN | HEART RATE: 81 BPM | DIASTOLIC BLOOD PRESSURE: 80 MMHG | WEIGHT: 283.5 LBS

## 2020-12-28 DIAGNOSIS — R55 SYNCOPE, UNSPECIFIED SYNCOPE TYPE: ICD-10-CM

## 2020-12-28 DIAGNOSIS — I10 ESSENTIAL HYPERTENSION: Primary | ICD-10-CM

## 2020-12-28 DIAGNOSIS — K21.9 CHRONIC GERD: ICD-10-CM

## 2020-12-28 DIAGNOSIS — E11.51 CONTROLLED TYPE 2 DIABETES MELLITUS WITH DIABETIC PERIPHERAL ANGIOPATHY WITHOUT GANGRENE, WITHOUT LONG-TERM CURRENT USE OF INSULIN (HCC): ICD-10-CM

## 2020-12-28 DIAGNOSIS — I25.10 CORONARY ARTERY DISEASE INVOLVING NATIVE CORONARY ARTERY OF NATIVE HEART WITHOUT ANGINA PECTORIS: ICD-10-CM

## 2020-12-28 PROCEDURE — 99214 OFFICE O/P EST MOD 30 MIN: CPT | Performed by: FAMILY MEDICINE

## 2020-12-28 RX ORDER — FAMOTIDINE 20 MG/1
20 TABLET, FILM COATED ORAL 2 TIMES DAILY
Qty: 60 TABLET | Refills: 3 | Status: SHIPPED | OUTPATIENT
Start: 2020-12-28 | End: 2021-03-22

## 2020-12-28 NOTE — PROGRESS NOTES
Angel White Jr. is a 65 y.o. male.      Assessment/Plan   Problems Addressed this Visit        Cardiovascular and Mediastinum    CAD (coronary artery disease)    Essential hypertension - Primary    Controlled type 2 diabetes mellitus with diabetic peripheral angiopathy without gangrene, without long-term current use of insulin (CMS/[facility])    Syncope       Digestive    Chronic GERD    Relevant Medications    famotidine (Pepcid) 20 MG tablet      Diagnoses       Codes Comments    Essential hypertension    -  Primary ICD-10-CM: I10  ICD-9-CM: 401.9     Controlled type 2 diabetes mellitus with diabetic peripheral angiopathy without gangrene, without long-term current use of insulin (CMS/[facility])     ICD-10-CM: E11.51  ICD-9-CM: 250.70, 443.81     Coronary artery disease involving native coronary artery of native heart without angina pectoris     ICD-10-CM: I25.10  ICD-9-CM: 414.01     Chronic GERD     ICD-10-CM: K21.9  ICD-9-CM: 530.81     Syncope, unspecified syncope type     ICD-10-CM: R55  ICD-9-CM: 780.2            Syncope most likely medication related discouraged Benadryl use promethazine use her Zofran use can trial antihistamine nonsedating will also trial famotidine for nausea symptoms of but seem to be mostly reflux related  Monitor CAD and hypertension  Follow-up as needed or    Return in about 1 month (around 1/28/2021).      Chief Complaint   Patient presents with   • passed out twice Wednesday night - EMS called     Social History     Tobacco Use   • Smoking status: Never Smoker   • Smokeless tobacco: Never Used   Substance Use Topics   • Alcohol use: No   • Drug use: No       History of Present Illness   Patient follows up for ongoing management of chronic medical problems of GERD hypertension coronary disease had an episode of syncope recently which was thought to be medication related due to combination of Benadryl and promethazine and Zofran which have been used for nausea and upper respiratory allergy  "symptoms presently he feels fine back to normal no chest pain no shortness of breath no increased fatigue blood pressures well controlled no symptomology relatable to coronary artery compromise patient has not been very active through the Covid months is try to get back into regular routine of physical activity is not had any symptoms relatable to angina the following portions of the patient's history were reviewed and updated as appropriate:PMHroutine: Social history , Allergies, Current Medications, Active Problem List and Health Maintenance    Review of Systems   Constitutional: Negative.    HENT: Positive for congestion, postnasal drip and sinus pressure.    Eyes: Negative.    Respiratory: Negative.    Cardiovascular: Negative.    Genitourinary: Negative.    Neurological: Negative.        Objective   Vitals:    12/28/20 1127   BP: 114/80   BP Location: Left arm   Patient Position: Sitting   Cuff Size: Large Adult   Pulse: 81   SpO2: 98%   Weight: 129 kg (283 lb 8 oz)   Height: 187.3 cm (73.75\")     Body mass index is 36.65 kg/m².  Physical Exam  Vitals signs reviewed.   Constitutional:       Appearance: Normal appearance. He is obese.   HENT:      Head: Normocephalic and atraumatic.   Eyes:      General: No scleral icterus.  Cardiovascular:      Rate and Rhythm: Normal rate and regular rhythm.      Pulses: Normal pulses.      Heart sounds: Normal heart sounds.   Pulmonary:      Effort: Pulmonary effort is normal.      Breath sounds: Normal breath sounds.   Musculoskeletal:      Right lower leg: No edema.      Left lower leg: No edema.   Skin:     General: Skin is warm and dry.   Neurological:      Mental Status: He is alert.   Psychiatric:         Mood and Affect: Mood normal.         Behavior: Behavior normal.         Thought Content: Thought content normal.         Judgment: Judgment normal.       Reviewed Data:  No visits with results within 1 Month(s) from this visit.   Latest known visit with results is: "   Lab on 09/10/2020   Component Date Value Ref Range Status   • Hemoglobin A1C 09/10/2020 7.18* 4.80 - 5.60 % Final

## 2021-01-06 ENCOUNTER — TELEPHONE (OUTPATIENT)
Dept: GASTROENTEROLOGY | Facility: CLINIC | Age: 66
End: 2021-01-06

## 2021-01-06 NOTE — TELEPHONE ENCOUNTER
----- Message from Sujatha Sears sent at 1/6/2021  3:58 PM EST -----  Regarding: returning call  Pt rg on  returning call please call at [phone]

## 2021-01-06 NOTE — TELEPHONE ENCOUNTER
Call to spouse, Jaylene (see hipaa).  States received call a few days ago from this office - states whoever called spoke so fast they could not understand.  States message received on land line, which they never check.  Phone #'s updated in epic.       Wondered if someone calling re:  billing issue.  Message to Manager.     States doing well GI..

## 2021-01-13 ENCOUNTER — OFFICE VISIT (OUTPATIENT)
Dept: INTERNAL MEDICINE | Facility: CLINIC | Age: 66
End: 2021-01-13

## 2021-01-13 VITALS — SYSTOLIC BLOOD PRESSURE: 120 MMHG | DIASTOLIC BLOOD PRESSURE: 70 MMHG

## 2021-01-13 DIAGNOSIS — E11.51 CONTROLLED TYPE 2 DIABETES MELLITUS WITH DIABETIC PERIPHERAL ANGIOPATHY WITHOUT GANGRENE, WITHOUT LONG-TERM CURRENT USE OF INSULIN (HCC): ICD-10-CM

## 2021-01-13 DIAGNOSIS — I10 ESSENTIAL HYPERTENSION: Primary | ICD-10-CM

## 2021-01-13 DIAGNOSIS — F41.9 ANXIETY: ICD-10-CM

## 2021-01-13 DIAGNOSIS — F51.01 PRIMARY INSOMNIA: ICD-10-CM

## 2021-01-13 PROBLEM — G47.09 OTHER INSOMNIA: Status: ACTIVE | Noted: 2021-01-13

## 2021-01-13 PROCEDURE — 99442 PR PHYS/QHP TELEPHONE EVALUATION 11-20 MIN: CPT | Performed by: FAMILY MEDICINE

## 2021-01-13 NOTE — PROGRESS NOTES
This visit has been rescheduled as a phone visit to comply with patient safety concerns in accordance with CDC recommendations. Total time of discussion was 20minutes.    You have chosen to receive care through a telephone visit. Do you consent to use a telephone visit for your medical care today? Yes      Chief Complaint  Anxiety and Insomnia    Subjective          Angel White JrRamirez presents to [facility] for   History of Present Illness  Patient follow-up appointment for ongoing problems of anxiety insomnia hypertension diabetes he is having worsening anxiety mostly situational stress related in financial his blood pressure is well controlled he has known chest pain or shortness of breath he does have some trouble with sleeping because he cannot turn his mind off he has had consultation with nurse practitioner for counseling.    Objective   Vital Signs:   /70     Physical Exam  Vitals signs reviewed.        Result Review :     Common labs    Common Labsle 7/1/20 7/1/20 7/1/20 7/6/20 9/10/20 9/10/20    1304 1304 1304  1524 1524   BUN   19 12     Creatinine   1.45 (A) 1.13     eGFR Non  Am   49 (A) 65     Sodium   135 (A) 137     Potassium   4.0 4.3     Chloride   98 103     Calcium   9.6 9.4     Albumin   4.50      Total Bilirubin   0.9      Alkaline Phosphatase   69      AST (SGOT)   37      ALT (SGPT)   53 (A)      WBC 10.25        Hemoglobin 15.6        Hematocrit 46.7        Platelets 274        Triglycerides      108   HDL Cholesterol      56   LDL Cholesterol       58   Hemoglobin A1C  7.30 (A)   7.18 (A)    (A) Abnormal value                      Assessment and Plan    Problem List Items Addressed This Visit        Cardiac and Vasculature    Essential hypertension - Primary       Endocrine and Metabolic    Controlled type 2 diabetes mellitus with diabetic peripheral angiopathy without gangrene, without long-term current use of insulin (CMS/[facility])        Mental Health    Anxiety       Sleep    Primary insomnia      Insomnia with anxiety trial of sertraline initiate 50 mg daily may double dose after 1 week  I spent 30 minutes caring for Angel on this date of service. This time includes time spent by me in the following activities:preparing for the visit, counseling and educating the patient/family/caregiver and ordering medications, tests, or procedures  Follow Up   Return in about 1 month (around 2/13/2021), or if symptoms worsen or fail to improve, for Recheck.  Patient was given instructions and counseling regarding his condition or for health maintenance advice. Please see specific information pulled into the AVS if appropriate.

## 2021-01-27 PROBLEM — F51.01 PRIMARY INSOMNIA: Status: ACTIVE | Noted: 2021-01-13

## 2021-03-05 ENCOUNTER — TELEPHONE (OUTPATIENT)
Dept: CARDIOLOGY | Facility: CLINIC | Age: 66
End: 2021-03-05

## 2021-03-09 ENCOUNTER — OFFICE VISIT (OUTPATIENT)
Dept: CARDIOLOGY | Facility: CLINIC | Age: 66
End: 2021-03-09

## 2021-03-09 VITALS
WEIGHT: 280.8 LBS | SYSTOLIC BLOOD PRESSURE: 120 MMHG | DIASTOLIC BLOOD PRESSURE: 62 MMHG | OXYGEN SATURATION: 98 % | BODY MASS INDEX: 36.04 KG/M2 | HEART RATE: 73 BPM | HEIGHT: 74 IN

## 2021-03-09 DIAGNOSIS — I25.10 CORONARY ARTERY DISEASE INVOLVING NATIVE CORONARY ARTERY OF NATIVE HEART WITHOUT ANGINA PECTORIS: Primary | ICD-10-CM

## 2021-03-09 DIAGNOSIS — G47.33 OSA (OBSTRUCTIVE SLEEP APNEA): ICD-10-CM

## 2021-03-09 DIAGNOSIS — E11.59 TYPE 2 DIABETES MELLITUS WITH OTHER CIRCULATORY COMPLICATION, WITHOUT LONG-TERM CURRENT USE OF INSULIN (HCC): ICD-10-CM

## 2021-03-09 DIAGNOSIS — G47.00 INSOMNIA, UNSPECIFIED TYPE: ICD-10-CM

## 2021-03-09 DIAGNOSIS — E78.2 MIXED HYPERLIPIDEMIA: ICD-10-CM

## 2021-03-09 DIAGNOSIS — I10 ESSENTIAL HYPERTENSION: ICD-10-CM

## 2021-03-09 DIAGNOSIS — E66.01 MORBID OBESITY (HCC): ICD-10-CM

## 2021-03-09 DIAGNOSIS — Z95.5 HISTORY OF CORONARY ARTERY STENT PLACEMENT: ICD-10-CM

## 2021-03-09 DIAGNOSIS — R55 SYNCOPE, UNSPECIFIED SYNCOPE TYPE: ICD-10-CM

## 2021-03-09 PROCEDURE — 93000 ELECTROCARDIOGRAM COMPLETE: CPT | Performed by: NURSE PRACTITIONER

## 2021-03-09 PROCEDURE — 99214 OFFICE O/P EST MOD 30 MIN: CPT | Performed by: NURSE PRACTITIONER

## 2021-03-09 RX ORDER — SERTRALINE HYDROCHLORIDE 100 MG/1
100 TABLET, FILM COATED ORAL DAILY
COMMUNITY
Start: 2021-02-22 | End: 2021-05-20

## 2021-03-09 NOTE — PROGRESS NOTES
Date of Office Visit: 21  Encounter Provider: TREY Bagley  Primary Cardiologist: Dr. Dumont  Place of Service: [facility]ARD[facility]  Patient Name: Angel White Jr.  :1955      Subjective:     Chief Complaint:  6 month CAD follow up    History of Present Illness:  Angel White Jr. is a pleasant 66 y.o. male who is new to me .  Outside records have been requested and reviewed by me if available.     This is a former Dr. Hall patient now follows with Dr. Dumont.  He has a history of multivessel coronary disease with prior drug-eluting stent to the proximal to mid LAD and the ramus in  for unstable angina as well as stenting to the PDA and proximal RCA in .  He has hypertension, hyperlipidemia, nonalcoholic fatty liver disease, diabetes, obesity, paroxysmal SVT, MARIA/insomnia.  He retired from [employer] after working there for 40 years,     2020 Dr. Dumont first saw the patient when he established care with her.  He had been taking dual antiplatelet therapy without issues since .  Apparently he had a recent colonoscopy with Dr. Ballesteros and there was possible atrial fibrillation noted on telemetry but no twelve-lead EKG was performed.  48-hour Holter was ordered and there was no A. fib but short 7 beat run of SVT.  He did not have any significant symptoms of palpitations or tachycardia.  His blood pressure had been well controlled.  Dr. Dumont recommended a 2-week Zio patch to investigate for occult atrial fibrillation.  Also ordered a lipid panel. His Zio patch showed rare PACs and PVCs he had some ventricular bigeminy no AV block he did have short bursts of narrow complex regular SVT lasting just a few seconds no apparent atrial fibrillation and short episodes of nonsustained VT up to 4 beats.  No changes or anticoagulation was recommended at that time.    Patient is presenting today for 6-month follow-up.  Patient has overall been doing reasonably well.  He  denies chest pain, shortness breath, palpitations, significant lower extremity edema occasionally he will have some slight edema of his left lower leg from an old injury status post surgery but this is mild.  He reports that couple months ago he did a syncopal episode.  He typically takes NyQuil every night to help him sleep and he was having some allergy symptoms and difficulty breathing out of his nose so he took a Benadryl then took his regular NyQuil and later tried to get up and felt dizzy and passed out twice.  He does not recall any preceding symptoms.  EMS came he did not go to the ER.  He has not had recurrence of symptoms.  He recently was started on Zoloft because he was having a lot of stress and some anxiety and this is helped a great deal he states.  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  In the last month or so he will sometimes get a little dizzy if he bends over and stands up too quickly but does not have dizziness when he goes from lying to sitting or sitting to standing and this is not caused him to fall.  He really does not check his blood pressure frequently and when he was checking it is primarily when he is feeling more anxious and stressed he would get readings 140s 160s/70s-90s.  He states when he has it checked at the doctor's office his BP has always been in good range.  He has back on his HCTZ after temporary hold last summer.  He denies any bleeding issues on his aspirin or Plavix.  He is concerned as he has been having some more trouble with his memory that he has noticed at the grocery store.      Past Medical History:   Diagnosis Date   • AP (angina pectoris) (CMS/HCC)     unstable   • CAD (coronary artery disease)     6 stents placed   • DDD (degenerative disc disease), lumbosacral    • Diabetes (CMS/[facility])    • Essential hypertension    •  Fatty liver    • Osteoarthritis of knee    • Screening for prostate cancer 2010    normal   • Sleep apnea     started wearing a CPAP on 10/19/16     Past Surgical History:   Procedure Laterality Date   • COLONOSCOPY  2016    normal per patient   • COLONOSCOPY N/A 8/3/2020    Procedure: COLONOSCOPY to cecum and TI with cold biopsies;  Surgeon: Kelly Ashraf MD;  Location: [facility] ENDOSCOPY;  Service: Gastroenterology;  Laterality: N/A;  pre-change in bowel habits, hx polyps   post-hemorrhoids, lipomas, abnormal cecal tissue    • CORONARY ANGIOPLASTY WITH STENT PLACEMENT  08/28/2015    6 stents   • FOOT SURGERY Right 05/23/2017   • REPLACEMENT TOTAL KNEE Left 2010   • REPLACEMENT TOTAL KNEE Right 2009   • SKIN CANCER EXCISION Left 03/30/2016    left ear and left eyebrow, graft took from face, placed on ear     Outpatient Medications Prior to Visit   Medication Sig Dispense Refill   • amLODIPine (NORVASC) 5 MG tablet Take 1 tablet by mouth Daily. 90 tablet 2   • aspirin 81 MG EC tablet Take 81 mg by mouth daily.     • atorvastatin (LIPITOR) 40 MG tablet Take 1 tablet by mouth Daily. 90 tablet 1   • B-D UF III MINI PEN NEEDLES 31G X 5 MM misc INJECT VICTOZA SUB-Q DAILY  5   • clopidogrel (PLAVIX) 75 MG tablet TAKE 1 TABLET BY MOUTH EVERY DAY 90 tablet 1   • famotidine (Pepcid) 20 MG tablet Take 1 tablet by mouth 2 (Two) Times a Day. 60 tablet 3   • hydroCHLOROthiazide (HYDRODIURIL) 25 MG tablet TAKE 1 TABLET BY MOUTH EVERY DAY 90 tablet 1   • Lancets (ONETOUCH ULTRASOFT) lancets 2 (Two) Times a Day. for testing  2   • losartan (Cozaar) 100 MG tablet Take 1 tablet by mouth Daily. 90 tablet 2   • metFORMIN (GLUCOPHAGE) 500 MG tablet TAKE ONE TABLET BY MOUTH TWICE DAILY WITH MEALS 60 tablet 5   • ONETOUCH VERIO test strip 2 (Two) Times a Day. for testing  2   • Pseudoeph-Doxylamine-DM-APAP (NYQUIL PO) Take  by mouth. nightly     • sertraline (ZOLOFT) 100 MG tablet TAKE A HALF TABLET BY MOUTH AT BEDTIME FOR 1 WEEK AND  THEN INCREASE TO A WHOLE TABLET IF NEEDED     • sertraline (Zoloft) 50 MG tablet One daily in evening, in one week may increase to 2 daily 60 tablet 3     No facility-administered medications prior to visit.       Allergies as of 03/09/2021 - Reviewed 03/09/2021   Allergen Reaction Noted   • Morphine and related Itching 11/20/2015     Social History     Socioeconomic History   • Marital status:      Spouse name: Not on file   • Number of children: Not on file   • Years of education: Not on file   • Highest education level: Not on file   Tobacco Use   • Smoking status: Never Smoker   • Smokeless tobacco: Never Used   • Tobacco comment: Caffeine daily   Substance and Sexual Activity   • Alcohol use: No   • Drug use: No   • Sexual activity: Yes     Partners: Female     Family History   Problem Relation Age of Onset   • Hypertension Mother    • Heart disease Father    • Hypertension Father    • Macular degeneration Father    • Arthritis Maternal Grandmother    • Heart disease Maternal Grandmother    • Arthritis Maternal Grandfather    • Heart disease Maternal Grandfather    • Diabetes Maternal Grandfather    • Hypertension Maternal Grandfather    • Cancer Maternal Grandfather         colon?   • Arthritis Paternal Grandmother    • Heart disease Paternal Grandmother    • Diabetes Paternal Grandmother    • Hypertension Paternal Grandmother    • Stroke Paternal Grandmother    • Arthritis Paternal Grandfather    • Heart disease Paternal Grandfather    • Macular degeneration Sister      Review of Systems   Constitutional: Positive for malaise/fatigue.   Cardiovascular: Positive for syncope. Negative for chest pain, dyspnea on exertion, leg swelling, orthopnea, palpitations and paroxysmal nocturnal dyspnea.   Respiratory: Positive for snoring.    Hematologic/Lymphatic: Negative for bleeding problem.   Neurological: Positive for excessive daytime sleepiness, dizziness and light-headedness (with bending over and standing   "up to quickly).   Psychiatric/Behavioral: Positive for memory loss. The patient has insomnia.           Objective:     Vitals:    03/09/21 1317 03/09/21 1351   BP: 120/62    BP Location: Right arm    Patient Position: Sitting    Pulse: 65 73   SpO2:  98%   Weight: 127 kg (280 lb 12.8 oz)    Height: 187.3 cm (73.75\")      Body mass index is 36.3 kg/m².    PHYSICAL EXAM:  Vitals and nursing note reviewed.   Constitutional:       General: Not in acute distress.     Appearance: Well-developed and not in distress. Obese. Not diaphoretic.   Eyes:      Comments: Wearing glasses   HENT:      Head: Normocephalic and atraumatic.   Neck:      Vascular: No carotid bruit or JVD.   Pulmonary:      Effort: Pulmonary effort is normal. No respiratory distress.      Breath sounds: Normal breath sounds. No wheezing. No rhonchi. No rales.   Cardiovascular:      Normal rate. Regular rhythm. Normal S1. Normal S2.      Murmurs: There is no murmur.      Comments: Trace sock line edema  Pulses:     Carotid: 2+ bilaterally.     Radial: 2+ bilaterally.  Abdominal:      General: Bowel sounds are normal. There is no distension.      Palpations: Abdomen is soft.      Tenderness: There is no abdominal tenderness.      Comments: Obese abdomen   Skin:     General: Skin is warm and dry.      Findings: No erythema.   Neurological:      Mental Status: Alert and oriented to person, place, and time.   Psychiatric:         Attention and Perception: Attention normal.         Mood and Affect: Mood normal.         Speech: Speech normal.         Behavior: Behavior normal.         Thought Content: Thought content normal.         Judgment: Judgment normal.           ECG 12 Lead    Date/Time: 3/9/2021 1:24 PM  Performed by: Nicole Aguayo APRN  Authorized by: Nicole Aguayo APRN   Comparison: compared with previous ECG from 9/4/2020  Rhythm: sinus rhythm  Rate: normal  BPM: 65  QRS axis: normal    Clinical impression: normal ECG  Comments: Unchanged ECG "   Indication: CAD with prior multivessel stenting            Most recent lab review:  9/10/2020 lipid panel is under good control LDL 58, HDL 56, triglycerides 108, total cholesterol 136, hemoglobin A1c was 7.18%.    7/6/2020 BMP normal except for glucose elevated 107 CBC unremarkable.  Few days prior to that he had some elevated creatinine of 1.45 with a low sodium 135 and his HCTZ was stopped at that time.  ALT was mildly elevated 53  Assessment:       Diagnosis Plan   1. Coronary artery disease involving native coronary artery of native heart without angina pectoris  Ambulatory Referral to Sleep Medicine   2. History of coronary artery stent placement     3. Essential hypertension     4. Mixed hyperlipidemia     5. Type 2 diabetes mellitus with other circulatory complication, without long-term current use of insulin (CMS/[facility])     6. Morbid obesity (CMS/[facility])     7. Syncope, unspecified syncope type     8. MARIA (obstructive sleep apnea)  Ambulatory Referral to Sleep Medicine   9. Insomnia, unspecified type  Ambulatory Referral to Sleep Medicine       Plan:     1.  Coronary artery disease: Remote stenting of the LAD, ramus, right coronary artery/PDA in the setting of unstable angina in 2015.  He has been maintained on dual antiplatelet therapy since then without any bleeding complications.  His only antianginal medication is amlodipine 5 mg.  He has no chest pain or dyspnea symptoms.  2.  Hypertension: Well-controlled in the office today on current regimen.  Encouraged him to monitor closer at home reviewed goal blood pressure readings with him.  He will bring in his home cuff to be checked for accuracy we will review his log to make sure he does not need any med adjustments.  3.  Hyperlipidemia: On Lipitor 40 mg daily.  Lipids well controlled December 2020  4.  Diabetes: Managed by PCP. On high intensity statin.  5.  Arrhythmia: He had a reported episode of atrial fibrillation during a colonoscopy summer 2020  without correlating strips or documentation He wore a 48-hour Holter monitor which showed 1 short run of SVT of 7 beats and then Zio patch September 2020 showed some short bursts of SVT no appreciable atrial fibrillation and one nonsustained VT 5 beats with some ventricular bigeminy.  He has no palpitations.    6.  Renal insufficiency: July 2020 improved temporary hold of HCTZ.  Recommend he have repeat labs since he has been back on HCTZ.  7. Obesity:  8.  MARIA: In the past he quit treatment of his CPAP mask as he did not tolerate.  He also suffers from insomnia and requires NyQuil every night is requiring a nap in the afternoon every day due to fatigue.  Will refer him back to sleep medicine for evaluation.  9.  Memory concerns: Asked him to follow-up with PCP regarding this  10.  Syncopal episode: 2 occurring in the same night a few months ago likely related to NyQuil and concurrent Benadryl use as he has not had recurrence.  He will call if he has recurrence at which point time I would recommend further work-up with an echo and possibly a Zio patch.  I have also asked him to start monitoring his blood pressure at home as he does have some dizziness with bending over.  11.  Stress/anxiety: Zoloft per PCP which is helped.    I advised on the importance of medication compliance, good blood pressure, blood sugar and cholesterol control, as well as heart healthy diet, regular exercise and avoidance of tobacco for cardiovascular disease risk factor modification.   Recommend follow up with repeat lab with PCP.     Follow up with Dr. Dumont in 6 months, unless otherwise needed sooner.  I advised the patient to contact our office with any questions or concerns.  There is no reported need for medication refills today.     It has been a pleasure to participate in this patient's care. Please feel free to contact me with any questions or concerns.     Nicole Aguayo, APRN  03/09/21             Your medication list           Accurate as of March 9, 2021  2:04 PM. If you have any questions, ask your nurse or doctor.            CHANGE how you take these medications      Instructions Last Dose Given Next Dose Due   sertraline 100 MG tablet  Commonly known as: ZOLOFT  What changed: Another medication with the same name was removed. Continue taking this medication, and follow the directions you see here.  Changed by: TREY Bagley      TAKE A HALF TABLET BY MOUTH AT BEDTIME FOR 1 WEEK AND THEN INCREASE TO A WHOLE TABLET IF NEEDED          CONTINUE taking these medications      Instructions Last Dose Given Next Dose Due   amLODIPine 5 MG tablet  Commonly known as: NORVASC      Take 1 tablet by mouth Daily.       aspirin 81 MG EC tablet      Take 81 mg by mouth daily.       atorvastatin 40 MG tablet  Commonly known as: LIPITOR      Take 1 tablet by mouth Daily.       B-D UF III MINI PEN NEEDLES 31G X 5 MM misc  Generic drug: Insulin Pen Needle      INJECT VICTOZA SUB-Q DAILY       clopidogrel 75 MG tablet  Commonly known as: PLAVIX      TAKE 1 TABLET BY MOUTH EVERY DAY       famotidine 20 MG tablet  Commonly known as: Pepcid      Take 1 tablet by mouth 2 (Two) Times a Day.       hydroCHLOROthiazide 25 MG tablet  Commonly known as: HYDRODIURIL      TAKE 1 TABLET BY MOUTH EVERY DAY       losartan 100 MG tablet  Commonly known as: Cozaar      Take 1 tablet by mouth Daily.       metFORMIN 500 MG tablet  Commonly known as: GLUCOPHAGE      TAKE ONE TABLET BY MOUTH TWICE DAILY WITH MEALS       NYQUIL PO      Take  by mouth. nightly       onetouch ultrasoft lancets      2 (Two) Times a Day. for testing       OneTouch Verio test strip  Generic drug: glucose blood      2 (Two) Times a Day. for testing              The above medication changes may not have been made by this provider.  Medication list was updated to reflect medications patient is currently taking including medication changes and discontinuations made by other healthcare providers  or the patients themselves.     Dictated utilizing Dragon Dictation System.

## 2021-03-15 DIAGNOSIS — E78.2 MIXED HYPERLIPIDEMIA: ICD-10-CM

## 2021-03-15 RX ORDER — ATORVASTATIN CALCIUM 40 MG/1
TABLET, FILM COATED ORAL
Qty: 90 TABLET | Refills: 1 | Status: SHIPPED | OUTPATIENT
Start: 2021-03-15 | End: 2021-09-14

## 2021-03-17 ENCOUNTER — LAB (OUTPATIENT)
Dept: LAB | Facility: HOSPITAL | Age: 66
End: 2021-03-17

## 2021-03-17 ENCOUNTER — OFFICE VISIT (OUTPATIENT)
Dept: INTERNAL MEDICINE | Facility: CLINIC | Age: 66
End: 2021-03-17

## 2021-03-17 VITALS
OXYGEN SATURATION: 99 % | BODY MASS INDEX: 36.5 KG/M2 | DIASTOLIC BLOOD PRESSURE: 76 MMHG | HEIGHT: 74 IN | HEART RATE: 64 BPM | SYSTOLIC BLOOD PRESSURE: 124 MMHG | WEIGHT: 284.4 LBS

## 2021-03-17 DIAGNOSIS — I10 ESSENTIAL HYPERTENSION: ICD-10-CM

## 2021-03-17 DIAGNOSIS — E11.51 CONTROLLED TYPE 2 DIABETES MELLITUS WITH DIABETIC PERIPHERAL ANGIOPATHY WITHOUT GANGRENE, WITHOUT LONG-TERM CURRENT USE OF INSULIN (HCC): ICD-10-CM

## 2021-03-17 DIAGNOSIS — R53.82 CHRONIC FATIGUE, UNSPECIFIED: ICD-10-CM

## 2021-03-17 DIAGNOSIS — E78.2 MIXED HYPERLIPIDEMIA: Primary | ICD-10-CM

## 2021-03-17 DIAGNOSIS — F51.01 PRIMARY INSOMNIA: ICD-10-CM

## 2021-03-17 LAB
ALBUMIN SERPL-MCNC: 4.2 G/DL (ref 3.5–5.2)
ALBUMIN/GLOB SERPL: 1.4 G/DL
ALP SERPL-CCNC: 89 U/L (ref 39–117)
ALT SERPL W P-5'-P-CCNC: 18 U/L (ref 1–41)
ANION GAP SERPL CALCULATED.3IONS-SCNC: 9.7 MMOL/L (ref 5–15)
AST SERPL-CCNC: 19 U/L (ref 1–40)
BASOPHILS # BLD AUTO: 0.09 10*3/MM3 (ref 0–0.2)
BASOPHILS NFR BLD AUTO: 1 % (ref 0–1.5)
BILIRUB SERPL-MCNC: 0.4 MG/DL (ref 0–1.2)
BUN SERPL-MCNC: 13 MG/DL (ref 8–23)
BUN/CREAT SERPL: 13.5 (ref 7–25)
CALCIUM SPEC-SCNC: 9.3 MG/DL (ref 8.6–10.5)
CHLORIDE SERPL-SCNC: 102 MMOL/L (ref 98–107)
CHOLEST SERPL-MCNC: 128 MG/DL (ref 0–200)
CO2 SERPL-SCNC: 28.3 MMOL/L (ref 22–29)
CREAT SERPL-MCNC: 0.96 MG/DL (ref 0.76–1.27)
DEPRECATED RDW RBC AUTO: 41.3 FL (ref 37–54)
EOSINOPHIL # BLD AUTO: 0.28 10*3/MM3 (ref 0–0.4)
EOSINOPHIL NFR BLD AUTO: 3.1 % (ref 0.3–6.2)
ERYTHROCYTE [DISTWIDTH] IN BLOOD BY AUTOMATED COUNT: 13 % (ref 12.3–15.4)
GFR SERPL CREATININE-BSD FRML MDRD: 79 ML/MIN/1.73
GLOBULIN UR ELPH-MCNC: 3 GM/DL
GLUCOSE SERPL-MCNC: 185 MG/DL (ref 65–99)
HBA1C MFR BLD: 7.08 % (ref 4.8–5.6)
HCT VFR BLD AUTO: 43.3 % (ref 37.5–51)
HDLC SERPL-MCNC: 47 MG/DL (ref 40–60)
HGB BLD-MCNC: 13.9 G/DL (ref 13–17.7)
IMM GRANULOCYTES # BLD AUTO: 0.04 10*3/MM3 (ref 0–0.05)
IMM GRANULOCYTES NFR BLD AUTO: 0.4 % (ref 0–0.5)
LDLC SERPL CALC-MCNC: 54 MG/DL (ref 0–100)
LDLC/HDLC SERPL: 1.03 {RATIO}
LYMPHOCYTES # BLD AUTO: 1.87 10*3/MM3 (ref 0.7–3.1)
LYMPHOCYTES NFR BLD AUTO: 20.5 % (ref 19.6–45.3)
MCH RBC QN AUTO: 28.2 PG (ref 26.6–33)
MCHC RBC AUTO-ENTMCNC: 32.1 G/DL (ref 31.5–35.7)
MCV RBC AUTO: 87.8 FL (ref 79–97)
MONOCYTES # BLD AUTO: 0.62 10*3/MM3 (ref 0.1–0.9)
MONOCYTES NFR BLD AUTO: 6.8 % (ref 5–12)
NEUTROPHILS NFR BLD AUTO: 6.23 10*3/MM3 (ref 1.7–7)
NEUTROPHILS NFR BLD AUTO: 68.2 % (ref 42.7–76)
NRBC BLD AUTO-RTO: 0 /100 WBC (ref 0–0.2)
PLATELET # BLD AUTO: 295 10*3/MM3 (ref 140–450)
PMV BLD AUTO: 9.9 FL (ref 6–12)
POTASSIUM SERPL-SCNC: 4 MMOL/L (ref 3.5–5.2)
PROT SERPL-MCNC: 7.2 G/DL (ref 6–8.5)
RBC # BLD AUTO: 4.93 10*6/MM3 (ref 4.14–5.8)
SODIUM SERPL-SCNC: 140 MMOL/L (ref 136–145)
T4 FREE SERPL-MCNC: 1.38 NG/DL (ref 0.93–1.7)
TRIGL SERPL-MCNC: 162 MG/DL (ref 0–150)
TSH SERPL DL<=0.05 MIU/L-ACNC: 2.42 UIU/ML (ref 0.27–4.2)
VLDLC SERPL-MCNC: 27 MG/DL (ref 5–40)
WBC # BLD AUTO: 9.13 10*3/MM3 (ref 3.4–10.8)

## 2021-03-17 PROCEDURE — 80053 COMPREHEN METABOLIC PANEL: CPT | Performed by: FAMILY MEDICINE

## 2021-03-17 PROCEDURE — 99214 OFFICE O/P EST MOD 30 MIN: CPT | Performed by: FAMILY MEDICINE

## 2021-03-17 PROCEDURE — 36415 COLL VENOUS BLD VENIPUNCTURE: CPT | Performed by: FAMILY MEDICINE

## 2021-03-17 PROCEDURE — 80061 LIPID PANEL: CPT | Performed by: FAMILY MEDICINE

## 2021-03-17 PROCEDURE — 83036 HEMOGLOBIN GLYCOSYLATED A1C: CPT | Performed by: FAMILY MEDICINE

## 2021-03-17 PROCEDURE — 84439 ASSAY OF FREE THYROXINE: CPT | Performed by: FAMILY MEDICINE

## 2021-03-17 PROCEDURE — 84443 ASSAY THYROID STIM HORMONE: CPT | Performed by: FAMILY MEDICINE

## 2021-03-17 PROCEDURE — 85025 COMPLETE CBC W/AUTO DIFF WBC: CPT | Performed by: FAMILY MEDICINE

## 2021-03-17 RX ORDER — SUVOREXANT 20 MG/1
1 TABLET, FILM COATED ORAL NIGHTLY PRN
Qty: 30 TABLET | Refills: 1 | Status: ON HOLD | OUTPATIENT
Start: 2021-03-17 | End: 2021-10-30

## 2021-03-17 RX ORDER — HYDROCHLOROTHIAZIDE 25 MG/1
TABLET ORAL
Qty: 90 TABLET | Refills: 1 | Status: SHIPPED | OUTPATIENT
Start: 2021-03-17 | End: 2021-09-17

## 2021-03-17 NOTE — PROGRESS NOTES
"Chief Complaint  Insomnia and right hand shakes    Subjective          Angel White Jr. presents to [facility]  History of Present Illness  Patient follows up for ongoing management of chronic problems of hyperlipidemia hypertension diabetes insomnia  No chest pain shortness of breath or increased fatigue no dizziness or rashes had an episode of some tremoring of his hand but that was not persistent  No specific trauma  He has been experiencing more fatigue lately nonspecific heaviness or dizziness or abdominal pain he is not doing any physical activity on a regular basis  No black or bloody stool or recent travel  Objective   Vital Signs:   /76 (BP Location: Left arm, Patient Position: Sitting, Cuff Size: Large Adult)   Pulse 64   Ht 187.3 cm (73.75\")   Wt 129 kg (284 lb 6.4 oz)   SpO2 99%   BMI 36.76 kg/m²     Physical Exam  Vitals and nursing note reviewed.   Constitutional:       Appearance: He is obese.   HENT:      Head: Normocephalic.   Eyes:      General: No scleral icterus.  Cardiovascular:      Rate and Rhythm: Normal rate.      Pulses: Normal pulses.      Heart sounds: Normal heart sounds.   Pulmonary:      Effort: Pulmonary effort is normal.   Musculoskeletal:      Right lower leg: No edema.      Left lower leg: No edema.   Skin:     General: Skin is warm and dry.   Psychiatric:         Mood and Affect: Mood normal.         Behavior: Behavior normal.         Thought Content: Thought content normal.         Judgment: Judgment normal.        Result Review :                 Assessment and Plan    Diagnoses and all orders for this visit:    1. Mixed hyperlipidemia (Primary)  -     Lipid Panel    2. Essential hypertension  -     Comprehensive Metabolic Panel  -     TSH  -     T4, Free    3. Controlled type 2 diabetes mellitus with diabetic peripheral angiopathy without gangrene, without long-term current use of insulin (CMS/[facility])  -     CBC & Differential  -     " Hemoglobin A1c    4. Primary insomnia  -     Suvorexant (Belsomra) 20 MG tablet; Take 20 mg by mouth At Night As Needed (sleep).  Dispense: 30 tablet; Refill: 1    5. Chronic fatigue, unspecified   -     CBC & Differential    Follow-up results of blood work for further treatment of hyperlipidemia and hypertension and diabetes trial of Belsomra for insomnia    Follow Up   No follow-ups on file.  Patient was given instructions and counseling regarding his condition or for health maintenance advice. Please see specific information pulled into the AVS if appropriate.

## 2021-03-22 ENCOUNTER — BULK ORDERING (OUTPATIENT)
Dept: CASE MANAGEMENT | Facility: OTHER | Age: 66
End: 2021-03-22

## 2021-03-22 DIAGNOSIS — Z23 IMMUNIZATION DUE: ICD-10-CM

## 2021-03-22 RX ORDER — FAMOTIDINE 20 MG/1
TABLET, FILM COATED ORAL
Qty: 180 TABLET | Refills: 1 | Status: SHIPPED | OUTPATIENT
Start: 2021-03-22 | End: 2021-09-20

## 2021-03-26 DIAGNOSIS — I25.10 ARTERIOSCLEROTIC HEART DISEASE: ICD-10-CM

## 2021-03-26 RX ORDER — CLOPIDOGREL BISULFATE 75 MG/1
TABLET ORAL
Qty: 90 TABLET | Refills: 3 | Status: ON HOLD | OUTPATIENT
Start: 2021-03-26 | End: 2021-10-30

## 2021-03-30 ENCOUNTER — TELEPHONE (OUTPATIENT)
Dept: INTERNAL MEDICINE | Facility: CLINIC | Age: 66
End: 2021-03-30

## 2021-03-30 NOTE — TELEPHONE ENCOUNTER
PATIENT STATES: THAT  Suvorexant (Belsomra) 20 MG tablet IS NOT WORKING HE WOULD LIKE TO TRY SOMETHING ELSE PLEASE ADVISE      PATIENT CAN BE REACHED ON:[phone]    [id number] [place] [facility] [address] [facility] [phone]  - [phone]   [phone]

## 2021-04-01 ENCOUNTER — TELEPHONE (OUTPATIENT)
Dept: INTERNAL MEDICINE | Facility: CLINIC | Age: 66
End: 2021-04-01

## 2021-04-01 NOTE — TELEPHONE ENCOUNTER
Suvorexant (Belsomra) 20 MG tablet  1 tablet, Nightly PRN     PATIENT STATES THIS MEDICATION IS HAVING A NEGATIVE EFFECT ON SLEEPING. IT IS CAUSING HIM NOT TO BE ABLE TO SLEEP. IS ASKING IF SOMETHING ELSE CAN BE SENT TO THE PHARMACY.    [phone]     [id number] - [facility], [place] - [address] AT [facility] [phone] [facility] [phone] FX

## 2021-04-14 ENCOUNTER — IMMUNIZATION (OUTPATIENT)
Dept: VACCINE CLINIC | Facility: HOSPITAL | Age: 66
End: 2021-04-14

## 2021-04-14 PROCEDURE — 91300 HC SARSCOV02 VAC 30MCG/0.3ML IM: CPT | Performed by: INTERNAL MEDICINE

## 2021-04-14 PROCEDURE — 0001A: CPT | Performed by: INTERNAL MEDICINE

## 2021-04-28 ENCOUNTER — APPOINTMENT (OUTPATIENT)
Dept: SLEEP MEDICINE | Facility: HOSPITAL | Age: 66
End: 2021-04-28

## 2021-05-05 ENCOUNTER — IMMUNIZATION (OUTPATIENT)
Dept: VACCINE CLINIC | Facility: HOSPITAL | Age: 66
End: 2021-05-05

## 2021-05-05 PROCEDURE — 91300 HC SARSCOV02 VAC 30MCG/0.3ML IM: CPT | Performed by: INTERNAL MEDICINE

## 2021-05-05 PROCEDURE — 0002A: CPT | Performed by: INTERNAL MEDICINE

## 2021-05-19 ENCOUNTER — LAB (OUTPATIENT)
Dept: LAB | Facility: HOSPITAL | Age: 66
End: 2021-05-19

## 2021-05-19 ENCOUNTER — OFFICE VISIT (OUTPATIENT)
Dept: INTERNAL MEDICINE | Facility: CLINIC | Age: 66
End: 2021-05-19

## 2021-05-19 VITALS
SYSTOLIC BLOOD PRESSURE: 110 MMHG | DIASTOLIC BLOOD PRESSURE: 76 MMHG | OXYGEN SATURATION: 99 % | HEIGHT: 74 IN | HEART RATE: 88 BPM | WEIGHT: 288.9 LBS | BODY MASS INDEX: 37.08 KG/M2

## 2021-05-19 DIAGNOSIS — Z12.5 PROSTATE CANCER SCREENING: ICD-10-CM

## 2021-05-19 DIAGNOSIS — E66.01 MORBID OBESITY (HCC): ICD-10-CM

## 2021-05-19 DIAGNOSIS — I25.10 CORONARY ARTERY DISEASE INVOLVING NATIVE CORONARY ARTERY OF NATIVE HEART WITHOUT ANGINA PECTORIS: ICD-10-CM

## 2021-05-19 DIAGNOSIS — E11.51 CONTROLLED TYPE 2 DIABETES MELLITUS WITH DIABETIC PERIPHERAL ANGIOPATHY WITHOUT GANGRENE, WITHOUT LONG-TERM CURRENT USE OF INSULIN (HCC): ICD-10-CM

## 2021-05-19 DIAGNOSIS — I10 ESSENTIAL HYPERTENSION: ICD-10-CM

## 2021-05-19 DIAGNOSIS — B35.1 ONYCHOMYCOSIS: Primary | ICD-10-CM

## 2021-05-19 LAB
BILIRUB BLD-MCNC: NEGATIVE MG/DL
CLARITY, POC: CLEAR
COLOR UR: YELLOW
GLUCOSE UR STRIP-MCNC: NEGATIVE MG/DL
KETONES UR QL: NEGATIVE
LEUKOCYTE EST, POC: NEGATIVE
NITRITE UR-MCNC: NEGATIVE MG/ML
PH UR: 6 [PH] (ref 5–8)
PROT UR STRIP-MCNC: NEGATIVE MG/DL
PSA SERPL-MCNC: 2.64 NG/ML (ref 0–4)
RBC # UR STRIP: NEGATIVE /UL
SP GR UR: 1.01 (ref 1–1.03)
UROBILINOGEN UR QL: NORMAL

## 2021-05-19 PROCEDURE — 99215 OFFICE O/P EST HI 40 MIN: CPT | Performed by: FAMILY MEDICINE

## 2021-05-19 PROCEDURE — 36415 COLL VENOUS BLD VENIPUNCTURE: CPT | Performed by: FAMILY MEDICINE

## 2021-05-19 PROCEDURE — G0103 PSA SCREENING: HCPCS | Performed by: FAMILY MEDICINE

## 2021-05-19 PROCEDURE — 81003 URINALYSIS AUTO W/O SCOPE: CPT | Performed by: FAMILY MEDICINE

## 2021-05-19 RX ORDER — METFORMIN HYDROCHLORIDE 500 MG/1
500 TABLET, EXTENDED RELEASE ORAL 2 TIMES DAILY
COMMUNITY
Start: 2021-04-10 | End: 2022-05-19 | Stop reason: SDUPTHER

## 2021-05-19 NOTE — PROGRESS NOTES
"Chief Complaint  2nd toe right foot painful and Dysuria    Subjective          Angel White Jr. presents to [facility]  History of Present Illness  Patient has painful second toe right foot which underwent callus removal about 3 months ago with Dr. Parada podiatry has had recurrence he has underlying onychomycosis feet bilaterally.  He was fashioned orthotics and wears them intermittently but he cannot wear them because of the toe callus redevelopment  He also has had some increased urine frequency and dysuria no back pain sweats fever chills.  He suffers from morbid obesity coronary artery disease hypertension diabetes.  Some of his meals are prepared foods that are frozen  Difficulty with weight control.  No chest pain shortness of breath or increased fatigue although is not very active    Objective   Vital Signs:   /76 (BP Location: Right arm, Patient Position: Sitting, Cuff Size: Large Adult)   Pulse 88   Ht 187.3 cm (73.75\")   Wt 131 kg (288 lb 14.4 oz)   SpO2 99%   BMI 37.34 kg/m²     Physical Exam  Constitutional:       Appearance: He is obese.   Cardiovascular:      Rate and Rhythm: Normal rate and regular rhythm.      Pulses: Normal pulses.      Heart sounds: Normal heart sounds.   Pulmonary:      Effort: Pulmonary effort is normal.      Breath sounds: Normal breath sounds.   Musculoskeletal:         General: Deformity present.      Comments: Flatfeet  Second toe onychomycosis with callus   Neurological:      Mental Status: He is alert.   Psychiatric:         Mood and Affect: Mood normal.         Behavior: Behavior normal.         Thought Content: Thought content normal.         Judgment: Judgment normal.        Result Review :            UA unremarkable     Assessment and Plan    Diagnoses and all orders for this visit:    1. Onychomycosis (Primary)    2. Prostate cancer screening  -     PSA Screen    3. Controlled type 2 diabetes mellitus with diabetic peripheral " angiopathy without gangrene, without long-term current use of insulin (CMS/[facility])  -     POCT urinalysis dipstick, automated    4. Morbid obesity (CMS/[facility])    5. Essential hypertension  -     POCT urinalysis dipstick, automated    6. Coronary artery disease involving native coronary artery of native heart without angina pectoris    Monitor for signs and symptoms of cardiovascular compromise  Monitor blood pressure  Up results of prostate screen  Patient will reestablish care with podiatry for primary foot pain and toe pain  I spent 40 minutes caring for Angel on this date of service. This time includes time spent by me in the following activities:reviewing tests, performing a medically appropriate examination and/or evaluation , counseling and educating the patient/family/caregiver, ordering medications, tests, or procedures and documenting information in the medical record  Follow Up   No follow-ups on file.  Patient was given instructions and counseling regarding his condition or for health maintenance advice. Please see specific information pulled into the AVS if appropriate.

## 2021-05-20 RX ORDER — SERTRALINE HYDROCHLORIDE 100 MG/1
100 TABLET, FILM COATED ORAL DAILY
Qty: 90 TABLET | Refills: 2 | Status: ON HOLD | OUTPATIENT
Start: 2021-05-20 | End: 2021-10-30

## 2021-06-22 DIAGNOSIS — I10 ESSENTIAL HYPERTENSION: ICD-10-CM

## 2021-06-23 RX ORDER — LOSARTAN POTASSIUM 100 MG/1
TABLET ORAL
Qty: 90 TABLET | Refills: 2 | Status: ON HOLD | OUTPATIENT
Start: 2021-06-23 | End: 2021-10-30

## 2021-07-22 ENCOUNTER — OFFICE VISIT (OUTPATIENT)
Dept: INTERNAL MEDICINE | Facility: CLINIC | Age: 66
End: 2021-07-22

## 2021-07-22 VITALS
DIASTOLIC BLOOD PRESSURE: 70 MMHG | SYSTOLIC BLOOD PRESSURE: 122 MMHG | WEIGHT: 288.3 LBS | OXYGEN SATURATION: 98 % | HEART RATE: 63 BPM | BODY MASS INDEX: 37 KG/M2 | HEIGHT: 74 IN

## 2021-07-22 DIAGNOSIS — J30.9 ALLERGIC RHINITIS, UNSPECIFIED SEASONALITY, UNSPECIFIED TRIGGER: ICD-10-CM

## 2021-07-22 DIAGNOSIS — R41.3 MEMORY LOSS: Primary | ICD-10-CM

## 2021-07-22 PROCEDURE — 99213 OFFICE O/P EST LOW 20 MIN: CPT | Performed by: FAMILY MEDICINE

## 2021-07-22 NOTE — PROGRESS NOTES
"Chief Complaint  SINUS ISSUES and MEMORY ISSUES    Subjective          Angel White Jr. presents to [facility] PRIMARY CARE  History of Present Illness  Patient with head congestion seems be worse over this time of the year some also sinus tenderness with some discharge examining specifics fever sweats or chills he uses antihistamines intermittently as well as nasal steroids although nothing of recent.  He is also having concerns about short-term memory loss when he cannot recall for certain things sometimes he does not know that he is even forgetful and his wife has to remind him  Objective   Vital Signs:   /70 (BP Location: Left arm, Patient Position: Sitting, Cuff Size: Large Adult)   Pulse 63   Ht 187.3 cm (73.75\")   Wt 131 kg (288 lb 4.8 oz)   SpO2 98%   BMI 37.27 kg/m²     Physical Exam  Constitutional:       Appearance: He is obese.   HENT:      Right Ear: Tympanic membrane, ear canal and external ear normal.      Left Ear: Tympanic membrane, ear canal and external ear normal.      Nose: Congestion present.      Right Sinus: No maxillary sinus tenderness or frontal sinus tenderness.      Left Sinus: No maxillary sinus tenderness or frontal sinus tenderness.   Neurological:      Mental Status: He is alert.        Result Review :     Common labs    Common Labsle 9/10/20 9/10/20 3/17/21 3/17/21 3/17/21 3/17/21 5/19/21    1524 1524 1206 1206 1206 1206    Glucose      185 (A)    BUN      13    Creatinine      0.96    eGFR Non African Am      79    Sodium      140    Potassium      4.0    Chloride      102    Calcium      9.3    Albumin      4.20    Total Bilirubin      0.4    Alkaline Phosphatase      89    AST (SGOT)      19    ALT (SGPT)      18    WBC   9.13       Hemoglobin   13.9       Hematocrit   43.3       Platelets   295       Total Cholesterol  136   128     Triglycerides  108   162 (A)     HDL Cholesterol  56   47     LDL Cholesterol   58   54     Hemoglobin A1C 7.18 (A)   " 7.08 (A)      PSA       2.640   (A) Abnormal value                      Assessment and Plan    Diagnoses and all orders for this visit:    1. Memory loss (Primary)  -     Ambulatory Referral to Neurology    2. Allergic rhinitis, unspecified seasonality, unspecified trigger    Recommend Zyrtec daily  Recommend Flonase 2 squirts each nostril demonstrated use at bedtime      Follow Up   Return if symptoms worsen or fail to improve, for Recheck.  Patient was given instructions and counseling regarding his condition or for health maintenance advice. Please see specific information pulled into the AVS if appropriate.

## 2021-08-09 ENCOUNTER — TELEPHONE (OUTPATIENT)
Dept: INTERNAL MEDICINE | Facility: CLINIC | Age: 66
End: 2021-08-09

## 2021-09-13 ENCOUNTER — OFFICE VISIT (OUTPATIENT)
Dept: CARDIOLOGY | Facility: CLINIC | Age: 66
End: 2021-09-13

## 2021-09-13 VITALS
WEIGHT: 282 LBS | SYSTOLIC BLOOD PRESSURE: 118 MMHG | HEIGHT: 74 IN | HEART RATE: 98 BPM | DIASTOLIC BLOOD PRESSURE: 80 MMHG | BODY MASS INDEX: 36.19 KG/M2

## 2021-09-13 DIAGNOSIS — I25.10 CORONARY ARTERY DISEASE INVOLVING NATIVE CORONARY ARTERY OF NATIVE HEART WITHOUT ANGINA PECTORIS: Primary | ICD-10-CM

## 2021-09-13 DIAGNOSIS — I10 ESSENTIAL HYPERTENSION: ICD-10-CM

## 2021-09-13 PROCEDURE — 99213 OFFICE O/P EST LOW 20 MIN: CPT | Performed by: INTERNAL MEDICINE

## 2021-09-13 NOTE — PROGRESS NOTES
Subjective:     Encounter Date: 09/13/21      Patient ID: Angel White Jr. is a 66 y.o. male.    Chief Complaint: CAD    HPI:   This is a 66-year-old man who has a history of multivessel coronary disease, hypertension, hyperlipidemia, NAFLD, diabetes.  In 2015 he had unstable angina and underwent drug-eluting stenting of the proximal to mid LAD as well as the ramus.  He also had stenting of the PDA and proximal RCA in 2015.     He is feeling well. He has intermittent positional dizziness which resolves quickly. He drinks 6 small bottles of coke a day. He does drink a fair amount of water. His BP is around 140 at home. He had some SVT during a colonoscopy in 2020 and wore a ZIO which showed no AF. He's had no palpitations.     He has 14 cats and one dog. His wife is bed bound and he cares for her. He has a son who has developmental disabilities and has a young grandson.     The following portions of the patient's history were reviewed and updated as appropriate: allergies, current medications, past family history, past medical history, past social history, past surgical history and problem list.     REVIEW OF SYSTEMS:   All systems reviewed.  Pertinent positives identified in HPI.  All other systems are negative.    Past Medical History:   Diagnosis Date   • AP (angina pectoris) (CMS/HCC)     unstable   • CAD (coronary artery disease)     6 stents placed   • DDD (degenerative disc disease), lumbosacral    • Diabetes (CMS/HCC)    • Essential hypertension    • Fatty liver    • Osteoarthritis of knee    • Screening for prostate cancer 2010    normal   • Sleep apnea     started wearing a CPAP on 10/19/16       Family History   Problem Relation Age of Onset   • Hypertension Mother    • Heart disease Father    • Hypertension Father    • Macular degeneration Father    • Arthritis Maternal Grandmother    • Heart disease Maternal Grandmother    • Arthritis Maternal Grandfather    • Heart disease Maternal Grandfather     • Diabetes Maternal Grandfather    • Hypertension Maternal Grandfather    • Cancer Maternal Grandfather         colon?   • Arthritis Paternal Grandmother    • Heart disease Paternal Grandmother    • Diabetes Paternal Grandmother    • Hypertension Paternal Grandmother    • Stroke Paternal Grandmother    • Arthritis Paternal Grandfather    • Heart disease Paternal Grandfather    • Macular degeneration Sister        Social History     Socioeconomic History   • Marital status:      Spouse name: Not on file   • Number of children: Not on file   • Years of education: Not on file   • Highest education level: Not on file   Tobacco Use   • Smoking status: Never Smoker   • Smokeless tobacco: Never Used   • Tobacco comment: Caffeine daily   Substance and Sexual Activity   • Alcohol use: No   • Drug use: No   • Sexual activity: Yes     Partners: Female       Allergies   Allergen Reactions   • Morphine And Related Itching   • Zolpidem Other (See Comments)     Sleep walking         Past Surgical History:   Procedure Laterality Date   • COLONOSCOPY  2016    normal per patient   • COLONOSCOPY N/A 8/3/2020    Procedure: COLONOSCOPY to cecum and TI with cold biopsies;  Surgeon: Kelly Ashraf MD;  [facility] ENDOSCOPY;  Service: Gastroenterology;  Laterality: N/A;  pre-change in bowel habits, hx polyps   post-hemorrhoids, lipomas, abnormal cecal tissue    • CORONARY ANGIOPLASTY WITH STENT PLACEMENT  08/28/2015    6 stents   • FOOT SURGERY Right 05/23/2017   • REPLACEMENT TOTAL KNEE Left 2010   • REPLACEMENT TOTAL KNEE Right 2009   • SKIN CANCER EXCISION Left 03/30/2016    left ear and left eyebrow, graft took from face, placed on ear       Procedures       Objective:         PHYSICAL EXAM:  GEN: VSS, no distress,   Eyes: normal sclera, normal lids and lashes  HENT: moist mucus membranes,   Respiratory: CTAB, no rales or wheezes  CV: RRR, no murmurs, , +2 DP and 2+ carotid pulses b/l  GI: NABS, soft,  Nontender,  nondistended  MSK: +1 lower extremity edema with chronic venous stasis changes, no scoliosis or kyphosis  Skin: no rash, warm, dry  Heme/Lymph: no bruising or bleeding  Psych: organized thought, normal behavior and affect  Neuro: Cranial nerves grossly intact, Alert and Oriented x 3.         Assessment:          Diagnosis Plan   1. Coronary artery disease involving native coronary artery of native heart without angina pectoris     2. Essential hypertension            Plan:       1.  Coronary artery disease: Remote stenting of the LAD, ramus, right coronary artery in the setting of unstable angina in 2015.  He has been maintained on dual antiplatelet therapy since then without any bleeding complications.  His only antianginal medication is amlodipine 5 mg. CCS 1  2.  Hypertension: Well-controlled on amlodipine HCTZ and losartan  3.  Hyperlipidemia: On Lipitor 40.  Lipids at goal, LDL 54 March 2021. Continues.   4.  Diabetes: On high intensity statin.  5. SVT: No palpitations or AF on ZO.    Dr. Meza, thank you very much for referring this kind patient to me. Please call me with any questions or concerns. I will see the patient again in the office in 12 months.         Crys Dumont MD  09/13/21  [facility]    Outpatient Encounter Medications as of 9/13/2021   Medication Sig Dispense Refill   • amLODIPine (NORVASC) 5 MG tablet Take 1 tablet by mouth Daily. 90 tablet 2   • aspirin 81 MG EC tablet Take 81 mg by mouth daily.     • atorvastatin (LIPITOR) 40 MG tablet TAKE 1 TABLET BY MOUTH EVERY DAY 90 tablet 1   • B-D UF III MINI PEN NEEDLES 31G X 5 MM misc INJECT VICTOZA SUB-Q DAILY  5   • clopidogrel (PLAVIX) 75 MG tablet TAKE 1 TABLET BY MOUTH EVERY DAY 90 tablet 3   • famotidine (PEPCID) 20 MG tablet TAKE 1 TABLET BY MOUTH TWICE A  tablet 1   • hydroCHLOROthiazide (HYDRODIURIL) 25 MG tablet TAKE 1 TABLET BY MOUTH EVERY DAY 90 tablet 1   • Lancets (ONETOUCH ULTRASOFT) lancets 2 (Two) Times a  Day. for testing  2   • losartan (COZAAR) 100 MG tablet TAKE 1 TABLET BY MOUTH EVERY DAY 90 tablet 2   • metFORMIN ER (GLUCOPHAGE-XR) 500 MG 24 hr tablet Take 500 mg by mouth 2 (Two) Times a Day.     • ONETOUCH VERIO test strip 2 (Two) Times a Day. for testing  2   • sertraline (ZOLOFT) 100 MG tablet Take 1 tablet by mouth Daily. 90 tablet 2   • Suvorexant (Belsomra) 20 MG tablet Take 20 mg by mouth At Night As Needed (sleep). 30 tablet 1     No facility-administered encounter medications on file as of 9/13/2021.

## 2021-09-14 DIAGNOSIS — E78.2 MIXED HYPERLIPIDEMIA: ICD-10-CM

## 2021-09-14 RX ORDER — ATORVASTATIN CALCIUM 40 MG/1
TABLET, FILM COATED ORAL
Qty: 90 TABLET | Refills: 1 | Status: ON HOLD | OUTPATIENT
Start: 2021-09-14 | End: 2021-10-30

## 2021-09-17 RX ORDER — HYDROCHLOROTHIAZIDE 25 MG/1
TABLET ORAL
Qty: 90 TABLET | Refills: 1 | Status: ON HOLD | OUTPATIENT
Start: 2021-09-17 | End: 2021-10-29

## 2021-09-20 RX ORDER — FAMOTIDINE 20 MG/1
TABLET, FILM COATED ORAL
Qty: 180 TABLET | Refills: 1 | Status: ON HOLD | OUTPATIENT
Start: 2021-09-20 | End: 2021-10-30

## 2021-09-23 DIAGNOSIS — I10 ESSENTIAL HYPERTENSION: ICD-10-CM

## 2021-09-23 RX ORDER — AMLODIPINE BESYLATE 5 MG/1
TABLET ORAL
Qty: 90 TABLET | Refills: 0 | Status: ON HOLD | OUTPATIENT
Start: 2021-09-23 | End: 2021-10-30

## 2021-10-01 ENCOUNTER — OFFICE VISIT (OUTPATIENT)
Dept: NEUROLOGY | Facility: CLINIC | Age: 66
End: 2021-10-01

## 2021-10-01 VITALS
BODY MASS INDEX: 36.19 KG/M2 | WEIGHT: 282 LBS | HEART RATE: 86 BPM | SYSTOLIC BLOOD PRESSURE: 128 MMHG | OXYGEN SATURATION: 98 % | HEIGHT: 74 IN | DIASTOLIC BLOOD PRESSURE: 78 MMHG

## 2021-10-01 DIAGNOSIS — R41.3 MEMORY LOSS: Primary | ICD-10-CM

## 2021-10-01 PROCEDURE — 99204 OFFICE O/P NEW MOD 45 MIN: CPT | Performed by: PSYCHIATRY & NEUROLOGY

## 2021-10-01 NOTE — PROGRESS NOTES
Chief Complaint  Memory Loss (began in 2019 and worsening no imaging)    Subjective          Angel White Jr. presents to [facility] NEUROLOGY for   HISTORY OF PRESENT ILLNESS:    Angel White Jr. Is a 66 year old right handed man who presents to neurology clinic for initial evaluation and treatment of memory loss.  His wife has noticed trouble for several years.  He has noticed over the past 1-2 years more trouble with his short term memory.  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  He basically has to do everything for them as caregiver.  His wife will ask him to get something for her and he will walk into the kitchen and forget what he was going to get and he will have to ask her again.  He tells me he also does not remember most of his childhood.  He can't remember when his grandparents passed away.  He tends to place things in the same place to be sure he can find things and does not misplace things.  He is driving and has not gotten lost driving.  No driving accident or tickets.  He cooks and has not burned the food.  He does think he has trouble with concentrating and focusing which has been a chronic issue but he thinks it is worse now.  There is no family history of dementia.  He has had lab work including CMP (only elevated blood sugar as he is diabetic), normal TSH and globulin levels.  He does admit to having anxiety but no true depression. He is currently on sertraline which seems to be helping.  He does not have a living will and tells me his wife would be the POA if something were to happen to him.  He does not do much exercising or socializing.  He does try to walk as much as possible but admits to not getting much exercise.  He does not drink or smoke.  He did finish some [employer] and also graduated high school.      Past Medical History:   Diagnosis Date   • AP (angina pectoris) ([facility])     unstable   • CAD (coronary artery  disease)     6 stents placed   • DDD (degenerative disc disease), lumbosacral    • Diabetes (HCC)    • Essential hypertension    • Fatty liver    • Memory loss    • Osteoarthritis of knee    • Screening for prostate cancer 2010    normal   • Sleep apnea     started wearing a CPAP on 10/19/16        Family History   Problem Relation Age of Onset   • Hypertension Mother    • Heart disease Father    • Hypertension Father    • Macular degeneration Father    • Arthritis Maternal Grandmother    • Heart disease Maternal Grandmother    • Arthritis Maternal Grandfather    • Heart disease Maternal Grandfather    • Diabetes Maternal Grandfather    • Hypertension Maternal Grandfather    • Cancer Maternal Grandfather         colon?   • Arthritis Paternal Grandmother    • Heart disease Paternal Grandmother    • Diabetes Paternal Grandmother    • Hypertension Paternal Grandmother    • Stroke Paternal Grandmother    • Arthritis Paternal Grandfather    • Heart disease Paternal Grandfather    • Macular degeneration Sister         Social History     Socioeconomic History   • Marital status:      Spouse name: Not on file   • Number of children: Not on file   • Years of education: Not on file   • Highest education level: Not on file   Tobacco Use   • Smoking status: Never Smoker   • Smokeless tobacco: Never Used   • Tobacco comment: Caffeine daily   Vaping Use   • Vaping Use: Never used   Substance and Sexual Activity   • Alcohol use: No   • Drug use: Not Currently     Types: Marijuana   • Sexual activity: Yes     Partners: Female        I have personally reviewed the ROS as stated below.     Review of Systems   Constitutional: Positive for fatigue. Negative for activity change and appetite change.   HENT: Negative for hearing loss, trouble swallowing and voice change.    Eyes: Negative for blurred vision, double vision and pain.   Respiratory: Negative for shortness of breath and wheezing.    Cardiovascular: Negative for  "palpitations.   Gastrointestinal: Negative for anal bleeding, nausea and vomiting.   Musculoskeletal: Positive for gait problem. Negative for back pain and neck stiffness.   Allergic/Immunologic: Negative for environmental allergies and food allergies.   Neurological: Positive for dizziness (when sit to stand ), tremors (wife notices right side), light-headedness and memory problem. Negative for seizures, syncope, facial asymmetry, speech difficulty, weakness, numbness, headache and confusion.   Psychiatric/Behavioral: Positive for stress. Negative for agitation, behavioral problems, decreased concentration, dysphoric mood, hallucinations, self-injury, sleep disturbance, suicidal ideas, negative for hyperactivity and depressed mood. The patient is not nervous/anxious.         Objective   Vital Signs:   /78   Pulse 86   Ht 187.6 cm (73.86\")   Wt 128 kg (282 lb)   SpO2 98%   BMI 36.35 kg/m²       PHYSICAL EXAM:    General   Mental Status - Alert. General Appearance - Well developed, Well groomed, Oriented and Cooperative. Orientation - Oriented X3.       Head and Neck  Head - normocephalic, atraumatic with no lesions or palpable masses.  Neck    Global Assessment - supple.       Eye   Sclera/Conjunctiva - Bilateral - Normal.    ENMT  Mouth and Throat   Oral Cavity/Oropharynx: Oropharynx - the soft palate,uvula and tongue are normal in appearance.    Chest and Lung Exam   Chest - lung clear to auscultation bilaterally.    Cardiovascular   Cardiovascular examination reveals  - normal heart sounds, regular rate and rhythm.    Neurologic   Mental Status: Speech - Normal. Cognitive function - SLUMS 28/30, appropriate fund of knowledge. No impairment of attention, Impairment of concentration, impairment of long term memory and some impairment of short term memory.  Cranial Nerves:   II Optic: Visual acuity - Left - Normal. Right - Normal. Visual fields - Normal (to confrontation).  III Oculomotor: Pupillary " constriction - Left - Normal. Right - Normal.  VII Facial: - Normal Bilaterally.  VIII Acoustic - Bilateral - Hearing normal and (Hearing tested by finger rub).   IX Glossopharyngeal / X Vagus - Normal.  XI Accessory: Trapezius - Bilateral - Normal. Sternocleidomastoid - Bilateral - Normal.  XII Hypoglossal - Bilateral - Normal.  Eye Movements: - Normal Bilaterally.  Sensory:   Light Touch: Intact - Globally.  Motor:   Bulk and Contour: - Normal.  Tone: - Normal.  Tremor: Not present.  Strength: 5/5 normal muscle strength - All Muscles.   General Assessment of Reflexes: - deep tendon reflexes are brisk on the left upper extremity compared to the right side. Coordination - No Impairment of finger-to-nose or Impairment of rapid alternating movements. Gait - Broad based, stable.       Result Review :                 Assessment and Plan    Problem List Items Addressed This Visit        Neuro    Memory loss - Primary    Current Assessment & Plan     66 year old man with memory loss.  SLUMS of 28/30 today is not consistent with Alzheimer's or other forms of dementia however he is having problems with his memory noted by his wife and himself.  I informed patient with regards to the findings on his testing today and informed him that his symptoms maybe due to very early stages of dementia vs pseudodementia due to underlying stress and anxiety or trouble concentrating.  He has had lab work including CMP (only elevated blood sugar as he is diabetic), normal TSH and globulin levels.  I also want to check Vit B12, thiamine and folate levels as well.  He does have more brisk reflex on the left side compared to the right side and has not had any brain imaging in the past.  I will order a brain MRI scan for further evaluation.  I will also refer him for formal neuropsychological testing to see if his symptoms are really more consistent with pseudodementia vs early symptoms of mild cognitive impairment.  He has not had any issues  with driving, has not gotten lost or had any accidents/tickets.    Discussed diagnosis extensively with patient the importance of and advised patient to exercise and socialize which are the only two things that have been shown to help potentially slow down progression of disease.  POA and Living will were discussed which patient.  I reviewed his labs as stated above.  I will follow up with patient following his formal neuropsychological testing.           Relevant Orders    Ambulatory Referral to Neuropsychology    MRI Brain Without Contrast    Vitamin B12    Vitamin B1, Whole Blood    Folate          I spent 45 minutes caring for Angel on this date of service. This time includes time spent by me in the following activities:preparing for the visit, reviewing tests, obtaining and/or reviewing a separately obtained history, performing a medically appropriate examination and/or evaluation , counseling and educating the patient/family/caregiver, ordering medications, tests, or procedures, documenting information in the medical record and care coordination    Follow Up   No follow-ups on file.  Patient was given instructions and counseling regarding his condition or for health maintenance advice. Please see specific information pulled into the AVS if appropriate.

## 2021-10-01 NOTE — ASSESSMENT & PLAN NOTE
66 year old man with memory loss.  SLUMS of 28/30 today is not consistent with Alzheimer's or other forms of dementia however he is having problems with his memory noted by his wife and himself.  I informed patient with regards to the findings on his testing today and informed him that his symptoms maybe due to very early stages of dementia vs pseudodementia due to underlying stress and anxiety or trouble concentrating.  He has had lab work including CMP (only elevated blood sugar as he is diabetic), normal TSH and globulin levels.  I also want to check Vit B12, thiamine and folate levels as well.  He does have more brisk reflex on the left side compared to the right side and has not had any brain imaging in the past.  I will order a brain MRI scan for further evaluation.  I will also refer him for formal neuropsychological testing to see if his symptoms are really more consistent with pseudodementia vs early symptoms of mild cognitive impairment.  He has not had any issues with driving, has not gotten lost or had any accidents/tickets.    Discussed diagnosis extensively with patient the importance of and advised patient to exercise and socialize which are the only two things that have been shown to help potentially slow down progression of disease.  POA and Living will were discussed which patient.  I reviewed his labs as stated above.  I will follow up with patient following his formal neuropsychological testing.

## 2021-10-25 ENCOUNTER — LAB (OUTPATIENT)
Dept: LAB | Facility: HOSPITAL | Age: 66
End: 2021-10-25

## 2021-10-25 ENCOUNTER — OFFICE VISIT (OUTPATIENT)
Dept: INTERNAL MEDICINE | Facility: CLINIC | Age: 66
End: 2021-10-25

## 2021-10-25 VITALS
BODY MASS INDEX: 34.78 KG/M2 | OXYGEN SATURATION: 99 % | TEMPERATURE: 97.3 F | SYSTOLIC BLOOD PRESSURE: 120 MMHG | HEIGHT: 74 IN | WEIGHT: 271 LBS | HEART RATE: 70 BPM | DIASTOLIC BLOOD PRESSURE: 66 MMHG

## 2021-10-25 DIAGNOSIS — E55.9 VITAMIN D DEFICIENCY: ICD-10-CM

## 2021-10-25 DIAGNOSIS — K59.1 FUNCTIONAL DIARRHEA: Primary | ICD-10-CM

## 2021-10-25 LAB
25(OH)D3 SERPL-MCNC: 31.2 NG/ML (ref 30–100)
ALBUMIN SERPL-MCNC: 4.3 G/DL (ref 3.5–5.2)
ALBUMIN/GLOB SERPL: 1.7 G/DL
ALP SERPL-CCNC: 88 U/L (ref 39–117)
ALT SERPL W P-5'-P-CCNC: 10 U/L (ref 1–41)
ANION GAP SERPL CALCULATED.3IONS-SCNC: 13.5 MMOL/L (ref 5–15)
AST SERPL-CCNC: 16 U/L (ref 1–40)
BASOPHILS # BLD AUTO: 0.08 10*3/MM3 (ref 0–0.2)
BASOPHILS NFR BLD AUTO: 0.8 % (ref 0–1.5)
BILIRUB SERPL-MCNC: 0.2 MG/DL (ref 0–1.2)
BUN SERPL-MCNC: 12 MG/DL (ref 8–23)
BUN/CREAT SERPL: 10.9 (ref 7–25)
CALCIUM SPEC-SCNC: 9 MG/DL (ref 8.6–10.5)
CHLORIDE SERPL-SCNC: 104 MMOL/L (ref 98–107)
CO2 SERPL-SCNC: 21.5 MMOL/L (ref 22–29)
CREAT SERPL-MCNC: 1.1 MG/DL (ref 0.76–1.27)
DEPRECATED RDW RBC AUTO: 42.6 FL (ref 37–54)
EOSINOPHIL # BLD AUTO: 0.26 10*3/MM3 (ref 0–0.4)
EOSINOPHIL NFR BLD AUTO: 2.8 % (ref 0.3–6.2)
ERYTHROCYTE [DISTWIDTH] IN BLOOD BY AUTOMATED COUNT: 14.4 % (ref 12.3–15.4)
FOLATE SERPL-MCNC: 9.92 NG/ML (ref 4.78–24.2)
GFR SERPL CREATININE-BSD FRML MDRD: 67 ML/MIN/1.73
GLOBULIN UR ELPH-MCNC: 2.6 GM/DL
GLUCOSE SERPL-MCNC: 136 MG/DL (ref 65–99)
HCT VFR BLD AUTO: 28.2 % (ref 37.5–51)
HGB BLD-MCNC: 8.5 G/DL (ref 13–17.7)
IMM GRANULOCYTES # BLD AUTO: 0.04 10*3/MM3 (ref 0–0.05)
IMM GRANULOCYTES NFR BLD AUTO: 0.4 % (ref 0–0.5)
LYMPHOCYTES # BLD AUTO: 1.93 10*3/MM3 (ref 0.7–3.1)
LYMPHOCYTES NFR BLD AUTO: 20.5 % (ref 19.6–45.3)
MCH RBC QN AUTO: 25.1 PG (ref 26.6–33)
MCHC RBC AUTO-ENTMCNC: 30.1 G/DL (ref 31.5–35.7)
MCV RBC AUTO: 83.4 FL (ref 79–97)
MONOCYTES # BLD AUTO: 0.82 10*3/MM3 (ref 0.1–0.9)
MONOCYTES NFR BLD AUTO: 8.7 % (ref 5–12)
NEUTROPHILS NFR BLD AUTO: 6.3 10*3/MM3 (ref 1.7–7)
NEUTROPHILS NFR BLD AUTO: 66.8 % (ref 42.7–76)
NRBC BLD AUTO-RTO: 0.1 /100 WBC (ref 0–0.2)
PLATELET # BLD AUTO: 371 10*3/MM3 (ref 140–450)
PMV BLD AUTO: 9.7 FL (ref 6–12)
POTASSIUM SERPL-SCNC: 3.8 MMOL/L (ref 3.5–5.2)
PROT SERPL-MCNC: 6.9 G/DL (ref 6–8.5)
RBC # BLD AUTO: 3.38 10*6/MM3 (ref 4.14–5.8)
SODIUM SERPL-SCNC: 139 MMOL/L (ref 136–145)
VIT B12 BLD-MCNC: 1127 PG/ML (ref 211–946)
WBC # BLD AUTO: 9.43 10*3/MM3 (ref 3.4–10.8)

## 2021-10-25 PROCEDURE — 36415 COLL VENOUS BLD VENIPUNCTURE: CPT | Performed by: PSYCHIATRY & NEUROLOGY

## 2021-10-25 PROCEDURE — 84425 ASSAY OF VITAMIN B-1: CPT | Performed by: PSYCHIATRY & NEUROLOGY

## 2021-10-25 PROCEDURE — 82746 ASSAY OF FOLIC ACID SERUM: CPT | Performed by: PSYCHIATRY & NEUROLOGY

## 2021-10-25 PROCEDURE — 80053 COMPREHEN METABOLIC PANEL: CPT | Performed by: FAMILY MEDICINE

## 2021-10-25 PROCEDURE — 82306 VITAMIN D 25 HYDROXY: CPT | Performed by: PSYCHIATRY & NEUROLOGY

## 2021-10-25 PROCEDURE — 99213 OFFICE O/P EST LOW 20 MIN: CPT | Performed by: FAMILY MEDICINE

## 2021-10-25 PROCEDURE — 82607 VITAMIN B-12: CPT | Performed by: PSYCHIATRY & NEUROLOGY

## 2021-10-25 PROCEDURE — 85025 COMPLETE CBC W/AUTO DIFF WBC: CPT | Performed by: FAMILY MEDICINE

## 2021-10-25 NOTE — PROGRESS NOTES
"Chief Complaint  dizzy spells (sees gastro on Thursday) and Diarrhea    Subjective          Angel White Jr. presents to [facility] PRIMARY CARE  History of Present Illness  Patient appointment to discuss diarrhea that is developed over the last few weeks it started out pasty and then became watery is not black or bloody although there was some blood because he has known to have hemorrhoids  He has not had any persistent dizziness he is trying to stay well-hydrated has been no chest pain or shortness of breath  Objective   Vital Signs:   /66 (BP Location: Right arm, Patient Position: Sitting, Cuff Size: Large Adult)   Pulse 70   Temp 97.3 °F (36.3 °C) (Infrared)   Ht 187.6 cm (73.86\")   Wt 123 kg (271 lb)   SpO2 99%   BMI 34.93 kg/m²     Physical Exam  Vitals and nursing note reviewed.   Constitutional:       Appearance: Normal appearance. He is obese.   HENT:      Head: Normocephalic and atraumatic.   Eyes:      General: No scleral icterus.  Cardiovascular:      Rate and Rhythm: Normal rate.      Pulses: Normal pulses.      Heart sounds: Normal heart sounds.   Abdominal:      Tenderness: There is no right CVA tenderness or left CVA tenderness.   Musculoskeletal:      Right lower leg: No edema.      Left lower leg: No edema.   Neurological:      Mental Status: He is alert.   Psychiatric:         Mood and Affect: Mood normal.         Behavior: Behavior normal.         Thought Content: Thought content normal.         Judgment: Judgment normal.        Result Review :     Common labs    Common Labsle 3/17/21 3/17/21 3/17/21 3/17/21 5/19/21    1206 1206 1206 1206    Glucose    185 (A)    BUN    13    Creatinine    0.96    eGFR Non African Am    79    Sodium    140    Potassium    4.0    Chloride    102    Calcium    9.3    Albumin    4.20    Total Bilirubin    0.4    Alkaline Phosphatase    89    AST (SGOT)    19    ALT (SGPT)    18    WBC 9.13       Hemoglobin 13.9       Hematocrit 43.3   "     Platelets 295       Total Cholesterol   128     Triglycerides   162 (A)     HDL Cholesterol   47     LDL Cholesterol    54     Hemoglobin A1C  7.08 (A)      PSA     2.640   (A) Abnormal value                      Assessment and Plan {CC Problem List  Visit Diagnosis   ROS  Review (Popup)  Health Maintenance  Quality  BestPractice  Medications  SmartSets  SnapShot Encounters  Media :23}   Diagnoses and all orders for this visit:    1. Functional diarrhea (Primary)  -     CBC & Differential  -     Comprehensive Metabolic Panel    2. Vitamin D deficiency  -     Vitamin D 25 Hydroxy    Follow-up results of labs for further evaluation of diarrhea although patient has follow-up appointment with gastroenterology this week    Follow Up   Return if symptoms worsen or fail to improve.  Patient was given instructions and counseling regarding his condition or for health maintenance advice. Please see specific information pulled into the AVS if appropriate.

## 2021-10-28 ENCOUNTER — HOSPITAL ENCOUNTER (OUTPATIENT)
Dept: MRI IMAGING | Facility: HOSPITAL | Age: 66
Discharge: HOME | End: 2021-10-28
Admitting: PSYCHIATRY & NEUROLOGY

## 2021-10-28 ENCOUNTER — OFFICE VISIT (OUTPATIENT)
Dept: GASTROENTEROLOGY | Facility: CLINIC | Age: 66
End: 2021-10-28

## 2021-10-28 VITALS — BODY MASS INDEX: 37.14 KG/M2 | TEMPERATURE: 98 F | HEIGHT: 73 IN | WEIGHT: 280.2 LBS

## 2021-10-28 DIAGNOSIS — R19.7 DIARRHEA, UNSPECIFIED TYPE: ICD-10-CM

## 2021-10-28 DIAGNOSIS — R11.0 NAUSEA: ICD-10-CM

## 2021-10-28 DIAGNOSIS — R41.3 MEMORY LOSS: ICD-10-CM

## 2021-10-28 DIAGNOSIS — D50.9 IRON DEFICIENCY ANEMIA, UNSPECIFIED IRON DEFICIENCY ANEMIA TYPE: ICD-10-CM

## 2021-10-28 DIAGNOSIS — K62.5 RECTAL BLEEDING: Primary | ICD-10-CM

## 2021-10-28 DIAGNOSIS — R10.30 LOWER ABDOMINAL PAIN: ICD-10-CM

## 2021-10-28 PROCEDURE — 70551 MRI BRAIN STEM W/O DYE: CPT

## 2021-10-28 PROCEDURE — 99214 OFFICE O/P EST MOD 30 MIN: CPT | Performed by: NURSE PRACTITIONER

## 2021-10-28 NOTE — PROGRESS NOTES
Chief Complaint   Patient presents with   • Rectal Bleeding   • Diarrhea       Angel White Jr. is a  66 y.o. male here for a follow up visit for rectal bleeding.    HPI  66-year-old male presents today for follow-up visit for rectal bleeding and diarrhea.  He is a patient of Dr. Ashraf.  He was last seen in the office by me on 11/17/2020.  He normally has a history of chronic constipation but he reports for the past 3 to 4 weeks he has had bright red rectal bleeding and diarrhea with lower abdominal pain and cramping.  He tells me the bleeding did stop last week and the abdominal pain is definitely better but he is still having loose stools.  Now he tells me his lower abdomen just feels sore.  He is on Plavix for CAD with history of cardiac stents.  He does me he definitely has been more tired lately and has been sleeping more at home.  He tells me he does has not been feeling well the last several weeks.  He has had episodes of nausea.  But he denies any vomiting.  He did go to his PCP office 3 days ago and he had blood work done that did show a hemoglobin drop from 13.9 this past April down to 8.5.  He denies any dysphagia, reflux, vomiting or melena.  His last colonoscopy was on 8/3/2020.  At that time it did show some hyperplastic colon polyps.  Recall in 2025.  He does have a history of adenomatous colon polyps as well.  He also has history of fatty liver disease and his most recent LFTs were normal.  He also has a history of GERD and admits normally he does well on Pepcid 20 mg every evening.  He is no longer taking any probiotics or fiber supplementation.  Never had an EGD before.  He denies any new medications or any recent antibiotics.  Past Medical History:   Diagnosis Date   • AP (angina pectoris) (HCC)     unstable   • CAD (coronary artery disease)     6 stents placed   • DDD (degenerative disc disease), lumbosacral    • Diabetes (HCC)    • Essential hypertension    • Fatty liver    • Memory loss    •  Osteoarthritis of knee    • Screening for prostate cancer 2010    normal   • Sleep apnea     started wearing a CPAP on 10/19/16       Past Surgical History:   Procedure Laterality Date   • COLONOSCOPY  2016    normal per patient   • COLONOSCOPY N/A 8/3/2020    Procedure: COLONOSCOPY to cecum and TI with cold biopsies;  Surgeon: Kelly Ashraf MD;  Location: [facility] ENDOSCOPY;  Service: Gastroenterology;  Laterality: N/A;  pre-change in bowel habits, hx polyps   post-hemorrhoids, lipomas, abnormal cecal tissue    • CORONARY ANGIOPLASTY WITH STENT PLACEMENT  08/28/2015    6 stents   • FOOT SURGERY Right 05/23/2017    Dr. Cervantes   • REPLACEMENT TOTAL KNEE Left 2010   • REPLACEMENT TOTAL KNEE Right 2009   • SKIN CANCER EXCISION Left 03/30/2016    left ear and left eyebrow, graft took from face, placed on ear       Scheduled Meds:    Continuous Infusions:No current facility-administered medications for this visit.      PRN Meds:.    Allergies   Allergen Reactions   • Morphine And Related Itching   • Zolpidem Other (See Comments)     Sleep walking         Social History     Socioeconomic History   • Marital status:    Tobacco Use   • Smoking status: Never Smoker   • Smokeless tobacco: Never Used   • Tobacco comment: Caffeine daily   Vaping Use   • Vaping Use: Never used   Substance and Sexual Activity   • Alcohol use: No   • Drug use: Not Currently     Types: Marijuana   • Sexual activity: Yes     Partners: Female       Family History   Problem Relation Age of Onset   • Hypertension Mother    • Heart disease Father    • Hypertension Father    • Macular degeneration Father    • Arthritis Maternal Grandmother    • Heart disease Maternal Grandmother    • Arthritis Maternal Grandfather    • Heart disease Maternal Grandfather    • Diabetes Maternal Grandfather    • Hypertension Maternal Grandfather    • Cancer Maternal Grandfather         colon?   • Arthritis Paternal Grandmother    • Heart disease Paternal  Grandmother    • Diabetes Paternal Grandmother    • Hypertension Paternal Grandmother    • Stroke Paternal Grandmother    • Arthritis Paternal Grandfather    • Heart disease Paternal Grandfather    • Macular degeneration Sister        Review of Systems   Constitutional: Positive for fatigue. Negative for appetite change, chills, diaphoresis, fever and unexpected weight change.   HENT: Negative for nosebleeds, postnasal drip, sore throat, trouble swallowing and voice change.    Respiratory: Negative for cough, choking, chest tightness, shortness of breath, wheezing and stridor.    Cardiovascular: Negative for chest pain, palpitations and leg swelling.   Gastrointestinal: Positive for abdominal distention, abdominal pain, diarrhea and nausea. Negative for anal bleeding, blood in stool, constipation, rectal pain and vomiting.   Endocrine: Negative for polydipsia, polyphagia and polyuria.   Musculoskeletal: Negative for gait problem.   Skin: Negative for rash and wound.   Allergic/Immunologic: Negative for food allergies.   Neurological: Negative for dizziness, speech difficulty and light-headedness.   Psychiatric/Behavioral: Negative for confusion, self-injury, sleep disturbance and suicidal ideas.       Vitals:    10/28/21 1113   Temp: 98 °F (36.7 °C)       Physical Exam  Constitutional:       General: He is not in acute distress.     Appearance: He is well-developed. He is not ill-appearing.   HENT:      Head: Normocephalic.   Eyes:      Pupils: Pupils are equal, round, and reactive to light.   Cardiovascular:      Rate and Rhythm: Normal rate and regular rhythm.      Heart sounds: Normal heart sounds.   Pulmonary:      Effort: Pulmonary effort is normal.      Breath sounds: Normal breath sounds.   Abdominal:      General: Bowel sounds are normal. There is distension.      Palpations: Abdomen is soft. There is no mass.      Tenderness: There is abdominal tenderness. There is no guarding or rebound.      Hernia: No  hernia is present.       Musculoskeletal:         General: Normal range of motion.   Skin:     General: Skin is warm and dry.   Neurological:      Mental Status: He is alert and oriented to person, place, and time.   Psychiatric:         Speech: Speech normal.         Behavior: Behavior normal.         Judgment: Judgment normal.         No radiology results for the last 7 days     Diagnoses and all orders for this visit:    1. Rectal bleeding (Primary)  -     CBC & Differential  -     Comprehensive Metabolic Panel  -     Amylase  -     Lipase  -     CT Abdomen Pelvis With Contrast; Future    2. Iron deficiency anemia, unspecified iron deficiency anemia type  -     CBC & Differential  -     Comprehensive Metabolic Panel  -     Amylase  -     Lipase  -     CT Abdomen Pelvis With Contrast; Future    3. Diarrhea, unspecified type  -     CBC & Differential  -     Comprehensive Metabolic Panel  -     Amylase  -     Lipase  -     CT Abdomen Pelvis With Contrast; Future  -     Clostridium Difficile Toxin, PCR - Stool, Per Rectum  -     Fecal Lactoferrin - Stool, Per Rectum    4. Lower abdominal pain  -     CT Abdomen Pelvis With Contrast; Future    5. Nausea  -     CT Abdomen Pelvis With Contrast; Future      After reviewing his lab work results with him today and given his current symptoms I worry about an acute GI bleed and would like him to go on over to the [facility] ER for immediate evaluation.  Unfortunately he tells me he is the only caregiver for his wife who is at home in a wheelchair and he does not really want to go over to the ER today.  So I will go ahead and get labs, stool studies and a CT scan of the abdomen and pelvis to start.  He does report the rectal bleeding has stopped as of last week.  Abdominal pain does seem to be improved.  He is still mildly tender on exam today.  Still having loose stools.  I would like him to start a bland diet.  He can take some Pepto-Bismol over-the-counter.  But I did make him  aware that when I get his CBC back in the morning if his hemoglobin is any lower I will be referring him back over to the ER tomorrow.  Patient to call the office with any issues.  If he gets worse I have advised him to go on over the [facility] ER for immediate evaluation.  Patient to follow-up with me in 1 week.  Patient is agreeable to the plan.

## 2021-10-29 ENCOUNTER — HOSPITAL ENCOUNTER (OUTPATIENT)
Facility: HOSPITAL | Age: 66
Setting detail: OBSERVATION
Discharge: HOME | End: 2021-10-30
Attending: EMERGENCY MEDICINE | Admitting: EMERGENCY MEDICINE

## 2021-10-29 ENCOUNTER — TELEPHONE (OUTPATIENT)
Dept: GASTROENTEROLOGY | Facility: CLINIC | Age: 66
End: 2021-10-29

## 2021-10-29 DIAGNOSIS — K62.5 RECTAL BLEEDING: ICD-10-CM

## 2021-10-29 DIAGNOSIS — D64.9 SYMPTOMATIC ANEMIA: Primary | ICD-10-CM

## 2021-10-29 LAB
ABO GROUP BLD: NORMAL
ALBUMIN SERPL-MCNC: 3.8 G/DL (ref 3.8–4.8)
ALBUMIN SERPL-MCNC: 4 G/DL (ref 3.5–5.2)
ALBUMIN/GLOB SERPL: 1.6 G/DL
ALBUMIN/GLOB SERPL: 1.7 {RATIO} (ref 1.2–2.2)
ALP SERPL-CCNC: 87 U/L (ref 39–117)
ALP SERPL-CCNC: 90 IU/L (ref 44–121)
ALT SERPL W P-5'-P-CCNC: 11 U/L (ref 1–41)
ALT SERPL-CCNC: 10 IU/L (ref 0–44)
AMYLASE SERPL-CCNC: 42 U/L (ref 31–110)
ANION GAP SERPL CALCULATED.3IONS-SCNC: 9.9 MMOL/L (ref 5–15)
AST SERPL-CCNC: 13 IU/L (ref 0–40)
AST SERPL-CCNC: 13 U/L (ref 1–40)
BASOPHILS # BLD AUTO: 0.08 10*3/MM3 (ref 0–0.2)
BASOPHILS # BLD AUTO: 0.1 X10E3/UL (ref 0–0.2)
BASOPHILS NFR BLD AUTO: 1 %
BASOPHILS NFR BLD AUTO: 1 % (ref 0–1.5)
BH BB BLOOD EXPIRATION DATE: NORMAL
BH BB BLOOD EXPIRATION DATE: NORMAL
BH BB BLOOD TYPE BARCODE: 8400
BH BB BLOOD TYPE BARCODE: 8400
BH BB DISPENSE STATUS: NORMAL
BH BB DISPENSE STATUS: NORMAL
BH BB PRODUCT CODE: NORMAL
BH BB PRODUCT CODE: NORMAL
BH BB UNIT NUMBER: NORMAL
BH BB UNIT NUMBER: NORMAL
BILIRUB SERPL-MCNC: 0.3 MG/DL (ref 0–1.2)
BILIRUB SERPL-MCNC: <0.2 MG/DL (ref 0–1.2)
BLD GP AB SCN SERPL QL: NEGATIVE
BUN SERPL-MCNC: 12 MG/DL (ref 8–27)
BUN SERPL-MCNC: 8 MG/DL (ref 8–23)
BUN/CREAT SERPL: 13 (ref 10–24)
BUN/CREAT SERPL: 9.5 (ref 7–25)
C DIFF TOX GENS STL QL NAA+PROBE: POSITIVE
CALCIUM SERPL-MCNC: 8.5 MG/DL (ref 8.6–10.2)
CALCIUM SPEC-SCNC: 8.6 MG/DL (ref 8.6–10.5)
CHLORIDE SERPL-SCNC: 105 MMOL/L (ref 96–106)
CHLORIDE SERPL-SCNC: 109 MMOL/L (ref 98–107)
CO2 SERPL-SCNC: 23 MMOL/L (ref 20–29)
CO2 SERPL-SCNC: 23.1 MMOL/L (ref 22–29)
CREAT SERPL-MCNC: 0.84 MG/DL (ref 0.76–1.27)
CREAT SERPL-MCNC: 0.9 MG/DL (ref 0.76–1.27)
DEPRECATED RDW RBC AUTO: 41.5 FL (ref 37–54)
EOSINOPHIL # BLD AUTO: 0.18 10*3/MM3 (ref 0–0.4)
EOSINOPHIL # BLD AUTO: 0.4 X10E3/UL (ref 0–0.4)
EOSINOPHIL NFR BLD AUTO: 2.2 % (ref 0.3–6.2)
EOSINOPHIL NFR BLD AUTO: 4 %
ERYTHROCYTE [DISTWIDTH] IN BLOOD BY AUTOMATED COUNT: 14.3 % (ref 11.6–15.4)
ERYTHROCYTE [DISTWIDTH] IN BLOOD BY AUTOMATED COUNT: 14.3 % (ref 12.3–15.4)
EXPIRATION DATE: ABNORMAL
FECAL OCCULT BLOOD SCREEN, POC: POSITIVE
GFR SERPL CREATININE-BSD FRML MDRD: 92 ML/MIN/1.73
GLOBULIN SER CALC-MCNC: 2.3 G/DL (ref 1.5–4.5)
GLOBULIN UR ELPH-MCNC: 2.5 GM/DL
GLUCOSE SERPL-MCNC: 103 MG/DL (ref 65–99)
GLUCOSE SERPL-MCNC: 132 MG/DL (ref 65–99)
HCT VFR BLD AUTO: 24.9 % (ref 37.5–51)
HCT VFR BLD AUTO: 25.3 % (ref 37.5–51)
HGB BLD-MCNC: 7.7 G/DL (ref 13–17.7)
HGB BLD-MCNC: 7.7 G/DL (ref 13–17.7)
IMM GRANULOCYTES # BLD AUTO: 0.04 10*3/MM3 (ref 0–0.05)
IMM GRANULOCYTES # BLD AUTO: 0.1 X10E3/UL (ref 0–0.1)
IMM GRANULOCYTES NFR BLD AUTO: 0.5 % (ref 0–0.5)
IMM GRANULOCYTES NFR BLD AUTO: 1 %
LIPASE SERPL-CCNC: 43 U/L (ref 13–78)
LYMPHOCYTES # BLD AUTO: 1.21 10*3/MM3 (ref 0.7–3.1)
LYMPHOCYTES # BLD AUTO: 2.2 X10E3/UL (ref 0.7–3.1)
LYMPHOCYTES NFR BLD AUTO: 15.1 % (ref 19.6–45.3)
LYMPHOCYTES NFR BLD AUTO: 23 %
Lab: 174
MCH RBC QN AUTO: 24.4 PG (ref 26.6–33)
MCH RBC QN AUTO: 25.3 PG (ref 26.6–33)
MCHC RBC AUTO-ENTMCNC: 30.4 G/DL (ref 31.5–35.7)
MCHC RBC AUTO-ENTMCNC: 30.9 G/DL (ref 31.5–35.7)
MCV RBC AUTO: 80.3 FL (ref 79–97)
MCV RBC AUTO: 82 FL (ref 79–97)
MONOCYTES # BLD AUTO: 0.41 10*3/MM3 (ref 0.1–0.9)
MONOCYTES # BLD AUTO: 0.8 X10E3/UL (ref 0.1–0.9)
MONOCYTES NFR BLD AUTO: 5.1 % (ref 5–12)
MONOCYTES NFR BLD AUTO: 9 %
NEGATIVE CONTROL: NEGATIVE
NEUTROPHILS # BLD AUTO: 6 X10E3/UL (ref 1.4–7)
NEUTROPHILS NFR BLD AUTO: 6.11 10*3/MM3 (ref 1.7–7)
NEUTROPHILS NFR BLD AUTO: 62 %
NEUTROPHILS NFR BLD AUTO: 76.1 % (ref 42.7–76)
NRBC BLD AUTO-RTO: 0 /100 WBC (ref 0–0.2)
PLATELET # BLD AUTO: 309 10*3/MM3 (ref 140–450)
PLATELET # BLD AUTO: 319 X10E3/UL (ref 150–450)
PMV BLD AUTO: 9.6 FL (ref 6–12)
POSITIVE CONTROL: POSITIVE
POTASSIUM SERPL-SCNC: 4 MMOL/L (ref 3.5–5.2)
POTASSIUM SERPL-SCNC: 4.4 MMOL/L (ref 3.5–5.2)
PROT SERPL-MCNC: 6.1 G/DL (ref 6–8.5)
PROT SERPL-MCNC: 6.5 G/DL (ref 6–8.5)
RBC # BLD AUTO: 3.04 X10E6/UL (ref 4.14–5.8)
RBC # BLD AUTO: 3.15 10*6/MM3 (ref 4.14–5.8)
RH BLD: POSITIVE
SARS-COV-2 RNA RESP QL NAA+PROBE: NOT DETECTED
SODIUM SERPL-SCNC: 139 MMOL/L (ref 134–144)
SODIUM SERPL-SCNC: 142 MMOL/L (ref 136–145)
T&S EXPIRATION DATE: NORMAL
UNIT  ABO: NORMAL
UNIT  ABO: NORMAL
UNIT  RH: NORMAL
UNIT  RH: NORMAL
WBC # BLD AUTO: 8.03 10*3/MM3 (ref 3.4–10.8)
WBC # BLD AUTO: 9.6 X10E3/UL (ref 3.4–10.8)

## 2021-10-29 PROCEDURE — 86901 BLOOD TYPING SEROLOGIC RH(D): CPT | Performed by: EMERGENCY MEDICINE

## 2021-10-29 PROCEDURE — G0378 HOSPITAL OBSERVATION PER HR: HCPCS

## 2021-10-29 PROCEDURE — 80053 COMPREHEN METABOLIC PANEL: CPT | Performed by: EMERGENCY MEDICINE

## 2021-10-29 PROCEDURE — 86850 RBC ANTIBODY SCREEN: CPT | Performed by: EMERGENCY MEDICINE

## 2021-10-29 PROCEDURE — 99284 EMERGENCY DEPT VISIT MOD MDM: CPT

## 2021-10-29 PROCEDURE — P9016 RBC LEUKOCYTES REDUCED: HCPCS

## 2021-10-29 PROCEDURE — 82270 OCCULT BLOOD FECES: CPT | Performed by: EMERGENCY MEDICINE

## 2021-10-29 PROCEDURE — 87493 C DIFF AMPLIFIED PROBE: CPT | Performed by: PHYSICIAN ASSISTANT

## 2021-10-29 PROCEDURE — 86900 BLOOD TYPING SEROLOGIC ABO: CPT

## 2021-10-29 PROCEDURE — 85025 COMPLETE CBC W/AUTO DIFF WBC: CPT | Performed by: EMERGENCY MEDICINE

## 2021-10-29 PROCEDURE — C9803 HOPD COVID-19 SPEC COLLECT: HCPCS

## 2021-10-29 PROCEDURE — 86900 BLOOD TYPING SEROLOGIC ABO: CPT | Performed by: EMERGENCY MEDICINE

## 2021-10-29 PROCEDURE — U0003 INFECTIOUS AGENT DETECTION BY NUCLEIC ACID (DNA OR RNA); SEVERE ACUTE RESPIRATORY SYNDROME CORONAVIRUS 2 (SARS-COV-2) (CORONAVIRUS DISEASE [COVID-19]), AMPLIFIED PROBE TECHNIQUE, MAKING USE OF HIGH THROUGHPUT TECHNOLOGIES AS DESCRIBED BY CMS-2020-01-R: HCPCS | Performed by: EMERGENCY MEDICINE

## 2021-10-29 PROCEDURE — U0005 INFEC AGEN DETEC AMPLI PROBE: HCPCS | Performed by: EMERGENCY MEDICINE

## 2021-10-29 PROCEDURE — 36430 TRANSFUSION BLD/BLD COMPNT: CPT

## 2021-10-29 PROCEDURE — 86920 COMPATIBILITY TEST SPIN: CPT

## 2021-10-29 RX ORDER — VANCOMYCIN HYDROCHLORIDE 125 MG/1
125 CAPSULE ORAL EVERY 6 HOURS SCHEDULED
Status: DISCONTINUED | OUTPATIENT
Start: 2021-10-30 | End: 2021-10-30 | Stop reason: HOSPADM

## 2021-10-29 RX ORDER — SODIUM CHLORIDE, SODIUM LACTATE, POTASSIUM CHLORIDE, CALCIUM CHLORIDE 600; 310; 30; 20 MG/100ML; MG/100ML; MG/100ML; MG/100ML
75 INJECTION, SOLUTION INTRAVENOUS CONTINUOUS
Status: DISCONTINUED | OUTPATIENT
Start: 2021-10-29 | End: 2021-10-30 | Stop reason: HOSPADM

## 2021-10-29 RX ORDER — AMLODIPINE BESYLATE 5 MG/1
5 TABLET ORAL DAILY
Status: DISCONTINUED | OUTPATIENT
Start: 2021-10-29 | End: 2021-10-30 | Stop reason: HOSPADM

## 2021-10-29 RX ORDER — ONDANSETRON 4 MG/1
4 TABLET, FILM COATED ORAL EVERY 6 HOURS PRN
Status: DISCONTINUED | OUTPATIENT
Start: 2021-10-29 | End: 2021-10-30 | Stop reason: HOSPADM

## 2021-10-29 RX ORDER — ATORVASTATIN CALCIUM 20 MG/1
40 TABLET, FILM COATED ORAL DAILY
Status: DISCONTINUED | OUTPATIENT
Start: 2021-10-29 | End: 2021-10-30 | Stop reason: HOSPADM

## 2021-10-29 RX ORDER — NITROGLYCERIN 0.4 MG/1
0.4 TABLET SUBLINGUAL
Status: DISCONTINUED | OUTPATIENT
Start: 2021-10-29 | End: 2021-10-30 | Stop reason: HOSPADM

## 2021-10-29 RX ORDER — SODIUM CHLORIDE 0.9 % (FLUSH) 0.9 %
10 SYRINGE (ML) INJECTION EVERY 12 HOURS SCHEDULED
Status: DISCONTINUED | OUTPATIENT
Start: 2021-10-29 | End: 2021-10-30 | Stop reason: HOSPADM

## 2021-10-29 RX ORDER — LOSARTAN POTASSIUM 100 MG/1
100 TABLET ORAL DAILY
Status: DISCONTINUED | OUTPATIENT
Start: 2021-10-29 | End: 2021-10-30 | Stop reason: HOSPADM

## 2021-10-29 RX ORDER — METFORMIN HYDROCHLORIDE 500 MG/1
500 TABLET, EXTENDED RELEASE ORAL 2 TIMES DAILY
Status: DISCONTINUED | OUTPATIENT
Start: 2021-10-29 | End: 2021-10-30

## 2021-10-29 RX ORDER — SODIUM CHLORIDE 0.9 % (FLUSH) 0.9 %
10 SYRINGE (ML) INJECTION AS NEEDED
Status: DISCONTINUED | OUTPATIENT
Start: 2021-10-29 | End: 2021-10-30 | Stop reason: HOSPADM

## 2021-10-29 RX ORDER — ONDANSETRON 2 MG/ML
4 INJECTION INTRAMUSCULAR; INTRAVENOUS EVERY 6 HOURS PRN
Status: DISCONTINUED | OUTPATIENT
Start: 2021-10-29 | End: 2021-10-30 | Stop reason: HOSPADM

## 2021-10-29 RX ORDER — FAMOTIDINE 20 MG/1
20 TABLET, FILM COATED ORAL 2 TIMES DAILY
Status: DISCONTINUED | OUTPATIENT
Start: 2021-10-29 | End: 2021-10-30 | Stop reason: HOSPADM

## 2021-10-29 RX ORDER — SERTRALINE HYDROCHLORIDE 100 MG/1
100 TABLET, FILM COATED ORAL DAILY
Status: DISCONTINUED | OUTPATIENT
Start: 2021-10-29 | End: 2021-10-30 | Stop reason: HOSPADM

## 2021-10-29 RX ADMIN — SERTRALINE 100 MG: 100 TABLET, FILM COATED ORAL at 21:17

## 2021-10-29 RX ADMIN — METFORMIN HYDROCHLORIDE 500 MG: 500 TABLET, EXTENDED RELEASE ORAL at 21:17

## 2021-10-29 RX ADMIN — ATORVASTATIN CALCIUM 40 MG: 20 TABLET, FILM COATED ORAL at 21:17

## 2021-10-29 RX ADMIN — FAMOTIDINE 20 MG: 20 TABLET, FILM COATED ORAL at 21:17

## 2021-10-29 RX ADMIN — AMLODIPINE BESYLATE 5 MG: 5 TABLET ORAL at 21:32

## 2021-10-29 RX ADMIN — LOSARTAN POTASSIUM 100 MG: 100 TABLET, FILM COATED ORAL at 21:17

## 2021-10-29 RX ADMIN — Medication 10 ML: at 22:16

## 2021-10-29 NOTE — TELEPHONE ENCOUNTER
Called pt and spoke with pt's wife and advised of Marielena's note. Verb understanding and she states she will get him to go to the ER,  Update sent to Marielena NP.

## 2021-10-29 NOTE — TELEPHONE ENCOUNTER
----- Message from TREY Tate sent at 10/29/2021 10:26 AM EDT -----  please call the patient at home and tell him that his hemoglobin is worse today than it was 4 days ago.  He has gone from 15.6 a year ago down to 8.5---4 days ago and yesterday when we checked him he is 7.7.  He is definitely losing blood somewhere.  I need him to go on over to the [facility] ER for immediate evaluation today.  If he is too weak to drive have him call an ambulance.  Let him know I am very worried about him.  Thanks

## 2021-10-30 ENCOUNTER — APPOINTMENT (OUTPATIENT)
Dept: CT IMAGING | Facility: HOSPITAL | Age: 66
End: 2021-10-30

## 2021-10-30 ENCOUNTER — READMISSION MANAGEMENT (OUTPATIENT)
Dept: CALL CENTER | Facility: HOSPITAL | Age: 66
End: 2021-10-30

## 2021-10-30 VITALS
HEART RATE: 66 BPM | OXYGEN SATURATION: 98 % | TEMPERATURE: 98.8 F | RESPIRATION RATE: 18 BRPM | DIASTOLIC BLOOD PRESSURE: 88 MMHG | SYSTOLIC BLOOD PRESSURE: 100 MMHG

## 2021-10-30 LAB
ALBUMIN SERPL-MCNC: 3.7 G/DL (ref 3.5–5.2)
ALBUMIN/GLOB SERPL: 1.4 G/DL
ALP SERPL-CCNC: 81 U/L (ref 39–117)
ALT SERPL W P-5'-P-CCNC: 12 U/L (ref 1–41)
ANION GAP SERPL CALCULATED.3IONS-SCNC: 9.2 MMOL/L (ref 5–15)
AST SERPL-CCNC: 19 U/L (ref 1–40)
BH BB BLOOD EXPIRATION DATE: NORMAL
BH BB BLOOD EXPIRATION DATE: NORMAL
BH BB BLOOD TYPE BARCODE: 8400
BH BB BLOOD TYPE BARCODE: 8400
BH BB DISPENSE STATUS: NORMAL
BH BB DISPENSE STATUS: NORMAL
BH BB PRODUCT CODE: NORMAL
BH BB PRODUCT CODE: NORMAL
BH BB UNIT NUMBER: NORMAL
BH BB UNIT NUMBER: NORMAL
BILIRUB SERPL-MCNC: 0.3 MG/DL (ref 0–1.2)
BUN SERPL-MCNC: 7 MG/DL (ref 8–23)
BUN/CREAT SERPL: 8.8 (ref 7–25)
C DIFF TOX GENS STL QL NAA+PROBE: POSITIVE
CALCIUM SPEC-SCNC: 8.4 MG/DL (ref 8.6–10.5)
CHLORIDE SERPL-SCNC: 109 MMOL/L (ref 98–107)
CO2 SERPL-SCNC: 21.8 MMOL/L (ref 22–29)
CREAT SERPL-MCNC: 0.8 MG/DL (ref 0.76–1.27)
CROSSMATCH INTERPRETATION: NORMAL
CROSSMATCH INTERPRETATION: NORMAL
DEPRECATED RDW RBC AUTO: 44.9 FL (ref 37–54)
ERYTHROCYTE [DISTWIDTH] IN BLOOD BY AUTOMATED COUNT: 14.9 % (ref 12.3–15.4)
GFR SERPL CREATININE-BSD FRML MDRD: 97 ML/MIN/1.73
GLOBULIN UR ELPH-MCNC: 2.6 GM/DL
GLUCOSE SERPL-MCNC: 93 MG/DL (ref 65–99)
HCT VFR BLD AUTO: 28.6 % (ref 37.5–51)
HCT VFR BLD AUTO: 29.2 % (ref 37.5–51)
HGB BLD-MCNC: 8.7 G/DL (ref 13–17.7)
HGB BLD-MCNC: 9.1 G/DL (ref 13–17.7)
MCH RBC QN AUTO: 25.3 PG (ref 26.6–33)
MCHC RBC AUTO-ENTMCNC: 30.4 G/DL (ref 31.5–35.7)
MCV RBC AUTO: 83.1 FL (ref 79–97)
PLATELET # BLD AUTO: 298 10*3/MM3 (ref 140–450)
PMV BLD AUTO: 10 FL (ref 6–12)
POTASSIUM SERPL-SCNC: 4 MMOL/L (ref 3.5–5.2)
PROT SERPL-MCNC: 6.3 G/DL (ref 6–8.5)
RBC # BLD AUTO: 3.44 10*6/MM3 (ref 4.14–5.8)
SODIUM SERPL-SCNC: 140 MMOL/L (ref 136–145)
UNIT  ABO: NORMAL
UNIT  ABO: NORMAL
UNIT  RH: NORMAL
UNIT  RH: NORMAL
WBC # BLD AUTO: 10.75 10*3/MM3 (ref 3.4–10.8)

## 2021-10-30 PROCEDURE — 86900 BLOOD TYPING SEROLOGIC ABO: CPT

## 2021-10-30 PROCEDURE — G0378 HOSPITAL OBSERVATION PER HR: HCPCS

## 2021-10-30 PROCEDURE — 80053 COMPREHEN METABOLIC PANEL: CPT | Performed by: NURSE PRACTITIONER

## 2021-10-30 PROCEDURE — 86901 BLOOD TYPING SEROLOGIC RH(D): CPT

## 2021-10-30 PROCEDURE — 85018 HEMOGLOBIN: CPT | Performed by: NURSE PRACTITIONER

## 2021-10-30 PROCEDURE — 25010000002 IOPAMIDOL 61 % SOLUTION: Performed by: EMERGENCY MEDICINE

## 2021-10-30 PROCEDURE — 85027 COMPLETE CBC AUTOMATED: CPT | Performed by: NURSE PRACTITIONER

## 2021-10-30 PROCEDURE — 74177 CT ABD & PELVIS W/CONTRAST: CPT

## 2021-10-30 PROCEDURE — 99214 OFFICE O/P EST MOD 30 MIN: CPT | Performed by: INTERNAL MEDICINE

## 2021-10-30 PROCEDURE — 85014 HEMATOCRIT: CPT | Performed by: NURSE PRACTITIONER

## 2021-10-30 RX ORDER — LOSARTAN POTASSIUM 100 MG/1
100 TABLET ORAL NIGHTLY
COMMUNITY
End: 2022-03-16

## 2021-10-30 RX ORDER — METFORMIN HYDROCHLORIDE 500 MG/1
500 TABLET, EXTENDED RELEASE ORAL 2 TIMES DAILY
Status: DISCONTINUED | OUTPATIENT
Start: 2021-11-02 | End: 2021-10-30 | Stop reason: HOSPADM

## 2021-10-30 RX ORDER — AMLODIPINE BESYLATE 5 MG/1
5 TABLET ORAL DAILY
COMMUNITY
End: 2021-12-15

## 2021-10-30 RX ORDER — ATORVASTATIN CALCIUM 40 MG/1
40 TABLET, FILM COATED ORAL NIGHTLY
COMMUNITY
End: 2022-03-16

## 2021-10-30 RX ORDER — VANCOMYCIN HYDROCHLORIDE 125 MG/1
125 CAPSULE ORAL EVERY 6 HOURS SCHEDULED
Qty: 40 CAPSULE | Refills: 0 | Status: SHIPPED | OUTPATIENT
Start: 2021-10-30 | End: 2021-11-09

## 2021-10-30 RX ORDER — FAMOTIDINE 20 MG/1
20 TABLET, FILM COATED ORAL NIGHTLY
COMMUNITY
End: 2022-02-02

## 2021-10-30 RX ORDER — SUVOREXANT 20 MG/1
20 TABLET, FILM COATED ORAL NIGHTLY PRN
COMMUNITY
End: 2022-05-05

## 2021-10-30 RX ORDER — CLOPIDOGREL BISULFATE 75 MG/1
75 TABLET ORAL DAILY
COMMUNITY
End: 2022-03-15

## 2021-10-30 RX ORDER — SERTRALINE HYDROCHLORIDE 100 MG/1
100 TABLET, FILM COATED ORAL NIGHTLY
COMMUNITY
End: 2022-02-08

## 2021-10-30 RX ADMIN — VANCOMYCIN HYDROCHLORIDE 125 MG: 125 CAPSULE ORAL at 00:35

## 2021-10-30 RX ADMIN — FAMOTIDINE 20 MG: 20 TABLET, FILM COATED ORAL at 12:34

## 2021-10-30 RX ADMIN — VANCOMYCIN HYDROCHLORIDE 125 MG: 125 CAPSULE ORAL at 14:06

## 2021-10-30 RX ADMIN — SODIUM CHLORIDE, POTASSIUM CHLORIDE, SODIUM LACTATE AND CALCIUM CHLORIDE 75 ML/HR: 600; 310; 30; 20 INJECTION, SOLUTION INTRAVENOUS at 00:35

## 2021-10-30 RX ADMIN — VANCOMYCIN HYDROCHLORIDE 125 MG: 125 CAPSULE ORAL at 06:07

## 2021-10-30 RX ADMIN — IOPAMIDOL 85 ML: 612 INJECTION, SOLUTION INTRAVENOUS at 10:35

## 2021-10-30 NOTE — OUTREACH NOTE
Prep Survey      Responses   [facility] patient discharged from? [place]   Is LACE score < 7 ? Yes   Emergency Room discharge w/ pulse ox? No   Eligibility [facility]   Date of Admission 10/29/21   Date of Discharge 10/30/21   Discharge Disposition Home or Self Care   Discharge diagnosis Anemia   Does the patient have one of the following disease processes/diagnoses(primary or secondary)? Other   Does the patient have Home health ordered? No   Is there a DME ordered? No   Prep survey completed? Yes          Abigail Cobb RN

## 2021-11-01 ENCOUNTER — TRANSITIONAL CARE MANAGEMENT TELEPHONE ENCOUNTER (OUTPATIENT)
Dept: CALL CENTER | Facility: HOSPITAL | Age: 66
End: 2021-11-01

## 2021-11-01 LAB — VIT B1 BLD-SCNC: 115.7 NMOL/L (ref 66.5–200)

## 2021-11-01 NOTE — OUTREACH NOTE
Call Center TCM Note      Responses   [facility] patient discharged from? [place]   Does the patient have one of the following disease processes/diagnoses(primary or secondary)? Other   TCM attempt successful? No  [Jaylene-wife]   Unsuccessful attempts Attempt 1          Marcia Paige RN    11/1/2021, 15:24 EDT

## 2021-11-02 ENCOUNTER — TELEPHONE (OUTPATIENT)
Dept: GASTROENTEROLOGY | Facility: CLINIC | Age: 66
End: 2021-11-02

## 2021-11-02 LAB — LACTOFERRIN STL-MCNC: 3.99 UG/ML(G) (ref 0–7.24)

## 2021-11-02 NOTE — TELEPHONE ENCOUNTER
Overdue alert received for fecal lactoferrin.     Call to pt.  Has recently been re-hospitalized.  See notes.  Upcoming appt with RICH Astorga 11/5.     Update to RICH Astorga.

## 2021-11-02 NOTE — TELEPHONE ENCOUNTER
Yes he was hospitalized for C. difficile so there is no need for the fecal lactoferrin at this time.  Thanks

## 2021-11-05 ENCOUNTER — LAB (OUTPATIENT)
Dept: LAB | Facility: HOSPITAL | Age: 66
End: 2021-11-05

## 2021-11-05 ENCOUNTER — OFFICE VISIT (OUTPATIENT)
Dept: INTERNAL MEDICINE | Facility: CLINIC | Age: 66
End: 2021-11-05

## 2021-11-05 VITALS
BODY MASS INDEX: 36.18 KG/M2 | SYSTOLIC BLOOD PRESSURE: 118 MMHG | HEART RATE: 68 BPM | OXYGEN SATURATION: 100 % | HEIGHT: 73 IN | TEMPERATURE: 96.8 F | DIASTOLIC BLOOD PRESSURE: 76 MMHG | WEIGHT: 273 LBS

## 2021-11-05 DIAGNOSIS — K62.5 GASTROINTESTINAL HEMORRHAGE ASSOCIATED WITH ANORECTAL SOURCE: Primary | ICD-10-CM

## 2021-11-05 DIAGNOSIS — K62.5 GASTROINTESTINAL HEMORRHAGE ASSOCIATED WITH ANORECTAL SOURCE: ICD-10-CM

## 2021-11-05 LAB
HCT VFR BLD AUTO: 32.4 % (ref 37.5–51)
HGB BLD-MCNC: 9.8 G/DL (ref 13–17.7)

## 2021-11-05 PROCEDURE — 36415 COLL VENOUS BLD VENIPUNCTURE: CPT

## 2021-11-05 PROCEDURE — 1111F DSCHRG MED/CURRENT MED MERGE: CPT | Performed by: FAMILY MEDICINE

## 2021-11-05 PROCEDURE — 99495 TRANSJ CARE MGMT MOD F2F 14D: CPT | Performed by: FAMILY MEDICINE

## 2021-11-05 PROCEDURE — 85018 HEMOGLOBIN: CPT

## 2021-11-05 PROCEDURE — 85014 HEMATOCRIT: CPT

## 2021-11-05 NOTE — PROGRESS NOTES
Transitional Care Follow Up Visit  Subjective     Angel White Jr. is a 66 y.o. male who presents for a transitional care management visit.    Within 48 business hours after discharge our office contacted him via telephone to coordinate his care and needs.      I reviewed and discussed the details of that call along with the discharge summary, hospital problems, inpatient lab results, inpatient diagnostic studies, and consultation reports with Angel.     Current outpatient and discharge medications have been reconciled for the patient.  Reviewed by: Momo Meza MD      Date of TCM Phone Call 10/30/2021   [facility]   Date of Admission 10/29/2021   Date of Discharge 10/30/2021   Discharge Disposition Home or Self Care     Risk for Readmission (LACE) Score: 5 (10/30/2021  6:01 AM)      History of Present Illness       Course During Hospital Stay:  He went to see his gastroenterologist Dr. Ashraf in the office yesterday and had labs drawn, he was called today to notify him that his hemoglobin was 7.7 and a need to go to the emergency room to receive a blood transfusion.  He states he is feeling well and again has had no blood per rectum.  Three weeks prior loose stool.  Transfused 2 units packed red blood cells stable hematocrit hemoglobin     The following portions of the patient's history were reviewed and updated as appropriate: allergies, current medications, past family history, past medical history, past social history, past surgical history and problem list.  Colonoscopy August 2020 hemorrhoids cecal polyp  Review of Systems   Constitutional: Negative.    HENT: Negative.    Respiratory: Negative.    Cardiovascular: Negative.    Gastrointestinal: Negative.    Endocrine: Negative.    Genitourinary: Negative.    Musculoskeletal: Negative.    Neurological: Negative.        Objective   Physical Exam  Vitals and nursing note reviewed.   Constitutional:       Appearance: Normal  appearance. He is obese.   HENT:      Head: Normocephalic and atraumatic.   Eyes:      Conjunctiva/sclera: Conjunctivae normal.   Cardiovascular:      Rate and Rhythm: Normal rate and regular rhythm.      Pulses: Normal pulses.      Heart sounds: Normal heart sounds.   Pulmonary:      Effort: Pulmonary effort is normal.      Breath sounds: Normal breath sounds.   Abdominal:      Tenderness: There is no right CVA tenderness or left CVA tenderness.   Neurological:      Mental Status: He is alert.   Psychiatric:         Mood and Affect: Mood normal.         Behavior: Behavior normal.         Thought Content: Thought content normal.         Judgment: Judgment normal.         Assessment/Plan   Diagnoses and all orders for this visit:    1. Gastrointestinal hemorrhage associated with anorectal source (Primary)  -     Hemoglobin and hematocrit, blood; Future  -     Hemoglobin and hematocrit, blood      Up results of blood work for further GI direction  Continue amlodipine losartan   for hypertension  If recurrence of bleeding stop aspirin  For C. difficile continue vancomycin  Insomnia continue Belsomra  Anxiety depression continue Zoloft    Follow-up otherwise as needed or scheduled or 6 months

## 2021-11-18 ENCOUNTER — LAB (OUTPATIENT)
Dept: LAB | Facility: HOSPITAL | Age: 66
End: 2021-11-18

## 2021-11-18 ENCOUNTER — OFFICE VISIT (OUTPATIENT)
Dept: INTERNAL MEDICINE | Facility: CLINIC | Age: 66
End: 2021-11-18

## 2021-11-18 VITALS
TEMPERATURE: 98 F | RESPIRATION RATE: 16 BRPM | WEIGHT: 263.1 LBS | OXYGEN SATURATION: 99 % | HEIGHT: 73 IN | DIASTOLIC BLOOD PRESSURE: 75 MMHG | HEART RATE: 74 BPM | BODY MASS INDEX: 34.87 KG/M2 | SYSTOLIC BLOOD PRESSURE: 119 MMHG

## 2021-11-18 DIAGNOSIS — E11.59 TYPE 2 DIABETES MELLITUS WITH OTHER CIRCULATORY COMPLICATION, WITHOUT LONG-TERM CURRENT USE OF INSULIN (HCC): ICD-10-CM

## 2021-11-18 DIAGNOSIS — A04.72 C. DIFFICILE DIARRHEA: Primary | ICD-10-CM

## 2021-11-18 DIAGNOSIS — I10 ESSENTIAL HYPERTENSION: ICD-10-CM

## 2021-11-18 PROBLEM — R55 SYNCOPE: Status: RESOLVED | Noted: 2020-12-28 | Resolved: 2021-11-18

## 2021-11-18 LAB
ALBUMIN SERPL-MCNC: 4.5 G/DL (ref 3.5–5.2)
ALBUMIN/GLOB SERPL: 1.5 G/DL
ALP SERPL-CCNC: 103 U/L (ref 39–117)
ALT SERPL W P-5'-P-CCNC: 9 U/L (ref 1–41)
ANION GAP SERPL CALCULATED.3IONS-SCNC: 13.9 MMOL/L (ref 5–15)
AST SERPL-CCNC: 20 U/L (ref 1–40)
BASOPHILS # BLD AUTO: 0.08 10*3/MM3 (ref 0–0.2)
BASOPHILS NFR BLD AUTO: 0.8 % (ref 0–1.5)
BILIRUB SERPL-MCNC: 0.4 MG/DL (ref 0–1.2)
BUN SERPL-MCNC: 13 MG/DL (ref 8–23)
BUN/CREAT SERPL: 12.5 (ref 7–25)
CALCIUM SPEC-SCNC: 9.5 MG/DL (ref 8.6–10.5)
CHLORIDE SERPL-SCNC: 106 MMOL/L (ref 98–107)
CO2 SERPL-SCNC: 19.1 MMOL/L (ref 22–29)
CREAT SERPL-MCNC: 1.04 MG/DL (ref 0.76–1.27)
DEPRECATED RDW RBC AUTO: 43.8 FL (ref 37–54)
EOSINOPHIL # BLD AUTO: 0.09 10*3/MM3 (ref 0–0.4)
EOSINOPHIL NFR BLD AUTO: 0.9 % (ref 0.3–6.2)
ERYTHROCYTE [DISTWIDTH] IN BLOOD BY AUTOMATED COUNT: 16 % (ref 12.3–15.4)
GFR SERPL CREATININE-BSD FRML MDRD: 72 ML/MIN/1.73
GLOBULIN UR ELPH-MCNC: 3.1 GM/DL
GLUCOSE SERPL-MCNC: 137 MG/DL (ref 65–99)
HBA1C MFR BLD: 6.25 % (ref 4.8–5.6)
HCT VFR BLD AUTO: 33.7 % (ref 37.5–51)
HGB BLD-MCNC: 10.7 G/DL (ref 13–17.7)
IMM GRANULOCYTES # BLD AUTO: 0.07 10*3/MM3 (ref 0–0.05)
IMM GRANULOCYTES NFR BLD AUTO: 0.7 % (ref 0–0.5)
LYMPHOCYTES # BLD AUTO: 1.49 10*3/MM3 (ref 0.7–3.1)
LYMPHOCYTES NFR BLD AUTO: 15.7 % (ref 19.6–45.3)
MCH RBC QN AUTO: 24.4 PG (ref 26.6–33)
MCHC RBC AUTO-ENTMCNC: 31.8 G/DL (ref 31.5–35.7)
MCV RBC AUTO: 76.8 FL (ref 79–97)
MONOCYTES # BLD AUTO: 0.69 10*3/MM3 (ref 0.1–0.9)
MONOCYTES NFR BLD AUTO: 7.3 % (ref 5–12)
NEUTROPHILS NFR BLD AUTO: 7.07 10*3/MM3 (ref 1.7–7)
NEUTROPHILS NFR BLD AUTO: 74.6 % (ref 42.7–76)
NRBC BLD AUTO-RTO: 0 /100 WBC (ref 0–0.2)
PLATELET # BLD AUTO: 398 10*3/MM3 (ref 140–450)
PMV BLD AUTO: 9.6 FL (ref 6–12)
POTASSIUM SERPL-SCNC: 3.8 MMOL/L (ref 3.5–5.2)
PROT SERPL-MCNC: 7.6 G/DL (ref 6–8.5)
RBC # BLD AUTO: 4.39 10*6/MM3 (ref 4.14–5.8)
SODIUM SERPL-SCNC: 139 MMOL/L (ref 136–145)
WBC NRBC COR # BLD: 9.49 10*3/MM3 (ref 3.4–10.8)

## 2021-11-18 PROCEDURE — 99214 OFFICE O/P EST MOD 30 MIN: CPT | Performed by: FAMILY MEDICINE

## 2021-11-18 PROCEDURE — 80053 COMPREHEN METABOLIC PANEL: CPT | Performed by: FAMILY MEDICINE

## 2021-11-18 PROCEDURE — 36415 COLL VENOUS BLD VENIPUNCTURE: CPT | Performed by: FAMILY MEDICINE

## 2021-11-18 PROCEDURE — 85025 COMPLETE CBC W/AUTO DIFF WBC: CPT | Performed by: FAMILY MEDICINE

## 2021-11-18 PROCEDURE — 83036 HEMOGLOBIN GLYCOSYLATED A1C: CPT | Performed by: FAMILY MEDICINE

## 2021-11-18 NOTE — PROGRESS NOTES
"Chief Complaint  Diarrhea (For about a week/ Not eating/ throwing up solid foods ) and Diarrhea (Sharp pain on left side body before going to the restroom )  Hypertension diabetes  Subjective          Angel White Jr. presents to [facility]  History of Present Illness  Patient follows up for persistent diarrhea yellow liquid with eating or drinking,  He was found to have C. difficile treated with vancomycin for 14 days he says he is getting better but he is concerned because of the persistent nature of the diarrhea he has lost considerable weight his sugars under good control with his underlying diabetes he is still taking his Metformin  He had anemia secondary to colitis and did have some recurrence of bleeding yesterday  He has no specific abdominal pain his blood pressure is well controlled  Objective   Vital Signs:   /75   Pulse 74   Temp 98 °F (36.7 °C)   Resp 16   Ht 185.4 cm (73.01\")   Wt 119 kg (263 lb 1.6 oz)   SpO2 99%   BMI 34.70 kg/m²     Physical Exam  Vitals and nursing note reviewed.   Constitutional:       Appearance: Normal appearance.   HENT:      Head: Normocephalic and atraumatic.   Eyes:      General: No scleral icterus.     Conjunctiva/sclera: Conjunctivae normal.   Cardiovascular:      Rate and Rhythm: Normal rate and regular rhythm.      Pulses: Normal pulses.      Heart sounds: Normal heart sounds.   Pulmonary:      Effort: Pulmonary effort is normal.      Breath sounds: Normal breath sounds.   Abdominal:      Tenderness: There is no right CVA tenderness or left CVA tenderness.   Neurological:      Mental Status: He is alert.   Psychiatric:         Mood and Affect: Mood normal.         Behavior: Behavior normal.         Thought Content: Thought content normal.         Judgment: Judgment normal.        Result Review :     Common labs    Common Labsle 10/29/21 10/29/21 10/30/21 10/30/21 10/30/21 11/5/21    1512 1512 0426 0426 1234    Glucose  132 " (A)  93     BUN  8  7 (A)     Creatinine  0.84  0.80     eGFR Non African Am  92  97     Sodium  142  140     Potassium  4.0  4.0     Chloride  109 (A)  109 (A)     Calcium  8.6  8.4 (A)     Albumin  4.00  3.70     Total Bilirubin  0.3  0.3     Alkaline Phosphatase  87  81     AST (SGOT)  13  19     ALT (SGPT)  11  12     WBC 8.03  10.75      Hemoglobin 7.7 (A)  8.7 (A)  9.1 (A) 9.8 (A)   Hematocrit 25.3 (A)  28.6 (A)  29.2 (A) 32.4 (A)   Platelets 309  298      (A) Abnormal value       Comments are available for some flowsheets but are not being displayed.                     Assessment and Plan    Diagnoses and all orders for this visit:    1. C. difficile diarrhea (Primary)  -     Comprehensive Metabolic Panel  -     CBC & Differential  -     Hemoglobin A1c    2. Type 2 diabetes mellitus with other circulatory complication, without long-term current use of insulin (HCC)  -     Comprehensive Metabolic Panel  -     Hemoglobin A1c    3. Essential hypertension    hold metformin,  Continue monitoring blood sugar and blood pressure  Keep follow-up appointment with CT scan  Follow-up results of blood work otherwise as needed or    Follow Up   Return if symptoms worsen or fail to improve, for Recheck, Next scheduled follow up.  Patient was given instructions and counseling regarding his condition or for health maintenance advice. Please see specific information pulled into the AVS if appropriate.

## 2021-11-19 ENCOUNTER — TELEPHONE (OUTPATIENT)
Dept: GASTROENTEROLOGY | Facility: CLINIC | Age: 66
End: 2021-11-19

## 2021-11-19 ENCOUNTER — HOSPITAL ENCOUNTER (OUTPATIENT)
Dept: CT IMAGING | Facility: HOSPITAL | Age: 66
Discharge: HOME | End: 2021-11-19
Admitting: NURSE PRACTITIONER

## 2021-11-19 DIAGNOSIS — R93.3 ABNORMAL CT SCAN, COLON: ICD-10-CM

## 2021-11-19 DIAGNOSIS — K62.5 RECTAL BLEEDING: ICD-10-CM

## 2021-11-19 DIAGNOSIS — R11.0 NAUSEA: ICD-10-CM

## 2021-11-19 DIAGNOSIS — D50.9 IRON DEFICIENCY ANEMIA, UNSPECIFIED IRON DEFICIENCY ANEMIA TYPE: ICD-10-CM

## 2021-11-19 DIAGNOSIS — R10.30 LOWER ABDOMINAL PAIN: ICD-10-CM

## 2021-11-19 DIAGNOSIS — R19.7 DIARRHEA OF PRESUMED INFECTIOUS ORIGIN: Primary | ICD-10-CM

## 2021-11-19 DIAGNOSIS — R19.7 DIARRHEA, UNSPECIFIED TYPE: ICD-10-CM

## 2021-11-19 PROCEDURE — 74177 CT ABD & PELVIS W/CONTRAST: CPT

## 2021-11-19 PROCEDURE — 82565 ASSAY OF CREATININE: CPT

## 2021-11-19 PROCEDURE — 0 DIATRIZOATE MEGLUMINE & SODIUM PER 1 ML: Performed by: NURSE PRACTITIONER

## 2021-11-19 PROCEDURE — 25010000002 IOPAMIDOL 61 % SOLUTION: Performed by: NURSE PRACTITIONER

## 2021-11-19 RX ORDER — VANCOMYCIN HYDROCHLORIDE 125 MG/1
125 CAPSULE ORAL 4 TIMES DAILY
Qty: 40 CAPSULE | Refills: 0 | Status: SHIPPED | OUTPATIENT
Start: 2021-11-19 | End: 2021-11-29

## 2021-11-19 RX ORDER — PROMETHAZINE HYDROCHLORIDE 12.5 MG/1
12.5 TABLET ORAL EVERY 6 HOURS PRN
Qty: 30 TABLET | Refills: 0 | Status: SHIPPED | OUTPATIENT
Start: 2021-11-19 | End: 2021-12-06 | Stop reason: SDUPTHER

## 2021-11-19 RX ADMIN — DIATRIZOATE MEGLUMINE AND DIATRIZOATE SODIUM 30 ML: 660; 100 LIQUID ORAL; RECTAL at 12:00

## 2021-11-19 RX ADMIN — IOPAMIDOL 85 ML: 612 INJECTION, SOLUTION INTRAVENOUS at 13:11

## 2021-11-19 NOTE — TELEPHONE ENCOUNTER
----- Message from TREY Tate sent at 11/19/2021  3:25 PM EST -----  I called the patient and went over the results of his CT scan.  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  I have also sent this on over to Dr. Ashraf for her to review.  Once she gives me the surgeon she would like him referred to I told the patient that we would call him back on Monday morning to get the referral going.  I did explain to the patient if any of his symptoms were to get worse over the weekend to go on over the [facility] ER for immediate evaluation.  He was agreeable to the plan.thanks.

## 2021-11-19 NOTE — TELEPHONE ENCOUNTER
----- Message from Isela Nichols sent at 11/19/2021  2:48 PM EST -----  Regarding: CT- ABDOMINE AND PELVIS  Contact: [phone]  DR. Avalos  Please call Danielle back at [phone] in Radiology......

## 2021-11-19 NOTE — TELEPHONE ENCOUNTER
I was notified by Marielena Astorga regarding the results of his CT imaging showing a possible mass in or around the terminal ileum.  She had notified him of the results.    I am referring him to Dr. Donovan with the [facility] surgery group.  This was placed as an urgent referral.    He is also having worsened diarrhea again.  He just got treated for C. difficile.  He is losing weight due to the diarrhea..  He was also having some nausea    I am sending him another course of vancomycin.  I am sending him some cholestyramine.  I am sending him some Phenergan.    Both he and his wife (who is also on the phone) are aware that he needs to go to the ER if symptoms worsen or for other concerning changes.

## 2021-11-22 LAB — CREAT BLDA-MCNC: 1 MG/DL (ref 0.6–1.3)

## 2021-11-24 ENCOUNTER — TELEPHONE (OUTPATIENT)
Dept: GASTROENTEROLOGY | Facility: CLINIC | Age: 66
End: 2021-11-24

## 2021-12-06 ENCOUNTER — OFFICE VISIT (OUTPATIENT)
Dept: SURGERY | Facility: CLINIC | Age: 66
End: 2021-12-06

## 2021-12-06 ENCOUNTER — OFFICE VISIT (OUTPATIENT)
Dept: GASTROENTEROLOGY | Facility: CLINIC | Age: 66
End: 2021-12-06

## 2021-12-06 VITALS — BODY MASS INDEX: 35.92 KG/M2 | WEIGHT: 271 LBS | HEIGHT: 73 IN

## 2021-12-06 VITALS — BODY MASS INDEX: 35.92 KG/M2 | WEIGHT: 271 LBS | TEMPERATURE: 96.4 F | HEIGHT: 73 IN

## 2021-12-06 DIAGNOSIS — K62.5 RECTAL BLEEDING: ICD-10-CM

## 2021-12-06 DIAGNOSIS — A04.72 C. DIFFICILE DIARRHEA: Primary | ICD-10-CM

## 2021-12-06 DIAGNOSIS — C7A.012 MALIGNANT CARCINOID TUMOR OF ILEUM (HCC): Primary | ICD-10-CM

## 2021-12-06 DIAGNOSIS — R93.5 ABNORMAL CT OF THE ABDOMEN: ICD-10-CM

## 2021-12-06 DIAGNOSIS — D50.9 IRON DEFICIENCY ANEMIA, UNSPECIFIED IRON DEFICIENCY ANEMIA TYPE: ICD-10-CM

## 2021-12-06 PROCEDURE — 99214 OFFICE O/P EST MOD 30 MIN: CPT | Performed by: NURSE PRACTITIONER

## 2021-12-06 PROCEDURE — 99204 OFFICE O/P NEW MOD 45 MIN: CPT | Performed by: SURGERY

## 2021-12-06 RX ORDER — METRONIDAZOLE 500 MG/1
500 TABLET ORAL 3 TIMES DAILY
Qty: 3 TABLET | Refills: 0 | Status: SHIPPED | OUTPATIENT
Start: 2021-12-06 | End: 2021-12-07

## 2021-12-06 RX ORDER — PROMETHAZINE HYDROCHLORIDE 12.5 MG/1
12.5 TABLET ORAL EVERY 6 HOURS PRN
Qty: 30 TABLET | Refills: 3 | Status: SHIPPED | OUTPATIENT
Start: 2021-12-06 | End: 2022-05-05

## 2021-12-06 RX ORDER — NEOMYCIN SULFATE 500 MG/1
1000 TABLET ORAL 3 TIMES DAILY
Qty: 6 TABLET | Refills: 0 | Status: SHIPPED | OUTPATIENT
Start: 2021-12-06 | End: 2021-12-07

## 2021-12-06 NOTE — PROGRESS NOTES
SUMMARY (A/P):    66-year-old male who presents today for evaluation of a nonobstructing mass at the terminal ileum with adjacent enlarged nodes found on CT scan 11/19/2021.  Patient reports a 1 month history of abdominal pain, nausea, diarrhea, and rectal bleeding requiring blood transfusion.  He had a history of C. difficile in which he completed a round of vancomycin and was recently started on additional antibiotics per GI. He had a colonoscopy in August 2020 which revealed a hyperplastic polyp.  He denies any family history of colon cancer. 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  Discussed proceeding with a laparoscopic colon resection, the risks and benefits including but not limited to bleeding and infection, risk of anastomotic leak requiring reoperation and temporary colostomy, and conversion to open procedure were discussed.  Bowel preparation and antibiotic preparation instructions were given. All questions were answered.  Patient verbalizes understanding and wishes to proceed.    CC: Mass at the terminal ileum     HPI: Mr. White is a 66-year-old male who presents today for evaluation of a mass at the terminal ileum.  He states 1 month ago he started having abdominal pain and associated nausea with eating and diarrhea.  More recently, 2 weeks ago, he noticed a significant amount of blood in his stool requiring blood transfusion of 2 units. He states prior to this episode of rectal bleeding 1 month ago, he did notice intermittent blood on the toilet paper after bowel movements in the last year, but felt this was due to hemorrhoids. He was also diagnosed with C. Difficile in which he completed a round of vancomycin.  He had a CT scan done in October 2021 that revealed a mass at the terminal ileum and he states he was referred to our office for further evaluation.  He states he did have a colonoscopy 1 year ago that revealed a hyperplastic polyp. He denies any family  history of colon cancer.  He is on Plavix due to history of stent placements in 2015.  He states that he will be starting vancomycin today for additional C. difficile coverage per GI recommendation.    ENDOSCOPY:   • Colonoscopy 8/30/2020: hyperplastic polyp    RADIOLOGY:   ·  CT abdomen pelvis with contrast 11/19/2021:  · There is an approximately 3 cm nonobstructing mass at the terminal ileum and the enlarged nodes adjacently are suspicious.    · There is a 2 cm lipoma at the cecum and a 2.5 cm lipoma of the ascending colon.  · There is cholelithiasis without cholecystitis.    LABS:    • 11/23/2021 glucose 137, A1c 6.25, hemoglobin 10.7, hematocrit 33.7, platelets 398    PREVIOUS ABDOMINAL SURGERY    · None    OTHER SURGERY  • Bilateral knee replacements  • Right foot horace placement    PMH:    • Hypertension  • Hyperlipidemia  • GERD  • Diabetes  • Depression  • Constipation  • Stent placement x 6 2015    ALLERGIES:   • Morphine    MEDICATIONS:   • Reviewed  • Plavix    FAMILY HISTORY:    • Colorectal cancer: Negative    SOCIAL HISTORY:   • Denies tobacco use  • Denies alcohol use    PHYSICAL EXAM:   • Constitutional: Well-developed well-nourished, no acute distress  • Vital signs: Stable, BMI 35  • Eyes: Conjunctiva normal, sclera nonicteric  • ENMT: Hearing grossly normal, oral mucosa moist  • Neck: Supple, trachea midline  • Respiratory: Normal inspiratory effort  • Cardiovascular: Regular rate, no peripheral edema, no jugular venous distention  • Gastrointestinal: Soft, nontender, no palpable mass, no hepatosplenomegaly  • Skin:  Warm, dry, no rash on visualized skin surfaces  • Musculoskeletal: Symmetric strength, normal gait  • Psychiatric: Alert and oriented ×3, normal affect     ROS:    No cough, chest pain, shortness of air.  All other systems reviewed and negative other than presenting complaints.    Audrey Beatty PA-C

## 2021-12-06 NOTE — PROGRESS NOTES
Chief Complaint   Patient presents with   • Anemia   • Diarrhea   • Abdominal Pain   • Nausea       Angel White Jr. is a  66 y.o. male here for a follow up visit for diarrhea.    HPI  66-year-old male presents today accompanied by his wife for follow-up visit for diarrhea.  He is a patient of Dr. Ashraf.  He was last seen in the office by me on 10/28/2021.  He was recently hospitalized for worsening anemia and diarrhea at [facility] on 10/30/2021.  At that time he had a repeat CT scan of the abdomen and pelvis that showed:    FINDINGS:  The CT scan was performed through the abdomen and pelvis with  intravenous contrast and compared to previous CT scans of the abdomen  and pelvis dated 08/28/2015. The following findings are present:  1. The lung bases are clear. There is improvement in previous fatty  infiltration of the liver noted on the study of 08/28/2015. The spleen,  pancreas, both adrenal glands, and both kidneys are unremarkable. There  is at least 1 tiny gallstone within the gallbladder and no evidence of  gallbladder wall thickening.  2. There is no aortic aneurysm or retroperitoneal lymphadenopathy. The  large and small bowel loops are normal in caliber and show no  inflammatory change. There is a small mural lipoma involving the wall of  the cecum that measures 1.6 cm that is unchanged.  3. In the pelvis there is slight enlargement of the prostate measuring  4.9 cm. The urinary bladder has a smooth contour and there is no wall  Thickening.      He had repeat CT abd/pelvis done on 11/19/21 per his PCP after continued issues with diarrhea and bleeding that showed:    IMPRESSION:  1. 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 The appearance  can be seen with lymphoma, carcinoid tumor, and metastatic carcinoma.  Surgical consultation is recommended.  2. There is a 2 cm lipoma at the cecum and a 2.5 cm lipoma at  the  ascending colon.  3. There is cholelithiasis without cholecystitis.          He has been urgently referred to Dr. Donovan for further evaluation and the patient reports he has a new patient appointment with Dr. Donovan this afternoon.  When the patient was in the hospital he was diagnosed with C. difficile and given a round of vancomycin.  Patient admits this really did help but as soon as he stopped the antibiotics it seems like all of his symptoms returned.  He tells me he is still currently having diarrhea with gas, bloating and nausea.  He is also on cholestyramine which she says helps form up the stool a little bit but not much.  Dr. Ashraf did send him in a prescription for vancomycin a couple of weeks ago but the patient admits he did not understand if he was supposed to take it then or just keep it as needed so he has not been taking it.  He has been using Phenergan for the nausea and it does help.  He tells me he needs a refill on it.  He does have a history of iron deficiency anemia and his most recent hemoglobin was 10.7 on 11/17/2021.  He does have a history of CVA/cardiac stents and takes daily Plavix.  He is also on a baby aspirin.  He denies any dysphagia, reflux, vomiting, rectal bleeding or melena.  He was having rectal bleeding but he tells me that has since stopped.  He tells me the rectal bleeding seems to kind of come and go as it pleases.      His last colonoscopy was on 8/3/2020.  He does have a history of colon polyps.  He does have a questionable family history of colon cancer with a maternal grandfather.  Past Medical History:   Diagnosis Date   • AP (angina pectoris) (HCC)     unstable   • CAD (coronary artery disease)     6 stents placed   • DDD (degenerative disc disease), lumbosacral    • Diabetes (HCC)    • Essential hypertension    • Fatty liver    • Memory loss    • Osteoarthritis of knee    • Screening for prostate cancer 2010    normal   • Sleep apnea     started wearing a  CPAP on 10/19/16       Past Surgical History:   Procedure Laterality Date   • COLONOSCOPY  2016    normal per patient   • COLONOSCOPY N/A 8/3/2020    Procedure: COLONOSCOPY to cecum and TI with cold biopsies;  Surgeon: Kelly Ashraf MD;  Location: [facility] ENDOSCOPY;  Service: Gastroenterology;  Laterality: N/A;  pre-change in bowel habits, hx polyps   post-hemorrhoids, lipomas, abnormal cecal tissue    • CORONARY ANGIOPLASTY WITH STENT PLACEMENT  08/28/2015    6 stents   • FOOT SURGERY Right 05/23/2017    Dr. Cervantes   • REPLACEMENT TOTAL KNEE Left 2010   • REPLACEMENT TOTAL KNEE Right 2009   • SKIN CANCER EXCISION Left 03/30/2016    left ear and left eyebrow, graft took from face, placed on ear       Scheduled Meds:    Continuous Infusions:No current facility-administered medications for this visit.      PRN Meds:.    Allergies   Allergen Reactions   • Morphine And Related Itching   • Zolpidem Other (See Comments)     Sleep walking         Social History     Socioeconomic History   • Marital status:    Tobacco Use   • Smoking status: Never Smoker   • Smokeless tobacco: Never Used   • Tobacco comment: Caffeine daily   Vaping Use   • Vaping Use: Never used   Substance and Sexual Activity   • Alcohol use: No   • Drug use: Not Currently     Types: Marijuana   • Sexual activity: Yes     Partners: Female       Family History   Problem Relation Age of Onset   • Hypertension Mother    • Heart disease Father    • Hypertension Father    • Macular degeneration Father    • Arthritis Maternal Grandmother    • Heart disease Maternal Grandmother    • Arthritis Maternal Grandfather    • Heart disease Maternal Grandfather    • Diabetes Maternal Grandfather    • Hypertension Maternal Grandfather    • Cancer Maternal Grandfather         colon?   • Arthritis Paternal Grandmother    • Heart disease Paternal Grandmother    • Diabetes Paternal Grandmother    • Hypertension Paternal Grandmother    • Stroke Paternal  Grandmother    • Arthritis Paternal Grandfather    • Heart disease Paternal Grandfather    • Macular degeneration Sister        Review of Systems   Constitutional: Positive for fatigue. Negative for appetite change, chills, diaphoresis, fever and unexpected weight change.   HENT: Negative for nosebleeds, postnasal drip, sore throat, trouble swallowing and voice change.    Respiratory: Negative for cough, choking, chest tightness, shortness of breath, wheezing and stridor.    Cardiovascular: Negative for chest pain, palpitations and leg swelling.   Gastrointestinal: Positive for abdominal distention, anal bleeding, diarrhea and nausea. Negative for abdominal pain, blood in stool, constipation, rectal pain and vomiting.   Endocrine: Negative for polydipsia, polyphagia and polyuria.   Musculoskeletal: Negative for gait problem.   Skin: Negative for rash and wound.   Allergic/Immunologic: Negative for food allergies.   Neurological: Negative for dizziness, speech difficulty and light-headedness.   Psychiatric/Behavioral: Negative for confusion, self-injury, sleep disturbance and suicidal ideas.       Vitals:    12/06/21 1057   Temp: 96.4 °F (35.8 °C)       Physical Exam  Constitutional:       General: He is not in acute distress.     Appearance: He is well-developed. He is not ill-appearing.   HENT:      Head: Normocephalic.   Eyes:      Pupils: Pupils are equal, round, and reactive to light.   Cardiovascular:      Rate and Rhythm: Normal rate and regular rhythm.      Heart sounds: Normal heart sounds.   Pulmonary:      Effort: Pulmonary effort is normal.      Breath sounds: Normal breath sounds.   Abdominal:      General: Bowel sounds are normal. There is distension.      Palpations: Abdomen is soft. There is no mass.      Tenderness: There is no abdominal tenderness. There is no guarding or rebound.      Hernia: No hernia is present.   Musculoskeletal:         General: Normal range of motion.   Skin:     General: Skin  is warm and dry.   Neurological:      Mental Status: He is alert and oriented to person, place, and time.   Psychiatric:         Speech: Speech normal.         Behavior: Behavior normal.         Judgment: Judgment normal.         No radiology results for the last 7 days     Diagnoses and all orders for this visit:    1. C. difficile diarrhea (Primary)    2. Abnormal CT of the abdomen    3. Rectal bleeding    4. Iron deficiency anemia, unspecified iron deficiency anemia type    Other orders  -     promethazine (PHENERGAN) 12.5 MG tablet; Take 1 tablet by mouth Every 6 (Six) Hours As Needed for Nausea or Vomiting.  Dispense: 30 tablet; Refill: 3    Reviewed most recent imaging results and lab results with him today.  He is supposed to see Dr. Donovan for new patient consult this afternoon.  I did reiterate the importance of restarting the vancomycin for his continued diarrhea.  He can continue the cholestyramine as needed.  I will refill the Phenergan since it does seem to be helping his nausea.  Hemoglobin is stable right now.  Patient to call the office next week with an update.  Patient to follow-up with Dr. Ashraf as planned.  Patient is agreeable to the plan.

## 2021-12-15 DIAGNOSIS — I10 ESSENTIAL (PRIMARY) HYPERTENSION: ICD-10-CM

## 2021-12-15 RX ORDER — AMLODIPINE BESYLATE 5 MG/1
5 TABLET ORAL DAILY
Qty: 90 TABLET | Refills: 1 | Status: SHIPPED | OUTPATIENT
Start: 2021-12-15 | End: 2022-06-15

## 2021-12-23 ENCOUNTER — OFFICE VISIT (OUTPATIENT)
Dept: GASTROENTEROLOGY | Facility: CLINIC | Age: 66
End: 2021-12-23

## 2021-12-23 VITALS
WEIGHT: 268.6 LBS | HEIGHT: 73 IN | DIASTOLIC BLOOD PRESSURE: 80 MMHG | BODY MASS INDEX: 35.6 KG/M2 | SYSTOLIC BLOOD PRESSURE: 132 MMHG

## 2021-12-23 DIAGNOSIS — A04.72 C. DIFFICILE DIARRHEA: ICD-10-CM

## 2021-12-23 DIAGNOSIS — R93.5 ABNORMAL CT OF THE ABDOMEN: Primary | ICD-10-CM

## 2021-12-23 DIAGNOSIS — D50.9 IRON DEFICIENCY ANEMIA, UNSPECIFIED IRON DEFICIENCY ANEMIA TYPE: ICD-10-CM

## 2021-12-23 PROCEDURE — 99213 OFFICE O/P EST LOW 20 MIN: CPT | Performed by: INTERNAL MEDICINE

## 2021-12-23 NOTE — PROGRESS NOTES
Chief Complaint   Patient presents with   • Anemia   • Rectal Bleeding   • Diarrhea       Subjective     HPI    Angel White Jr. is a 66 y.o. male with a past medical history noted below who presents for follow-up of recent C. difficile colitis, terminal ileum mass, anemia.    He was evaluated by Dr. Donovan on 12/6 for this terminal ileum mass.  He is scheduled for 1/11/2022 resection.  He has had no further rectal bleeding.  He completed his second course of vancomycin and he is down to about 2-3 soft bowel movements per day.  Sometimes they are hard to shout.    Does not feel overly anemic.  He is eating and drinking well.  He has not had his hemoglobin checked since the 18th.      Today's visit was in the office.  Both the patient and I were wearing face masks and proper hand hygiene was performed before and after the physical exam.           Current Outpatient Medications:   •  amLODIPine (NORVASC) 5 MG tablet, Take 1 tablet by mouth Daily., Disp: 90 tablet, Rfl: 1  •  aspirin 81 MG EC tablet, Take 81 mg by mouth daily., Disp: , Rfl:   •  atorvastatin (LIPITOR) 40 MG tablet, Take 40 mg by mouth Every Night., Disp: , Rfl:   •  cholestyramine light 4 g powder, Take 1 packet by mouth 2 (Two) Times a Day. mixed with a liquid, Disp: 378 g, Rfl: 0  •  clopidogrel (PLAVIX) 75 MG tablet, Take 75 mg by mouth Daily., Disp: , Rfl:   •  famotidine (PEPCID) 20 MG tablet, Take 20 mg by mouth Every Night., Disp: , Rfl:   •  losartan (COZAAR) 100 MG tablet, Take 100 mg by mouth Every Night., Disp: , Rfl:   •  metFORMIN ER (GLUCOPHAGE-XR) 500 MG 24 hr tablet, Take 500 mg by mouth 2 (Two) Times a Day., Disp: , Rfl:   •  ONETOUCH VERIO test strip, 2 (Two) Times a Day. for testing, Disp: , Rfl: 2  •  promethazine (PHENERGAN) 12.5 MG tablet, Take 1 tablet by mouth Every 6 (Six) Hours As Needed for Nausea or Vomiting., Disp: 30 tablet, Rfl: 3  •  sertraline (ZOLOFT) 100 MG tablet, Take 100 mg by mouth Daily., Disp: , Rfl:   •   Suvorexant (Belsomra) 20 MG tablet, Take 20 mg by mouth At Night As Needed (Sleep)., Disp: , Rfl:       Objective     Vitals:    12/23/21 1308   BP: 132/80         12/23/21  1308   Weight: 122 kg (268 lb 9.6 oz)     Body mass index is 35.44 kg/m².    Physical Exam  Constitutional:       General: He is not in acute distress.  Pulmonary:      Effort: Pulmonary effort is normal.   Abdominal:      Palpations: Abdomen is soft.      Tenderness: There is no abdominal tenderness.   Neurological:      Mental Status: He is alert and oriented to person, place, and time.   Psychiatric:         Mood and Affect: Mood normal.         Behavior: Behavior normal.         Thought Content: Thought content normal.         Judgment: Judgment normal.             WBC   Date Value Ref Range Status   11/18/2021 9.49 3.40 - 10.80 10*3/mm3 Final   10/28/2021 9.6 3.4 - 10.8 x10E3/uL Final     RBC   Date Value Ref Range Status   11/18/2021 4.39 4.14 - 5.80 10*6/mm3 Final   10/28/2021 3.04 (L) 4.14 - 5.80 x10E6/uL Final     Hemoglobin   Date Value Ref Range Status   11/18/2021 10.7 (L) 13.0 - 17.7 g/dL Final     Hematocrit   Date Value Ref Range Status   11/18/2021 33.7 (L) 37.5 - 51.0 % Final     MCV   Date Value Ref Range Status   11/18/2021 76.8 (L) 79.0 - 97.0 fL Final     MCH   Date Value Ref Range Status   11/18/2021 24.4 (L) 26.6 - 33.0 pg Final     MCHC   Date Value Ref Range Status   11/18/2021 31.8 31.5 - 35.7 g/dL Final     RDW   Date Value Ref Range Status   11/18/2021 16.0 (H) 12.3 - 15.4 % Final     RDW-SD   Date Value Ref Range Status   11/18/2021 43.8 37.0 - 54.0 fl Final     MPV   Date Value Ref Range Status   11/18/2021 9.6 6.0 - 12.0 fL Final     Platelets   Date Value Ref Range Status   11/18/2021 398 140 - 450 10*3/mm3 Final     Neutrophil %   Date Value Ref Range Status   11/18/2021 74.6 42.7 - 76.0 % Final     Lymphocyte %   Date Value Ref Range Status   11/18/2021 15.7 (L) 19.6 - 45.3 % Final     Monocyte %   Date Value  Ref Range Status   11/18/2021 7.3 5.0 - 12.0 % Final     Eosinophil %   Date Value Ref Range Status   11/18/2021 0.9 0.3 - 6.2 % Final     Basophil %   Date Value Ref Range Status   11/18/2021 0.8 0.0 - 1.5 % Final     Immature Grans %   Date Value Ref Range Status   11/18/2021 0.7 (H) 0.0 - 0.5 % Final     Neutrophils, Absolute   Date Value Ref Range Status   11/18/2021 7.07 (H) 1.70 - 7.00 10*3/mm3 Final     Lymphocytes, Absolute   Date Value Ref Range Status   11/18/2021 1.49 0.70 - 3.10 10*3/mm3 Final     Monocytes, Absolute   Date Value Ref Range Status   11/18/2021 0.69 0.10 - 0.90 10*3/mm3 Final     Eosinophils, Absolute   Date Value Ref Range Status   11/18/2021 0.09 0.00 - 0.40 10*3/mm3 Final     Basophils, Absolute   Date Value Ref Range Status   11/18/2021 0.08 0.00 - 0.20 10*3/mm3 Final     Immature Grans, Absolute   Date Value Ref Range Status   11/18/2021 0.07 (H) 0.00 - 0.05 10*3/mm3 Final     nRBC   Date Value Ref Range Status   11/18/2021 0.0 0.0 - 0.2 /100 WBC Final       Lab Results   Component Value Date    GLUCOSE 137 (H) 11/18/2021    BUN 13 11/18/2021    CREATININE 1.00 11/19/2021    EGFRIFNONA 72 11/18/2021    EGFRIFAFRI 103 10/28/2021    BCR 12.5 11/18/2021    CO2 19.1 (L) 11/18/2021    CALCIUM 9.5 11/18/2021    PROTENTOTREF 6.1 10/28/2021    ALBUMIN 4.50 11/18/2021    LABIL2 1.7 10/28/2021    AST 20 11/18/2021    ALT 9 11/18/2021         CT ABDOMEN AND PELVIS WITH IV CONTRAST     HISTORY: 66-year-old male with abdominal pain. Rectal bleeding. Nausea.  Anemia.     TECHNIQUE: Radiation dose reduction techniques were utilized, including  automated exposure control and exposure modulation based on body size.   3 mm images were obtained through the abdomen and pelvis after the  administration of IV contrast. Compared with previous CT 10/30/2021.     FINDINGS: There is an approximately 3 cm mass at the terminal ileum,  image 101. There are enlarged nodes adjacently with 1 of the nodes  measuring  1.4 x 1.1 cm, image 93. There is no small bowel obstruction.  There is a 2 cm lipoma at the cecum and a 2.4 cm lipoma at the ascending  colon, images 110 and 66 respectively. There is no significant colonic  diverticulosis. The appendix appears normal. The liver appears  unremarkable. There are a few tiny gallstones within the gallbladder  without cholecystitis and there is no biliary dilatation. The pancreas,  and left adrenal, and kidneys appear unremarkable. There is slight  nodular enlargement of the right adrenal gland. Splenic size is normal.  There are no airspace opacities or effusions at the visualized lower  lung fields.     IMPRESSION:  1. 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 The appearance  can be seen with lymphoma, carcinoid tumor, and metastatic carcinoma.  Surgical consultation is recommended.  2. There is a 2 cm lipoma at the cecum and a 2.5 cm lipoma at the  ascending colon.  3. There is cholelithiasis without cholecystitis.     The radiology support staff will call the referring physician's office  to confirm that this report was received today.     This report was finalized on 11/19/2021 2:43 PM by Dr. Marycruz Li M.D.    I personally reviewed data as detailed below:     The labs listed above.    The radiology studies listed above.    Office notes from: 12/6/21 Dr Donovan    Endoscopy procedures and pathology from: 8/2020 colonoscopy      No notes on file    Assessment/Plan    1.  Abnormal CT of the abdomen: Mass in the terminal ileum.  He is scheduled for surgery in January 2.  C. difficile diarrhea: He has been off vancomycin for a couple of weeks and improved    3.  Iron deficiency anemia    Continue to monitor stools  Encouraged daily fiber use to bulk up the stools  Update CBC today; it would be good to go if he is having further issues with anemia prior to his upcoming surgery    Diagnoses and all  orders for this visit:    1. Abnormal CT of the abdomen (Primary)    2. C. difficile diarrhea    3. Iron deficiency anemia, unspecified iron deficiency anemia type  -     CBC & Differential        I have discussed the above plan with the patient.  They verbalize understanding and are in agreement with the plan.  They have been advised to contact the office for any questions, concerns, or changes related to their health.    Dictated utilizing Dragon dictation

## 2021-12-24 LAB
BASOPHILS # BLD AUTO: 0.1 X10E3/UL (ref 0–0.2)
BASOPHILS NFR BLD AUTO: 1 %
EOSINOPHIL # BLD AUTO: 0.4 X10E3/UL (ref 0–0.4)
EOSINOPHIL NFR BLD AUTO: 5 %
ERYTHROCYTE [DISTWIDTH] IN BLOOD BY AUTOMATED COUNT: 16.1 % (ref 11.6–15.4)
HCT VFR BLD AUTO: 30 % (ref 37.5–51)
HGB BLD-MCNC: 9 G/DL (ref 13–17.7)
IMM GRANULOCYTES # BLD AUTO: 0 X10E3/UL (ref 0–0.1)
IMM GRANULOCYTES NFR BLD AUTO: 0 %
LYMPHOCYTES # BLD AUTO: 1.8 X10E3/UL (ref 0.7–3.1)
LYMPHOCYTES NFR BLD AUTO: 22 %
MCH RBC QN AUTO: 22.7 PG (ref 26.6–33)
MCHC RBC AUTO-ENTMCNC: 30 G/DL (ref 31.5–35.7)
MCV RBC AUTO: 76 FL (ref 79–97)
MONOCYTES # BLD AUTO: 0.7 X10E3/UL (ref 0.1–0.9)
MONOCYTES NFR BLD AUTO: 8 %
NEUTROPHILS # BLD AUTO: 5 X10E3/UL (ref 1.4–7)
NEUTROPHILS NFR BLD AUTO: 64 %
PLATELET # BLD AUTO: 288 X10E3/UL (ref 150–450)
RBC # BLD AUTO: 3.97 X10E6/UL (ref 4.14–5.8)
WBC # BLD AUTO: 7.9 X10E3/UL (ref 3.4–10.8)

## 2021-12-27 NOTE — PROGRESS NOTES
His hemoglobin is 9.  It is better than it was in October but still likely benign.  I am hopeful this improves following his surgeryI am going to send him iron supplementation to take in the meantime.

## 2021-12-28 ENCOUNTER — TELEPHONE (OUTPATIENT)
Dept: GASTROENTEROLOGY | Facility: CLINIC | Age: 66
End: 2021-12-28

## 2021-12-28 NOTE — TELEPHONE ENCOUNTER
----- Message from Kelly Ashraf MD sent at 12/27/2021 12:41 PM EST -----  His hemoglobin is 9.  It is better than it was in October but still likely benign.  I am hopeful this improves following his surgeryI am going to send him iron supplementation to take in the meantime.

## 2022-01-03 ENCOUNTER — TELEPHONE (OUTPATIENT)
Dept: GASTROENTEROLOGY | Facility: CLINIC | Age: 67
End: 2022-01-03

## 2022-01-03 NOTE — TELEPHONE ENCOUNTER
Pt called and advised of note from [facility] pharmacist and advised of note. Pt verb understanding and states she will get this otc.   Update sent to Dr Ashraf.

## 2022-01-03 NOTE — TELEPHONE ENCOUNTER
Called pt and pt reports that Dr Ashraf had sent in an [facility] supp [facility] but they are reporting that they do not have this.  Advised we will call them and see what is going on     Called [facility] and spoke with Reynaldo. He advised that they did not receive the script and it will not be covered by insurance. He did advised that he can get this otc     Called pt and call continues to ring and then states call can not be completed at this time.

## 2022-01-03 NOTE — TELEPHONE ENCOUNTER
----- Message from Isela Nichols sent at 1/3/2022  3:14 PM EST -----  Regarding: Salvatore  Contact: [phone]  Please call   getting ready for surgery.

## 2022-01-07 ENCOUNTER — APPOINTMENT (OUTPATIENT)
Dept: PREADMISSION TESTING | Facility: HOSPITAL | Age: 67
End: 2022-01-07

## 2022-01-19 ENCOUNTER — TELEPHONE (OUTPATIENT)
Dept: INTERNAL MEDICINE | Facility: CLINIC | Age: 67
End: 2022-01-19

## 2022-01-19 RX ORDER — FLURBIPROFEN SODIUM 0.3 MG/ML
SOLUTION/ DROPS OPHTHALMIC
Qty: 200 EACH | Refills: 1 | Status: SHIPPED | OUTPATIENT
Start: 2022-01-19 | End: 2022-02-02

## 2022-01-19 NOTE — TELEPHONE ENCOUNTER
PATIENT CALLED STATING HE IS NEEDING A PRESCRIPTION FOR PEN NEEDLES TO BE CALLED IN TO     [id number] [place] [facility] [address] AT [facility] [phone] [facility] [phone]   [phone]    DIDN'T SEE ANY LISTED ON HIS PROFILE     PLEASE ADVISE  Angel White Jr. (Self) [phone] (H)

## 2022-02-01 ENCOUNTER — TELEPHONE (OUTPATIENT)
Dept: SURGERY | Facility: CLINIC | Age: 67
End: 2022-02-01

## 2022-02-01 NOTE — TELEPHONE ENCOUNTER
Pt was sched for 1/11 for colon resection & got cancelled because of over night stays & covid.  Wife called in a lot of pain & losing weight what to do?

## 2022-02-02 ENCOUNTER — TELEPHONE (OUTPATIENT)
Dept: SURGERY | Facility: CLINIC | Age: 67
End: 2022-02-02

## 2022-02-02 ENCOUNTER — PRE-ADMISSION TESTING (OUTPATIENT)
Dept: PREADMISSION TESTING | Facility: HOSPITAL | Age: 67
End: 2022-02-02

## 2022-02-02 VITALS
SYSTOLIC BLOOD PRESSURE: 136 MMHG | BODY MASS INDEX: 33.74 KG/M2 | WEIGHT: 262.9 LBS | RESPIRATION RATE: 18 BRPM | TEMPERATURE: 99.5 F | HEART RATE: 66 BPM | OXYGEN SATURATION: 97 % | HEIGHT: 74 IN | DIASTOLIC BLOOD PRESSURE: 83 MMHG

## 2022-02-02 DIAGNOSIS — C7A.012 MALIGNANT CARCINOID TUMOR OF ILEUM: ICD-10-CM

## 2022-02-02 LAB
ANION GAP SERPL CALCULATED.3IONS-SCNC: 7 MMOL/L (ref 5–15)
BUN SERPL-MCNC: 10 MG/DL (ref 8–23)
BUN/CREAT SERPL: 12.7 (ref 7–25)
CALCIUM SPEC-SCNC: 8.9 MG/DL (ref 8.6–10.5)
CHLORIDE SERPL-SCNC: 107 MMOL/L (ref 98–107)
CO2 SERPL-SCNC: 24 MMOL/L (ref 22–29)
CREAT SERPL-MCNC: 0.79 MG/DL (ref 0.76–1.27)
DEPRECATED RDW RBC AUTO: 53.5 FL (ref 37–54)
ERYTHROCYTE [DISTWIDTH] IN BLOOD BY AUTOMATED COUNT: 19.8 % (ref 12.3–15.4)
GFR SERPL CREATININE-BSD FRML MDRD: 98 ML/MIN/1.73
GLUCOSE SERPL-MCNC: 121 MG/DL (ref 65–99)
HCT VFR BLD AUTO: 35.1 % (ref 37.5–51)
HGB BLD-MCNC: 10.1 G/DL (ref 13–17.7)
MCH RBC QN AUTO: 21.5 PG (ref 26.6–33)
MCHC RBC AUTO-ENTMCNC: 28.8 G/DL (ref 31.5–35.7)
MCV RBC AUTO: 74.8 FL (ref 79–97)
PLATELET # BLD AUTO: 361 10*3/MM3 (ref 140–450)
PMV BLD AUTO: 9.7 FL (ref 6–12)
POTASSIUM SERPL-SCNC: 4.5 MMOL/L (ref 3.5–5.2)
QT INTERVAL: 437 MS
RBC # BLD AUTO: 4.69 10*6/MM3 (ref 4.14–5.8)
SARS-COV-2 RNA PNL SPEC NAA+PROBE: NOT DETECTED
SODIUM SERPL-SCNC: 138 MMOL/L (ref 136–145)
WBC NRBC COR # BLD: 8.53 10*3/MM3 (ref 3.4–10.8)

## 2022-02-02 PROCEDURE — 87635 SARS-COV-2 COVID-19 AMP PRB: CPT

## 2022-02-02 PROCEDURE — C9803 HOPD COVID-19 SPEC COLLECT: HCPCS

## 2022-02-02 PROCEDURE — 80048 BASIC METABOLIC PNL TOTAL CA: CPT

## 2022-02-02 PROCEDURE — 85027 COMPLETE CBC AUTOMATED: CPT

## 2022-02-02 PROCEDURE — 93010 ELECTROCARDIOGRAM REPORT: CPT | Performed by: INTERNAL MEDICINE

## 2022-02-02 PROCEDURE — 93005 ELECTROCARDIOGRAM TRACING: CPT

## 2022-02-02 PROCEDURE — 36415 COLL VENOUS BLD VENIPUNCTURE: CPT

## 2022-02-02 NOTE — DISCHARGE INSTRUCTIONS
Take the following medications the morning of surgery:  NONE    Arrive to hospital on your day of surgery at 11:00 AM.    FOLLOW BOWEL PREP INSTRUCTIONS      If you are on prescription narcotic pain medication to control your pain you may also take that medication the morning of surgery.    General Instructions:  • Do not eat solid food after midnight the night before surgery.  • You may drink clear liquids day of surgery but must stop at least one hour before your hospital arrival time.  • It is beneficial for you to have a clear drink that contains carbohydrates the day of surgery.  We suggest a 12 to 20 ounce bottle of Gatorade or Powerade for non-diabetic patients or a 12 to 20 ounce bottle of G2 or Powerade Zero for diabetic patients. (Pediatric patients, are not advised to drink a 12 to 20 ounce carbohydrate drink)    Clear liquids are liquids you can see through.  Nothing red in color.     Plain water                               Sports drinks  Sodas                                   Gelatin (Jell-O)  Fruit juices without pulp such as white grape juice and apple juice  Popsicles that contain no fruit or yogurt  Tea or coffee (no cream or milk added)  Gatorade / Powerade  G2 / Powerade Zero    • Infants may have breast milk up to four hours before surgery.  • Infants drinking formula may drink formula up to six hours before surgery.   • Patients who avoid smoking, chewing tobacco and alcohol for 4 weeks prior to surgery have a reduced risk of post-operative complications.  Quit smoking as many days before surgery as you can.  • Do not smoke, use chewing tobacco or drink alcohol the day of surgery.   • If applicable bring your C-PAP/ BI-PAP machine.  • Bring any papers given to you in the doctor’s office.  • Wear clean comfortable clothes.  • Do not wear contact lenses, false eyelashes or make-up.  Bring a case for your glasses.   • Bring crutches or walker if applicable.  • Remove all piercings.  Leave  jewelry and any other valuables at home.  • Hair extensions with metal clips must be removed prior to surgery.  • The Pre-Admission Testing nurse will instruct you to bring medications if unable to obtain an accurate list in Pre-Admission Testing.        If you were given a blood bank ID arm band remember to bring it with you the day of surgery.    Preventing a Surgical Site Infection:  • For 2 to 3 days before surgery, avoid shaving with a razor because the razor can irritate skin and make it easier to develop an infection.    • Any areas of open skin can increase the risk of a post-operative wound infection by allowing bacteria to enter and travel throughout the body.  Notify your surgeon if you have any skin wounds / rashes even if it is not near the expected surgical site.  The area will need assessed to determine if surgery should be delayed until it is healed.  • The night prior to surgery shower using a fresh bar of anti-bacterial soap (such as Dial) and clean washcloth.  Sleep in a clean bed with clean clothing.  Do not allow pets to sleep with you.  • Shower on the morning of surgery using a fresh bar of anti-bacterial soap (such as Dial) and clean washcloth.  Dry with a clean towel and dress in clean clothing.  • Ask your surgeon if you will be receiving antibiotics prior to surgery.  • Make sure you, your family, and all healthcare providers clean their hands with soap and water or an alcohol based hand  before caring for you or your wound.    Day of surgery:  Your arrival time is approximately two hours before your scheduled surgery time.  Upon arrival, a Pre-op nurse and Anesthesiologist will review your health history, obtain vital signs, and answer questions you may have.  The only belongings needed at this time will be a list of your home medications and if applicable your C-PAP/BI-PAP machine.  A Pre-op nurse will start an IV and you may receive medication in preparation for surgery,  including something to help you relax.     Please be aware that surgery does come with discomfort.  We want to make every effort to control your discomfort so please discuss any uncontrolled symptoms with your nurse.   Your doctor will most likely have prescribed pain medications.      If you are going home after surgery you will receive individualized written care instructions before being discharged.  A responsible adult must drive you to and from the hospital on the day of your surgery and stay with you for 24 hours.  Discharge prescriptions can be filled by the hospital pharmacy during regular pharmacy hours.  If you are having surgery late in the day/evening your prescription may be e-prescribed to your pharmacy.  Please verify your pharmacy hours or chose a 24 hour pharmacy to avoid not having access to your prescription because your pharmacy has closed for the day.    If you are staying overnight following surgery, you will be transported to your hospital room following the recovery period.  [facility] has all private rooms.    If you have any questions please call Pre-Admission Testing at [phone].  Deductibles and co-payments are collected on the day of service. Please be prepared to pay the required co-pay, deductible or deposit on the day of service as defined by your plan.    Patient Education for Self-Quarantine Process    • Following your COVID testing, we strongly recommend that you wear a mask when you are with other people and practice social distancing.   • Limit your activities to only required outings.  • Wash your hands with soap and water frequently for at least 20 seconds.   • Avoid touching your eyes, nose and mouth with unwashed hands.  • Do not share anything - utensils, drinking glasses, food from the same bowl.   • Sanitize household surfaces daily. Include all high touch areas (door handles, light switches, phones, countertops, etc.)    Call your surgeon immediately  if you experience any of the following symptoms:  • Sore Throat  • Shortness of Breath or difficulty breathing  • Cough  • Chills  • Body soreness or muscle pain  • Headache  • Fever  • New loss of taste or smell  • Do not arrive for your surgery ill.  Your procedure will need to be rescheduled to another time.  You will need to call your physician before the day of surgery to avoid any unnecessary exposure to hospital staff as well as other patients.    CHLORHEXIDINE CLOTH INSTRUCTIONS  The morning of surgery follow these instructions using the Chlorhexidine cloths you've been given.  These steps reduce bacteria on the body.  Do not use the cloths near your eyes, ears mouth, genitalia or on open wounds.  Throw the cloths away after use but do not try to flush them down a toilet.      • Open and remove one cloth at a time from the package.    • Leave the cloth unfolded and begin the bathing.  • Massage the skin with the cloths using gentle pressure to remove bacteria.  Do not scrub harshly.   • Follow the steps below with one 2% CHG cloth per area (6 total cloths).  • One cloth for neck, shoulders and chest.  • One cloth for both arms, hands, fingers and underarms (do underarms last).  • One cloth for the abdomen followed by groin.  • One cloth for right leg and foot including between the toes.  • One cloth for left leg and foot including between the toes.  • The last cloth is to be used for the back of the neck, back and buttocks.    Allow the CHG to air dry 3 minutes on the skin which will give it time to work and decrease the chance of irritation.  The skin may feel sticky until it is dry.  Do not rinse with water or any other liquid or you will lose the beneficial effects of the CHG.  If mild skin irritation occurs, do rinse the skin to remove the CHG.  Report this to the nurse at time of admission.  Do not apply lotions, creams, ointments, deodorants or perfumes after using the clothes. Dress in clean clothes  before coming to the hospital.

## 2022-02-03 ENCOUNTER — ANESTHESIA (OUTPATIENT)
Dept: PERIOP | Facility: HOSPITAL | Age: 67
End: 2022-02-03

## 2022-02-03 ENCOUNTER — ANESTHESIA EVENT (OUTPATIENT)
Dept: PERIOP | Facility: HOSPITAL | Age: 67
End: 2022-02-03

## 2022-02-03 ENCOUNTER — HOSPITAL ENCOUNTER (INPATIENT)
Facility: HOSPITAL | Age: 67
LOS: 1 days | Discharge: HOME | End: 2022-02-04
Attending: SURGERY | Admitting: SURGERY

## 2022-02-03 DIAGNOSIS — C7A.012 MALIGNANT CARCINOID TUMOR OF ILEUM: ICD-10-CM

## 2022-02-03 LAB
GLUCOSE BLDC GLUCOMTR-MCNC: 153 MG/DL (ref 70–130)
GLUCOSE BLDC GLUCOMTR-MCNC: 198 MG/DL (ref 70–130)
GLUCOSE BLDC GLUCOMTR-MCNC: 205 MG/DL (ref 70–130)

## 2022-02-03 PROCEDURE — C1889 IMPLANT/INSERT DEVICE, NOC: HCPCS | Performed by: SURGERY

## 2022-02-03 PROCEDURE — 25010000002 MAGNESIUM SULFATE PER 500 MG OF MAGNESIUM: Performed by: NURSE ANESTHETIST, CERTIFIED REGISTERED

## 2022-02-03 PROCEDURE — 25010000002 HYDROMORPHONE PER 4 MG: Performed by: NURSE ANESTHETIST, CERTIFIED REGISTERED

## 2022-02-03 PROCEDURE — C9290 INJ, BUPIVACAINE LIPOSOME: HCPCS | Performed by: SURGERY

## 2022-02-03 PROCEDURE — 88342 IMHCHEM/IMCYTCHM 1ST ANTB: CPT | Performed by: SURGERY

## 2022-02-03 PROCEDURE — 25010000002 ONDANSETRON PER 1 MG: Performed by: ANESTHESIOLOGY

## 2022-02-03 PROCEDURE — 88360 TUMOR IMMUNOHISTOCHEM/MANUAL: CPT | Performed by: SURGERY

## 2022-02-03 PROCEDURE — 25010000002 FENTANYL CITRATE (PF) 50 MCG/ML SOLUTION: Performed by: NURSE ANESTHETIST, CERTIFIED REGISTERED

## 2022-02-03 PROCEDURE — 0DBB4ZZ EXCISION OF ILEUM, PERCUTANEOUS ENDOSCOPIC APPROACH: ICD-10-PCS | Performed by: SURGERY

## 2022-02-03 PROCEDURE — 0 BUPIVACAINE LIPOSOME 1.3 % SUSPENSION 20 ML VIAL: Performed by: SURGERY

## 2022-02-03 PROCEDURE — 07BB4ZX EXCISION OF MESENTERIC LYMPHATIC, PERCUTANEOUS ENDOSCOPIC APPROACH, DIAGNOSTIC: ICD-10-PCS | Performed by: SURGERY

## 2022-02-03 PROCEDURE — 25010000002 MIDAZOLAM PER 1 MG: Performed by: NURSE ANESTHETIST, CERTIFIED REGISTERED

## 2022-02-03 PROCEDURE — S0260 H&P FOR SURGERY: HCPCS | Performed by: SURGERY

## 2022-02-03 PROCEDURE — 88341 IMHCHEM/IMCYTCHM EA ADD ANTB: CPT | Performed by: SURGERY

## 2022-02-03 PROCEDURE — 82962 GLUCOSE BLOOD TEST: CPT

## 2022-02-03 PROCEDURE — 25010000002 KETOROLAC TROMETHAMINE PER 15 MG: Performed by: SURGERY

## 2022-02-03 PROCEDURE — 44205 LAP COLECTOMY PART W/ILEUM: CPT | Performed by: STUDENT IN AN ORGANIZED HEALTH CARE EDUCATION/TRAINING PROGRAM

## 2022-02-03 PROCEDURE — 25010000002 CEFOXITIN PER 1 G: Performed by: SURGERY

## 2022-02-03 PROCEDURE — 25010000002 MIDAZOLAM PER 1 MG: Performed by: ANESTHESIOLOGY

## 2022-02-03 PROCEDURE — 25010000002 DEXAMETHASONE PER 1 MG: Performed by: NURSE ANESTHETIST, CERTIFIED REGISTERED

## 2022-02-03 PROCEDURE — 25010000002 PROPOFOL 10 MG/ML EMULSION: Performed by: NURSE ANESTHETIST, CERTIFIED REGISTERED

## 2022-02-03 PROCEDURE — 25010000002 HYDROMORPHONE 1 MG/ML SOLUTION: Performed by: SURGERY

## 2022-02-03 PROCEDURE — 88309 TISSUE EXAM BY PATHOLOGIST: CPT | Performed by: SURGERY

## 2022-02-03 PROCEDURE — 44205 LAP COLECTOMY PART W/ILEUM: CPT | Performed by: SURGERY

## 2022-02-03 DEVICE — CLIP LIGAT VASC HORIZON TI MD/LG GRN 6CT: Type: IMPLANTABLE DEVICE | Site: ABDOMEN | Status: FUNCTIONAL

## 2022-02-03 DEVICE — ENDOPATH ECHELON ENDOSCOPIC LINEAR CUTTER RELOADS, BLUE, 60MM
Type: IMPLANTABLE DEVICE | Site: ABDOMEN | Status: FUNCTIONAL
Brand: ECHELON ENDOPATH

## 2022-02-03 DEVICE — CLIP LIGAT VASC HORIZON TI LG ORNG 6CT: Type: IMPLANTABLE DEVICE | Site: ABDOMEN | Status: FUNCTIONAL

## 2022-02-03 DEVICE — ENDOSCOPIC LINEAR CUTTER RELOADS GRAY 2.0MM, 6 ROWS
Type: IMPLANTABLE DEVICE | Site: ABDOMEN | Status: FUNCTIONAL
Brand: ECHELON ENDOPATH

## 2022-02-03 DEVICE — ENDOPATH ECHELON ENDOSCOPIC LINEAR CUTTER RELOADS, WHITE, 60MM
Type: IMPLANTABLE DEVICE | Site: ABDOMEN | Status: FUNCTIONAL
Brand: ECHELON ENDOPATH

## 2022-02-03 DEVICE — ARISTA AH ABSORBABLE HEMOSTATIC PARTICLES
Type: IMPLANTABLE DEVICE | Site: ABDOMEN | Status: FUNCTIONAL
Brand: ARISTA™ AH

## 2022-02-03 RX ORDER — PROMETHAZINE HYDROCHLORIDE 25 MG/1
25 SUPPOSITORY RECTAL ONCE AS NEEDED
Status: DISCONTINUED | OUTPATIENT
Start: 2022-02-03 | End: 2022-02-03 | Stop reason: HOSPADM

## 2022-02-03 RX ORDER — DEXTROSE MONOHYDRATE 25 G/50ML
25 INJECTION, SOLUTION INTRAVENOUS
Status: DISCONTINUED | OUTPATIENT
Start: 2022-02-03 | End: 2022-02-04 | Stop reason: HOSPADM

## 2022-02-03 RX ORDER — DIPHENHYDRAMINE HCL 25 MG
25 CAPSULE ORAL
Status: DISCONTINUED | OUTPATIENT
Start: 2022-02-03 | End: 2022-02-03 | Stop reason: HOSPADM

## 2022-02-03 RX ORDER — LIDOCAINE HYDROCHLORIDE 10 MG/ML
0.5 INJECTION, SOLUTION EPIDURAL; INFILTRATION; INTRACAUDAL; PERINEURAL ONCE AS NEEDED
Status: DISCONTINUED | OUTPATIENT
Start: 2022-02-03 | End: 2022-02-03 | Stop reason: HOSPADM

## 2022-02-03 RX ORDER — HYDROCODONE BITARTRATE AND ACETAMINOPHEN 5; 325 MG/1; MG/1
2 TABLET ORAL EVERY 4 HOURS PRN
Status: DISCONTINUED | OUTPATIENT
Start: 2022-02-03 | End: 2022-02-04 | Stop reason: HOSPADM

## 2022-02-03 RX ORDER — FENTANYL CITRATE 50 UG/ML
50 INJECTION, SOLUTION INTRAMUSCULAR; INTRAVENOUS
Status: DISCONTINUED | OUTPATIENT
Start: 2022-02-03 | End: 2022-02-03 | Stop reason: HOSPADM

## 2022-02-03 RX ORDER — OXYCODONE AND ACETAMINOPHEN 7.5; 325 MG/1; MG/1
1 TABLET ORAL EVERY 4 HOURS PRN
Status: DISCONTINUED | OUTPATIENT
Start: 2022-02-03 | End: 2022-02-03 | Stop reason: HOSPADM

## 2022-02-03 RX ORDER — FENTANYL CITRATE 50 UG/ML
INJECTION, SOLUTION INTRAMUSCULAR; INTRAVENOUS AS NEEDED
Status: DISCONTINUED | OUTPATIENT
Start: 2022-02-03 | End: 2022-02-03 | Stop reason: SURG

## 2022-02-03 RX ORDER — ROCURONIUM BROMIDE 10 MG/ML
INJECTION, SOLUTION INTRAVENOUS AS NEEDED
Status: DISCONTINUED | OUTPATIENT
Start: 2022-02-03 | End: 2022-02-03 | Stop reason: SURG

## 2022-02-03 RX ORDER — HYDROCODONE BITARTRATE AND ACETAMINOPHEN 5; 325 MG/1; MG/1
1 TABLET ORAL EVERY 4 HOURS PRN
Status: DISCONTINUED | OUTPATIENT
Start: 2022-02-03 | End: 2022-02-04 | Stop reason: HOSPADM

## 2022-02-03 RX ORDER — HYDROCODONE BITARTRATE AND ACETAMINOPHEN 7.5; 325 MG/1; MG/1
1 TABLET ORAL ONCE AS NEEDED
Status: DISCONTINUED | OUTPATIENT
Start: 2022-02-03 | End: 2022-02-03 | Stop reason: HOSPADM

## 2022-02-03 RX ORDER — LIDOCAINE HYDROCHLORIDE 20 MG/ML
INJECTION, SOLUTION INFILTRATION; PERINEURAL AS NEEDED
Status: DISCONTINUED | OUTPATIENT
Start: 2022-02-03 | End: 2022-02-03 | Stop reason: SURG

## 2022-02-03 RX ORDER — SODIUM CHLORIDE, SODIUM LACTATE, POTASSIUM CHLORIDE, CALCIUM CHLORIDE 600; 310; 30; 20 MG/100ML; MG/100ML; MG/100ML; MG/100ML
9 INJECTION, SOLUTION INTRAVENOUS CONTINUOUS
Status: DISCONTINUED | OUTPATIENT
Start: 2022-02-03 | End: 2022-02-03

## 2022-02-03 RX ORDER — METFORMIN HYDROCHLORIDE 500 MG/1
500 TABLET, EXTENDED RELEASE ORAL 2 TIMES DAILY
Status: DISCONTINUED | OUTPATIENT
Start: 2022-02-03 | End: 2022-02-04 | Stop reason: HOSPADM

## 2022-02-03 RX ORDER — FLUMAZENIL 0.1 MG/ML
0.2 INJECTION INTRAVENOUS AS NEEDED
Status: DISCONTINUED | OUTPATIENT
Start: 2022-02-03 | End: 2022-02-03 | Stop reason: HOSPADM

## 2022-02-03 RX ORDER — ONDANSETRON 2 MG/ML
4 INJECTION INTRAMUSCULAR; INTRAVENOUS EVERY 6 HOURS PRN
Status: DISCONTINUED | OUTPATIENT
Start: 2022-02-03 | End: 2022-02-04 | Stop reason: HOSPADM

## 2022-02-03 RX ORDER — FAMOTIDINE 10 MG/ML
20 INJECTION, SOLUTION INTRAVENOUS ONCE
Status: COMPLETED | OUTPATIENT
Start: 2022-02-03 | End: 2022-02-03

## 2022-02-03 RX ORDER — NICOTINE POLACRILEX 4 MG
15 LOZENGE BUCCAL
Status: DISCONTINUED | OUTPATIENT
Start: 2022-02-03 | End: 2022-02-04 | Stop reason: HOSPADM

## 2022-02-03 RX ORDER — HYDROMORPHONE HCL 110MG/55ML
PATIENT CONTROLLED ANALGESIA SYRINGE INTRAVENOUS AS NEEDED
Status: DISCONTINUED | OUTPATIENT
Start: 2022-02-03 | End: 2022-02-03 | Stop reason: SURG

## 2022-02-03 RX ORDER — LOSARTAN POTASSIUM 100 MG/1
100 TABLET ORAL NIGHTLY
Status: DISCONTINUED | OUTPATIENT
Start: 2022-02-03 | End: 2022-02-04 | Stop reason: HOSPADM

## 2022-02-03 RX ORDER — KETOROLAC TROMETHAMINE 30 MG/ML
15 INJECTION, SOLUTION INTRAMUSCULAR; INTRAVENOUS EVERY 6 HOURS
Status: DISCONTINUED | OUTPATIENT
Start: 2022-02-03 | End: 2022-02-04 | Stop reason: HOSPADM

## 2022-02-03 RX ORDER — SODIUM CHLORIDE 0.9 % (FLUSH) 0.9 %
3 SYRINGE (ML) INJECTION EVERY 12 HOURS SCHEDULED
Status: DISCONTINUED | OUTPATIENT
Start: 2022-02-03 | End: 2022-02-03 | Stop reason: HOSPADM

## 2022-02-03 RX ORDER — DEXAMETHASONE SODIUM PHOSPHATE 10 MG/ML
INJECTION INTRAMUSCULAR; INTRAVENOUS AS NEEDED
Status: DISCONTINUED | OUTPATIENT
Start: 2022-02-03 | End: 2022-02-03 | Stop reason: SURG

## 2022-02-03 RX ORDER — ATORVASTATIN CALCIUM 20 MG/1
40 TABLET, FILM COATED ORAL NIGHTLY
Status: DISCONTINUED | OUTPATIENT
Start: 2022-02-03 | End: 2022-02-04 | Stop reason: HOSPADM

## 2022-02-03 RX ORDER — NALOXONE HCL 0.4 MG/ML
0.1 VIAL (ML) INJECTION
Status: DISCONTINUED | OUTPATIENT
Start: 2022-02-03 | End: 2022-02-04 | Stop reason: HOSPADM

## 2022-02-03 RX ORDER — PROMETHAZINE HYDROCHLORIDE 25 MG/1
25 TABLET ORAL ONCE AS NEEDED
Status: DISCONTINUED | OUTPATIENT
Start: 2022-02-03 | End: 2022-02-03 | Stop reason: HOSPADM

## 2022-02-03 RX ORDER — SODIUM CHLORIDE 0.9 % (FLUSH) 0.9 %
3-10 SYRINGE (ML) INJECTION AS NEEDED
Status: DISCONTINUED | OUTPATIENT
Start: 2022-02-03 | End: 2022-02-03 | Stop reason: HOSPADM

## 2022-02-03 RX ORDER — IBUPROFEN 600 MG/1
600 TABLET ORAL ONCE AS NEEDED
Status: DISCONTINUED | OUTPATIENT
Start: 2022-02-03 | End: 2022-02-03 | Stop reason: HOSPADM

## 2022-02-03 RX ORDER — NALOXONE HCL 0.4 MG/ML
0.2 VIAL (ML) INJECTION AS NEEDED
Status: DISCONTINUED | OUTPATIENT
Start: 2022-02-03 | End: 2022-02-03 | Stop reason: HOSPADM

## 2022-02-03 RX ORDER — MIDAZOLAM HYDROCHLORIDE 1 MG/ML
INJECTION INTRAMUSCULAR; INTRAVENOUS AS NEEDED
Status: DISCONTINUED | OUTPATIENT
Start: 2022-02-03 | End: 2022-02-03 | Stop reason: SURG

## 2022-02-03 RX ORDER — DIPHENHYDRAMINE HYDROCHLORIDE 50 MG/ML
12.5 INJECTION INTRAMUSCULAR; INTRAVENOUS
Status: DISCONTINUED | OUTPATIENT
Start: 2022-02-03 | End: 2022-02-03 | Stop reason: HOSPADM

## 2022-02-03 RX ORDER — HYDROMORPHONE HYDROCHLORIDE 1 MG/ML
0.5 INJECTION, SOLUTION INTRAMUSCULAR; INTRAVENOUS; SUBCUTANEOUS
Status: DISCONTINUED | OUTPATIENT
Start: 2022-02-03 | End: 2022-02-03 | Stop reason: CLARIF

## 2022-02-03 RX ORDER — BUPIVACAINE HYDROCHLORIDE AND EPINEPHRINE 5; 5 MG/ML; UG/ML
INJECTION, SOLUTION EPIDURAL; INTRACAUDAL; PERINEURAL AS NEEDED
Status: DISCONTINUED | OUTPATIENT
Start: 2022-02-03 | End: 2022-02-03 | Stop reason: HOSPADM

## 2022-02-03 RX ORDER — MAGNESIUM SULFATE HEPTAHYDRATE 500 MG/ML
INJECTION, SOLUTION INTRAMUSCULAR; INTRAVENOUS AS NEEDED
Status: DISCONTINUED | OUTPATIENT
Start: 2022-02-03 | End: 2022-02-03 | Stop reason: SURG

## 2022-02-03 RX ORDER — ONDANSETRON 2 MG/ML
INJECTION INTRAMUSCULAR; INTRAVENOUS AS NEEDED
Status: DISCONTINUED | OUTPATIENT
Start: 2022-02-03 | End: 2022-02-03 | Stop reason: SURG

## 2022-02-03 RX ORDER — SERTRALINE HYDROCHLORIDE 100 MG/1
100 TABLET, FILM COATED ORAL NIGHTLY
Status: DISCONTINUED | OUTPATIENT
Start: 2022-02-03 | End: 2022-02-04 | Stop reason: HOSPADM

## 2022-02-03 RX ORDER — HYDROMORPHONE HYDROCHLORIDE 1 MG/ML
0.5 INJECTION, SOLUTION INTRAMUSCULAR; INTRAVENOUS; SUBCUTANEOUS
Status: DISCONTINUED | OUTPATIENT
Start: 2022-02-03 | End: 2022-02-03 | Stop reason: HOSPADM

## 2022-02-03 RX ORDER — EPHEDRINE SULFATE 50 MG/ML
5 INJECTION, SOLUTION INTRAVENOUS ONCE AS NEEDED
Status: DISCONTINUED | OUTPATIENT
Start: 2022-02-03 | End: 2022-02-03 | Stop reason: HOSPADM

## 2022-02-03 RX ORDER — LABETALOL HYDROCHLORIDE 5 MG/ML
5 INJECTION, SOLUTION INTRAVENOUS
Status: DISCONTINUED | OUTPATIENT
Start: 2022-02-03 | End: 2022-02-03 | Stop reason: HOSPADM

## 2022-02-03 RX ORDER — MAGNESIUM HYDROXIDE 1200 MG/15ML
LIQUID ORAL AS NEEDED
Status: DISCONTINUED | OUTPATIENT
Start: 2022-02-03 | End: 2022-02-03 | Stop reason: HOSPADM

## 2022-02-03 RX ORDER — KETAMINE HYDROCHLORIDE 10 MG/ML
INJECTION INTRAMUSCULAR; INTRAVENOUS AS NEEDED
Status: DISCONTINUED | OUTPATIENT
Start: 2022-02-03 | End: 2022-02-03 | Stop reason: SURG

## 2022-02-03 RX ORDER — PROPOFOL 10 MG/ML
VIAL (ML) INTRAVENOUS AS NEEDED
Status: DISCONTINUED | OUTPATIENT
Start: 2022-02-03 | End: 2022-02-03 | Stop reason: SURG

## 2022-02-03 RX ORDER — NALOXONE HCL 0.4 MG/ML
0.1 VIAL (ML) INJECTION
Status: DISCONTINUED | OUTPATIENT
Start: 2022-02-03 | End: 2022-02-03 | Stop reason: CLARIF

## 2022-02-03 RX ORDER — MIDAZOLAM HYDROCHLORIDE 1 MG/ML
0.5 INJECTION INTRAMUSCULAR; INTRAVENOUS
Status: DISCONTINUED | OUTPATIENT
Start: 2022-02-03 | End: 2022-02-03 | Stop reason: HOSPADM

## 2022-02-03 RX ORDER — HYDRALAZINE HYDROCHLORIDE 20 MG/ML
5 INJECTION INTRAMUSCULAR; INTRAVENOUS
Status: DISCONTINUED | OUTPATIENT
Start: 2022-02-03 | End: 2022-02-03 | Stop reason: HOSPADM

## 2022-02-03 RX ORDER — SODIUM CHLORIDE 9 MG/ML
INJECTION, SOLUTION INTRAVENOUS AS NEEDED
Status: DISCONTINUED | OUTPATIENT
Start: 2022-02-03 | End: 2022-02-03 | Stop reason: HOSPADM

## 2022-02-03 RX ORDER — ALVIMOPAN 12 MG/1
12 CAPSULE ORAL 2 TIMES DAILY
Status: DISCONTINUED | OUTPATIENT
Start: 2022-02-03 | End: 2022-02-04 | Stop reason: HOSPADM

## 2022-02-03 RX ORDER — ONDANSETRON 2 MG/ML
4 INJECTION INTRAMUSCULAR; INTRAVENOUS ONCE AS NEEDED
Status: DISCONTINUED | OUTPATIENT
Start: 2022-02-03 | End: 2022-02-03 | Stop reason: HOSPADM

## 2022-02-03 RX ORDER — EPHEDRINE SULFATE 50 MG/ML
INJECTION, SOLUTION INTRAVENOUS AS NEEDED
Status: DISCONTINUED | OUTPATIENT
Start: 2022-02-03 | End: 2022-02-03 | Stop reason: SURG

## 2022-02-03 RX ADMIN — ROCURONIUM BROMIDE 20 MG: 50 INJECTION INTRAVENOUS at 14:11

## 2022-02-03 RX ADMIN — METFORMIN HYDROCHLORIDE 500 MG: 500 TABLET, EXTENDED RELEASE ORAL at 22:34

## 2022-02-03 RX ADMIN — MIDAZOLAM 2 MG: 1 INJECTION INTRAMUSCULAR; INTRAVENOUS at 13:36

## 2022-02-03 RX ADMIN — KETAMINE HYDROCHLORIDE 20 MG: 10 INJECTION INTRAMUSCULAR; INTRAVENOUS at 14:34

## 2022-02-03 RX ADMIN — KETAMINE HYDROCHLORIDE 70 MG: 10 INJECTION INTRAMUSCULAR; INTRAVENOUS at 13:40

## 2022-02-03 RX ADMIN — ALVIMOPAN 12 MG: 12 CAPSULE ORAL at 20:36

## 2022-02-03 RX ADMIN — FENTANYL CITRATE 50 MCG: 0.05 INJECTION, SOLUTION INTRAMUSCULAR; INTRAVENOUS at 17:10

## 2022-02-03 RX ADMIN — KETAMINE HYDROCHLORIDE 10 MG: 10 INJECTION INTRAMUSCULAR; INTRAVENOUS at 14:51

## 2022-02-03 RX ADMIN — FAMOTIDINE 20 MG: 10 INJECTION INTRAVENOUS at 12:42

## 2022-02-03 RX ADMIN — FENTANYL CITRATE 100 MCG: 0.05 INJECTION, SOLUTION INTRAMUSCULAR; INTRAVENOUS at 13:04

## 2022-02-03 RX ADMIN — SODIUM CHLORIDE, POTASSIUM CHLORIDE, SODIUM LACTATE AND CALCIUM CHLORIDE 9 ML/HR: 600; 310; 30; 20 INJECTION, SOLUTION INTRAVENOUS at 12:00

## 2022-02-03 RX ADMIN — LIDOCAINE HYDROCHLORIDE 100 MG: 20 INJECTION, SOLUTION INFILTRATION; PERINEURAL at 13:04

## 2022-02-03 RX ADMIN — DEXAMETHASONE SODIUM PHOSPHATE 10 MG: 10 INJECTION INTRAMUSCULAR; INTRAVENOUS at 13:15

## 2022-02-03 RX ADMIN — ALVIMOPAN 12 MG: 12 CAPSULE ORAL at 12:36

## 2022-02-03 RX ADMIN — CEFOXITIN 2 G: 2 INJECTION, POWDER, FOR SOLUTION INTRAVENOUS at 20:39

## 2022-02-03 RX ADMIN — PROPOFOL 200 MG: 10 INJECTION, EMULSION INTRAVENOUS at 13:04

## 2022-02-03 RX ADMIN — HYDROMORPHONE HYDROCHLORIDE 0.5 MG: 1 INJECTION, SOLUTION INTRAMUSCULAR; INTRAVENOUS; SUBCUTANEOUS at 16:49

## 2022-02-03 RX ADMIN — FENTANYL CITRATE 100 MCG: 0.05 INJECTION, SOLUTION INTRAMUSCULAR; INTRAVENOUS at 13:35

## 2022-02-03 RX ADMIN — SODIUM CHLORIDE 500 ML: 9 INJECTION, SOLUTION INTRAVENOUS at 16:28

## 2022-02-03 RX ADMIN — HYDROMORPHONE HYDROCHLORIDE 0.5 MG: 1 INJECTION, SOLUTION INTRAMUSCULAR; INTRAVENOUS; SUBCUTANEOUS at 22:14

## 2022-02-03 RX ADMIN — HYDROMORPHONE HYDROCHLORIDE 0.5 MG: 2 INJECTION, SOLUTION INTRAMUSCULAR; INTRAVENOUS; SUBCUTANEOUS at 15:10

## 2022-02-03 RX ADMIN — HYDROMORPHONE HYDROCHLORIDE 0.5 MG: 2 INJECTION, SOLUTION INTRAMUSCULAR; INTRAVENOUS; SUBCUTANEOUS at 14:58

## 2022-02-03 RX ADMIN — ROCURONIUM BROMIDE 50 MG: 50 INJECTION INTRAVENOUS at 13:04

## 2022-02-03 RX ADMIN — KETOROLAC TROMETHAMINE 15 MG: 30 INJECTION, SOLUTION INTRAMUSCULAR; INTRAVENOUS at 16:29

## 2022-02-03 RX ADMIN — CEFOXITIN 2 G: 2 INJECTION, POWDER, FOR SOLUTION INTRAVENOUS at 14:54

## 2022-02-03 RX ADMIN — EPHEDRINE SULFATE 10 MG: 50 INJECTION INTRAVENOUS at 14:23

## 2022-02-03 RX ADMIN — CEFOXITIN 2 G: 2 INJECTION, POWDER, FOR SOLUTION INTRAVENOUS at 12:54

## 2022-02-03 RX ADMIN — ATORVASTATIN CALCIUM 40 MG: 20 TABLET, FILM COATED ORAL at 20:39

## 2022-02-03 RX ADMIN — MAGNESIUM SULFATE HEPTAHYDRATE 2 G: 500 INJECTION, SOLUTION INTRAMUSCULAR; INTRAVENOUS at 13:20

## 2022-02-03 RX ADMIN — FENTANYL CITRATE 50 MCG: 0.05 INJECTION, SOLUTION INTRAMUSCULAR; INTRAVENOUS at 16:37

## 2022-02-03 RX ADMIN — EPHEDRINE SULFATE 10 MG: 50 INJECTION INTRAVENOUS at 14:19

## 2022-02-03 RX ADMIN — SUGAMMADEX 200 MG: 100 INJECTION, SOLUTION INTRAVENOUS at 15:27

## 2022-02-03 RX ADMIN — ONDANSETRON 4 MG: 2 INJECTION INTRAMUSCULAR; INTRAVENOUS at 15:20

## 2022-02-03 RX ADMIN — SERTRALINE 100 MG: 100 TABLET, FILM COATED ORAL at 22:35

## 2022-02-03 RX ADMIN — LOSARTAN POTASSIUM 100 MG: 100 TABLET, FILM COATED ORAL at 22:35

## 2022-02-03 RX ADMIN — HYDROCODONE BITARTRATE AND ACETAMINOPHEN 1 TABLET: 5; 325 TABLET ORAL at 19:49

## 2022-02-03 RX ADMIN — MIDAZOLAM 0.5 MG: 1 INJECTION INTRAMUSCULAR; INTRAVENOUS at 12:42

## 2022-02-03 RX ADMIN — KETOROLAC TROMETHAMINE 15 MG: 30 INJECTION, SOLUTION INTRAMUSCULAR; INTRAVENOUS at 21:06

## 2022-02-03 NOTE — H&P
SUMMARY (A/P):    66-year-old gentleman with terminal ileal mass and adjacent enlarged lymph nodes on CT scan from November with intermittent lower abdominal/back pain, nausea, diarrhea, and rectal bleeding requiring transfusion.  Colonoscopy in August 2020 was remarkable only for hyperplastic polyp.  Presents today for laparoscopic ileocolic resection.  He understands nature the procedure and the risks including but not limited to bleeding, infection, conversion to open procedure, and anastomotic leak requiring temporary colostomy.      CC:    Terminal ileum mass    HPI:    66-year-old gentleman with history of lower abdominal pain and significant episode of rectal bleeding requiring blood transfusion.  A CT scan performed in November 2021 demonstrated a 3 cm nonobstructing mass at the terminal ileum with adjacent enlarged lymph nodes.    ENDOSCOPY:   • Colonoscopy 8/3/2020 Dr. Ashraf demonstrated normal-appearing terminal ileum, medium sized ascending and transverse colon lipomas, no other significant abnormalities.    RADIOLOGY:   • CT abdomen pelvis 11/19/2021: 3 cm nonobstructing mass at the terminal ileum and enlarged adjacent lymph nodes that are suspicious.  Appearance can be seen with lymphoma, carcinoid tumor, and metastatic carcinoma.  2 cm lipoma at the cecum and 2.5 cm lipoma at the ascending colon.  Cholelithiasis without cholecystitis (ultrasound of the gallbladder in 2019 did not demonstrate cholelithiasis and I am not sure that on my review of images I am absolutely certain that cholelithiasis is present).    LABS:    • CBC 2/2/2022: WBC 8.5, hemoglobin 10.1, platelets 361  • BMP 2/2/2022: Normal except glucose 121    PREVIOUS ABDOMINAL SURGERY    • None    PMH:    • Hypertension  • Hyperlipidemia  • Gastroesophageal reflux disease  • Diabetes  • Depression  • Coronary artery disease status post coronary stent placement in 2015 morphine    ALLERGIES:   • Ambien-sleepwalking  • Morphine-itching,  rash    MEDICATIONS:   • Reviewed, of note he is on Plavix    FAMILY HISTORY:    • Colorectal cancer: Negative    SOCIAL HISTORY:   • Denies tobacco use  • Denies alcohol use    PHYSICAL EXAM:   • Constitutional: Well-developed well-nourished, no acute distress  • Vital signs:   o Blood pressure 147/66  o Heart rate 76  o Respirations 18  o Temperature 98.9  o Weight 255 pounds  o Height 74 inches  o BMI 32.8  • Respiratory: Normal inspiratory effort  • Cardiovascular: Regular rate  • Gastrointestinal: Soft, nontender, no palpable mass  • Psychiatric: Alert and oriented ×3, normal affect     KAVITA MUÑOZ M.D.

## 2022-02-03 NOTE — ANESTHESIA POSTPROCEDURE EVALUATION
"Patient: Angel White Jr.    Procedure Summary     Date: 02/03/22 Room / Location: [facility] OR 06 / [facility] MAIN OR    Anesthesia Start: 1258 Anesthesia Stop: 1547    Procedure: ILEUM COLON RESECTION FOR TERMINAL ILEUM MASS LAPAROSCOPIC (N/A Abdomen) Diagnosis:       Malignant carcinoid tumor of ileum (HCC)      (Malignant carcinoid tumor of ileum (HCC) [C7A.012])    Surgeons: Roscoe Donovan MD Provider: Joon Vasquez MD    Anesthesia Type: general ASA Status: 3          Anesthesia Type: general    Vitals  Vitals Value Taken Time   /95 02/03/22 1701   Temp 36.9 °C (98.5 °F) 02/03/22 1543   Pulse 73 02/03/22 1710   Resp 16 02/03/22 1700   SpO2 93 % 02/03/22 1710   Vitals shown include unvalidated device data.        Post Anesthesia Care and Evaluation    Patient location during evaluation: bedside  Patient participation: complete - patient participated  Level of consciousness: awake and alert  Pain management: adequate  Airway patency: patent  Anesthetic complications: No anesthetic complications    Cardiovascular status: acceptable  Respiratory status: acceptable  Hydration status: acceptable    Comments: /95   Pulse 77   Temp 36.9 °C (98.5 °F) (Oral)   Resp 16   Ht 188 cm (74\")   Wt 116 kg (255 lb 9.6 oz)   SpO2 96%   BMI 32.82 kg/m²       "

## 2022-02-03 NOTE — OP NOTE
PREOPERATIVE DIAGNOSIS:  Tumor of terminal ileum    POSTOPERATIVE DIAGNOSIS (FINDINGS):  Same    PROCEDURE:  Laparoscopic ileocolic resection    SURGEON:  Roscoe Donovan MD    ASSISTANT:  Teresa Roque MD, was responsible for performing the following activities: suction, irrigation, suturing, closing, retraction, camera holding, and placing dressing, and their skilled assistance was necessary for the success of this case.    ANESTHESIA:  General    EBL:  Minimal    SPECIMEN(S):  Ileocolic resection    DESCRIPTION:  In supine position under general anesthetic prepped and draped usual sterile manner.  Small incision made above the umbilicus and Veress needle inserted with upper distraction on the abdominal wall and abdomen insufflated 15 mmHg.  5 mm Optiview trocar inserted and ultimately switched to 12 mm trocar.  Further 5 mm supraumbilical trocar introduced followed by suprapubic and right lower quadrant 5 mm trochars.  All incisions and abdominal wall at incision sites infiltrated with half percent Marcaine with epinephrine mixed with Exparel.  The terminal ileum, ascending colon, and hepatic flexure were mobilized with the harmonic scalpel.  The ileocolic pedicle was identified skeletonized and divided with the vascular loaded stapler.  The tumor in the terminal ileum as well as a probable metastatic mesenteric lymph node were identified.  The mesentery was divided from the ileocolic pedicle to the planned point of transection on the terminal ileum with the harmonic scalpel.  The terminal ileum was divided with a white loaded [place] stapler.  The mesentery was then divided leading to the proximal transverse colon with the harmonic scalpel and the proximal transverse colon was then divided with the blue loaded [place] stapler.  A side-to-side stapled anastomosis between the terminal ileum and proximal colon was then completed by making enterotomies in the small bowel and colon, inserting and firing white  loaded [place] 60 mm stapler.  The mutual enterotomy was closed with interrupted 2-0 silk suture.  Abdomen was inspected and good hemostasis was noted.  Due to the relative recency of Plavix use, Renetta was applied along the right paracolic gutter.  The specimen was then placed in a medium [place] urologic bag.  The right lower quadrant 5 mm trocar incision was extended and abdominal wall opened and specimen removed through this incision.  Fascia was then closed with 2 layers of running 0 PDS.  Fascia of the 12 mm trocar site closed with 0 Vicryl.  Skin edges closed with 5-0 Vicryl subcuticular followed by Dermabond.  Tolerated well.    Roscoe Donovan M.D.

## 2022-02-03 NOTE — PERIOPERATIVE NURSING NOTE
Dr. Soto on unit, at bedside, notified pt does not have cardiac clearance, but saw cardiologist last in September 2021, routine pre-op orders noted.

## 2022-02-03 NOTE — ANESTHESIA PREPROCEDURE EVALUATION
" Anesthesia Evaluation     Patient summary reviewed and Nursing notes reviewed   no history of anesthetic complications:  NPO Solid Status: > 8 hours  NPO Liquid Status: > 2 hours           Airway   Mallampati: II  TM distance: >3 FB  Neck ROM: full  No difficulty expected  Dental - normal exam     Pulmonary - normal exam   (+) sleep apnea (no cpap ue),   Cardiovascular - normal exam  Exercise tolerance: good (4-7 METS)    ECG reviewed  PT is on anticoagulation therapy    (+) hypertension, CAD, cardiac stents unknown timeframe dysrhythmias (rare PAc, PVC on holter. no a fib) Atrial Fib, hyperlipidemia,   (-) angina (denies angina)      Neuro/Psych  (+) psychiatric history Anxiety,     GI/Hepatic/Renal/Endo    (+) obesity,  GERD,  liver disease fatty liver disease, diabetes mellitus (a1c 6-7) type 2 well controlled,     Musculoskeletal     Abdominal   (+) obese,    Substance History      OB/GYN          Other   arthritis, blood dyscrasia (hgb 10) anemia,   history of cancer (malignant carcinoid tumor of ileum)                  Anesthesia Plan    ASA 3     general   (I have reviewed the patient's history and chart with the patient, including all pertinent laboratory results and imaging. I have explained the risks of anesthesia including but not limited to dental damage, corneal abrasion, nerve injury, MI, stroke, aspiration, and death. Patient has agreed to proceed.     /66 (BP Location: Left arm, Patient Position: Lying)   Pulse 76   Temp 37.2 °C (98.9 °F) (Oral)   Resp 18   Ht 188 cm (74\")   Wt 116 kg (255 lb 9.6 oz)   SpO2 98%   BMI 32.82 kg/m²   )  intravenous induction     Anesthetic plan, all risks, benefits, and alternatives have been provided, discussed and informed consent has been obtained with: patient.    Plan discussed with CRNA.        CODE STATUS:       "

## 2022-02-03 NOTE — ANESTHESIA PROCEDURE NOTES
Airway  Urgency: elective    Date/Time: 2/3/2022 1:13 PM  Airway not difficult    General Information and Staff    Patient location during procedure: OR  Anesthesiologist: Shahram Man MD  CRNA: Montserrat Koenig CRNA    Indications and Patient Condition  Indications for airway management: airway protection    Preoxygenated: yes  Mask difficulty assessment: 1 - vent by mask    Final Airway Details  Final airway type: endotracheal airway      Successful airway: ETT  Cuffed: yes   Successful intubation technique: direct laryngoscopy  Facilitating devices/methods: Bougie and cricoid pressure  Endotracheal tube insertion site: oral  Blade: Reeves  Blade size: 2  ETT size (mm): 8.0  Cormack-Lehane Classification: grade IIb - view of arytenoids or posterior of glottis only  Placement verified by: chest auscultation and capnometry   Measured from: lips  ETT/EBT  to lips (cm): 22  Number of attempts at approach: 2  Assessment: lips, teeth, and gum same as pre-op and atraumatic intubation    Additional Comments  Atraumatic, MOP to cuff, BSBE, no change to dentition, secured with tape

## 2022-02-04 ENCOUNTER — READMISSION MANAGEMENT (OUTPATIENT)
Dept: CALL CENTER | Facility: HOSPITAL | Age: 67
End: 2022-02-04

## 2022-02-04 VITALS
HEART RATE: 64 BPM | TEMPERATURE: 98.2 F | SYSTOLIC BLOOD PRESSURE: 143 MMHG | BODY MASS INDEX: 32.8 KG/M2 | DIASTOLIC BLOOD PRESSURE: 76 MMHG | RESPIRATION RATE: 16 BRPM | HEIGHT: 74 IN | WEIGHT: 255.6 LBS | OXYGEN SATURATION: 98 %

## 2022-02-04 LAB
ANION GAP SERPL CALCULATED.3IONS-SCNC: 8.9 MMOL/L (ref 5–15)
BASOPHILS # BLD AUTO: 0.04 10*3/MM3 (ref 0–0.2)
BASOPHILS NFR BLD AUTO: 0.3 % (ref 0–1.5)
BUN SERPL-MCNC: 12 MG/DL (ref 8–23)
BUN/CREAT SERPL: 14.5 (ref 7–25)
CALCIUM SPEC-SCNC: 8.9 MG/DL (ref 8.6–10.5)
CHLORIDE SERPL-SCNC: 104 MMOL/L (ref 98–107)
CO2 SERPL-SCNC: 22.1 MMOL/L (ref 22–29)
CREAT SERPL-MCNC: 0.83 MG/DL (ref 0.76–1.27)
DEPRECATED RDW RBC AUTO: 54.3 FL (ref 37–54)
EOSINOPHIL # BLD AUTO: 0 10*3/MM3 (ref 0–0.4)
EOSINOPHIL NFR BLD AUTO: 0 % (ref 0.3–6.2)
ERYTHROCYTE [DISTWIDTH] IN BLOOD BY AUTOMATED COUNT: 20 % (ref 12.3–15.4)
GFR SERPL CREATININE-BSD FRML MDRD: 93 ML/MIN/1.73
GLUCOSE BLDC GLUCOMTR-MCNC: 137 MG/DL (ref 70–130)
GLUCOSE SERPL-MCNC: 130 MG/DL (ref 65–99)
HCT VFR BLD AUTO: 34.2 % (ref 37.5–51)
HGB BLD-MCNC: 9.7 G/DL (ref 13–17.7)
IMM GRANULOCYTES # BLD AUTO: 0.06 10*3/MM3 (ref 0–0.05)
IMM GRANULOCYTES NFR BLD AUTO: 0.4 % (ref 0–0.5)
LYMPHOCYTES # BLD AUTO: 0.91 10*3/MM3 (ref 0.7–3.1)
LYMPHOCYTES NFR BLD AUTO: 6.8 % (ref 19.6–45.3)
MCH RBC QN AUTO: 21.3 PG (ref 26.6–33)
MCHC RBC AUTO-ENTMCNC: 28.4 G/DL (ref 31.5–35.7)
MCV RBC AUTO: 75.2 FL (ref 79–97)
MONOCYTES # BLD AUTO: 0.92 10*3/MM3 (ref 0.1–0.9)
MONOCYTES NFR BLD AUTO: 6.9 % (ref 5–12)
NEUTROPHILS NFR BLD AUTO: 11.41 10*3/MM3 (ref 1.7–7)
NEUTROPHILS NFR BLD AUTO: 85.6 % (ref 42.7–76)
NRBC BLD AUTO-RTO: 0 /100 WBC (ref 0–0.2)
PLATELET # BLD AUTO: 323 10*3/MM3 (ref 140–450)
PMV BLD AUTO: 9.3 FL (ref 6–12)
POTASSIUM SERPL-SCNC: 3.9 MMOL/L (ref 3.5–5.2)
RBC # BLD AUTO: 4.55 10*6/MM3 (ref 4.14–5.8)
SODIUM SERPL-SCNC: 135 MMOL/L (ref 136–145)
WBC NRBC COR # BLD: 13.34 10*3/MM3 (ref 3.4–10.8)

## 2022-02-04 PROCEDURE — 82962 GLUCOSE BLOOD TEST: CPT

## 2022-02-04 PROCEDURE — 85025 COMPLETE CBC W/AUTO DIFF WBC: CPT | Performed by: SURGERY

## 2022-02-04 PROCEDURE — 80048 BASIC METABOLIC PNL TOTAL CA: CPT | Performed by: SURGERY

## 2022-02-04 PROCEDURE — 25010000002 HYDROMORPHONE 1 MG/ML SOLUTION: Performed by: SURGERY

## 2022-02-04 PROCEDURE — 25010000002 KETOROLAC TROMETHAMINE PER 15 MG: Performed by: SURGERY

## 2022-02-04 PROCEDURE — 25010000002 CEFOXITIN PER 1 G: Performed by: SURGERY

## 2022-02-04 PROCEDURE — 25010000002 ENOXAPARIN PER 10 MG: Performed by: SURGERY

## 2022-02-04 RX ORDER — ONDANSETRON 4 MG/1
4 TABLET, FILM COATED ORAL EVERY 6 HOURS PRN
Qty: 10 TABLET | Refills: 1 | Status: SHIPPED | OUTPATIENT
Start: 2022-02-04 | End: 2022-02-08

## 2022-02-04 RX ORDER — HYDROCODONE BITARTRATE AND ACETAMINOPHEN 5; 325 MG/1; MG/1
TABLET ORAL
Qty: 24 TABLET | Refills: 0 | Status: SHIPPED | OUTPATIENT
Start: 2022-02-04 | End: 2022-02-28

## 2022-02-04 RX ADMIN — CEFOXITIN 2 G: 2 INJECTION, POWDER, FOR SOLUTION INTRAVENOUS at 09:17

## 2022-02-04 RX ADMIN — HYDROCODONE BITARTRATE AND ACETAMINOPHEN 2 TABLET: 5; 325 TABLET ORAL at 02:36

## 2022-02-04 RX ADMIN — CEFOXITIN 2 G: 2 INJECTION, POWDER, FOR SOLUTION INTRAVENOUS at 02:37

## 2022-02-04 RX ADMIN — ENOXAPARIN SODIUM 40 MG: 100 INJECTION SUBCUTANEOUS at 09:17

## 2022-02-04 RX ADMIN — ALVIMOPAN 12 MG: 12 CAPSULE ORAL at 09:17

## 2022-02-04 RX ADMIN — HYDROMORPHONE HYDROCHLORIDE 0.5 MG: 1 INJECTION, SOLUTION INTRAMUSCULAR; INTRAVENOUS; SUBCUTANEOUS at 06:02

## 2022-02-04 RX ADMIN — KETOROLAC TROMETHAMINE 15 MG: 30 INJECTION, SOLUTION INTRAMUSCULAR; INTRAVENOUS at 04:54

## 2022-02-04 RX ADMIN — KETOROLAC TROMETHAMINE 15 MG: 30 INJECTION, SOLUTION INTRAMUSCULAR; INTRAVENOUS at 10:46

## 2022-02-04 RX ADMIN — HYDROCODONE BITARTRATE AND ACETAMINOPHEN 2 TABLET: 5; 325 TABLET ORAL at 09:17

## 2022-02-04 RX ADMIN — METFORMIN HYDROCHLORIDE 500 MG: 500 TABLET, EXTENDED RELEASE ORAL at 09:17

## 2022-02-04 NOTE — PLAN OF CARE
Goal Outcome Evaluation:  Plan of Care Reviewed With: patient        Progress: no change  Outcome Summary: Dilaudid x2. Norco 1 tab x1. Norco 2 tab x1. Up with minimal help to restroom. Voiding well. IV fluids, Mefoxin and Toradol on board. Lap sites x3 dermabond. Room air. SCDs used. Patient's [birth year] is [birth year]! Anticipating possible discharge today.

## 2022-02-04 NOTE — NURSING NOTE
Patient's wife upset upon shift change; has belongings in locker 8 in surgery. Wife's son is downstairs ready to pick her up. Wife expressed frustration about not having her belongings brought up here earlier than now. This RN just came on shift, so I offered to call and get it taken care of, but she said she cannot wait and she will wheel herself down in her own wheelchair and grab the belongings.

## 2022-02-04 NOTE — OUTREACH NOTE
Prep Survey      Responses   [facility]levism facility patient discharged from? [place]   Is LACE score < 7 ? No   Emergency Room discharge w/ pulse ox? No   Eligibility [facility]   Date of Admission 02/03/22   Date of Discharge 02/04/22   Discharge Disposition Home or Self Care   Discharge diagnosis Lap ileum colon resection for terminal ileum mass   Does the patient have one of the following disease processes/diagnoses(primary or secondary)? General Surgery   Does the patient have Home health ordered? No   Is there a DME ordered? No   Prep survey completed? Yes          Hannah Phillips RN

## 2022-02-04 NOTE — DISCHARGE SUMMARY
DATE OF ADMIT: 2/3/2022    DATE OF DISCHARGE: 2/4/2022    DIAGNOSIS: Carcinoid tumor of terminal ileum    FINAL PATHOLOGY: Pending at time of this dictation    PROCEDURES: Laparoscopic ileocolic resection 2/3/2022    SUMMARY OF HOSPITAL COURSE:   Uneventful postop course. Tolerating diet, incisions in good order and afebrile with stable vital signs at discharge. Prescribed hydrocodone for pain.    DIET: Regular    ACTIVITY: Walking encouraged, no lifting or strenuous activity    MEDICATIONS: Refer to MAR    FOLLOW-UP: To call office and schedule 1 week follow-up appointment    Roscoe Donovan M.D.

## 2022-02-04 NOTE — PROGRESS NOTES
"Adult Nutrition  Assessment/PES    Patient Name:  Angel White Jr.  YOB: 1955  MRN: [id number]  Admit Date:  2/3/2022    Assessment Date:  2/4/2022    Comments: MST 5, weight loss indicated on nurse admission screen.     Pt admitted yesterday, s/p ileocolic resection due to tumor. He has been discharged to home - noted to be tolerating diet. Weight hx reviewed - down about 25 lb over past year, noticeably since October 2021. BMI 32, wt 255 lb. No intervention warranted given his discharge.      Reason for Assessment     Row Name 02/04/22 1216          Reason for Assessment    Reason For Assessment identified at risk by screening criteria     Diagnosis cancer diagnosis/related complications; cardiac disease; diabetes diagnosis/complications  Malignant carcinoid tumor of ileum     Identified At Risk by Screening Criteria MST SCORE 2+                Nutrition/Diet History     Row Name 02/04/22 1218          Nutrition/Diet History    Typical Food/Fluid Intake Ileocolic resection done yesterday (lap) due to carcinoid tumor of terminal ileum. Being dc today.                Anthropometrics     Row Name 02/04/22 1219          Anthropometrics    Height 188 cm (74\")            Admit Weight    Admit Weight 116 kg (255 lb)            Ideal Body Weight (IBW)    Ideal Body Weight (IBW) (kg) 87.66     % of Ideal Body Weight Assessment other (see comments)  132%            Usual Body Weight (UBW)    Usual Body Weight 127 kg (280 lb)  3/2021     Weight Loss unintentional     Weight Loss Time Frame drop in weight around Oct 2021 and gradual loss since (net 25 lb in past year)            Body Mass Index (BMI)    BMI Assessment BMI 30-34.9: obesity grade I  32                Labs/Tests/Procedures/Meds     Row Name 02/04/22 1240          Labs/Procedures/Meds    Lab Results Reviewed reviewed, pertinent     Lab Results Comments Na, Glu, Hgb            Diagnostic Tests/Procedures    Diagnostic Test/Procedure Reviewed " "reviewed, pertinent     Diagnostic Test/Procedures Comments 2/3 lap resection of ileum tumor            Medications    Pertinent Medications Reviewed reviewed, pertinent     Pertinent Medications Comments metformin, zoloft, toradol, lovenox, lipitor                Physical Findings     Row Name 02/04/22 1241          Physical Findings    Overall Physical Appearance overweight     Skin surgical incision  abdomen                Estimated/Assessed Needs     Row Name 02/04/22 1219          Calculation Measurements    Weight Used For Calculations 116 kg (255 lb)     Height 188 cm (74\")            Estimated/Assessed Needs    Additional Documentation KCAL/KG (Group); Fluid Requirements (Group); Protein Requirements (Group)            KCAL/KG    KCAL/KG 20 Kcal/Kg (kcal)     20 Kcal/Kg (kcal) 2313.34            Protein Requirements    Weight Used For Protein Calculations 116 kg (255 lb)     Est Protein Requirement Amount (gms/kg) 1.0 gm protein     Estimated Protein Requirements (gms/day) 115.67            Fluid Requirements    Fluid Requirements (mL/day) 2300     Estimated Fluid Requirement Method RDA Method     RDA Method (mL) 2300                Nutrition Prescription Ordered     Row Name 02/04/22 1242          Nutrition Prescription PO    Current PO Diet Regular     Fluid Consistency Thin                Evaluation of Received Nutrient/Fluid Intake     Row Name 02/04/22 1243          PO Evaluation    % PO Intake \"tolerating\" diet                     Problem/Interventions:           Intervention Goal     Row Name 02/04/22 1245          Intervention Goal    General Disease management/therapy     PO Continue positive trend     Weight Maintain weight                    Education/Evaluation     Row Name 02/04/22 1246          Education    Education No discharge needs identified at this time                 Electronically signed by:  Sarah Walker RD  02/04/22 12:47 EST  "

## 2022-02-04 NOTE — PLAN OF CARE
Goal Outcome Evaluation:           Progress: no change  Outcome Summary: Pt is A/O x 4, on 2L NC, up with assistance.  Pt voided upon arrival.  Lap x 3 with dermabond.  Wife is at bedside, VSS, will continue to monitor.

## 2022-02-04 NOTE — PROGRESS NOTES
Case Management Discharge Note      Final Note: Pt discharged home, no needs identified. Susan Pineda LCSW         Selected Continued Care - Admitted Since 2/3/2022     Destination    No services have been selected for the patient.              Durable Medical Equipment    No services have been selected for the patient.              Dialysis/Infusion    No services have been selected for the patient.              Home Medical Care    No services have been selected for the patient.              Therapy    No services have been selected for the patient.              Community Resources    No services have been selected for the patient.              Community & DME    No services have been selected for the patient.                  Transportation Services  Private: Car    Final Discharge Disposition Code: 01 - home or self-care

## 2022-02-05 ENCOUNTER — TRANSITIONAL CARE MANAGEMENT TELEPHONE ENCOUNTER (OUTPATIENT)
Dept: CALL CENTER | Facility: HOSPITAL | Age: 67
End: 2022-02-05

## 2022-02-05 NOTE — OUTREACH NOTE
Call Center TCM Note      Responses   [facility] patient discharged from? [place]   Does the patient have one of the following disease processes/diagnoses(primary or secondary)? General Surgery   TCM attempt successful? Yes   Call start time 1159   Call end time 1205   Discharge diagnosis Lap ileum colon resection for terminal ileum mass   Meds reviewed with patient/caregiver? Yes   Is the patient having any side effects they believe may be caused by any medication additions or changes? No   Does the patient have all medications related to this admission filled (includes all antibiotics, pain medications, etc.) Yes   Is the patient taking all medications as directed (includes completed medication regime)? Yes   Does the patient have a follow up appointment scheduled with their surgeon? No   What is preventing the patient from scheduling follow up appointments? Waiting on return call   Nursing Interventions Advised patient to make appointment   Has the patient kept scheduled appointments due by today? N/A   Comments 2/7/22 at 1:30pm   Has home health visited the patient within 72 hours of discharge? N/A   Psychosocial issues? No   Did the patient receive a copy of their discharge instructions? Yes   Nursing interventions Reviewed instructions with patient   What is the patient's perception of their health status since discharge? Improving  [Sore]   Nursing interventions Nurse provided patient education   Is the patient /caregiver able to teach back basic post-op care? Practice 'cough and deep breath',  Take showers only when approved by MD-sponge bathe until then,  No tub bath, swimming, or hot tub until instructed by MD,  Lifting as instructed by MD in discharge instructions,  Drive as instructed by MD in discharge instructions   Is the patient/caregiver able to teach back signs and symptoms of incisional infection? Fever,  Pus or odor from incision,  Incisional warmth,  Increased drainage or bleeding,   Increased redness, swelling or pain at the incisonal site   Is the patient/caregiver able to teach back steps to recovery at home? Eat a well-balance diet,  Rest and rebuild strength, gradually increase activity   TCM call completed? Yes          Dominique Sosa RN    2/5/2022, 12:05 EST

## 2022-02-07 ENCOUNTER — TELEPHONE (OUTPATIENT)
Dept: SURGERY | Facility: CLINIC | Age: 67
End: 2022-02-07

## 2022-02-07 LAB
LAB AP CASE REPORT: NORMAL
LAB AP SPECIAL STAINS: NORMAL
LAB AP SYNOPTIC CHECKLIST: NORMAL
PATH REPORT.FINAL DX SPEC: NORMAL
PATH REPORT.GROSS SPEC: NORMAL

## 2022-02-07 NOTE — TELEPHONE ENCOUNTER
Patient called stating he only had 5 pain pills left and that he was in a significant amount of pain every time he moves. Says that it feels as if there is a rubber band or string inside that gets tension and pull every time that he moves.   I suggested to patient that he can try to add some ibuprofen into his pain regimen to see if this gives him some relief for break through pain he will give us a call if this is non-effective for him.

## 2022-02-08 ENCOUNTER — OFFICE VISIT (OUTPATIENT)
Dept: INTERNAL MEDICINE | Facility: CLINIC | Age: 67
End: 2022-02-08

## 2022-02-08 VITALS
OXYGEN SATURATION: 99 % | DIASTOLIC BLOOD PRESSURE: 60 MMHG | BODY MASS INDEX: 33.07 KG/M2 | SYSTOLIC BLOOD PRESSURE: 130 MMHG | HEART RATE: 61 BPM | HEIGHT: 74 IN | WEIGHT: 257.7 LBS

## 2022-02-08 DIAGNOSIS — I10 ESSENTIAL HYPERTENSION: Primary | ICD-10-CM

## 2022-02-08 DIAGNOSIS — E11.51 CONTROLLED TYPE 2 DIABETES MELLITUS WITH DIABETIC PERIPHERAL ANGIOPATHY WITHOUT GANGRENE, WITHOUT LONG-TERM CURRENT USE OF INSULIN: ICD-10-CM

## 2022-02-08 DIAGNOSIS — E78.2 MIXED HYPERLIPIDEMIA: ICD-10-CM

## 2022-02-08 PROCEDURE — 99495 TRANSJ CARE MGMT MOD F2F 14D: CPT | Performed by: FAMILY MEDICINE

## 2022-02-08 PROCEDURE — 1111F DSCHRG MED/CURRENT MED MERGE: CPT | Performed by: FAMILY MEDICINE

## 2022-02-08 RX ORDER — SERTRALINE HYDROCHLORIDE 100 MG/1
TABLET, FILM COATED ORAL
Qty: 90 TABLET | Refills: 0 | Status: SHIPPED | OUTPATIENT
Start: 2022-02-08 | End: 2022-05-09

## 2022-02-08 NOTE — PROGRESS NOTES
"Chief Complaint  Follow-up (A1C)    Subjective     {Problem List  Visit Diagnosis   Encounters  Notes  Medications  Labs  Result Review Imaging  Media :23}     Angel White Jr. presents to [facility]  History of Present Illness    Objective   Vital Signs:   /60 (BP Location: Right arm, Patient Position: Sitting, Cuff Size: Large Adult)   Pulse 61   Ht 188 cm (74\")   Wt 117 kg (257 lb 11.2 oz)   SpO2 99%   BMI 33.09 kg/m²     Physical Exam   Result Review :{Labs  Result Review  Imaging  Med Tab  Media  Procedures :23}   {The following data was reviewed by (Optional):48897}  {Ambulatory Labs (Optional):03954}  {Data reviewed (Optional):56344:::1}          Assessment and Plan {CC Problem List  Visit Diagnosis   ROS  Review (Popup)  Health Maintenance  Quality  BestPractice  Medications  SmartSets  SnapShot Encounters  Media :23}   There are no diagnoses linked to this encounter.  {Time Spent (Optional):46595}  Follow Up {Instructions Charge Capture  Follow-up Communications :23}  No follow-ups on file.  Patient was given instructions and counseling regarding his condition or for health maintenance advice. Please see specific information pulled into the AVS if appropriate.       "

## 2022-02-08 NOTE — PROGRESS NOTES
Lab at bedside for blood draw   Transitional Care Follow Up Visit  Subjective     Angel White Jr. is a 67 y.o. male who presents for a transitional care management visit.    Within 48 business hours after discharge our office contacted him via telephone to coordinate his care and needs.      I reviewed and discussed the details of that call along with the discharge summary, hospital problems, inpatient lab results, inpatient diagnostic studies, and consultation reports with Angel.     Current outpatient and discharge medications have been reconciled for the patient.  Reviewed by: Momo Meza MD      Date of TCM Phone Call 2/4/2022   [facility]   Date of Admission 2/3/2022   Date of Discharge 2/4/2022   Discharge Disposition Home or Self Care     Risk for Readmission (LACE) Score: 11 (2/4/2022  6:01 AM)      History of Present Illness   Course During Hospital Stay: Hospital stay for resection of tumor in the ileum.Uneventful postop course. Tolerating diet, incisions in good order and afebrile with stable vital signs at discharge. Prescribed hydrocodone for pain.     The following portions of the patient's history were reviewed and updated as appropriate: allergies, current medications, past family history, past medical history, past social history, past surgical history and problem list.    Review of Systems   Constitutional: Negative.    HENT: Negative.    Respiratory: Negative.    Cardiovascular: Negative.    Gastrointestinal: Negative.    Genitourinary: Negative.    Musculoskeletal: Negative.    Neurological: Negative.    Psychiatric/Behavioral: Negative.        Objective   Physical Exam  Vitals and nursing note reviewed.   Constitutional:       Appearance: Normal appearance. He is well-developed. He is not diaphoretic.   HENT:      Head: Normocephalic and atraumatic.   Eyes:      General: Lids are normal. No scleral icterus.     Extraocular Movements: Extraocular movements intact.      Conjunctiva/sclera: Conjunctivae normal.    Neck:      Thyroid: No thyroid mass or thyromegaly.      Vascular: No carotid bruit or JVD.   Cardiovascular:      Rate and Rhythm: Normal rate and regular rhythm.      Pulses: Normal pulses.           Radial pulses are 2+ on the right side and 2+ on the left side.      Heart sounds: Normal heart sounds. No murmur heard.      Pulmonary:      Effort: Pulmonary effort is normal. No respiratory distress.      Breath sounds: Normal breath sounds.   Musculoskeletal:      Cervical back: Normal range of motion.      Right lower leg: No edema.      Left lower leg: No edema.   Skin:     General: Skin is warm and dry.      Coloration: Skin is not pale.      Findings: No erythema or rash.   Neurological:      General: No focal deficit present.      Mental Status: He is alert and oriented to person, place, and time.      Sensory: No sensory deficit.      Deep Tendon Reflexes: Reflexes are normal and symmetric.   Psychiatric:         Mood and Affect: Mood normal.         Behavior: Behavior normal. Behavior is cooperative.         Thought Content: Thought content normal.         Judgment: Judgment normal.         Assessment/Plan   Diagnoses and all orders for this visit:    1. Essential hypertension (Primary)    2. Mixed hyperlipidemia    3. Controlled type 2 diabetes mellitus with diabetic peripheral angiopathy without gangrene, without long-term current use of insulin (HCC)    Continue present management of hypertension amlodipine losartan  Continue hyperlipidemia with atorvastatin  Diabetes continue MetforminXR 500 mg once daily    Follow-up otherwise as needed or in 3 months fasting labs at that time including CBC A1c

## 2022-02-10 ENCOUNTER — TELEPHONE (OUTPATIENT)
Dept: SURGERY | Facility: CLINIC | Age: 67
End: 2022-02-10

## 2022-02-10 NOTE — PROGRESS NOTES
"Enter Query Response Below      Query Response:   * Yes, metastasis to the lymph nodes             If applicable, please update the problem list.         Patient: Angel White Jr.        : 1955  Account: [id number]           Admit Date: 2/3/2022        Options to Respond to Query:    1. Access the Encounter     a. From the To-Do Side bar, click Respond With Note.     b. Click New Note     c. Answer query within the yellow box.                d. Update the Problem List if applicable.     Dr. Donovan,    Based on Medicare billing guidelines [facility] are not allowed to use diagnoses from pathology reports as the sole basis for billing. Any findings noted on a pathology report must be affirmed by the treating physician before billing. The pathologist reported that the specimen shows \"The largest lymph node is sectioned to reveal that it is actually a lymph node conglomerate with at least two positive lymph nodes with solid tan white cut surfaces. The patient was treated with laparoscopic ileocolic resection.    After further study, is the pathologist findings consistent with your clinical impression for this patient?     * Yes, metastasis to the lymph nodes  * No (please specify) ______________  * Other (please specify)____________   * Clinically indeterminable     By submitting this query, we are merely seeking further clarification of documentation to accurately reflect all conditions that you are monitoring, evaluating, treating or that extend the hospitalization or utilize additional resources of care. Please utilize your independent clinical judgment when addressing the question(s) above.     This query and your response, once completed, will be entered into the legal medical record.    Sincerely,  [email or web address]  Clinical Documentation Integrity Program   "

## 2022-02-10 NOTE — PROGRESS NOTES
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         Patient: Angel White Jr.        : 1955  Account: [id number]           Admit Date: 2/3/2022        Options to Respond to Query:    1. Access the Encounter     a. From the To-Do Side bar, click Respond With Note.     b. Click New Note     c. Answer query within the yellow box.                d. Update the Problem List if applicable.     Dr. Donovan,     Based on Medicare billing guidelines [facility] are not allowed to use diagnoses from pathology reports as the sole basis for billing. Any findings noted on a pathology report must be affirmed by the treating physician before billing. The pathologist reported that the specimen of the colon and ileum shows \"Well-differentiated neuroendocrine tumor, G1.\" The patient was treated with laparoscopic ileocolic resection.    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 Please utilize your independent clinical judgment when addressing the question(s) above.     This query and your response, once completed, will be entered into the legal medical record.    Sincerely,  [email or web address]  Clinical Documentation Integrity Program   "

## 2022-02-10 NOTE — TELEPHONE ENCOUNTER
Patient called today with c/o rectal bleeding and abdominal pain s/p Laparoscopic ileocolic resection on 2/3/22 by Dr. Donovan. Reports the bleeding started 2 days ago. Blood is bright red with occasional clots. Started having loose stools 3 days ago. No rectal pain. Abdominal pain is mostly in the region of his incisions. Pain is not severe or intensifying. No incision problems. No N/V. States that the pain seems to improve with BMs, but is concerned that this is not normal. Resumed Plavix and Baby ASA on Monday. Has a follow-up wit you on 2/21/22.     Informed him that he might just need to d/c his blood thinners for a bit longer and monitor symtpoms, but I will see what you recommend.

## 2022-02-14 ENCOUNTER — READMISSION MANAGEMENT (OUTPATIENT)
Dept: CALL CENTER | Facility: HOSPITAL | Age: 67
End: 2022-02-14

## 2022-02-14 NOTE — OUTREACH NOTE
General Surgery Week 2 Survey      Responses   [facility] patient discharged from? [place]   Does the patient have one of the following disease processes/diagnoses(primary or secondary)? General Surgery   Week 2 attempt successful? Yes   Call start time 1537   Call end time 1539   Discharge diagnosis Lap ileum colon resection for terminal ileum mass   Meds reviewed with patient/caregiver? Yes   Is the patient having any side effects they believe may be caused by any medication additions or changes? No   Does the patient have all medications related to this admission filled (includes all antibiotics, pain medications, etc.) Yes   Is the patient taking all medications as directed (includes completed medication regime)? Yes   Does the patient have a follow up appointment scheduled with their surgeon? Yes   Has the patient kept scheduled appointments due by today? Yes   Comments FU with Surgeon 02.21.2022   Has home health visited the patient within 72 hours of discharge? N/A   Psychosocial issues? No   Did the patient receive a copy of their discharge instructions? Yes   Nursing interventions Reviewed instructions with patient   What is the patient's perception of their health status since discharge? Improving   Nursing interventions Nurse provided patient education   Is the patient /caregiver able to teach back basic post-op care? Lifting as instructed by MD in discharge instructions,  Keep incision areas clean,dry and protected,  Take showers only when approved by MD-sponge bathe until then   Is the patient/caregiver able to teach back signs and symptoms of incisional infection? Increased redness, swelling or pain at the incisonal site,  Increased drainage or bleeding,  Incisional warmth   Is the patient/caregiver able to teach back steps to recovery at home? Rest and rebuild strength, gradually increase activity,  Eat a well-balance diet,  Make a list of questions for surgeon's appointment   If the patient  is a current smoker, are they able to teach back resources for cessation? Not a smoker   Is the patient/caregiver able to teach back the hierarchy of who to call/visit for symptoms/problems? PCP, Specialist, Home health nurse, Urgent Care, ED, 911 Yes   Week 2 call completed? Yes   Wrap up additional comments Pt. states he is improving, no needs identified.           Kiana Suazo, RN

## 2022-02-23 ENCOUNTER — READMISSION MANAGEMENT (OUTPATIENT)
Dept: CALL CENTER | Facility: HOSPITAL | Age: 67
End: 2022-02-23

## 2022-02-24 NOTE — OUTREACH NOTE
General Surgery Week 3 Survey      Responses   [facility] patient discharged from? [place]   Does the patient have one of the following disease processes/diagnoses(primary or secondary)? General Surgery   Week 3 attempt successful? No   Unsuccessful attempts Attempt 1          Laurence Olivares RN

## 2022-02-28 ENCOUNTER — OFFICE VISIT (OUTPATIENT)
Dept: SURGERY | Facility: CLINIC | Age: 67
End: 2022-02-28

## 2022-02-28 ENCOUNTER — READMISSION MANAGEMENT (OUTPATIENT)
Dept: CALL CENTER | Facility: HOSPITAL | Age: 67
End: 2022-02-28

## 2022-02-28 VITALS — HEIGHT: 74 IN | BODY MASS INDEX: 32.98 KG/M2 | WEIGHT: 257 LBS

## 2022-02-28 DIAGNOSIS — Z09 FOLLOW UP: Primary | ICD-10-CM

## 2022-02-28 PROCEDURE — 99024 POSTOP FOLLOW-UP VISIT: CPT | Performed by: SURGERY

## 2022-02-28 NOTE — OUTREACH NOTE
General Surgery Week 3 Survey      Responses   [facility] patient discharged from? [place]   Does the patient have one of the following disease processes/diagnoses(primary or secondary)? General Surgery   Week 3 attempt successful? Yes   Call start time 1748   Call end time 1754   Discharge diagnosis Lap ileum colon resection for terminal ileum mass   Meds reviewed with patient/caregiver? Yes   Is the patient having any side effects they believe may be caused by any medication additions or changes? No   Does the patient have all medications related to this admission filled (includes all antibiotics, pain medications, etc.) Yes   Does the patient have a follow up appointment scheduled with their surgeon? Yes   Has the patient kept scheduled appointments due by today? Yes   What is the patient's perception of their health status since discharge? Improving   Nursing interventions Nurse provided patient education   If the patient is a current smoker, are they able to teach back resources for cessation? Not a smoker   Is the patient/caregiver able to teach back the hierarchy of who to call/visit for symptoms/problems? PCP, Specialist, Home health nurse, Urgent Care, ED, 911 Yes   Additional teach back comments [facility] number   Week 3 call completed? Yes   Revoked No further contact(revokes)-requires comment   Is the patient interested in additional calls from an ambulatory ?  NOTE:  applies to high risk patients requiring additional follow-up. No   Graduated/Revoked comments Doing well,  released by surgeon,  no further calls requested   Wrap up additional comments Improved,  no questions or concerns          Анна Horta RN

## 2022-02-28 NOTE — PROGRESS NOTES
Postoperative visit    Laparoscopic ileocolic resection 2/3/2022    Pathology: Well-differentiated (G1) neuroendocrine tumor, all margins are free of tumor, 4 of 12 regional lymph nodes positive    Office visit: Incisions are healing well and he is doing well.  Activity restrictions discussed.  We have made him an appointment with [facility].  I recommended a 1 year surveillance colonoscopy.

## 2022-03-14 ENCOUNTER — LAB (OUTPATIENT)
Dept: LAB | Facility: HOSPITAL | Age: 67
End: 2022-03-14

## 2022-03-14 ENCOUNTER — CONSULT (OUTPATIENT)
Dept: ONCOLOGY | Facility: CLINIC | Age: 67
End: 2022-03-14

## 2022-03-14 VITALS
WEIGHT: 249.7 LBS | HEART RATE: 57 BPM | TEMPERATURE: 97.6 F | SYSTOLIC BLOOD PRESSURE: 142 MMHG | RESPIRATION RATE: 16 BRPM | BODY MASS INDEX: 32.05 KG/M2 | OXYGEN SATURATION: 99 % | DIASTOLIC BLOOD PRESSURE: 69 MMHG | HEIGHT: 74 IN

## 2022-03-14 DIAGNOSIS — C7A.012 MALIGNANT CARCINOID TUMOR OF ILEUM: Primary | ICD-10-CM

## 2022-03-14 DIAGNOSIS — D3A.8 NEUROENDOCRINE TUMOR: Primary | ICD-10-CM

## 2022-03-14 DIAGNOSIS — D50.0 IRON DEFICIENCY ANEMIA DUE TO CHRONIC BLOOD LOSS: ICD-10-CM

## 2022-03-14 LAB
BASOPHILS # BLD AUTO: 0.11 10*3/MM3 (ref 0–0.2)
BASOPHILS NFR BLD AUTO: 1.3 % (ref 0–1.5)
DEPRECATED RDW RBC AUTO: 55.8 FL (ref 37–54)
EOSINOPHIL # BLD AUTO: 0.51 10*3/MM3 (ref 0–0.4)
EOSINOPHIL NFR BLD AUTO: 5.9 % (ref 0.3–6.2)
ERYTHROCYTE [DISTWIDTH] IN BLOOD BY AUTOMATED COUNT: 21.8 % (ref 12.3–15.4)
HCT VFR BLD AUTO: 34.3 % (ref 37.5–51)
HGB BLD-MCNC: 10.5 G/DL (ref 13–17.7)
IMM GRANULOCYTES # BLD AUTO: 0.06 10*3/MM3 (ref 0–0.05)
IMM GRANULOCYTES NFR BLD AUTO: 0.7 % (ref 0–0.5)
LYMPHOCYTES # BLD AUTO: 1.52 10*3/MM3 (ref 0.7–3.1)
LYMPHOCYTES NFR BLD AUTO: 17.5 % (ref 19.6–45.3)
MCH RBC QN AUTO: 22.1 PG (ref 26.6–33)
MCHC RBC AUTO-ENTMCNC: 30.6 G/DL (ref 31.5–35.7)
MCV RBC AUTO: 72.1 FL (ref 79–97)
MONOCYTES # BLD AUTO: 0.63 10*3/MM3 (ref 0.1–0.9)
MONOCYTES NFR BLD AUTO: 7.2 % (ref 5–12)
NEUTROPHILS NFR BLD AUTO: 5.87 10*3/MM3 (ref 1.7–7)
NEUTROPHILS NFR BLD AUTO: 67.4 % (ref 42.7–76)
NRBC BLD AUTO-RTO: 0 /100 WBC (ref 0–0.2)
PLATELET # BLD AUTO: 294 10*3/MM3 (ref 140–450)
PMV BLD AUTO: 9 FL (ref 6–12)
RBC # BLD AUTO: 4.76 10*6/MM3 (ref 4.14–5.8)
WBC NRBC COR # BLD: 8.7 10*3/MM3 (ref 3.4–10.8)

## 2022-03-14 PROCEDURE — 36415 COLL VENOUS BLD VENIPUNCTURE: CPT

## 2022-03-14 PROCEDURE — 85025 COMPLETE CBC W/AUTO DIFF WBC: CPT

## 2022-03-14 PROCEDURE — 99204 OFFICE O/P NEW MOD 45 MIN: CPT | Performed by: INTERNAL MEDICINE

## 2022-03-14 RX ORDER — FERROUS SULFATE 325(65) MG
325 TABLET ORAL
Qty: 30 TABLET | Refills: 3 | Status: SHIPPED | OUTPATIENT
Start: 2022-03-14 | End: 2022-06-10

## 2022-03-14 RX ORDER — PROCHLORPERAZINE MALEATE 10 MG
10 TABLET ORAL EVERY 6 HOURS PRN
Qty: 60 TABLET | Refills: 2 | Status: SHIPPED | OUTPATIENT
Start: 2022-03-14 | End: 2022-05-05

## 2022-03-14 NOTE — PROGRESS NOTES
Subjective     REASON FOR CONSULTATION:  Carcinoid tumor ileum  Provide an opinion on any further workup or treatment                             REQUESTING PHYSICIAN:  Dr. Donovan    RECORDS OBTAINED:  Records of the patients history including those obtained from the referring provider were reviewed and summarized in detail.    History of Present Illness   This is a very pleasant 67-year-old man who has medical history pertinent for coronary artery disease, diabetes, hypertension, hyperlipidemia, sleep apnea.  He recently presented with 50 pound weight loss, nausea, decreased appetite, and hematochezia.  The patient was noted to have iron deficiency anemia and was admitted to the hospital in October 2021 with hemoglobin 7.7 requiring transfusion support.  A CT of the abdomen and pelvis on 10/30/2021 showed some fatty infiltration in the liver, small mural lipoma in the wall of the cecum and slight prostamegaly.  The patient continued to have symptoms, and a repeat CT abdomen/pelvis on 11/19/2021 showed a 3 cm mass in the terminal ileum with enlarged lymph nodes adjacent up to 1.4 cm.  There was no evidence of small bowel obstruction.  There was a 2 cm lipoma in the cecum and a 2.4 cm lipoma in the ascending colon which were stable from previous.  The liver was unremarkable.    The patient was taken to the operating room on 2/3/2022 by Dr. Donovan and underwent a laparoscopic ileocolonic resection.  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  The tumor measured 2.5 cm in greatest dimension and invaded the muscularis propria into the subserosal tissue without penetration of the serosa.  There was lymphovascular but no perineural invasion.  12 lymph nodes were resected for of which were positive for tumor.  Final pathology pT3 N2 M0.    The patient continues to have some degree of nausea and  poor appetite since surgery and reports a continued 15 pound weight loss beyond surgery.  He is not having bright red blood per rectum.    Past Medical History:   Diagnosis Date   • Anemia    • AP (angina pectoris) (HCC)     unstable   • CAD (coronary artery disease)     6 stents placed   • DDD (degenerative disc disease), lumbosacral    • Diabetes (HCC)    • Essential hypertension    • Fatty liver     ?   • History of Clostridioides difficile infection 10/2021   • History of MI (myocardial infarction)    • Hyperlipidemia    • Low back pain    • Mass of colon    • Memory loss     SOME SHORT TERM MEMORY ISSUES AND FORGETFULNESS   • Osteoarthritis of knee    • Screening for prostate cancer 2010    normal   • Skin cancer     left eyebrow, side of face and back    • Sleep apnea     NO CPAP   • Tachycardia         Past Surgical History:   Procedure Laterality Date   • COLON RESECTION N/A 2/3/2022    Procedure: ILEUM COLON RESECTION FOR TERMINAL ILEUM MASS LAPAROSCOPIC;  Surgeon: Roscoe Donovan MD;  Location: [facility] MAIN OR;  Service: General;  Laterality: N/A;   • COLONOSCOPY N/A 2016    normal per patient   • COLONOSCOPY N/A 8/3/2020    Procedure: COLONOSCOPY to cecum and TI with cold biopsies;  Surgeon: Kelly Ashraf MD;  Location: [facility] ENDOSCOPY;  Service: Gastroenterology;  Laterality: N/A;  pre-change in bowel habits, hx polyps   post-hemorrhoids, lipomas, abnormal cecal tissue    • CORONARY ANGIOPLASTY WITH STENT PLACEMENT  08/28/2015    6 stents-Dr. Hall, [facility]   • FOOT SURGERY Right 05/23/2017    Dr. Cervantes, [facility]   • REPLACEMENT TOTAL KNEE Left 2010    Dr. Arnaud Reynaga, [facility]   • REPLACEMENT TOTAL KNEE Right 2009    Dr. Arnaud Reynaga, [facility]   • SKIN BIOPSY     • SKIN CANCER EXCISION Left 03/30/2016    left ear and left eyebrow, graft took from face, placed on ear   • SKIN CANCER EXCISION  2001    back        Current Outpatient Medications on File Prior to Visit   Medication Sig Dispense Refill    • amLODIPine (NORVASC) 5 MG tablet Take 1 tablet by mouth Daily. (Patient taking differently: Take 5 mg by mouth Every Night.) 90 tablet 1   • aspirin 81 MG EC tablet Take 81 mg by mouth Daily. HOLDING FOR SURGERY, LAST DOSE 1/31/22     • atorvastatin (LIPITOR) 40 MG tablet Take 40 mg by mouth Every Night.     • clopidogrel (PLAVIX) 75 MG tablet Take 75 mg by mouth Daily. PT HOLDING FOR SURGERY LAST DOSE 1/31/22     • Iron-Vitamin C  MG tablet Take 1 tablet by mouth Daily. 60 tablet 1   • losartan (COZAAR) 100 MG tablet Take 100 mg by mouth Every Night.     • metFORMIN ER (GLUCOPHAGE-XR) 500 MG 24 hr tablet Take 500 mg by mouth 2 (Two) Times a Day.     • promethazine (PHENERGAN) 12.5 MG tablet Take 1 tablet by mouth Every 6 (Six) Hours As Needed for Nausea or Vomiting. 30 tablet 3   • sertraline (ZOLOFT) 100 MG tablet TAKE 1 TABLET BY MOUTH EVERY DAY 90 tablet 0   • Suvorexant (Belsomra) 20 MG tablet Take 20 mg by mouth At Night As Needed (Sleep).       No current facility-administered medications on file prior to visit.        ALLERGIES:    Allergies   Allergen Reactions   • Ambien [Zolpidem] Other (See Comments)     Sleep walking     • Morphine And Related Itching and Rash        Social History     Socioeconomic History   • Marital status:      Spouse name: Jaylene   Tobacco Use   • Smoking status: Never Smoker   • Smokeless tobacco: Never Used   • Tobacco comment: Caffeine daily   Vaping Use   • Vaping Use: Never used   Substance and Sexual Activity   • Alcohol use: No   • Drug use: Not Currently   • Sexual activity: Defer     Partners: Female        Family History   Problem Relation Age of Onset   • Hypertension Mother    • Arthritis Mother    • Diabetes Mother    • Heart disease Father    • Hypertension Father    • Macular degeneration Father    • Arthritis Father    • Diabetes Father    • Arthritis Maternal Grandmother    • Heart disease Maternal Grandmother    • Arthritis Maternal Grandfather    •  "Heart disease Maternal Grandfather    • Diabetes Maternal Grandfather    • Hypertension Maternal Grandfather    • Colon cancer Maternal Grandfather    • Arthritis Paternal Grandmother    • Heart disease Paternal Grandmother    • Diabetes Paternal Grandmother    • Hypertension Paternal Grandmother    • Stroke Paternal Grandmother    • Colon cancer Paternal Grandmother    • Arthritis Paternal Grandfather    • Heart disease Paternal Grandfather    • Colon cancer Paternal Grandfather    • Macular degeneration Sister    • Malig Hyperthermia Neg Hx         Review of Systems   Constitutional: Positive for appetite change and unexpected weight change. Negative for fatigue.   HENT: Negative.    Respiratory: Negative for cough and shortness of breath.    Cardiovascular: Negative for chest pain and palpitations.   Gastrointestinal: Positive for nausea. Negative for blood in stool, constipation, diarrhea and vomiting.   Genitourinary: Negative.    Musculoskeletal: Negative.    Skin: Negative.    Allergic/Immunologic: Negative.    Neurological: Negative.    Hematological: Negative.    Psychiatric/Behavioral: Negative.         Objective     Vitals:    03/14/22 1412   BP: 142/69   Pulse: 57   Resp: 16   Temp: 97.6 °F (36.4 °C)   TempSrc: Temporal   SpO2: 99%   Weight: 113 kg (249 lb 11.2 oz)   Height: 188 cm (74.02\")   PainSc: 0-No pain     No flowsheet data found.    Physical Exam    CONSTITUTIONAL: pleasant well-developed adult man  HEENT: no icterus, no thrush, moist membranes  NECK: no jvd  LYMPH: no cervical or supraclavicular lad  CV: RRR, S1S2, no murmur  RESP: cta bilat, no wheezing, no rales  GI: soft, non-tender, no splenomegaly, +bs, laparoscopic scars healing well  MUSC: no edema, normal gait  NEURO: alert and oriented x3, normal strength  PSYCH: normal mood, mild anxious affect    RECENT LABS:  Hematology WBC   Date Value Ref Range Status   03/14/2022 8.70 3.40 - 10.80 10*3/mm3 Final   12/23/2021 7.9 3.4 - 10.8 " x10E3/uL Final     RBC   Date Value Ref Range Status   03/14/2022 4.76 4.14 - 5.80 10*6/mm3 Final   12/23/2021 3.97 (L) 4.14 - 5.80 x10E6/uL Final     Hemoglobin   Date Value Ref Range Status   03/14/2022 10.5 (L) 13.0 - 17.7 g/dL Final     Hematocrit   Date Value Ref Range Status   03/14/2022 34.3 (L) 37.5 - 51.0 % Final     Platelets   Date Value Ref Range Status   03/14/2022 294 140 - 450 10*3/mm3 Final        Lab Results   Component Value Date    GLUCOSE 130 (H) 02/04/2022    BUN 12 02/04/2022    CREATININE 0.83 02/04/2022    EGFRIFNONA 93 02/04/2022    EGFRIFAFRI 103 10/28/2021    BCR 14.5 02/04/2022    K 3.9 02/04/2022    CO2 22.1 02/04/2022    CALCIUM 8.9 02/04/2022    PROTENTOTREF 6.1 10/28/2021    ALBUMIN 4.50 11/18/2021    LABIL2 1.7 10/28/2021    AST 20 11/18/2021    ALT 9 11/18/2021     IMPRESSION CT abdomen/pelvis 11/19/2021  1. 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 The appearance  can be seen with lymphoma, carcinoid tumor, and metastatic carcinoma.  Surgical consultation is recommended.  2. There is a 2 cm lipoma at the cecum and a 2.5 cm lipoma at the  ascending colon.  3. There is cholelithiasis without cholecystitis.       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  The tumor measured 2.5 cm in greatest dimension and invaded the muscularis propria into the subserosal tissue without penetration of the  serosa.  There was lymphovascular but no perineural invasion.  12 lymph nodes were resected for of which were positive for tumor.  Final pathology pT3 N2 M0.    *Microcytic, hypochromic anemia  · Labs look consistent with iron deficiency anemia secondary to GI blood loss related to his malignancy  · Hemoglobin 10.5/MCV 72.1    *Continued nausea/weight loss since tumor resection    *Multiple comorbidities of CAD, hypertension, hyperlipidemia, sleep apnea etc.    Hematology/oncology plan/recommendations:  1. Imaging and pathologic results reviewed with the patient and his wife who was on speaker phone today  2. I discussed with the patient that for this histologic subtype of tumor, no adjuvant chemotherapy is generally recommended  3. I am a little concerned about his continued weight loss and nausea; I am going to request a neuroendocrine PET scan to make sure no evidence of residual disease status post ileocolonic resection-we will call the patient with results.  4. Assuming no evidence of disease on neuroendocrine PET, the patient will require surveillance with CT chest abdomen pelvis every 6 months for 3 years and then yearly until year 10.  No need for biochemical monitoring unless the patient develops symptoms of diarrhea, flushing etc. or evidence of metastatic disease  5. Begin oral ferrous sulfate 325 mg once daily to replace iron stores; if the patient continues to have nausea and weight loss consider discontinuation of oral iron and IV iron replacement  6. Begin Compazine 10 mg 1 p.o. as needed nausea    Thank you for allowing me to participate in the care of this pleasant patient.

## 2022-03-15 ENCOUNTER — TELEPHONE (OUTPATIENT)
Dept: ONCOLOGY | Facility: CLINIC | Age: 67
End: 2022-03-15

## 2022-03-15 DIAGNOSIS — I25.10 ATHEROSCLEROTIC HEART DISEASE OF NATIVE CORONARY ARTERY WITHOUT ANGINA PECTORIS: ICD-10-CM

## 2022-03-15 RX ORDER — CLOPIDOGREL BISULFATE 75 MG/1
TABLET ORAL
Qty: 90 TABLET | Refills: 3 | Status: SHIPPED | OUTPATIENT
Start: 2022-03-15 | End: 2023-01-16

## 2022-03-15 NOTE — TELEPHONE ENCOUNTER
----- Message from Sindi Turner sent at 3/15/2022 10:47 AM EDT -----  Regarding: [employer] scheduled  incorrectlyu  Can I come upstairs and look at this referral on your screens?  I want to see if there is something listed to help the  realize its a speciality PET.    On my end, Dr Velasquez ordered it correctly and choose [employer] as the isotope.    Let me know and I will run up.    This patient will need to be rescheduled also.   These need to be scheduled 2 weeks out to allow for Compliance and getting the isotope ordered.    Thanks, sindi

## 2022-03-16 DIAGNOSIS — I10 ESSENTIAL (PRIMARY) HYPERTENSION: ICD-10-CM

## 2022-03-16 DIAGNOSIS — E78.2 MIXED HYPERLIPIDEMIA: ICD-10-CM

## 2022-03-16 RX ORDER — ATORVASTATIN CALCIUM 40 MG/1
TABLET, FILM COATED ORAL
Qty: 90 TABLET | Refills: 1 | Status: SHIPPED | OUTPATIENT
Start: 2022-03-16 | End: 2022-09-14

## 2022-03-16 RX ORDER — HYDROCHLOROTHIAZIDE 25 MG/1
TABLET ORAL
Qty: 90 TABLET | Refills: 1 | OUTPATIENT
Start: 2022-03-16

## 2022-03-16 RX ORDER — LOSARTAN POTASSIUM 100 MG/1
TABLET ORAL
Qty: 90 TABLET | Refills: 1 | Status: SHIPPED | OUTPATIENT
Start: 2022-03-16 | End: 2022-09-14

## 2022-03-29 ENCOUNTER — HOSPITAL ENCOUNTER (OUTPATIENT)
Dept: PET IMAGING | Facility: HOSPITAL | Age: 67
Discharge: HOME | End: 2022-03-29

## 2022-03-29 ENCOUNTER — OFFICE VISIT (OUTPATIENT)
Dept: GASTROENTEROLOGY | Facility: CLINIC | Age: 67
End: 2022-03-29

## 2022-03-29 VITALS
DIASTOLIC BLOOD PRESSURE: 72 MMHG | WEIGHT: 255 LBS | BODY MASS INDEX: 32.73 KG/M2 | HEIGHT: 74 IN | SYSTOLIC BLOOD PRESSURE: 138 MMHG

## 2022-03-29 DIAGNOSIS — C7A.012 MALIGNANT CARCINOID TUMOR OF ILEUM: ICD-10-CM

## 2022-03-29 DIAGNOSIS — Z98.890 NEUROENDOCRINE TUMOR STATUS POST SURGICAL TREATMENT: ICD-10-CM

## 2022-03-29 DIAGNOSIS — K59.09 OTHER CONSTIPATION: Chronic | ICD-10-CM

## 2022-03-29 DIAGNOSIS — R11.0 NAUSEA: ICD-10-CM

## 2022-03-29 DIAGNOSIS — D50.9 IRON DEFICIENCY ANEMIA, UNSPECIFIED IRON DEFICIENCY ANEMIA TYPE: Primary | Chronic | ICD-10-CM

## 2022-03-29 PROCEDURE — A9592 COPPER CU 64 DOTATATE 1 MCI/ML SOLUTION: HCPCS | Performed by: INTERNAL MEDICINE

## 2022-03-29 PROCEDURE — 0 COPPER CU 64 DOTATATE 1 MCI/ML SOLUTION: Performed by: INTERNAL MEDICINE

## 2022-03-29 PROCEDURE — 78815 PET IMAGE W/CT SKULL-THIGH: CPT

## 2022-03-29 PROCEDURE — 99214 OFFICE O/P EST MOD 30 MIN: CPT | Performed by: INTERNAL MEDICINE

## 2022-03-29 RX ADMIN — COPPER CU 64 DOTATATE 1 DOSE: 1 INJECTION, SOLUTION INTRAVENOUS at 13:57

## 2022-03-29 NOTE — PROGRESS NOTES
Chief Complaint   Patient presents with   • Diarrhea   • Anemia       Subjective     HPI    Angel White Jr. is a 67 y.o. male with a past medical history noted below who presents for recent C. difficile colitis, terminal ileum mass, anemia.    He underwent surgery with Dr. Donovan on February 3, 2022 for ileal resection and right hemicolectomy.  This returned as a T3N2 carcinoid tumor.  He is now seeing Dr. Velasquez.  He will have a PET scan later today to assess his disease.    He is still anemic.  Dr Velasquez just put him on iron supplementation.  He denies that he has any overt bleeding    Feels that he is going to throw up when he needs to have a BM.  It does take him a while to have a BM, spends about 10 minutes in the process.  He is fine after he has a bowel movement.  He does have a little bit of clear drainage following bowel movements.    Eating smaller portions, trying to maintain his current weight.  He does have a good appetite.    Today's visit was in the office.  Both the patient and I were wearing face masks and proper hand hygiene was performed before and after the physical exam.           Current Outpatient Medications:   •  amLODIPine (NORVASC) 5 MG tablet, Take 1 tablet by mouth Daily. (Patient taking differently: Take 5 mg by mouth Every Night.), Disp: 90 tablet, Rfl: 1  •  aspirin 81 MG EC tablet, Take 81 mg by mouth Daily. HOLDING FOR SURGERY, LAST DOSE 1/31/22, Disp: , Rfl:   •  atorvastatin (LIPITOR) 40 MG tablet, TAKE 1 TABLET BY MOUTH EVERY DAY, Disp: 90 tablet, Rfl: 1  •  clopidogrel (PLAVIX) 75 MG tablet, TAKE 1 TABLET BY MOUTH EVERY DAY, Disp: 90 tablet, Rfl: 3  •  ferrous sulfate 325 (65 FE) MG tablet, Take 1 tablet by mouth Daily With Breakfast., Disp: 30 tablet, Rfl: 3  •  Iron-Vitamin C  MG tablet, Take 1 tablet by mouth Daily., Disp: 60 tablet, Rfl: 1  •  losartan (COZAAR) 100 MG tablet, TAKE 1 TABLET BY MOUTH EVERY DAY, Disp: 90 tablet, Rfl: 1  •  metFORMIN ER (GLUCOPHAGE-XR)  500 MG 24 hr tablet, Take 500 mg by mouth 2 (Two) Times a Day., Disp: , Rfl:   •  prochlorperazine (COMPAZINE) 10 MG tablet, Take 1 tablet by mouth Every 6 (Six) Hours As Needed for Nausea or Vomiting., Disp: 60 tablet, Rfl: 2  •  promethazine (PHENERGAN) 12.5 MG tablet, Take 1 tablet by mouth Every 6 (Six) Hours As Needed for Nausea or Vomiting., Disp: 30 tablet, Rfl: 3  •  sertraline (ZOLOFT) 100 MG tablet, TAKE 1 TABLET BY MOUTH EVERY DAY, Disp: 90 tablet, Rfl: 0  •  Suvorexant (Belsomra) 20 MG tablet, Take 20 mg by mouth At Night As Needed (Sleep)., Disp: , Rfl:       Objective     Vitals:    03/29/22 1313   BP: 138/72         03/29/22  1313   Weight: 116 kg (255 lb)     Body mass index is 32.74 kg/m².    Physical Exam  Constitutional:       General: He is not in acute distress.  Pulmonary:      Effort: Pulmonary effort is normal.   Neurological:      Mental Status: He is alert and oriented to person, place, and time.   Psychiatric:         Mood and Affect: Mood normal.         Behavior: Behavior normal.         Thought Content: Thought content normal.         Judgment: Judgment normal.             WBC   Date Value Ref Range Status   03/14/2022 8.70 3.40 - 10.80 10*3/mm3 Final   12/23/2021 7.9 3.4 - 10.8 x10E3/uL Final     RBC   Date Value Ref Range Status   03/14/2022 4.76 4.14 - 5.80 10*6/mm3 Final   12/23/2021 3.97 (L) 4.14 - 5.80 x10E6/uL Final     Hemoglobin   Date Value Ref Range Status   03/14/2022 10.5 (L) 13.0 - 17.7 g/dL Final     Hematocrit   Date Value Ref Range Status   03/14/2022 34.3 (L) 37.5 - 51.0 % Final     MCV   Date Value Ref Range Status   03/14/2022 72.1 (L) 79.0 - 97.0 fL Final     MCH   Date Value Ref Range Status   03/14/2022 22.1 (L) 26.6 - 33.0 pg Final     MCHC   Date Value Ref Range Status   03/14/2022 30.6 (L) 31.5 - 35.7 g/dL Final     RDW   Date Value Ref Range Status   03/14/2022 21.8 (H) 12.3 - 15.4 % Final     RDW-SD   Date Value Ref Range Status   03/14/2022 55.8 (H) 37.0 -  54.0 fl Final     MPV   Date Value Ref Range Status   03/14/2022 9.0 6.0 - 12.0 fL Final     Platelets   Date Value Ref Range Status   03/14/2022 294 140 - 450 10*3/mm3 Final     Neutrophil %   Date Value Ref Range Status   03/14/2022 67.4 42.7 - 76.0 % Final     Lymphocyte %   Date Value Ref Range Status   03/14/2022 17.5 (L) 19.6 - 45.3 % Final     Monocyte %   Date Value Ref Range Status   03/14/2022 7.2 5.0 - 12.0 % Final     Eosinophil %   Date Value Ref Range Status   03/14/2022 5.9 0.3 - 6.2 % Final     Basophil %   Date Value Ref Range Status   03/14/2022 1.3 0.0 - 1.5 % Final     Immature Grans %   Date Value Ref Range Status   03/14/2022 0.7 (H) 0.0 - 0.5 % Final     Neutrophils, Absolute   Date Value Ref Range Status   03/14/2022 5.87 1.70 - 7.00 10*3/mm3 Final     Lymphocytes, Absolute   Date Value Ref Range Status   03/14/2022 1.52 0.70 - 3.10 10*3/mm3 Final     Monocytes, Absolute   Date Value Ref Range Status   03/14/2022 0.63 0.10 - 0.90 10*3/mm3 Final     Eosinophils, Absolute   Date Value Ref Range Status   03/14/2022 0.51 (H) 0.00 - 0.40 10*3/mm3 Final     Basophils, Absolute   Date Value Ref Range Status   03/14/2022 0.11 0.00 - 0.20 10*3/mm3 Final     Immature Grans, Absolute   Date Value Ref Range Status   03/14/2022 0.06 (H) 0.00 - 0.05 10*3/mm3 Final     nRBC   Date Value Ref Range Status   03/14/2022 0.0 0.0 - 0.2 /100 WBC Final       Lab Results   Component Value Date    GLUCOSE 130 (H) 02/04/2022    BUN 12 02/04/2022    CREATININE 0.83 02/04/2022    EGFRIFNONA 93 02/04/2022    EGFRIFAFRI 103 10/28/2021    BCR 14.5 02/04/2022    CO2 22.1 02/04/2022    CALCIUM 8.9 02/04/2022    PROTENTOTREF 6.1 10/28/2021    ALBUMIN 4.50 11/18/2021    LABIL2 1.7 10/28/2021    AST 20 11/18/2021    ALT 9 11/18/2021         Imaging Results (Last 7 Days)     ** No results found for the last 168 hours. **          I personally reviewed data as detailed below:     The labs listed above.    Office notes from:  3/14/22 oncology note    Pathology from: 2/2022    1. Colon and Ileum, Right Hemicolectomy:               A. Well-differentiated neuroendocrine tumor, G1.               B. All margins are free of tumor.               C. Fibrous obliteration of appendix.               D. See synoptic template for complete tumor details.              Assessment/Plan    1.  Iron deficiency anemia: I suspect this is related to the carcinoid tumor.  He is now on iron supplementation    2.  Constipation: Mild but it seems to be giving him some problems    3.  Nausea: With above    4.  Neuroendocrine tumor of the small bowel: Status post resection.    Plan  I would like to see how he does with iron supplementation.  If he persists with anemia despite supplementation, he is going to need an EGD for further evaluation of his anemia  I will follow-up his PET scan  Encouraged use of daily fiber, such as psyllium or polycarbophil to facilitate bowel movements.  I think this will help facilitate his bowel movements, reduce nausea and minimize the drainage that he is having after his bowel movements  Follow-up in 3 months, sooner if needed    Diagnoses and all orders for this visit:    1. Iron deficiency anemia, unspecified iron deficiency anemia type (Primary)    2. Other constipation    3. Nausea    4. Neuroendocrine tumor status post surgical treatment        I have discussed the above plan with the patient.  They verbalize understanding and are in agreement with the plan.  They have been advised to contact the office for any questions, concerns, or changes related to their health.    Dictated utilizing Dragon dictation

## 2022-03-30 ENCOUNTER — TELEPHONE (OUTPATIENT)
Dept: ONCOLOGY | Facility: CLINIC | Age: 67
End: 2022-03-30

## 2022-03-30 DIAGNOSIS — C7A.012 MALIGNANT CARCINOID TUMOR OF ILEUM: Primary | ICD-10-CM

## 2022-03-30 DIAGNOSIS — D50.0 IRON DEFICIENCY ANEMIA DUE TO CHRONIC BLOOD LOSS: ICD-10-CM

## 2022-03-30 NOTE — TELEPHONE ENCOUNTER
Called pt to review his PET results and Dr. Velasquez's recommendations for follow up. Orders placed and message sent to scheduling to arrange for scans and MD [email or web address]/[email or web address].

## 2022-03-30 NOTE — TELEPHONE ENCOUNTER
----- Message from Akash Velasquez MD sent at 3/30/2022 11:08 AM EDT -----  Please let the patient know that his PET scan was negative for residual cancer.  He can follow-up in 6 months CBC CMP ferritin iron profile few days prior CT chest abdomen pelvis

## 2022-05-05 ENCOUNTER — OFFICE VISIT (OUTPATIENT)
Dept: INTERNAL MEDICINE | Facility: CLINIC | Age: 67
End: 2022-05-05

## 2022-05-05 ENCOUNTER — LAB (OUTPATIENT)
Dept: LAB | Facility: HOSPITAL | Age: 67
End: 2022-05-05

## 2022-05-05 VITALS
SYSTOLIC BLOOD PRESSURE: 119 MMHG | OXYGEN SATURATION: 98 % | HEART RATE: 62 BPM | WEIGHT: 247 LBS | BODY MASS INDEX: 31.7 KG/M2 | DIASTOLIC BLOOD PRESSURE: 82 MMHG | HEIGHT: 74 IN

## 2022-05-05 DIAGNOSIS — E11.51 CONTROLLED TYPE 2 DIABETES MELLITUS WITH DIABETIC PERIPHERAL ANGIOPATHY WITHOUT GANGRENE, WITHOUT LONG-TERM CURRENT USE OF INSULIN: ICD-10-CM

## 2022-05-05 DIAGNOSIS — E66.09 CLASS 1 OBESITY DUE TO EXCESS CALORIES WITH SERIOUS COMORBIDITY AND BODY MASS INDEX (BMI) OF 31.0 TO 31.9 IN ADULT: ICD-10-CM

## 2022-05-05 DIAGNOSIS — D50.0 IRON DEFICIENCY ANEMIA DUE TO CHRONIC BLOOD LOSS: ICD-10-CM

## 2022-05-05 DIAGNOSIS — B35.1 ONYCHOMYCOSIS: ICD-10-CM

## 2022-05-05 DIAGNOSIS — I10 ESSENTIAL HYPERTENSION: Primary | ICD-10-CM

## 2022-05-05 DIAGNOSIS — E78.2 MIXED HYPERLIPIDEMIA: ICD-10-CM

## 2022-05-05 LAB
ALBUMIN SERPL-MCNC: 4.1 G/DL (ref 3.5–5.2)
ALBUMIN/GLOB SERPL: 1.3 G/DL
ALP SERPL-CCNC: 98 U/L (ref 39–117)
ALT SERPL W P-5'-P-CCNC: 8 U/L (ref 1–41)
ANION GAP SERPL CALCULATED.3IONS-SCNC: 9.3 MMOL/L (ref 5–15)
AST SERPL-CCNC: 13 U/L (ref 1–40)
BASOPHILS # BLD AUTO: 0.09 10*3/MM3 (ref 0–0.2)
BASOPHILS NFR BLD AUTO: 1 % (ref 0–1.5)
BILIRUB SERPL-MCNC: 0.3 MG/DL (ref 0–1.2)
BUN SERPL-MCNC: 11 MG/DL (ref 8–23)
BUN/CREAT SERPL: 11 (ref 7–25)
CALCIUM SPEC-SCNC: 9.5 MG/DL (ref 8.6–10.5)
CHLORIDE SERPL-SCNC: 106 MMOL/L (ref 98–107)
CHOLEST SERPL-MCNC: 148 MG/DL (ref 0–200)
CO2 SERPL-SCNC: 25.7 MMOL/L (ref 22–29)
CREAT SERPL-MCNC: 1 MG/DL (ref 0.76–1.27)
DEPRECATED RDW RBC AUTO: 55.5 FL (ref 37–54)
EGFRCR SERPLBLD CKD-EPI 2021: 83 ML/MIN/1.73
EOSINOPHIL # BLD AUTO: 0.37 10*3/MM3 (ref 0–0.4)
EOSINOPHIL NFR BLD AUTO: 4.1 % (ref 0.3–6.2)
ERYTHROCYTE [DISTWIDTH] IN BLOOD BY AUTOMATED COUNT: 20.2 % (ref 12.3–15.4)
GLOBULIN UR ELPH-MCNC: 3.2 GM/DL
GLUCOSE SERPL-MCNC: 98 MG/DL (ref 65–99)
HBA1C MFR BLD: 5.9 % (ref 4.8–5.6)
HCT VFR BLD AUTO: 41 % (ref 37.5–51)
HDLC SERPL-MCNC: 52 MG/DL (ref 40–60)
HGB BLD-MCNC: 13.2 G/DL (ref 13–17.7)
IMM GRANULOCYTES # BLD AUTO: 0.02 10*3/MM3 (ref 0–0.05)
IMM GRANULOCYTES NFR BLD AUTO: 0.2 % (ref 0–0.5)
LDLC SERPL CALC-MCNC: 80 MG/DL (ref 0–100)
LDLC/HDLC SERPL: 1.52 {RATIO}
LYMPHOCYTES # BLD AUTO: 1.63 10*3/MM3 (ref 0.7–3.1)
LYMPHOCYTES NFR BLD AUTO: 18.3 % (ref 19.6–45.3)
MCH RBC QN AUTO: 25.5 PG (ref 26.6–33)
MCHC RBC AUTO-ENTMCNC: 32.2 G/DL (ref 31.5–35.7)
MCV RBC AUTO: 79.2 FL (ref 79–97)
MONOCYTES # BLD AUTO: 0.55 10*3/MM3 (ref 0.1–0.9)
MONOCYTES NFR BLD AUTO: 6.2 % (ref 5–12)
NEUTROPHILS NFR BLD AUTO: 6.26 10*3/MM3 (ref 1.7–7)
NEUTROPHILS NFR BLD AUTO: 70.2 % (ref 42.7–76)
NRBC BLD AUTO-RTO: 0 /100 WBC (ref 0–0.2)
PLATELET # BLD AUTO: 292 10*3/MM3 (ref 140–450)
PMV BLD AUTO: 9.3 FL (ref 6–12)
POTASSIUM SERPL-SCNC: 4.7 MMOL/L (ref 3.5–5.2)
PROT SERPL-MCNC: 7.3 G/DL (ref 6–8.5)
RBC # BLD AUTO: 5.18 10*6/MM3 (ref 4.14–5.8)
SODIUM SERPL-SCNC: 141 MMOL/L (ref 136–145)
TRIGL SERPL-MCNC: 86 MG/DL (ref 0–150)
VLDLC SERPL-MCNC: 16 MG/DL (ref 5–40)
WBC NRBC COR # BLD: 8.92 10*3/MM3 (ref 3.4–10.8)

## 2022-05-05 PROCEDURE — G0439 PPPS, SUBSEQ VISIT: HCPCS | Performed by: FAMILY MEDICINE

## 2022-05-05 PROCEDURE — 80061 LIPID PANEL: CPT | Performed by: FAMILY MEDICINE

## 2022-05-05 PROCEDURE — 99214 OFFICE O/P EST MOD 30 MIN: CPT | Performed by: FAMILY MEDICINE

## 2022-05-05 PROCEDURE — 85025 COMPLETE CBC W/AUTO DIFF WBC: CPT | Performed by: FAMILY MEDICINE

## 2022-05-05 PROCEDURE — 1126F AMNT PAIN NOTED NONE PRSNT: CPT | Performed by: FAMILY MEDICINE

## 2022-05-05 PROCEDURE — 80053 COMPREHEN METABOLIC PANEL: CPT | Performed by: FAMILY MEDICINE

## 2022-05-05 PROCEDURE — 83036 HEMOGLOBIN GLYCOSYLATED A1C: CPT | Performed by: FAMILY MEDICINE

## 2022-05-05 PROCEDURE — 1170F FXNL STATUS ASSESSED: CPT | Performed by: FAMILY MEDICINE

## 2022-05-05 PROCEDURE — 1159F MED LIST DOCD IN RCRD: CPT | Performed by: FAMILY MEDICINE

## 2022-05-05 PROCEDURE — 36415 COLL VENOUS BLD VENIPUNCTURE: CPT | Performed by: FAMILY MEDICINE

## 2022-05-05 NOTE — PROGRESS NOTES
The ABCs of the Annual Wellness Visit  Subsequent Medicare Wellness Visit    Chief Complaint   Patient presents with   • 6 Month Follow Up   • Medicare Wellness-subsequent      Subjective    History of Present Illness:  Angel White Jr. is a 67 y.o. male who presents for a Subsequent Medicare Wellness Visit.    The following portions of the patient's history were reviewed and   updated as appropriate: allergies, current medications, past family history, past medical history, past social history, past surgical history and problem list. {Optional Link Review (Popup) :23}    Compared to one year ago, the patient feels his physical   health is better.    Compared to one year ago, the patient feels his mental   health is better.    Recent Hospitalizations:  This patient has had a [facility], operated by [facility] admission record on file within the last 365 days.    Current Medical Providers:  Patient Care Team:  Momo Meza MD as PCP - General (Family Medicine)  Crys Dumont MD as Consulting Physician (Cardiology)  Akash Velasquez MD as Consulting Physician (Hematology and Oncology)  Roscoe Donovan MD as Referring Physician (General Surgery)    Outpatient Medications Prior to Visit   Medication Sig Dispense Refill   • amLODIPine (NORVASC) 5 MG tablet Take 1 tablet by mouth Daily. (Patient taking differently: Take 5 mg by mouth Every Night.) 90 tablet 1   • atorvastatin (LIPITOR) 40 MG tablet TAKE 1 TABLET BY MOUTH EVERY DAY 90 tablet 1   • clopidogrel (PLAVIX) 75 MG tablet TAKE 1 TABLET BY MOUTH EVERY DAY 90 tablet 3   • ferrous sulfate 325 (65 FE) MG tablet Take 1 tablet by mouth Daily With Breakfast. 30 tablet 3   • Iron-Vitamin C  MG tablet Take 1 tablet by mouth Daily. 60 tablet 1   • losartan (COZAAR) 100 MG tablet TAKE 1 TABLET BY MOUTH EVERY DAY 90 tablet 1   • metFORMIN ER (GLUCOPHAGE-XR) 500 MG 24 hr tablet Take 500 mg by mouth 2 (Two) Times a Day.     • sertraline (ZOLOFT) 100 MG tablet TAKE 1 TABLET BY  MOUTH EVERY DAY 90 tablet 0   • aspirin 81 MG EC tablet Take 81 mg by mouth Daily. HOLDING FOR SURGERY, LAST DOSE 1/31/22     • prochlorperazine (COMPAZINE) 10 MG tablet Take 1 tablet by mouth Every 6 (Six) Hours As Needed for Nausea or Vomiting. 60 tablet 2   • promethazine (PHENERGAN) 12.5 MG tablet Take 1 tablet by mouth Every 6 (Six) Hours As Needed for Nausea or Vomiting. 30 tablet 3   • Suvorexant (Belsomra) 20 MG tablet Take 20 mg by mouth At Night As Needed (Sleep).       No facility-administered medications prior to visit.       No opioid medication identified on active medication list. I have reviewed chart for other potential  high risk medication/s and harmful drug interactions in the elderly.          Aspirin is on active medication list. Aspirin use is not indicated based on review of current medical condition/s. Risk of harm outweighs potential benefits. Patient instructed to discontinue this medication.  .      Patient Active Problem List   Diagnosis   • CAD (coronary artery disease)   • Essential hypertension   • DDD (degenerative disc disease), lumbosacral   • Osteoarthritis of knee   • Post-traumatic osteoarthritis of multiple joints   • Posterior tibialis tendon insufficiency   • Arthritis of foot, right   • Onychomycosis   • Hyperlipidemia   • Controlled type 2 diabetes mellitus with diabetic peripheral angiopathy without gangrene, without long-term current use of insulin (HCC)   • History of coronary artery stent placement   • Diabetes (HCC)   • Morbid obesity (HCC)   • Other constipation   • Allergic rhinitis   • Trigger finger of right thumb   • Change in bowel habits   • Personal history of colonic polyps   • Chronic GERD   • Primary insomnia   • Anxiety   • Prostate cancer screening   • Memory loss   • Functional diarrhea   • Vitamin D deficiency   • Anemia   • Gastrointestinal hemorrhage associated with anorectal source   • C. difficile diarrhea   • Malignant carcinoid tumor of ileum (HCC)  "  • Iron deficiency anemia due to chronic blood loss   • Nausea   • Neuroendocrine tumor status post surgical treatment     Advance Care Planning   Advance Directive is not on file.  ACP discussion was held with the patient during this visit. Patient does not have an advance directive, information provided.          Objective       Vitals:    05/05/22 1412   BP: 119/82   BP Location: Right arm   Patient Position: Sitting   Cuff Size: Large Adult   Pulse: 62   SpO2: 98%   Weight: 112 kg (247 lb)   Height: 188 cm (74\")   PainSc: 0-No pain     BMI Readings from Last 1 Encounters:   05/05/22 31.71 kg/m²   BMI is above normal parameters. Recommendations include: exercise counseling and nutrition counseling    Does the patient have evidence of cognitive impairment? No    Physical Exam  Vitals and nursing note reviewed.   Constitutional:       Appearance: He is obese.   HENT:      Head: Normocephalic and atraumatic.   Cardiovascular:      Rate and Rhythm: Normal rate and regular rhythm.      Pulses: Normal pulses.      Heart sounds: Normal heart sounds.   Pulmonary:      Effort: Pulmonary effort is normal.      Breath sounds: Normal breath sounds.   Abdominal:      Tenderness: There is no right CVA tenderness or left CVA tenderness.   Musculoskeletal:      Right lower leg: No edema.      Left lower leg: No edema.   Skin:     General: Skin is warm and dry.   Neurological:      Mental Status: He is alert.   Psychiatric:         Mood and Affect: Mood normal.         Behavior: Behavior normal.         Thought Content: Thought content normal.         Judgment: Judgment normal.                 HEALTH RISK ASSESSMENT    Smoking Status:  Social History     Tobacco Use   Smoking Status Never Smoker   Smokeless Tobacco Never Used   Tobacco Comment    Caffeine daily     Alcohol Consumption:  Social History     Substance and Sexual Activity   Alcohol Use No     Fall Risk Screen:    STEADI Fall Risk Assessment was completed, and " patient is at LOW risk for falls.Assessment completed on:5/5/2022    Depression Screening:  PHQ-2/PHQ-9 Depression Screening 5/5/2022   Retired Total Score -   Little Interest or Pleasure in Doing Things 0-->not at all   Feeling Down, Depressed or Hopeless 0-->not at all   PHQ-9: Brief Depression Severity Measure Score 0       Health Habits and Functional and Cognitive Screening:  Functional & Cognitive Status 5/5/2022   Do you have difficulty preparing food and eating? No   Do you have difficulty bathing yourself, getting dressed or grooming yourself? No   Do you have difficulty using the toilet? No   Do you have difficulty moving around from place to place? No   Do you have trouble with steps or getting out of a bed or a chair? No   Current Diet Well Balanced Diet   Dental Exam Not up to date   Eye Exam Up to date   Exercise (times per week) 5 times per week   Current Exercises Include Walking   Do you need help using the phone?  No   Are you deaf or do you have serious difficulty hearing?  No   Do you need help with transportation? No   Do you need help shopping? No   Do you need help preparing meals?  No   Do you need help with housework?  No   Do you need help with laundry? No   Do you need help taking your medications? No   Do you need help managing money? No   Do you ever drive or ride in a car without wearing a seat belt? No   Have you felt unusual stress, anger or loneliness in the last month? No   Who do you live with? Spouse   If you need help, do you have trouble finding someone available to you? No   Have you been bothered in the last four weeks by sexual problems? No   Do you have difficulty concentrating, remembering or making decisions? No       Age-appropriate Screening Schedule:  Refer to the list below for future screening recommendations based on patient's age, sex and/or medical conditions. Orders for these recommended tests are listed in the plan section. The patient has been provided with a  written plan.    Health Maintenance   Topic Date Due   • ZOSTER VACCINE (2 of 2) 04/25/2017   • DIABETIC EYE EXAM  03/05/2019   • LIPID PANEL  03/17/2022   • TDAP/TD VACCINES (3 - Td or Tdap) 02/07/2029   • INFLUENZA VACCINE  Discontinued   • DIABETIC FOOT EXAM  Discontinued   • HEMOGLOBIN A1C  Discontinued   • URINE MICROALBUMIN  Discontinued              Assessment/Plan     CMS Preventative Services Quick Reference  Risk Factors Identified During Encounter  Cardiovascular Disease  Obesity/Overweight   The above risks/problems have been discussed with the patient.  Follow up actions/plans if indicated are seen below in the Assessment/Plan Section.  Pertinent information has been shared with the patient in the After Visit Summary.    Diagnoses and all orders for this visit:    1. Essential hypertension (Primary)  -     Comprehensive Metabolic Panel    2. Mixed hyperlipidemia  -     Lipid Panel    3. Controlled type 2 diabetes mellitus with diabetic peripheral angiopathy without gangrene, without long-term current use of insulin (HCC)  -     Hemoglobin A1c  -     Ambulatory Referral to Podiatry    4. Onychomycosis  -     Ambulatory Referral to Podiatry    5. Iron deficiency anemia due to chronic blood loss  -     CBC & Differential        Follow Up:   Return in about 6 months (around 11/5/2022), or if symptoms worsen or fail to improve, for Recheck.     An After Visit Summary and PPPS were given to the patient.  Ongoing  management of chronic medical problems.

## 2022-05-05 NOTE — PROGRESS NOTES
"Chief Complaint  6 Month Follow Up and Medicare Wellness-subsequent    Subjective          Angel White Jr. presents to [facility]  History of Present Illness  Patient follows up for ongoing management of chronic problems hypertension hyperlipidemia diabetes iron deficiency anemia.  He is feeling much better gradually improving since he has had his abdominal surgery removing neuroendocrine tumor in cecum  He has more energy no chest pain shortness of breath or increased fatigue blood sugars well controlled  Objective   Vital Signs:  /82 (BP Location: Right arm, Patient Position: Sitting, Cuff Size: Large Adult)   Pulse 62   Ht 188 cm (74\")   Wt 112 kg (247 lb)   SpO2 98%   BMI 31.71 kg/m²           Physical Exam  Vitals and nursing note reviewed.   Constitutional:       Appearance: Normal appearance. He is well-developed. He is not diaphoretic.   HENT:      Head: Normocephalic and atraumatic.   Eyes:      General: Lids are normal. No scleral icterus.     Extraocular Movements: Extraocular movements intact.      Conjunctiva/sclera: Conjunctivae normal.   Neck:      Thyroid: No thyroid mass or thyromegaly.      Vascular: No carotid bruit or JVD.   Cardiovascular:      Rate and Rhythm: Normal rate and regular rhythm.      Pulses: Normal pulses.           Radial pulses are 2+ on the right side and 2+ on the left side.      Heart sounds: Normal heart sounds. No murmur heard.  Pulmonary:      Effort: Pulmonary effort is normal. No respiratory distress.      Breath sounds: Normal breath sounds.   Abdominal:      Palpations: Abdomen is soft.      Tenderness: There is no right CVA tenderness or left CVA tenderness.   Musculoskeletal:      Cervical back: Normal range of motion.      Right lower leg: No edema.      Left lower leg: No edema.   Skin:     General: Skin is warm and dry.      Coloration: Skin is not pale.      Findings: No erythema or rash.   Neurological:      General: " No focal deficit present.      Mental Status: He is alert and oriented to person, place, and time.      Sensory: No sensory deficit.      Deep Tendon Reflexes: Reflexes are normal and symmetric.   Psychiatric:         Mood and Affect: Mood normal.         Behavior: Behavior normal. Behavior is cooperative.         Thought Content: Thought content normal.         Judgment: Judgment normal.        Result Review :     Common labs    Common Labsle 2/4/22 2/4/22 3/14/22 5/5/22 5/5/22 5/5/22 5/5/22    0720 0720  1520 1520 1520 1520   Glucose 130 (A)      98   BUN 12      11   Creatinine 0.83      1.00   eGFR Non African Am 93         Sodium 135 (A)      141   Potassium 3.9      4.7   Chloride 104      106   Calcium 8.9      9.5   Albumin       4.10   Total Bilirubin       0.3   Alkaline Phosphatase       98   AST (SGOT)       13   ALT (SGPT)       8   WBC  13.34 (A) 8.70 8.92      Hemoglobin  9.7 (A) 10.5 (A) 13.2      Hematocrit  34.2 (A) 34.3 (A) 41.0      Platelets  323 294 292      Total Cholesterol      148    Triglycerides      86    HDL Cholesterol      52    LDL Cholesterol       80    Hemoglobin A1C     5.90 (A)     (A) Abnormal value                      Assessment and Plan    Diagnoses and all orders for this visit:    1. Essential hypertension (Primary)  -     Comprehensive Metabolic Panel    2. Mixed hyperlipidemia  -     Lipid Panel    3. Controlled type 2 diabetes mellitus with diabetic peripheral angiopathy without gangrene, without long-term current use of insulin (HCC)  -     Hemoglobin A1c  -     Ambulatory Referral to Podiatry    4. Onychomycosis  -     Ambulatory Referral to Podiatry    5. Iron deficiency anemia due to chronic blood loss  -     CBC & Differential    6. Class 1 obesity due to excess calories with serious comorbidity and body mass index (BMI) of 31.0 to 31.9 in adult    Recommend calorie restriction diet for obesity         Follow Up   Return in about 6 months (around 11/5/2022), or if  symptoms worsen or fail to improve, for Recheck.  Patient was given instructions and counseling regarding his condition or for health maintenance advice. Please see specific information pulled into the AVS if appropriate.

## 2022-05-09 RX ORDER — SERTRALINE HYDROCHLORIDE 100 MG/1
TABLET, FILM COATED ORAL
Qty: 90 TABLET | Refills: 0 | Status: SHIPPED | OUTPATIENT
Start: 2022-05-09 | End: 2022-08-12

## 2022-05-18 PROBLEM — E66.9 CLASS 1 OBESITY WITH SERIOUS COMORBIDITY AND BODY MASS INDEX (BMI) OF 31.0 TO 31.9 IN ADULT: Status: ACTIVE | Noted: 2017-11-06

## 2022-05-18 PROBLEM — E66.811 CLASS 1 OBESITY WITH SERIOUS COMORBIDITY AND BODY MASS INDEX (BMI) OF 31.0 TO 31.9 IN ADULT: Status: ACTIVE | Noted: 2017-11-06

## 2022-05-19 RX ORDER — METFORMIN HYDROCHLORIDE 500 MG/1
500 TABLET, EXTENDED RELEASE ORAL 2 TIMES DAILY
Qty: 180 TABLET | Refills: 1 | Status: SHIPPED | OUTPATIENT
Start: 2022-05-19 | End: 2022-09-27

## 2022-05-19 NOTE — TELEPHONE ENCOUNTER
Caller: Angel White Jr.    Relationship: Self    Best call back number: [phone]    Requested Prescriptions:   Requested Prescriptions     Pending Prescriptions Disp Refills   • metFORMIN ER (GLUCOPHAGE-XR) 500 MG 24 hr tablet       Sig: Take 1 tablet by mouth 2 (Two) Times a Day.        Pharmacy where request should be sent: [facility]Y #[id number] - [place], [place] - [address] AT [facility] [phone] [facility] [phone] FX     Additional details provided by patient: PATIENT IS COMPLETELY OUT OF THIS MEDICATION   Does the patient have less than a 3 day supply:  [x] Yes  [] No    Isela HOANG Rep   05/19/22 15:30 EDT

## 2022-06-10 RX ORDER — FERROUS SULFATE 325(65) MG
TABLET ORAL
Qty: 90 TABLET | Refills: 1 | Status: SHIPPED | OUTPATIENT
Start: 2022-06-10 | End: 2023-01-09

## 2022-06-15 DIAGNOSIS — I10 ESSENTIAL (PRIMARY) HYPERTENSION: ICD-10-CM

## 2022-06-15 RX ORDER — AMLODIPINE BESYLATE 5 MG/1
TABLET ORAL
Qty: 90 TABLET | Refills: 1 | Status: SHIPPED | OUTPATIENT
Start: 2022-06-15 | End: 2022-10-11

## 2022-07-28 ENCOUNTER — OFFICE VISIT (OUTPATIENT)
Dept: INTERNAL MEDICINE | Facility: CLINIC | Age: 67
End: 2022-07-28

## 2022-07-28 VITALS
SYSTOLIC BLOOD PRESSURE: 128 MMHG | WEIGHT: 229.5 LBS | HEART RATE: 74 BPM | DIASTOLIC BLOOD PRESSURE: 70 MMHG | OXYGEN SATURATION: 99 % | TEMPERATURE: 96.6 F | HEIGHT: 74 IN | BODY MASS INDEX: 29.45 KG/M2

## 2022-07-28 DIAGNOSIS — U07.1 COVID-19 VIRUS INFECTION: ICD-10-CM

## 2022-07-28 DIAGNOSIS — B34.9 VIRAL SYNDROME: Primary | ICD-10-CM

## 2022-07-28 LAB
EXPIRATION DATE: ABNORMAL
FLUAV AG UPPER RESP QL IA.RAPID: NOT DETECTED
FLUBV AG UPPER RESP QL IA.RAPID: NOT DETECTED
INTERNAL CONTROL: ABNORMAL
Lab: ABNORMAL
SARS-COV-2 AG UPPER RESP QL IA.RAPID: DETECTED

## 2022-07-28 PROCEDURE — 87428 SARSCOV & INF VIR A&B AG IA: CPT | Performed by: FAMILY MEDICINE

## 2022-07-28 PROCEDURE — 99213 OFFICE O/P EST LOW 20 MIN: CPT | Performed by: FAMILY MEDICINE

## 2022-07-29 ENCOUNTER — TELEPHONE (OUTPATIENT)
Dept: INTERNAL MEDICINE | Facility: CLINIC | Age: 67
End: 2022-07-29

## 2022-08-12 RX ORDER — SERTRALINE HYDROCHLORIDE 100 MG/1
TABLET, FILM COATED ORAL
Qty: 90 TABLET | Refills: 0 | Status: SHIPPED | OUTPATIENT
Start: 2022-08-12 | End: 2022-10-14

## 2022-09-14 DIAGNOSIS — E78.2 MIXED HYPERLIPIDEMIA: ICD-10-CM

## 2022-09-14 DIAGNOSIS — I10 ESSENTIAL (PRIMARY) HYPERTENSION: ICD-10-CM

## 2022-09-14 RX ORDER — ATORVASTATIN CALCIUM 40 MG/1
TABLET, FILM COATED ORAL
Qty: 90 TABLET | Refills: 1 | Status: SHIPPED | OUTPATIENT
Start: 2022-09-14

## 2022-09-14 RX ORDER — LOSARTAN POTASSIUM 100 MG/1
TABLET ORAL
Qty: 90 TABLET | Refills: 1 | Status: SHIPPED | OUTPATIENT
Start: 2022-09-14

## 2022-09-27 ENCOUNTER — OFFICE VISIT (OUTPATIENT)
Dept: CARDIOLOGY | Facility: CLINIC | Age: 67
End: 2022-09-27

## 2022-09-27 VITALS
SYSTOLIC BLOOD PRESSURE: 142 MMHG | HEART RATE: 71 BPM | DIASTOLIC BLOOD PRESSURE: 94 MMHG | HEIGHT: 74 IN | BODY MASS INDEX: 32.21 KG/M2 | WEIGHT: 251 LBS

## 2022-09-27 DIAGNOSIS — I25.10 CORONARY ARTERY DISEASE INVOLVING NATIVE CORONARY ARTERY OF NATIVE HEART WITHOUT ANGINA PECTORIS: Primary | ICD-10-CM

## 2022-09-27 PROCEDURE — 99213 OFFICE O/P EST LOW 20 MIN: CPT | Performed by: INTERNAL MEDICINE

## 2022-09-27 NOTE — PROGRESS NOTES
Subjective:     Encounter Date: 09/27/22      Patient ID: Angel White Jr. is a 67 y.o. male.    Chief Complaint: CAD    HPI:   This is a 67-year-old man who has a history of multivessel coronary disease, hypertension, hyperlipidemia, NAFLD, diabetes.  In 2015 he had unstable angina and underwent drug-eluting stenting of the proximal to mid LAD as well as the ramus.  He also had stenting of the PDA and proximal RCA in 2015.   He has no complaints.  Earlier this year he underwent colon resection for carcinoid tumor.  Apparently this is stable.  He did not have a preoperative evaluation prior to this but did not end up having any major cardiac issues during or after surgery.  He has had no angina or dyspnea.  He continues to be active working in his yard.  He lost a lot of weight after surgery and is now off of his diabetic medications.  A1c is less than 6.    He has 14 cats and one dog. His wife is bed bound and he cares for her. He has a son who has developmental disabilities and has 4 young grandchildren    The following portions of the patient's history were reviewed and updated as appropriate: allergies, current medications, past family history, past medical history, past social history, past surgical history and problem list.     REVIEW OF SYSTEMS:   All systems reviewed.  Pertinent positives identified in HPI.  All other systems are negative.    Past Medical History:   Diagnosis Date   • Anemia    • AP (angina pectoris) (HCC)     unstable   • CAD (coronary artery disease)     6 stents placed   • DDD (degenerative disc disease), lumbosacral    • Diabetes (HCC)    • Essential hypertension    • Fatty liver     ?   • History of Clostridioides difficile infection 10/2021   • History of MI (myocardial infarction)    • Hyperlipidemia    • Low back pain    • Mass of colon    • Memory loss     SOME SHORT TERM MEMORY ISSUES AND FORGETFULNESS   • Osteoarthritis of knee    • Screening for prostate cancer 2010     normal   • Skin cancer     left eyebrow, side of face and back    • Sleep apnea     NO CPAP   • Tachycardia        Family History   Problem Relation Age of Onset   • Hypertension Mother    • Arthritis Mother    • Diabetes Mother    • Heart disease Father    • Hypertension Father    • Macular degeneration Father    • Arthritis Father    • Diabetes Father    • Arthritis Maternal Grandmother    • Heart disease Maternal Grandmother    • Arthritis Maternal Grandfather    • Heart disease Maternal Grandfather    • Diabetes Maternal Grandfather    • Hypertension Maternal Grandfather    • Colon cancer Maternal Grandfather    • Arthritis Paternal Grandmother    • Heart disease Paternal Grandmother    • Diabetes Paternal Grandmother    • Hypertension Paternal Grandmother    • Stroke Paternal Grandmother    • Colon cancer Paternal Grandmother    • Arthritis Paternal Grandfather    • Heart disease Paternal Grandfather    • Colon cancer Paternal Grandfather    • Macular degeneration Sister    • Malig Hyperthermia Neg Hx        Social History     Socioeconomic History   • Marital status:      Spouse name: Jaylene   Tobacco Use   • Smoking status: Never Smoker   • Smokeless tobacco: Never Used   • Tobacco comment: Caffeine daily   Vaping Use   • Vaping Use: Never used   Substance and Sexual Activity   • Alcohol use: No   • Drug use: Not Currently   • Sexual activity: Defer     Partners: Female       Allergies   Allergen Reactions   • Ambien [Zolpidem] Other (See Comments)     Sleep walking     • Morphine And Related Itching and Rash       Past Surgical History:   Procedure Laterality Date   • COLON RESECTION N/A 2/3/2022    Procedure: ILEUM COLON RESECTION FOR TERMINAL ILEUM MASS LAPAROSCOPIC;  Surgeon: Roscoe Donovan MD;  Location: [facility];  Service: General;  Laterality: N/A;   • COLONOSCOPY N/A 2016    normal per patient   • COLONOSCOPY N/A 8/3/2020    Procedure: COLONOSCOPY to cecum and TI with cold biopsies;   Surgeon: Kelly Ashraf MD;  Location: [facility] END[facility];  Service: Gastroenterology;  Laterality: N/A;  pre-change in bowel habits, hx polyps   post-hemorrhoids, lipomas, abnormal cecal tissue    • CORONARY ANGIOPLASTY WITH STENT PLACEMENT  08/28/2015    6 stents-Dr. Hall, [facility]   • FOOT SURGERY Right 05/23/2017    Dr. Cervantes, [facility]   • REPLACEMENT TOTAL KNEE Left 2010    Dr. Arnaud Reynaga, [facility]   • REPLACEMENT TOTAL KNEE Right 2009    Dr. Arnaud Reynaga, [facility]   • SKIN BIOPSY     • SKIN CANCER EXCISION Left 03/30/2016    left ear and left eyebrow, graft took from face, placed on ear   • SKIN CANCER EXCISION  2001    back       Procedures       Objective:         PHYSICAL EXAM:  GEN: VSS, no distress,   Eyes: normal sclera, normal lids and lashes  HENT: moist mucus membranes,   Respiratory: CTAB, no rales or wheezes  CV: RRR, no murmurs, , +2 DP and 2+ carotid pulses b/l  GI: NABS, soft,  Nontender, nondistended  MSK: +1 lower extremity edema with chronic venous stasis changes, no scoliosis or kyphosis  Skin: no rash, warm, dry  Heme/Lymph: no bruising or bleeding  Psych: organized thought, normal behavior and affect  Neuro: Cranial nerves grossly intact, Alert and Oriented x 3.         Assessment:          Diagnosis Plan   1. Coronary artery disease involving native coronary artery of native heart without angina pectoris            Plan:       1.  Coronary artery disease: Remote stenting of the LAD, ramus, right coronary artery in the setting of unstable angina in 2015.  Plavix.  2.  Hypertension: Well-controlled on amlodipine and losartan  3.  Hyperlipidemia: On Lipitor 40.  Lipids at goal, LDL 54 March 2021. Continues.   4.  Diabetes: Diet controlled, on high intensity statin.  5. SVT: No palpitations or AF on ZO.    Dr. Meza, thank you very much for referring this kind patient to me. Please call me with any questions or concerns. I will see the patient again in the office in 12 months.         Crys  MD Tim  09/27/22  [facility]    Outpatient Encounter Medications as of 9/27/2022   Medication Sig Dispense Refill   • amLODIPine (NORVASC) 5 MG tablet TAKE 1 TABLET BY MOUTH EVERY DAY 90 tablet 1   • atorvastatin (LIPITOR) 40 MG tablet TAKE 1 TABLET BY MOUTH EVERY DAY 90 tablet 1   • clopidogrel (PLAVIX) 75 MG tablet TAKE 1 TABLET BY MOUTH EVERY DAY 90 tablet 3   • ferrous sulfate 325 (65 FE) MG tablet TAKE 1 TABLET BY MOUTH EVERY DAY WITH BREAKFAST 90 tablet 1   • losartan (COZAAR) 100 MG tablet TAKE 1 TABLET BY MOUTH EVERY DAY 90 tablet 1   • Pseudoeph-Doxylamine-DM-APAP (NYQUIL PO) Take  by mouth every night at bedtime.     • sertraline (ZOLOFT) 100 MG tablet TAKE 1 TABLET BY MOUTH EVERY DAY 90 tablet 0   • [DISCONTINUED] Iron-Vitamin C  MG tablet Take 1 tablet by mouth Daily. 60 tablet 1   • [DISCONTINUED] metFORMIN ER (GLUCOPHAGE-XR) 500 MG 24 hr tablet Take 1 tablet by mouth 2 (Two) Times a Day. 180 tablet 1     No facility-administered encounter medications on file as of 9/27/2022.

## 2022-09-27 NOTE — PROGRESS NOTES
Subjective:     Encounter Date: 09/27/22      Patient ID: Angel White Jr. is a 67 y.o. male.    Chief Complaint: CAD    HPI:   This is a 66-year-old man who has a history of multivessel coronary disease, hypertension, hyperlipidemia, NAFLD, diabetes.  In 2015 he had unstable angina and underwent drug-eluting stenting of the proximal to mid LAD as well as the ramus.  He also had stenting of the PDA and proximal RCA in 2015.     He is feeling well. He has intermittent positional dizziness which resolves quickly. He drinks 6 small bottles of coke a day. He does drink a fair amount of water. His BP is around 140 at home. He had some SVT during a colonoscopy in 2020 and wore a ZIO which showed no AF. He's had no palpitations.     He has 14 cats and one dog. His wife is bed bound and he cares for her. He has a son who has developmental disabilities and has a young grandson.     The following portions of the patient's history were reviewed and updated as appropriate: allergies, current medications, past family history, past medical history, past social history, past surgical history and problem list.     REVIEW OF SYSTEMS:   All systems reviewed.  Pertinent positives identified in HPI.  All other systems are negative.    Past Medical History:   Diagnosis Date   • Anemia    • AP (angina pectoris) (HCC)     unstable   • CAD (coronary artery disease)     6 stents placed   • DDD (degenerative disc disease), lumbosacral    • Diabetes (HCC)    • Essential hypertension    • Fatty liver     ?   • History of Clostridioides difficile infection 10/2021   • History of MI (myocardial infarction)    • Hyperlipidemia    • Low back pain    • Mass of colon    • Memory loss     SOME SHORT TERM MEMORY ISSUES AND FORGETFULNESS   • Osteoarthritis of knee    • Screening for prostate cancer 2010    normal   • Skin cancer     left eyebrow, side of face and back    • Sleep apnea     NO CPAP   • Tachycardia        Family History   Problem  Relation Age of Onset   • Hypertension Mother    • Arthritis Mother    • Diabetes Mother    • Heart disease Father    • Hypertension Father    • Macular degeneration Father    • Arthritis Father    • Diabetes Father    • Arthritis Maternal Grandmother    • Heart disease Maternal Grandmother    • Arthritis Maternal Grandfather    • Heart disease Maternal Grandfather    • Diabetes Maternal Grandfather    • Hypertension Maternal Grandfather    • Colon cancer Maternal Grandfather    • Arthritis Paternal Grandmother    • Heart disease Paternal Grandmother    • Diabetes Paternal Grandmother    • Hypertension Paternal Grandmother    • Stroke Paternal Grandmother    • Colon cancer Paternal Grandmother    • Arthritis Paternal Grandfather    • Heart disease Paternal Grandfather    • Colon cancer Paternal Grandfather    • Macular degeneration Sister    • Malig Hyperthermia Neg Hx        Social History     Socioeconomic History   • Marital status:      Spouse name: Jaylene   Tobacco Use   • Smoking status: Never Smoker   • Smokeless tobacco: Never Used   • Tobacco comment: Caffeine daily   Vaping Use   • Vaping Use: Never used   Substance and Sexual Activity   • Alcohol use: No   • Drug use: Not Currently   • Sexual activity: Defer     Partners: Female       Allergies   Allergen Reactions   • Ambien [Zolpidem] Other (See Comments)     Sleep walking     • Morphine And Related Itching and Rash       Past Surgical History:   Procedure Laterality Date   • COLON RESECTION N/A 2/3/2022    Procedure: ILEUM COLON RESECTION FOR TERMINAL ILEUM MASS LAPAROSCOPIC;  Surgeon: Roscoe Donovan MD;  Location: [facility] MAIN OR;  Service: General;  Laterality: N/A;   • COLONOSCOPY N/A 2016    normal per patient   • COLONOSCOPY N/A 8/3/2020    Procedure: COLONOSCOPY to cecum and TI with cold biopsies;  Surgeon: Kelly Ashraf MD;  Location: [facility] ENDOSCOPY;  Service: Gastroenterology;  Laterality: N/A;  pre-change in bowel habits, hx polyps    post-hemorrhoids, lipomas, abnormal cecal tissue    • CORONARY ANGIOPLASTY WITH STENT PLACEMENT  08/28/2015    6 stents-Dr. Hall, [facility]   • FOOT SURGERY Right 05/23/2017    Dr. Cervantes [facility] yamil Hoover   • REPLACEMENT TOTAL KNEE Left 2010    Dr. Arnaud Reynaga, [facility]   • REPLACEMENT TOTAL KNEE Right 2009    Dr. Arnaud Reynaga, [facility]   • SKIN BIOPSY     • SKIN CANCER EXCISION Left 03/30/2016    left ear and left eyebrow, graft took from face, placed on ear   • SKIN CANCER EXCISION  2001    back       Procedures       Objective:         PHYSICAL EXAM:  GEN: VSS, no distress,   Eyes: normal sclera, normal lids and lashes  HENT: moist mucus membranes,   Respiratory: CTAB, no rales or wheezes  CV: RRR, no murmurs, , +2 DP and 2+ carotid pulses b/l  GI: NABS, soft,  Nontender, nondistended  MSK: +1 lower extremity edema with chronic venous stasis changes, no scoliosis or kyphosis  Skin: no rash, warm, dry  Heme/Lymph: no bruising or bleeding  Psych: organized thought, normal behavior and affect  Neuro: Cranial nerves grossly intact, Alert and Oriented x 3.         Assessment:         No diagnosis found.       Plan:       1.  Coronary artery disease: Remote stenting of the LAD, ramus, right coronary artery in the setting of unstable angina in 2015.  He has been maintained on dual antiplatelet therapy since then without any bleeding complications.  His only antianginal medication is amlodipine 5 mg. CCS 1  2.  Hypertension: Well-controlled on amlodipine HCTZ and losartan  3.  Hyperlipidemia: On Lipitor 40.  Lipids at goal, LDL 54 March 2021. Continues.   4.  Diabetes: On high intensity statin.  5. SVT: No palpitations or AF on ZO.    Dr. Meza, thank you very much for referring this kind patient to me. Please call me with any questions or concerns. I will see the patient again in the office in 12 months.         Crys Dumont MD  09/27/22  [facility]    Outpatient Encounter Medications as of 9/27/2022    Medication Sig Dispense Refill   • amLODIPine (NORVASC) 5 MG tablet TAKE 1 TABLET BY MOUTH EVERY DAY 90 tablet 1   • atorvastatin (LIPITOR) 40 MG tablet TAKE 1 TABLET BY MOUTH EVERY DAY 90 tablet 1   • clopidogrel (PLAVIX) 75 MG tablet TAKE 1 TABLET BY MOUTH EVERY DAY 90 tablet 3   • ferrous sulfate 325 (65 FE) MG tablet TAKE 1 TABLET BY MOUTH EVERY DAY WITH BREAKFAST 90 tablet 1   • losartan (COZAAR) 100 MG tablet TAKE 1 TABLET BY MOUTH EVERY DAY 90 tablet 1   • Pseudoeph-Doxylamine-DM-APAP (NYQUIL PO) Take  by mouth every night at bedtime.     • sertraline (ZOLOFT) 100 MG tablet TAKE 1 TABLET BY MOUTH EVERY DAY 90 tablet 0   • [DISCONTINUED] Iron-Vitamin C  MG tablet Take 1 tablet by mouth Daily. 60 tablet 1   • [DISCONTINUED] metFORMIN ER (GLUCOPHAGE-XR) 500 MG 24 hr tablet Take 1 tablet by mouth 2 (Two) Times a Day. 180 tablet 1     No facility-administered encounter medications on file as of 9/27/2022.

## 2022-09-29 ENCOUNTER — APPOINTMENT (OUTPATIENT)
Dept: OTHER | Facility: HOSPITAL | Age: 67
End: 2022-09-29

## 2022-10-04 DIAGNOSIS — I10 ESSENTIAL (PRIMARY) HYPERTENSION: ICD-10-CM

## 2022-10-11 RX ORDER — AMLODIPINE BESYLATE 5 MG/1
TABLET ORAL
Qty: 90 TABLET | Refills: 1 | Status: SHIPPED | OUTPATIENT
Start: 2022-10-11 | End: 2023-01-16

## 2022-10-13 ENCOUNTER — LAB (OUTPATIENT)
Dept: LAB | Facility: HOSPITAL | Age: 67
End: 2022-10-13

## 2022-10-13 ENCOUNTER — HOSPITAL ENCOUNTER (OUTPATIENT)
Dept: CT IMAGING | Facility: HOSPITAL | Age: 67
Discharge: HOME | End: 2022-10-13

## 2022-10-13 DIAGNOSIS — C7A.012 MALIGNANT CARCINOID TUMOR OF ILEUM: ICD-10-CM

## 2022-10-13 DIAGNOSIS — D50.0 IRON DEFICIENCY ANEMIA DUE TO CHRONIC BLOOD LOSS: ICD-10-CM

## 2022-10-13 LAB
IRON 24H UR-MRATE: 65 MCG/DL (ref 59–158)
IRON SATN MFR SERPL: 16 % (ref 20–50)
TIBC SERPL-MCNC: 404 MCG/DL (ref 298–536)
TRANSFERRIN SERPL-MCNC: 271 MG/DL (ref 200–360)

## 2022-10-13 PROCEDURE — 83540 ASSAY OF IRON: CPT

## 2022-10-13 PROCEDURE — 82565 ASSAY OF CREATININE: CPT

## 2022-10-13 PROCEDURE — 36415 COLL VENOUS BLD VENIPUNCTURE: CPT

## 2022-10-13 PROCEDURE — 71260 CT THORAX DX C+: CPT

## 2022-10-13 PROCEDURE — 25010000002 IOPAMIDOL 61 % SOLUTION: Performed by: INTERNAL MEDICINE

## 2022-10-13 PROCEDURE — 84466 ASSAY OF TRANSFERRIN: CPT

## 2022-10-13 PROCEDURE — 0 DIATRIZOATE MEGLUMINE & SODIUM PER 1 ML: Performed by: INTERNAL MEDICINE

## 2022-10-13 PROCEDURE — 74177 CT ABD & PELVIS W/CONTRAST: CPT

## 2022-10-13 RX ADMIN — IOPAMIDOL 100 ML: 612 INJECTION, SOLUTION INTRAVENOUS at 14:16

## 2022-10-13 RX ADMIN — DIATRIZOATE MEGLUMINE AND DIATRIZOATE SODIUM 30 ML: 660; 100 LIQUID ORAL; RECTAL at 13:00

## 2022-10-14 ENCOUNTER — TELEPHONE (OUTPATIENT)
Dept: ONCOLOGY | Facility: CLINIC | Age: 67
End: 2022-10-14

## 2022-10-14 LAB — CREAT BLDA-MCNC: 1 MG/DL (ref 0.6–1.3)

## 2022-10-14 RX ORDER — PROCHLORPERAZINE MALEATE 10 MG
TABLET ORAL
Qty: 120 TABLET | Refills: 1 | OUTPATIENT
Start: 2022-10-14

## 2022-10-14 RX ORDER — SERTRALINE HYDROCHLORIDE 100 MG/1
TABLET, FILM COATED ORAL
Qty: 90 TABLET | Refills: 0 | Status: SHIPPED | OUTPATIENT
Start: 2022-10-14 | End: 2022-11-07 | Stop reason: SDUPTHER

## 2022-10-14 NOTE — TELEPHONE ENCOUNTER
----- Message from Akash Dhaliwal II, MD sent at 10/14/2022  3:36 PM EDT -----  This patient is incorrectly scheduled with me.  I have never seen him.  He is a Dr. Velasquez patient.  Please fix the schedule.  Thank you

## 2022-10-14 NOTE — TELEPHONE ENCOUNTER
CALLED PT WITH HIS APPT TIME AS HE WAS SCHEDULED WITH ANOTHER DR IN THE OFFICE - ADVISED OF THE NEW TIME THAT THE PATIENT WAS TO COME IN AND SEE US - HE WILL BE SEEING DR WEBER

## 2022-10-20 NOTE — PROGRESS NOTES
Subjective     REASON FOR CONSULTATION:  Carcinoid tumor ileum  Provide an opinion on any further workup or treatment                             REQUESTING PHYSICIAN:  Dr. Donovan    RECORDS OBTAINED:  Records of the patients history including those obtained from the referring provider were reviewed and summarized in detail.    History of Present Illness   This is a very pleasant 67-year-old man who has medical history pertinent for coronary artery disease, diabetes, hypertension, hyperlipidemia, sleep apnea.  He recently presented with 50 pound weight loss, nausea, decreased appetite, and hematochezia.  The patient was noted to have iron deficiency anemia and was admitted to the hospital in October 2021 with hemoglobin 7.7 requiring transfusion support.  A CT of the abdomen and pelvis on 10/30/2021 showed some fatty infiltration in the liver, small mural lipoma in the wall of the cecum and slight prostamegaly.  The patient continued to have symptoms, and a repeat CT abdomen/pelvis on 11/19/2021 showed a 3 cm mass in the terminal ileum with enlarged lymph nodes adjacent up to 1.4 cm.  There was no evidence of small bowel obstruction.  There was a 2 cm lipoma in the cecum and a 2.4 cm lipoma in the ascending colon which were stable from previous.  The liver was unremarkable.    The patient was taken to the operating room on 2/3/2022 by Dr. Donovan and underwent a laparoscopic ileocolonic resection.  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  The tumor measured 2.5 cm in greatest dimension and invaded the muscularis propria into the subserosal tissue without penetration of the serosa.  There was lymphovascular but no perineural invasion.  12 lymph nodes were resected for of which were positive for tumor.  Final pathology pT3 N2 M0.    A neuroendocrine PET scan ([facility]) on 3/29/2022 was  negative for residual or metastatic disease.    The patient returned today for follow-up.  He is doing well.  Previous nausea and weight loss have resolved.  No abdominal pain reported.  He has mild constipation treated with prunes.    Past Medical History:   Diagnosis Date   • Anemia    • AP (angina pectoris) (HCC)     unstable   • CAD (coronary artery disease)     6 stents placed   • DDD (degenerative disc disease), lumbosacral    • Diabetes (HCC)    • Essential hypertension    • Fatty liver     ?   • History of Clostridioides difficile infection 10/2021   • History of MI (myocardial infarction)    • Hyperlipidemia    • Low back pain    • Mass of colon    • Memory loss     SOME SHORT TERM MEMORY ISSUES AND FORGETFULNESS   • Osteoarthritis of knee    • Screening for prostate cancer 2010    normal   • Skin cancer     left eyebrow, side of face and back    • Sleep apnea     NO CPAP   • Tachycardia         Past Surgical History:   Procedure Laterality Date   • COLON RESECTION N/A 2/3/2022    Procedure: ILEUM COLON RESECTION FOR TERMINAL ILEUM MASS LAPAROSCOPIC;  Surgeon: Roscoe Donovan MD;  Location: [facility] MAIN OR;  Service: General;  Laterality: N/A;   • COLONOSCOPY N/A 2016    normal per patient   • COLONOSCOPY N/A 8/3/2020    Procedure: COLONOSCOPY to cecum and TI with cold biopsies;  Surgeon: Kelly Ashraf MD;  Location: [facility] ENDOSCOPY;  Service: Gastroenterology;  Laterality: N/A;  pre-change in bowel habits, hx polyps   post-hemorrhoids, lipomas, abnormal cecal tissue    • CORONARY ANGIOPLASTY WITH STENT PLACEMENT  08/28/2015    6 stents-Dr. Hall, [facility]   • FOOT SURGERY Right 05/23/2017    Dr. Cervantes  [facility]   • REPLACEMENT TOTAL KNEE Left 2010    Dr. Arnaud Reynaga, [facility]   • REPLACEMENT TOTAL KNEE Right 2009    Dr. Arnaud Reynaga, [facility]   • SKIN BIOPSY     • SKIN CANCER EXCISION Left 03/30/2016    left ear and left eyebrow, graft took from face, placed on ear   • SKIN CANCER EXCISION  2001    back         Current Outpatient Medications on File Prior to Visit   Medication Sig Dispense Refill   • amLODIPine (NORVASC) 5 MG tablet TAKE 1 TABLET BY MOUTH EVERY DAY 90 tablet 1   • atorvastatin (LIPITOR) 40 MG tablet TAKE 1 TABLET BY MOUTH EVERY DAY 90 tablet 1   • clopidogrel (PLAVIX) 75 MG tablet TAKE 1 TABLET BY MOUTH EVERY DAY 90 tablet 3   • ferrous sulfate 325 (65 FE) MG tablet TAKE 1 TABLET BY MOUTH EVERY DAY WITH BREAKFAST 90 tablet 1   • losartan (COZAAR) 100 MG tablet TAKE 1 TABLET BY MOUTH EVERY DAY 90 tablet 1   • Pseudoeph-Doxylamine-DM-APAP (NYQUIL PO) Take  by mouth every night at bedtime.     • sertraline (ZOLOFT) 100 MG tablet TAKE 1 TABLET BY MOUTH EVERY DAY 90 tablet 0     No current facility-administered medications on file prior to visit.        ALLERGIES:    Allergies   Allergen Reactions   • Ambien [Zolpidem] Other (See Comments)     Sleep walking     • Morphine And Related Itching and Rash        Social History     Socioeconomic History   • Marital status:      Spouse name: Jaylene   Tobacco Use   • Smoking status: Never   • Smokeless tobacco: Never   • Tobacco comments:     Caffeine daily   Vaping Use   • Vaping Use: Never used   Substance and Sexual Activity   • Alcohol use: No   • Drug use: Not Currently   • Sexual activity: Defer     Partners: Female        Family History   Problem Relation Age of Onset   • Hypertension Mother    • Arthritis Mother    • Diabetes Mother    • Heart disease Father    • Hypertension Father    • Macular degeneration Father    • Arthritis Father    • Diabetes Father    • Arthritis Maternal Grandmother    • Heart disease Maternal Grandmother    • Arthritis Maternal Grandfather    • Heart disease Maternal Grandfather    • Diabetes Maternal Grandfather    • Hypertension Maternal Grandfather    • Colon cancer Maternal Grandfather    • Arthritis Paternal Grandmother    • Heart disease Paternal Grandmother    • Diabetes Paternal Grandmother    • Hypertension  "Paternal Grandmother    • Stroke Paternal Grandmother    • Colon cancer Paternal Grandmother    • Arthritis Paternal Grandfather    • Heart disease Paternal Grandfather    • Colon cancer Paternal Grandfather    • Macular degeneration Sister    • Malig Hyperthermia Neg Hx         Review of Systems   Constitutional: Negative for appetite change, fatigue and unexpected weight change.   HENT: Negative.    Respiratory: Negative for cough and shortness of breath.    Cardiovascular: Negative for chest pain and palpitations.   Gastrointestinal: Positive for constipation. Negative for blood in stool, diarrhea, nausea and vomiting.   Genitourinary: Negative.    Musculoskeletal: Negative.    Skin: Negative.    Allergic/Immunologic: Negative.    Neurological: Negative.    Hematological: Negative.    Psychiatric/Behavioral: Negative.         Objective     Vitals:    10/21/22 1149   BP: 165/89   Pulse: 71   Temp: 98 °F (36.7 °C)   TempSrc: Temporal   SpO2: 97%   Weight: 117 kg (257 lb 11.2 oz)   Height: 188 cm (74.02\")   PainSc:   3   PainLoc: Foot  Comment: Toe on right foot     Current Status 10/21/2022   ECOG score 0       Physical Exam    CONSTITUTIONAL: pleasant well-developed adult man  HEENT: no icterus, no thrush, moist membranes  LYMPH: no cervical or supraclavicular lad  CV: RRR, S1S2, no murmur  RESP: cta bilat, no wheezing, no rales  GI: soft, non-tender, no splenomegaly, +bs, laparoscopic scars healing well  MUSC: no edema, normal gait  NEURO: alert and oriented x3, normal strength  PSYCH: normal mood, mild anxious affect  Exam unchanged- 10/21/2022  RECENT LABS:  Hematology WBC   Date Value Ref Range Status   05/05/2022 8.92 3.40 - 10.80 10*3/mm3 Final   12/23/2021 7.9 3.4 - 10.8 x10E3/uL Final     RBC   Date Value Ref Range Status   05/05/2022 5.18 4.14 - 5.80 10*6/mm3 Final   12/23/2021 3.97 (L) 4.14 - 5.80 x10E6/uL Final     Hemoglobin   Date Value Ref Range Status   05/05/2022 13.2 13.0 - 17.7 g/dL Final "     Hematocrit   Date Value Ref Range Status   05/05/2022 41.0 37.5 - 51.0 % Final     Platelets   Date Value Ref Range Status   05/05/2022 292 140 - 450 10*3/mm3 Final        Lab Results   Component Value Date    GLUCOSE 98 05/05/2022    BUN 11 05/05/2022    CREATININE 1.00 10/13/2022    EGFRIFNONA 93 02/04/2022    EGFRIFAFRI 103 10/28/2021    BCR 11.0 05/05/2022    K 4.7 05/05/2022    CO2 25.7 05/05/2022    CALCIUM 9.5 05/05/2022    PROTENTOTREF 6.1 10/28/2021    ALBUMIN 4.10 05/05/2022    LABIL2 1.7 10/28/2021    AST 13 05/05/2022    ALT 8 05/05/2022          NM PET/CT Skull Base to Mid Thigh 3/29/2022 - Negative [facility] imaging. There is no evidence of  metastatic disease or residual primary carcinoid tumor.    CT Chest, Abdomen and Pelvis With Contrast Diagnostic 10/13/2022 - 1. No evidence for recurrent or metastatic disease to the chest,  abdomen, pelvis in patient with previous partial ileocolonic resection  and mesenteric jolly resection.  2. 2 mm noncalcified right upper lobe pulmonary nodule could be followed  with CT chest in one year.  3. Diffuse fat infiltration of the liver.  4. 11 mm sclerotic lesion within the right aspect of the T11 vertebral  body has mildly increased in size compared to prior CT 08/28/2015 though  this is of doubtful significance given its long-term presence.      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  The tumor measured 2.5 cm in greatest dimension and  invaded the muscularis propria into the subserosal tissue without penetration of the serosa.  There was lymphovascular but no perineural invasion.  12 lymph nodes were resected for of which were positive for tumor.  Final pathology pT3 N2 M0.  · [facility] scan 3/29/2022-no evidence of residual disease  · CT scan chest abdomen pelvis with contrast 10/13/2022- MARIAM (tiny right upper lobe lung nodule to be monitored)    *Microcytic, hypochromic anemia  · Labs look consistent with iron deficiency anemia secondary to GI blood loss related to his malignancy  · The patient has been on oral iron since his previous visit 6 months ago      *Multiple comorbidities of CAD, hypertension, hyperlipidemia, sleep apnea etc.    Hematology/oncology plan/recommendations:  1. Check CBC and ferritin today to see if the patient needs to continue oral iron  2. 6-month follow-up CBC CMP ferritin iron profile CT chest abdomen and pelvis ordered

## 2022-10-21 ENCOUNTER — OFFICE VISIT (OUTPATIENT)
Dept: ONCOLOGY | Facility: CLINIC | Age: 67
End: 2022-10-21

## 2022-10-21 ENCOUNTER — TELEPHONE (OUTPATIENT)
Dept: ONCOLOGY | Facility: CLINIC | Age: 67
End: 2022-10-21

## 2022-10-21 ENCOUNTER — APPOINTMENT (OUTPATIENT)
Dept: OTHER | Facility: HOSPITAL | Age: 67
End: 2022-10-21

## 2022-10-21 ENCOUNTER — OFFICE VISIT (OUTPATIENT)
Dept: LAB | Facility: HOSPITAL | Age: 67
End: 2022-10-21

## 2022-10-21 VITALS
TEMPERATURE: 98 F | HEART RATE: 71 BPM | DIASTOLIC BLOOD PRESSURE: 89 MMHG | OXYGEN SATURATION: 97 % | HEIGHT: 74 IN | WEIGHT: 257.7 LBS | SYSTOLIC BLOOD PRESSURE: 165 MMHG | BODY MASS INDEX: 33.07 KG/M2

## 2022-10-21 DIAGNOSIS — C7A.012 MALIGNANT CARCINOID TUMOR OF ILEUM: ICD-10-CM

## 2022-10-21 DIAGNOSIS — C7A.012 MALIGNANT CARCINOID TUMOR OF ILEUM: Primary | ICD-10-CM

## 2022-10-21 DIAGNOSIS — D50.0 IRON DEFICIENCY ANEMIA DUE TO CHRONIC BLOOD LOSS: ICD-10-CM

## 2022-10-21 DIAGNOSIS — Z98.890 NEUROENDOCRINE TUMOR STATUS POST SURGICAL TREATMENT: ICD-10-CM

## 2022-10-21 DIAGNOSIS — D64.89 ANEMIA DUE TO OTHER CAUSE, NOT CLASSIFIED: ICD-10-CM

## 2022-10-21 LAB
ALBUMIN SERPL-MCNC: 4.1 G/DL (ref 3.5–5.2)
ALBUMIN/GLOB SERPL: 1.2 G/DL (ref 1.1–2.4)
ALP SERPL-CCNC: 88 U/L (ref 38–116)
ALT SERPL W P-5'-P-CCNC: 9 U/L (ref 0–41)
ANION GAP SERPL CALCULATED.3IONS-SCNC: 11.7 MMOL/L (ref 5–15)
AST SERPL-CCNC: 14 U/L (ref 0–40)
BASOPHILS # BLD AUTO: 0.08 10*3/MM3 (ref 0–0.2)
BASOPHILS NFR BLD AUTO: 1 % (ref 0–1.5)
BILIRUB SERPL-MCNC: 0.3 MG/DL (ref 0.2–1.2)
BUN SERPL-MCNC: 22 MG/DL (ref 6–20)
BUN/CREAT SERPL: 22 (ref 7.3–30)
CALCIUM SPEC-SCNC: 9.4 MG/DL (ref 8.5–10.2)
CHLORIDE SERPL-SCNC: 102 MMOL/L (ref 98–107)
CO2 SERPL-SCNC: 25.3 MMOL/L (ref 22–29)
CREAT SERPL-MCNC: 1 MG/DL (ref 0.7–1.3)
DEPRECATED RDW RBC AUTO: 41.9 FL (ref 37–54)
EGFRCR SERPLBLD CKD-EPI 2021: 83 ML/MIN/1.73
EOSINOPHIL # BLD AUTO: 0.25 10*3/MM3 (ref 0–0.4)
EOSINOPHIL NFR BLD AUTO: 3.2 % (ref 0.3–6.2)
ERYTHROCYTE [DISTWIDTH] IN BLOOD BY AUTOMATED COUNT: 13 % (ref 12.3–15.4)
FERRITIN SERPL-MCNC: 57.2 NG/ML (ref 30–400)
GLOBULIN UR ELPH-MCNC: 3.5 GM/DL (ref 1.8–3.5)
GLUCOSE SERPL-MCNC: 164 MG/DL (ref 74–124)
HCT VFR BLD AUTO: 42.2 % (ref 37.5–51)
HGB BLD-MCNC: 13.8 G/DL (ref 13–17.7)
IMM GRANULOCYTES # BLD AUTO: 0.02 10*3/MM3 (ref 0–0.05)
IMM GRANULOCYTES NFR BLD AUTO: 0.3 % (ref 0–0.5)
LYMPHOCYTES # BLD AUTO: 1.71 10*3/MM3 (ref 0.7–3.1)
LYMPHOCYTES NFR BLD AUTO: 22.1 % (ref 19.6–45.3)
MCH RBC QN AUTO: 28.7 PG (ref 26.6–33)
MCHC RBC AUTO-ENTMCNC: 32.7 G/DL (ref 31.5–35.7)
MCV RBC AUTO: 87.7 FL (ref 79–97)
MONOCYTES # BLD AUTO: 0.45 10*3/MM3 (ref 0.1–0.9)
MONOCYTES NFR BLD AUTO: 5.8 % (ref 5–12)
NEUTROPHILS NFR BLD AUTO: 5.22 10*3/MM3 (ref 1.7–7)
NEUTROPHILS NFR BLD AUTO: 67.6 % (ref 42.7–76)
NRBC BLD AUTO-RTO: 0 /100 WBC (ref 0–0.2)
PLATELET # BLD AUTO: 264 10*3/MM3 (ref 140–450)
PMV BLD AUTO: 8.6 FL (ref 6–12)
POTASSIUM SERPL-SCNC: 4.8 MMOL/L (ref 3.5–4.7)
PROT SERPL-MCNC: 7.6 G/DL (ref 6.3–8)
RBC # BLD AUTO: 4.81 10*6/MM3 (ref 4.14–5.8)
SODIUM SERPL-SCNC: 139 MMOL/L (ref 134–145)
WBC NRBC COR # BLD: 7.73 10*3/MM3 (ref 3.4–10.8)

## 2022-10-21 PROCEDURE — 85025 COMPLETE CBC W/AUTO DIFF WBC: CPT

## 2022-10-21 PROCEDURE — 99214 OFFICE O/P EST MOD 30 MIN: CPT | Performed by: INTERNAL MEDICINE

## 2022-10-21 PROCEDURE — 80053 COMPREHEN METABOLIC PANEL: CPT

## 2022-10-21 PROCEDURE — 36415 COLL VENOUS BLD VENIPUNCTURE: CPT

## 2022-10-21 PROCEDURE — 82728 ASSAY OF FERRITIN: CPT

## 2022-10-21 NOTE — TELEPHONE ENCOUNTER
----- Message from Akash Velasquez MD sent at 10/21/2022  3:15 PM EDT -----  He can stop his iron tabs

## 2022-11-07 ENCOUNTER — OFFICE VISIT (OUTPATIENT)
Dept: INTERNAL MEDICINE | Facility: CLINIC | Age: 67
End: 2022-11-07

## 2022-11-07 VITALS
DIASTOLIC BLOOD PRESSURE: 62 MMHG | SYSTOLIC BLOOD PRESSURE: 124 MMHG | HEART RATE: 71 BPM | HEIGHT: 74 IN | WEIGHT: 267.8 LBS | BODY MASS INDEX: 34.37 KG/M2 | OXYGEN SATURATION: 100 %

## 2022-11-07 DIAGNOSIS — I10 ESSENTIAL HYPERTENSION: ICD-10-CM

## 2022-11-07 DIAGNOSIS — B35.1 ONYCHOMYCOSIS: ICD-10-CM

## 2022-11-07 DIAGNOSIS — E11.59 TYPE 2 DIABETES MELLITUS WITH OTHER CIRCULATORY COMPLICATION, WITHOUT LONG-TERM CURRENT USE OF INSULIN: ICD-10-CM

## 2022-11-07 DIAGNOSIS — R73.9 HYPERGLYCEMIA: ICD-10-CM

## 2022-11-07 DIAGNOSIS — F41.9 ANXIETY: Primary | ICD-10-CM

## 2022-11-07 PROBLEM — L03.031 CELLULITIS OF TOE OF RIGHT FOOT: Status: ACTIVE | Noted: 2022-11-07

## 2022-11-07 PROBLEM — B34.9 VIRAL SYNDROME: Status: RESOLVED | Noted: 2022-07-28 | Resolved: 2022-11-07

## 2022-11-07 PROBLEM — A04.72 C. DIFFICILE DIARRHEA: Status: RESOLVED | Noted: 2021-11-18 | Resolved: 2022-11-07

## 2022-11-07 PROCEDURE — 99214 OFFICE O/P EST MOD 30 MIN: CPT | Performed by: FAMILY MEDICINE

## 2022-11-07 RX ORDER — SERTRALINE HYDROCHLORIDE 100 MG/1
100 TABLET, FILM COATED ORAL DAILY
Qty: 90 TABLET | Refills: 3 | Status: SHIPPED | OUTPATIENT
Start: 2022-11-07

## 2022-11-07 RX ORDER — SULFAMETHOXAZOLE AND TRIMETHOPRIM 800; 160 MG/1; MG/1
1 TABLET ORAL 2 TIMES DAILY
Qty: 20 TABLET | Refills: 0 | Status: SHIPPED | OUTPATIENT
Start: 2022-11-07

## 2022-11-07 NOTE — PROGRESS NOTES
"Chief Complaint  follow up to hypertension and right foot toe issue    Subjective        Angel White Jr. presents to [facility] PRIMARY [facility]  History of Present Illness  Patient follows up for ongoing management of hypertension anxiety toe infection. blood pressures well controlled he restarted his sertraline to help control his anxiety and it seems to be beneficial  He has redeveloped of cellulitis of his middle toe on her right foot he has underlying onychomycosis and failed to have follow-up with podiatrist as he has not taken any medication for his diabetes  Objective   Vital Signs:  /62 (BP Location: Right arm, Patient Position: Sitting, Cuff Size: Adult)   Pulse 71   Ht 188 cm (74.02\")   Wt 121 kg (267 lb 12.8 oz)   SpO2 100%   BMI 34.36 kg/m²   Estimated body mass index is 34.36 kg/m² as calculated from the following:    Height as of this encounter: 188 cm (74.02\").    Weight as of this encounter: 121 kg (267 lb 12.8 oz).          Physical Exam  Vitals and nursing note reviewed.   Constitutional:       Appearance: He is obese.   Cardiovascular:      Rate and Rhythm: Normal rate and regular rhythm.      Pulses: Normal pulses.      Heart sounds: Normal heart sounds.   Pulmonary:      Effort: Pulmonary effort is normal.      Breath sounds: Normal breath sounds.   Musculoskeletal:      Right lower leg: Edema present.      Left lower leg: Edema present.        Feet:       Comments: Trace   Skin:     General: Skin is warm and dry.   Neurological:      Mental Status: He is alert.   Psychiatric:         Mood and Affect: Mood normal.         Behavior: Behavior normal.         Thought Content: Thought content normal.         Judgment: Judgment normal.        Result Review :    Common labs    Common Labs 5/5/22 5/5/22 5/5/22 5/5/22 10/13/22 10/21/22 10/21/22    1520 1520 1520 1520  1218 1218   Glucose    98   164 (A)   BUN    11   22 (A)   Creatinine    1.00 1.00  1.00   Sodium    141   " 139   Potassium    4.7   4.8 (A)   Chloride    106   102   Calcium    9.5   9.4   Albumin    4.10   4.10   Total Bilirubin    0.3   0.3   Alkaline Phosphatase    98   88   AST (SGOT)    13   14   ALT (SGPT)    8   9   WBC 8.92     7.73    Hemoglobin 13.2     13.8    Hematocrit 41.0     42.2    Platelets 292     264    Total Cholesterol   148       Triglycerides   86       HDL Cholesterol   52       LDL Cholesterol    80       Hemoglobin A1C  5.90 (A)        (A) Abnormal value       Comments are available for some flowsheets but are not being displayed.           Data reviewed: Consultant notes Podiatrist September 2022 cellulitis of foot          Assessment and Plan   Diagnoses and all orders for this visit:    1. Anxiety (Primary)    2. Hyperglycemia  -     Hemoglobin A1c; Future    3. Essential hypertension    4. Type 2 diabetes mellitus with other circulatory complication, without long-term current use of insulin (HCC)    5. Onychomycosis    Other orders  -     sertraline (ZOLOFT) 100 MG tablet; Take 1 tablet by mouth Daily.  Dispense: 90 tablet; Refill: 3  -     sulfamethoxazole-trimethoprim (BACTRIM DS,SEPTRA DS) 800-160 MG per tablet; Take 1 tablet by mouth 2 (Two) Times a Day.  Dispense: 20 tablet; Refill: 0    Anxiety continue sertraline  Diabetes follow-up results of A1c  Patient needs to follow-up with podiatry for ongoing chronic onychomycosis as well as foot hygiene         Follow Up   Return in about 3 months (around 2/7/2023), or if symptoms worsen or fail to improve, for Recheck.  Patient was given instructions and counseling regarding his condition or for health maintenance advice. Please see specific information pulled into the AVS if appropriate.

## 2022-11-08 LAB — HBA1C MFR BLD: 6.6 % (ref 4.8–5.6)

## 2022-11-14 ENCOUNTER — TELEPHONE (OUTPATIENT)
Dept: INTERNAL MEDICINE | Facility: CLINIC | Age: 67
End: 2022-11-14

## 2022-11-14 NOTE — TELEPHONE ENCOUNTER
Caller: Angel White Jr.    Relationship: Self    Best call back number: [phone]     What is the medical concern/diagnosis: PATIENT STATES THAT HE FEELS THAT THE HOLIDAYS ARE A HARDER TIME FOR HIM MENTALLY AND WOULD LIKE TO SPEAK WITH A PROFESSIONAL.       What specialty or service is being requested: PHYCOLOGIST     Any additional details: PATIENT STATES THAT HE DOES NOT HAVE ANYONE IN PARTICULAR IN MIND.     PATIENT WOULD LIKE TO SEE WHOMEVER DR العلي WOULD RECOMMEND.     PLEASE ADVISE

## 2022-11-15 DIAGNOSIS — F41.9 ANXIETY: Primary | ICD-10-CM

## 2023-01-09 RX ORDER — FERROUS SULFATE 325(65) MG
TABLET ORAL
Qty: 90 TABLET | Refills: 1 | Status: SHIPPED | OUTPATIENT
Start: 2023-01-09

## 2023-01-14 DIAGNOSIS — I10 ESSENTIAL (PRIMARY) HYPERTENSION: ICD-10-CM

## 2023-01-14 DIAGNOSIS — I25.10 ATHEROSCLEROTIC HEART DISEASE OF NATIVE CORONARY ARTERY WITHOUT ANGINA PECTORIS: ICD-10-CM

## 2023-01-16 RX ORDER — CLOPIDOGREL BISULFATE 75 MG/1
TABLET ORAL
Qty: 90 TABLET | Refills: 3 | Status: SHIPPED | OUTPATIENT
Start: 2023-01-16

## 2023-01-16 RX ORDER — PROCHLORPERAZINE MALEATE 10 MG
TABLET ORAL
Qty: 120 TABLET | Refills: 1 | OUTPATIENT
Start: 2023-01-16

## 2023-01-16 RX ORDER — AMLODIPINE BESYLATE 5 MG/1
TABLET ORAL
Qty: 90 TABLET | Refills: 1 | Status: SHIPPED | OUTPATIENT
Start: 2023-01-16

## 2023-04-03 ENCOUNTER — HOSPITAL ENCOUNTER (OUTPATIENT)
Dept: CT IMAGING | Facility: HOSPITAL | Age: 68
Discharge: HOME | End: 2023-04-03
Payer: MEDICARE

## 2023-04-03 DIAGNOSIS — Z98.890 NEUROENDOCRINE TUMOR STATUS POST SURGICAL TREATMENT: ICD-10-CM

## 2023-04-03 DIAGNOSIS — C7A.012 MALIGNANT CARCINOID TUMOR OF ILEUM: ICD-10-CM

## 2023-04-03 DIAGNOSIS — D64.89 ANEMIA DUE TO OTHER CAUSE, NOT CLASSIFIED: ICD-10-CM

## 2023-04-03 LAB
ALBUMIN SERPL-MCNC: 4.2 G/DL (ref 3.5–5.2)
ALBUMIN/GLOB SERPL: 1.3 G/DL
ALP SERPL-CCNC: 109 U/L (ref 39–117)
ALT SERPL W P-5'-P-CCNC: 21 U/L (ref 1–41)
ANION GAP SERPL CALCULATED.3IONS-SCNC: 10 MMOL/L (ref 5–15)
AST SERPL-CCNC: 20 U/L (ref 1–40)
BASOPHILS # BLD AUTO: 0.05 10*3/MM3 (ref 0–0.2)
BASOPHILS NFR BLD AUTO: 0.7 % (ref 0–1.5)
BILIRUB SERPL-MCNC: 0.2 MG/DL (ref 0–1.2)
BUN SERPL-MCNC: 8 MG/DL (ref 8–23)
BUN/CREAT SERPL: 8.7 (ref 7–25)
CALCIUM SPEC-SCNC: 8.8 MG/DL (ref 8.6–10.5)
CHLORIDE SERPL-SCNC: 99 MMOL/L (ref 98–107)
CO2 SERPL-SCNC: 26 MMOL/L (ref 22–29)
CREAT BLDA-MCNC: 0.9 MG/DL (ref 0.6–1.3)
CREAT SERPL-MCNC: 0.92 MG/DL (ref 0.76–1.27)
DEPRECATED RDW RBC AUTO: 38.7 FL (ref 37–54)
EGFRCR SERPLBLD CKD-EPI 2021: 91.2 ML/MIN/1.73
EOSINOPHIL # BLD AUTO: 0.18 10*3/MM3 (ref 0–0.4)
EOSINOPHIL NFR BLD AUTO: 2.4 % (ref 0.3–6.2)
ERYTHROCYTE [DISTWIDTH] IN BLOOD BY AUTOMATED COUNT: 12.6 % (ref 12.3–15.4)
FERRITIN SERPL-MCNC: 95.2 NG/ML (ref 30–400)
GLOBULIN UR ELPH-MCNC: 3.2 GM/DL
GLUCOSE SERPL-MCNC: 177 MG/DL (ref 65–99)
HCT VFR BLD AUTO: 43.5 % (ref 37.5–51)
HGB BLD-MCNC: 14.3 G/DL (ref 13–17.7)
IMM GRANULOCYTES # BLD AUTO: 0.04 10*3/MM3 (ref 0–0.05)
IMM GRANULOCYTES NFR BLD AUTO: 0.5 % (ref 0–0.5)
IRON 24H UR-MRATE: 53 MCG/DL (ref 59–158)
IRON SATN MFR SERPL: 14 % (ref 20–50)
LYMPHOCYTES # BLD AUTO: 1.45 10*3/MM3 (ref 0.7–3.1)
LYMPHOCYTES NFR BLD AUTO: 19 % (ref 19.6–45.3)
MCH RBC QN AUTO: 27.8 PG (ref 26.6–33)
MCHC RBC AUTO-ENTMCNC: 32.9 G/DL (ref 31.5–35.7)
MCV RBC AUTO: 84.6 FL (ref 79–97)
MONOCYTES # BLD AUTO: 0.46 10*3/MM3 (ref 0.1–0.9)
MONOCYTES NFR BLD AUTO: 6 % (ref 5–12)
NEUTROPHILS NFR BLD AUTO: 5.44 10*3/MM3 (ref 1.7–7)
NEUTROPHILS NFR BLD AUTO: 71.4 % (ref 42.7–76)
NRBC BLD AUTO-RTO: 0 /100 WBC (ref 0–0.2)
PLATELET # BLD AUTO: 280 10*3/MM3 (ref 140–450)
PMV BLD AUTO: 8.6 FL (ref 6–12)
POTASSIUM SERPL-SCNC: 4.3 MMOL/L (ref 3.5–5.2)
PROT SERPL-MCNC: 7.4 G/DL (ref 6–8.5)
RBC # BLD AUTO: 5.14 10*6/MM3 (ref 4.14–5.8)
SODIUM SERPL-SCNC: 135 MMOL/L (ref 136–145)
TIBC SERPL-MCNC: 374 MCG/DL (ref 298–536)
TRANSFERRIN SERPL-MCNC: 251 MG/DL (ref 200–360)
WBC NRBC COR # BLD: 7.62 10*3/MM3 (ref 3.4–10.8)

## 2023-04-03 PROCEDURE — 82728 ASSAY OF FERRITIN: CPT | Performed by: INTERNAL MEDICINE

## 2023-04-03 PROCEDURE — 71260 CT THORAX DX C+: CPT

## 2023-04-03 PROCEDURE — 85025 COMPLETE CBC W/AUTO DIFF WBC: CPT | Performed by: INTERNAL MEDICINE

## 2023-04-03 PROCEDURE — 82565 ASSAY OF CREATININE: CPT

## 2023-04-03 PROCEDURE — 84466 ASSAY OF TRANSFERRIN: CPT | Performed by: INTERNAL MEDICINE

## 2023-04-03 PROCEDURE — 74177 CT ABD & PELVIS W/CONTRAST: CPT

## 2023-04-03 PROCEDURE — 80053 COMPREHEN METABOLIC PANEL: CPT | Performed by: INTERNAL MEDICINE

## 2023-04-03 PROCEDURE — 83540 ASSAY OF IRON: CPT | Performed by: INTERNAL MEDICINE

## 2023-04-03 PROCEDURE — 0 DIATRIZOATE MEGLUMINE & SODIUM PER 1 ML: Performed by: INTERNAL MEDICINE

## 2023-04-03 PROCEDURE — 25510000001 IOPAMIDOL 61 % SOLUTION: Performed by: INTERNAL MEDICINE

## 2023-04-03 RX ADMIN — DIATRIZOATE MEGLUMINE AND DIATRIZOATE SODIUM 30 ML: 600; 100 SOLUTION ORAL; RECTAL at 14:30

## 2023-04-03 RX ADMIN — IOPAMIDOL 85 ML: 612 INJECTION, SOLUTION INTRAVENOUS at 15:34

## 2023-04-03 NOTE — PROGRESS NOTES
Subjective     REASON FOR CONSULTATION:  Carcinoid tumor ileum  Provide an opinion on any further workup or treatment                             REQUESTING PHYSICIAN:  Dr. Donovan    RECORDS OBTAINED:  Records of the patients history including those obtained from the referring provider were reviewed and summarized in detail.    History of Present Illness   This is a very pleasant 67-year-old man who has medical history pertinent for coronary artery disease, diabetes, hypertension, hyperlipidemia, sleep apnea.  He recently presented with 50 pound weight loss, nausea, decreased appetite, and hematochezia.  The patient was noted to have iron deficiency anemia and was admitted to the hospital in October 2021 with hemoglobin 7.7 requiring transfusion support.  A CT of the abdomen and pelvis on 10/30/2021 showed some fatty infiltration in the liver, small mural lipoma in the wall of the cecum and slight prostamegaly.  The patient continued to have symptoms, and a repeat CT abdomen/pelvis on 11/19/2021 showed a 3 cm mass in the terminal ileum with enlarged lymph nodes adjacent up to 1.4 cm.  There was no evidence of small bowel obstruction.  There was a 2 cm lipoma in the cecum and a 2.4 cm lipoma in the ascending colon which were stable from previous.  The liver was unremarkable.    The patient was taken to the operating room on 2/3/2022 by Dr. Donovan and underwent a laparoscopic ileocolonic resection.  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  The tumor measured 2.5 cm in greatest dimension and invaded the muscularis propria into the subserosal tissue without penetration of the serosa.  There was lymphovascular but no perineural invasion.  12 lymph nodes were resected for of which were positive for tumor.  Final pathology pT3 N2 M0.    A neuroendocrine PET scan ([facility]) on 3/29/2022 was  negative for residual or metastatic disease.    The patient returned today for follow-up.  He denies abdominal pain nausea vomiting.  He had bright red blood per rectum this morning with a bowel movement after straining.  This was a first-time occurrence.    Past Medical History:   Diagnosis Date   • Anemia    • AP (angina pectoris)     unstable   • CAD (coronary artery disease)     6 stents placed   • DDD (degenerative disc disease), lumbosacral    • Diabetes    • Essential hypertension    • Fatty liver     ?   • History of Clostridioides difficile infection 10/2021   • History of MI (myocardial infarction)    • Hyperlipidemia    • Low back pain    • Mass of colon    • Memory loss     SOME SHORT TERM MEMORY ISSUES AND FORGETFULNESS   • Osteoarthritis of knee    • Screening for prostate cancer 2010    normal   • Skin cancer     left eyebrow, side of face and back    • Sleep apnea     NO CPAP   • Tachycardia         Past Surgical History:   Procedure Laterality Date   • COLON RESECTION N/A 2/3/2022    Procedure: ILEUM COLON RESECTION FOR TERMINAL ILEUM MASS LAPAROSCOPIC;  Surgeon: Roscoe Donovan MD;  Location: [facility] MAIN OR;  Service: General;  Laterality: N/A;   • COLONOSCOPY N/A 2016    normal per patient   • COLONOSCOPY N/A 8/3/2020    Procedure: COLONOSCOPY to cecum and TI with cold biopsies;  Surgeon: Kelly Ashraf MD;  Location: [facility] ENDOSCOPY;  Service: Gastroenterology;  Laterality: N/A;  pre-change in bowel habits, hx polyps   post-hemorrhoids, lipomas, abnormal cecal tissue    • CORONARY ANGIOPLASTY WITH STENT PLACEMENT  08/28/2015    6 stents-Dr. Hall, [facility]   • FOOT SURGERY Right 05/23/2017    Dr. Cervantes  [facility]   • REPLACEMENT TOTAL KNEE Left 2010    Dr. Arnaud Reynaga, [facility]   • REPLACEMENT TOTAL KNEE Right 2009    Dr. Arnaud Reynaga, [facility]   • SKIN BIOPSY     • SKIN CANCER EXCISION Left 03/30/2016    left ear and left eyebrow, graft took from face, placed on ear   • SKIN CANCER EXCISION   2001    back        Current Outpatient Medications on File Prior to Visit   Medication Sig Dispense Refill   • amLODIPine (NORVASC) 5 MG tablet TAKE 1 TABLET BY MOUTH EVERY DAY 90 tablet 1   • atorvastatin (LIPITOR) 40 MG tablet TAKE 1 TABLET BY MOUTH EVERY DAY 90 tablet 1   • clopidogrel (PLAVIX) 75 MG tablet TAKE 1 TABLET BY MOUTH EVERY DAY 90 tablet 3   • ferrous sulfate 325 (65 FE) MG tablet TAKE 1 TABLET BY MOUTH EVERY DAY WITH BREAKFAST 90 tablet 1   • losartan (COZAAR) 100 MG tablet TAKE 1 TABLET BY MOUTH EVERY DAY 90 tablet 1   • Pseudoeph-Doxylamine-DM-APAP (NYQUIL PO) Take  by mouth every night at bedtime.     • sertraline (ZOLOFT) 100 MG tablet Take 1 tablet by mouth Daily. 90 tablet 3   • sulfamethoxazole-trimethoprim (BACTRIM DS,SEPTRA DS) 800-160 MG per tablet Take 1 tablet by mouth 2 (Two) Times a Day. 20 tablet 0     No current facility-administered medications on file prior to visit.        ALLERGIES:    Allergies   Allergen Reactions   • Ambien [Zolpidem] Other (See Comments)     Sleep walking     • Morphine And Related Itching and Rash        Social History     Socioeconomic History   • Marital status:      Spouse name: Jaylene   Tobacco Use   • Smoking status: Never   • Smokeless tobacco: Never   • Tobacco comments:     Caffeine daily   Vaping Use   • Vaping Use: Never used   Substance and Sexual Activity   • Alcohol use: No   • Drug use: Not Currently   • Sexual activity: Defer     Partners: Female        Family History   Problem Relation Age of Onset   • Hypertension Mother    • Arthritis Mother    • Diabetes Mother    • Heart disease Father    • Hypertension Father    • Macular degeneration Father    • Arthritis Father    • Diabetes Father    • Arthritis Maternal Grandmother    • Heart disease Maternal Grandmother    • Arthritis Maternal Grandfather    • Heart disease Maternal Grandfather    • Diabetes Maternal Grandfather    • Hypertension Maternal Grandfather    • Colon cancer Maternal  "Grandfather    • Arthritis Paternal Grandmother    • Heart disease Paternal Grandmother    • Diabetes Paternal Grandmother    • Hypertension Paternal Grandmother    • Stroke Paternal Grandmother    • Colon cancer Paternal Grandmother    • Arthritis Paternal Grandfather    • Heart disease Paternal Grandfather    • Colon cancer Paternal Grandfather    • Macular degeneration Sister    • Malig Hyperthermia Neg Hx         Review of Systems   Constitutional: Negative for appetite change, fatigue and unexpected weight change.   HENT: Negative.    Respiratory: Negative for cough and shortness of breath.    Cardiovascular: Negative for chest pain and palpitations.   Gastrointestinal: Positive for blood in stool and constipation. Negative for diarrhea, nausea and vomiting.   Genitourinary: Negative.    Musculoskeletal: Negative.    Skin: Negative.    Allergic/Immunologic: Negative.    Neurological: Negative.    Hematological: Negative.    Psychiatric/Behavioral: Negative.         Objective     Vitals:    04/10/23 1600   BP: 133/82   Pulse: 77   Resp: 18   Temp: 97.3 °F (36.3 °C)   TempSrc: Infrared   SpO2: 97%   Weight: 130 kg (286 lb 4.8 oz)   Height: 188 cm (74.02\")   PainSc: 0-No pain     Current Status 4/10/2023   ECOG score 0       Physical Exam    CONSTITUTIONAL: pleasant well-developed adult man  HEENT: no icterus, no thrush, moist membranes  LYMPH: no cervical or supraclavicular lad  CV: RRR, S1S2, no murmur  RESP: cta bilat, no wheezing, no rales  GI: soft, non-tender, no splenomegaly, +bs, laparoscopic scars healing well  MUSC: no edema, normal gait  NEURO: alert and oriented x3, normal strength  PSYCH: normal mood and affect    RECENT LABS:  Hematology WBC   Date Value Ref Range Status   04/03/2023 7.62 3.40 - 10.80 10*3/mm3 Final   12/23/2021 7.9 3.4 - 10.8 x10E3/uL Final     RBC   Date Value Ref Range Status   04/03/2023 5.14 4.14 - 5.80 10*6/mm3 Final   12/23/2021 3.97 (L) 4.14 - 5.80 x10E6/uL Final "     Hemoglobin   Date Value Ref Range Status   04/03/2023 14.3 13.0 - 17.7 g/dL Final     Hematocrit   Date Value Ref Range Status   04/03/2023 43.5 37.5 - 51.0 % Final     Platelets   Date Value Ref Range Status   04/03/2023 280 140 - 450 10*3/mm3 Final        Lab Results   Component Value Date    GLUCOSE 177 (H) 04/03/2023    BUN 8 04/03/2023    CREATININE 0.92 04/03/2023    CREATININE 0.90 04/03/2023    EGFRIFNONA 93 02/04/2022    EGFRIFAFRI 103 10/28/2021    BCR 8.7 04/03/2023    K 4.3 04/03/2023    CO2 26.0 04/03/2023    CALCIUM 8.8 04/03/2023    PROTENTOTREF 6.1 10/28/2021    ALBUMIN 4.2 04/03/2023    LABIL2 1.7 10/28/2021    AST 20 04/03/2023    ALT 21 04/03/2023          NM PET/CT Skull Base to Mid Thigh 3/29/2022 - Negative [facility] imaging. There is no evidence of  metastatic disease or residual primary carcinoid tumor.    CT Chest, Abdomen and Pelvis With Contrast Diagnostic 10/13/2022 - 1. No evidence for recurrent or metastatic disease to the chest,  abdomen, pelvis in patient with previous partial ileocolonic resection  and mesenteric jolly resection.  2. 2 mm noncalcified right upper lobe pulmonary nodule could be followed  with CT chest in one year.  3. Diffuse fat infiltration of the liver.  4. 11 mm sclerotic lesion within the right aspect of the T11 vertebral  body has mildly increased in size compared to prior CT 08/28/2015 though  this is of doubtful significance given its long-term presence.    CT Chest With Contrast Diagnostic and CT Abdomen Pelvis With Contrast 4/3/2023 - IMPRESSION:  1. No evidence of residual or recurrent disease  2. Stable right paramediastinal nodule, likely benign  3. Stable T11 sclerotic focus  4. Stable right adrenal thickening  5. Additional stable incidental findings as above.      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  The tumor measured 2.5 cm in greatest dimension and invaded the muscularis propria into the subserosal tissue without penetration of the serosa.  There was lymphovascular but no perineural invasion.  12 lymph nodes were resected for of which were positive for tumor.  Final pathology pT3 N2 M0.  · [facility] scan 3/29/2022-no evidence of residual disease  · CT scan chest abdomen pelvis with contrast 10/13/2022- MARIAM (tiny right upper lobe lung nodule to be monitored)  · CT chest abdomen pelvis with contrast 4/3/2023-MARIAM    *Microcytic, hypochromic anemia  · Labs look consistent with iron deficiency anemia secondary to GI blood loss related to his malignancy  · Hemoglobin/iron stores currently normal off iron      *Multiple comorbidities of CAD, hypertension, hyperlipidemia, sleep apnea etc.    Hematology/oncology plan/recommendations:  1. 6-month follow-up CBC CMP  CT chest abdomen and pelvis ordered  2. If hematochezia recurs patient to call back but suspected to be hemorrhoid related since one-time occurrence

## 2023-04-07 ENCOUNTER — TELEPHONE (OUTPATIENT)
Dept: INTERNAL MEDICINE | Facility: CLINIC | Age: 68
End: 2023-04-07

## 2023-04-07 NOTE — TELEPHONE ENCOUNTER
Patient did not want to go to ER/Urgent Care and scheduled an appointment to see Dr. Meza on Tuesday (he was unable to come in on Monday).  Patient will go to ER/UC if this gets worse.

## 2023-04-07 NOTE — TELEPHONE ENCOUNTER
UNABLE TO WARM TRANSFER.        Caller: Jaylene White    Relationship to patient: Emergency Contact    Patient is needing: PATIENT HAS A SWOLLEN FACE AND THEY BELIEVE IT IS DUE TO AN INFECTED TOOTH BUT THERE ARE NO DENTISTS OPEN TODAY. THEY WOULD LIKE TO COME IN. PLEASE CALL AND ADVISE. [phone]

## 2023-04-10 ENCOUNTER — OFFICE VISIT (OUTPATIENT)
Dept: ONCOLOGY | Facility: CLINIC | Age: 68
End: 2023-04-10
Payer: MEDICARE

## 2023-04-10 VITALS
RESPIRATION RATE: 18 BRPM | WEIGHT: 286.3 LBS | OXYGEN SATURATION: 97 % | HEIGHT: 74 IN | TEMPERATURE: 97.3 F | DIASTOLIC BLOOD PRESSURE: 82 MMHG | BODY MASS INDEX: 36.74 KG/M2 | SYSTOLIC BLOOD PRESSURE: 133 MMHG | HEART RATE: 77 BPM

## 2023-04-10 DIAGNOSIS — Z98.890 NEUROENDOCRINE TUMOR STATUS POST SURGICAL TREATMENT: Primary | ICD-10-CM

## 2023-04-10 DIAGNOSIS — D64.89 ANEMIA DUE TO OTHER CAUSE, NOT CLASSIFIED: ICD-10-CM

## 2023-04-10 DIAGNOSIS — C7A.012 MALIGNANT CARCINOID TUMOR OF ILEUM: ICD-10-CM

## 2023-04-10 PROCEDURE — 1126F AMNT PAIN NOTED NONE PRSNT: CPT | Performed by: INTERNAL MEDICINE

## 2023-04-10 PROCEDURE — 3075F SYST BP GE 130 - 139MM HG: CPT | Performed by: INTERNAL MEDICINE

## 2023-04-10 PROCEDURE — 99214 OFFICE O/P EST MOD 30 MIN: CPT | Performed by: INTERNAL MEDICINE

## 2023-04-10 PROCEDURE — 3079F DIAST BP 80-89 MM HG: CPT | Performed by: INTERNAL MEDICINE

## 2023-05-23 ENCOUNTER — OFFICE VISIT (OUTPATIENT)
Dept: INTERNAL MEDICINE | Facility: CLINIC | Age: 68
End: 2023-05-23
Payer: MEDICARE

## 2023-05-23 VITALS
SYSTOLIC BLOOD PRESSURE: 126 MMHG | HEIGHT: 74 IN | HEART RATE: 70 BPM | OXYGEN SATURATION: 96 % | WEIGHT: 275.3 LBS | DIASTOLIC BLOOD PRESSURE: 70 MMHG | BODY MASS INDEX: 35.33 KG/M2

## 2023-05-23 DIAGNOSIS — E11.51 CONTROLLED TYPE 2 DIABETES MELLITUS WITH DIABETIC PERIPHERAL ANGIOPATHY WITHOUT GANGRENE, WITHOUT LONG-TERM CURRENT USE OF INSULIN: Primary | ICD-10-CM

## 2023-05-23 DIAGNOSIS — K59.1 FUNCTIONAL DIARRHEA: ICD-10-CM

## 2023-05-23 DIAGNOSIS — D64.89 ANEMIA DUE TO OTHER CAUSE, NOT CLASSIFIED: ICD-10-CM

## 2023-05-23 DIAGNOSIS — I10 ESSENTIAL HYPERTENSION: ICD-10-CM

## 2023-05-23 DIAGNOSIS — E66.01 CLASS 2 SEVERE OBESITY DUE TO EXCESS CALORIES WITH SERIOUS COMORBIDITY AND BODY MASS INDEX (BMI) OF 35.0 TO 35.9 IN ADULT: ICD-10-CM

## 2023-05-23 DIAGNOSIS — F41.9 ANXIETY: ICD-10-CM

## 2023-05-23 PROBLEM — E66.812 CLASS 2 SEVERE OBESITY WITH SERIOUS COMORBIDITY AND BODY MASS INDEX (BMI) OF 35.0 TO 35.9 IN ADULT: Status: ACTIVE | Noted: 2017-11-06

## 2023-05-23 PROBLEM — U07.1 COVID-19 VIRUS INFECTION: Status: RESOLVED | Noted: 2022-07-28 | Resolved: 2023-05-23

## 2023-05-23 PROBLEM — L03.031 CELLULITIS OF TOE OF RIGHT FOOT: Status: RESOLVED | Noted: 2022-11-07 | Resolved: 2023-05-23

## 2023-05-23 RX ORDER — CITALOPRAM 20 MG/1
20 TABLET ORAL DAILY
Qty: 90 TABLET | Refills: 2 | Status: SHIPPED | OUTPATIENT
Start: 2023-05-23

## 2023-05-23 NOTE — PROGRESS NOTES
"Chief Complaint  Fatigue, Diarrhea, and Med Refill (Would like to discuss switching to citaolpram 20mg )    Subjective        Angel White JrRamirez presents to [facility]  History of Present Illness  Patient appointment to discuss fatigue intermittent loose stool anxiety depression  He feels he is getting good night sleep he has had a history of anemia iron deficiency stopped taking his iron recent iron studies reviewed with some evidence of deficiency  He would like to switch his sertraline to citalopram  He has had chronic intermittent diarrhea no black or bloody stool no fever sweats or chills  Objective   Vital Signs:  /70   Pulse 70   Ht 188 cm (74.02\")   Wt 125 kg (275 lb 4.8 oz)   SpO2 96%   BMI 35.33 kg/m²   Estimated body mass index is 35.33 kg/m² as calculated from the following:    Height as of this encounter: 188 cm (74.02\").    Weight as of this encounter: 125 kg (275 lb 4.8 oz).             Physical Exam  Vitals and nursing note reviewed.   Constitutional:       Appearance: Normal appearance.   HENT:      Head: Normocephalic and atraumatic.   Eyes:      General: No scleral icterus.  Cardiovascular:      Rate and Rhythm: Normal rate and regular rhythm.      Pulses: Normal pulses.      Heart sounds: Normal heart sounds.   Pulmonary:      Effort: Pulmonary effort is normal.      Breath sounds: Normal breath sounds.   Abdominal:      Tenderness: There is no abdominal tenderness. There is no right CVA tenderness, left CVA tenderness or rebound.   Musculoskeletal:      Right lower leg: Edema present.      Left lower leg: Edema present.      Comments: Trace   Neurological:      Mental Status: He is alert.   Psychiatric:         Mood and Affect: Mood normal.         Behavior: Behavior normal.         Thought Content: Thought content normal.         Judgment: Judgment normal.        Result Review :    Common labs        10/21/2022    12:18 11/7/2022    15:00 4/3/2023    " 14:50   Common Labs   Glucose 164    177     BUN 22    8     Creatinine 1.00    0.92      0.90     Sodium 139    135     Potassium 4.8    4.3     Chloride 102    99     Calcium 9.4    8.8     Albumin 4.10    4.2     Total Bilirubin 0.3    0.2     Alkaline Phosphatase 88    109     AST (SGOT) 14    20     ALT (SGPT) 9    21     WBC 7.73    7.62     Hemoglobin 13.8    14.3     Hematocrit 42.2    43.5     Platelets 264    280     Hemoglobin A1C  6.6                     Assessment and Plan   Diagnoses and all orders for this visit:    1. Controlled type 2 diabetes mellitus with diabetic peripheral angiopathy without gangrene, without long-term current use of insulin (Primary)  -     Hemoglobin A1c    2. Class 2 severe obesity due to excess calories with serious comorbidity and body mass index (BMI) of 35.0 to 35.9 in adult    3. Functional diarrhea    4. Anxiety  -     citalopram (CeleXA) 20 MG tablet; Take 1 tablet by mouth Daily.  Dispense: 90 tablet; Refill: 2    5. Essential hypertension    6. Anemia due to other cause, not classified    Follow-up results of A1c for untreated diabetes  Functional diarrhea recommend Metamucil daily  Obesity macron weight loss restriction diet  Anxiety discontinue sertraline start  Citalopram  Anemia restart iron       Follow Up   Return in about 1 month (around 6/23/2023), or if symptoms worsen or fail to improve, for Recheck.  Patient was given instructions and counseling regarding his condition or for health maintenance advice. Please see specific information pulled into the AVS if appropriate.

## 2023-05-24 LAB — HBA1C MFR BLD: 7.5 % (ref 4.8–5.6)

## 2023-05-24 RX ORDER — METFORMIN HYDROCHLORIDE 500 MG/1
500 TABLET, EXTENDED RELEASE ORAL
Qty: 30 TABLET | Refills: 3 | Status: SHIPPED | OUTPATIENT
Start: 2023-05-24

## 2023-06-02 ENCOUNTER — TELEPHONE (OUTPATIENT)
Dept: GASTROENTEROLOGY | Facility: CLINIC | Age: 68
End: 2023-06-02

## 2023-06-02 NOTE — TELEPHONE ENCOUNTER
Caller: Angel White Jr.    Relationship: Self    Best call back number: [phone]    What was the call regarding: PATIENT REPORTS STOMACH PROBLEMS AND LIQUID DIARRHEA FOR ABOUT A MONTH NOW. SCHEDULED F/U WITH DR ALVARADO 06/07 AT 200PM

## 2023-06-02 NOTE — TELEPHONE ENCOUNTER
Called pt and pt reports that he has had issues with diarrhea for approx one month.  Pt does have upcoming appt with Dr Ashraf on 06/07.  Pt advised that he will be fine waiting till his appt with Dr Ashraf and does not need anything today.   Update sent to Dr Ashraf.

## 2023-06-07 ENCOUNTER — TELEPHONE (OUTPATIENT)
Dept: GASTROENTEROLOGY | Facility: CLINIC | Age: 68
End: 2023-06-07
Payer: MEDICARE

## 2023-06-07 ENCOUNTER — OFFICE VISIT (OUTPATIENT)
Dept: GASTROENTEROLOGY | Facility: CLINIC | Age: 68
End: 2023-06-07
Payer: MEDICARE

## 2023-06-07 VITALS
TEMPERATURE: 96 F | WEIGHT: 278 LBS | HEIGHT: 73 IN | HEART RATE: 84 BPM | DIASTOLIC BLOOD PRESSURE: 83 MMHG | SYSTOLIC BLOOD PRESSURE: 159 MMHG | BODY MASS INDEX: 36.84 KG/M2

## 2023-06-07 DIAGNOSIS — R19.7 DIARRHEA, UNSPECIFIED TYPE: ICD-10-CM

## 2023-06-07 DIAGNOSIS — R19.4 CHANGE IN BOWEL HABITS: Primary | ICD-10-CM

## 2023-06-07 DIAGNOSIS — Z98.890 NEUROENDOCRINE TUMOR STATUS POST SURGICAL TREATMENT: Chronic | ICD-10-CM

## 2023-06-07 DIAGNOSIS — D50.9 IRON DEFICIENCY ANEMIA, UNSPECIFIED IRON DEFICIENCY ANEMIA TYPE: ICD-10-CM

## 2023-06-07 PROCEDURE — 1160F RVW MEDS BY RX/DR IN RCRD: CPT | Performed by: INTERNAL MEDICINE

## 2023-06-07 PROCEDURE — 3079F DIAST BP 80-89 MM HG: CPT | Performed by: INTERNAL MEDICINE

## 2023-06-07 PROCEDURE — 1159F MED LIST DOCD IN RCRD: CPT | Performed by: INTERNAL MEDICINE

## 2023-06-07 PROCEDURE — 99214 OFFICE O/P EST MOD 30 MIN: CPT | Performed by: INTERNAL MEDICINE

## 2023-06-07 PROCEDURE — 3077F SYST BP >= 140 MM HG: CPT | Performed by: INTERNAL MEDICINE

## 2023-06-07 RX ORDER — SODIUM CHLORIDE, SODIUM LACTATE, POTASSIUM CHLORIDE, CALCIUM CHLORIDE 600; 310; 30; 20 MG/100ML; MG/100ML; MG/100ML; MG/100ML
30 INJECTION, SOLUTION INTRAVENOUS CONTINUOUS
OUTPATIENT
Start: 2023-07-18

## 2023-06-07 NOTE — TELEPHONE ENCOUNTER
OK FOR HUB TO READ  PT IS [facility] FOR A COLONOSCOPY AND EGD ON 7/18/23  PT WAS GIVEN MIRALAX PREP PT JAYDEN W/ TRUPTI

## 2023-06-07 NOTE — PROGRESS NOTES
Chief Complaint   Patient presents with    Diarrhea       Subjective     HPI    Angel White Jr. is a 68 y.o. male with a past medical history noted below who presents for follow-up of constipation, iron deficiency anemia, nausea, and history of neuroendocrine tumor of the ileum (T3N2), followed by Dr. Velasquez.      He is here today with complaints of diarrhea.  For the past 3 months, he has been going from having 3-4 days of formed stool followed by a few days of pasty stool then a few days of liquidy stool that it watery and frequent.  Maybe having some flushing with the episodes.  No association with food.  He did start citalopram recently.  He feels that he cycles through these episodes.    His pcp now has him on oral iron for findings of a low iron saturation.  He is not anemic.  He has not seen any overt bleeding.  He is on Plavix for history of coronary artery stents.  He does not take NSAIDs.  Occasionally takes an aspirin.  Been on metformin for a number of years.        Today's visit was in the office.  Both the patient and I were wearing face masks and proper hand hygiene was performed before and after the physical exam.     Past Medical History:   Diagnosis Date    Anemia     AP (angina pectoris)     unstable    CAD (coronary artery disease)     6 stents placed    DDD (degenerative disc disease), lumbosacral     Diabetes     Essential hypertension     Fatty liver     ?    History of Clostridioides difficile infection 10/2021    History of MI (myocardial infarction)     Hyperlipidemia     Low back pain     Mass of colon     Memory loss     SOME SHORT TERM MEMORY ISSUES AND FORGETFULNESS    Osteoarthritis of knee     Screening for prostate cancer 2010    normal    Skin cancer     left eyebrow, side of face and back     Sleep apnea     NO CPAP    Tachycardia           Current Outpatient Medications:     amLODIPine (NORVASC) 5 MG tablet, TAKE 1 TABLET BY MOUTH EVERY DAY, Disp: 90 tablet, Rfl: 1     atorvastatin (LIPITOR) 40 MG tablet, TAKE 1 TABLET BY MOUTH EVERY DAY, Disp: 90 tablet, Rfl: 1    citalopram (CeleXA) 20 MG tablet, Take 1 tablet by mouth Daily., Disp: 90 tablet, Rfl: 2    clopidogrel (PLAVIX) 75 MG tablet, TAKE 1 TABLET BY MOUTH EVERY DAY, Disp: 90 tablet, Rfl: 3    ferrous sulfate 325 (65 FE) MG tablet, TAKE 1 TABLET BY MOUTH EVERY DAY WITH BREAKFAST, Disp: 90 tablet, Rfl: 1    losartan (COZAAR) 100 MG tablet, TAKE 1 TABLET BY MOUTH EVERY DAY, Disp: 90 tablet, Rfl: 1    metFORMIN ER (GLUCOPHAGE-XR) 500 MG 24 hr tablet, Take 1 tablet by mouth Daily With Breakfast., Disp: 30 tablet, Rfl: 3    Pseudoeph-Doxylamine-DM-APAP (NYQUIL PO), Take  by mouth every night at bedtime., Disp: , Rfl:       Objective     Vitals:    06/07/23 1411   BP: 159/83   Pulse: 84   Temp: 96 °F (35.6 °C)         06/07/23  1411   Weight: 126 kg (278 lb)     Body mass index is 36.68 kg/m².    Physical Exam        WBC   Date Value Ref Range Status   04/03/2023 7.62 3.40 - 10.80 10*3/mm3 Final   12/23/2021 7.9 3.4 - 10.8 x10E3/uL Final     RBC   Date Value Ref Range Status   04/03/2023 5.14 4.14 - 5.80 10*6/mm3 Final   12/23/2021 3.97 (L) 4.14 - 5.80 x10E6/uL Final     Hemoglobin   Date Value Ref Range Status   04/03/2023 14.3 13.0 - 17.7 g/dL Final     Hematocrit   Date Value Ref Range Status   04/03/2023 43.5 37.5 - 51.0 % Final     MCV   Date Value Ref Range Status   04/03/2023 84.6 79.0 - 97.0 fL Final     MCH   Date Value Ref Range Status   04/03/2023 27.8 26.6 - 33.0 pg Final     MCHC   Date Value Ref Range Status   04/03/2023 32.9 31.5 - 35.7 g/dL Final     RDW   Date Value Ref Range Status   04/03/2023 12.6 12.3 - 15.4 % Final     RDW-SD   Date Value Ref Range Status   04/03/2023 38.7 37.0 - 54.0 fl Final     MPV   Date Value Ref Range Status   04/03/2023 8.6 6.0 - 12.0 fL Final     Platelets   Date Value Ref Range Status   04/03/2023 280 140 - 450 10*3/mm3 Final     Neutrophil %   Date Value Ref Range Status    04/03/2023 71.4 42.7 - 76.0 % Final     Lymphocyte %   Date Value Ref Range Status   04/03/2023 19.0 (L) 19.6 - 45.3 % Final     Monocyte %   Date Value Ref Range Status   04/03/2023 6.0 5.0 - 12.0 % Final     Eosinophil %   Date Value Ref Range Status   04/03/2023 2.4 0.3 - 6.2 % Final     Basophil %   Date Value Ref Range Status   04/03/2023 0.7 0.0 - 1.5 % Final     Immature Grans %   Date Value Ref Range Status   04/03/2023 0.5 0.0 - 0.5 % Final     Neutrophils, Absolute   Date Value Ref Range Status   04/03/2023 5.44 1.70 - 7.00 10*3/mm3 Final     Lymphocytes, Absolute   Date Value Ref Range Status   04/03/2023 1.45 0.70 - 3.10 10*3/mm3 Final     Monocytes, Absolute   Date Value Ref Range Status   04/03/2023 0.46 0.10 - 0.90 10*3/mm3 Final     Eosinophils, Absolute   Date Value Ref Range Status   04/03/2023 0.18 0.00 - 0.40 10*3/mm3 Final     Basophils, Absolute   Date Value Ref Range Status   04/03/2023 0.05 0.00 - 0.20 10*3/mm3 Final     Immature Grans, Absolute   Date Value Ref Range Status   04/03/2023 0.04 0.00 - 0.05 10*3/mm3 Final     nRBC   Date Value Ref Range Status   04/03/2023 0.0 0.0 - 0.2 /100 WBC Final       Lab Results   Component Value Date    GLUCOSE 177 (H) 04/03/2023    BUN 8 04/03/2023    CREATININE 0.92 04/03/2023    CREATININE 0.90 04/03/2023    EGFRIFNONA 93 02/04/2022    EGFRIFAFRI 103 10/28/2021    BCR 8.7 04/03/2023    CO2 26.0 04/03/2023    CALCIUM 8.8 04/03/2023    PROTENTOTREF 6.1 10/28/2021    ALBUMIN 4.2 04/03/2023    LABIL2 1.7 10/28/2021    AST 20 04/03/2023    ALT 21 04/03/2023 4/2023 CT a/p    IMPRESSION:  1. No evidence of residual or recurrent disease  2. Stable right paramediastinal nodule, likely benign  3. Stable T11 sclerotic focus  4. Stable right adrenal thickening  5. Additional stable incidental findings as above.     This report was finalized on 4/3/2023 4:50 PM by Dr. rBendan Thomas M.D.    I personally reviewed data as detailed below:     The labs listed  above.    The radiology studies listed above.    Office notes from: 4/10/23 oncology    Assessment and Plan  1.  Change in bowel habits/diarrhea: Fairly significant diarrhea with nocturnal stools, maybe some flushing.  A bit worrisome in the setting of a T3 into carcinoid tumor the recent surveillance imaging without evidence of metastatic disease    2.  Iron deficiency: He is not anemic yet but has low iron saturation.  He is on Plavix.  No overt bleeding.  No recent EGD    3.  Neuroendocrine tumor: Status post surgical resection.  Followed by Dr. Velasquez    Plan  I have reached out to Dr. Velasquez regarding his diarrhea symptoms and to determine whether or not a PET CT scan with dotatate is warranted in the setting to rule out metastatic carcinoid tumor/carcinoid syndrome    We will proceed with EGD and colonoscopy for further evaluation of his iron deficiency as well as change in bowel habits    Continue oral iron    We will reach out to his cardiologist regarding holding Plavix 5 days prior to his scopes    Further recommendations after endoscopy       Diagnoses and all orders for this visit:    1. Change in bowel habits (Primary)    2. Iron deficiency anemia, unspecified iron deficiency anemia type  -     Case Request; Standing  -     lactated ringers infusion  -     Case Request    3. Neuroendocrine tumor status post surgical treatment  -     Case Request; Standing  -     lactated ringers infusion  -     Case Request    4. Diarrhea, unspecified type  -     Case Request; Standing  -     lactated ringers infusion  -     Case Request    Other orders  -     Implement Anesthesia Orders Day of Procedure; Standing  -     Obtain Informed Consent; Standing  -     Follow Anesthesia Guidelines / Protocol; Future  -     Obtain Informed Consent; Future          I have discussed the above plan with the patient.  They verbalize understanding and are in agreement with the plan.  They have been advised to contact the office for  any questions, concerns, or changes related to their health.    Dictated utilizing Dragon dictation

## 2023-06-08 ENCOUNTER — TELEPHONE (OUTPATIENT)
Dept: GASTROENTEROLOGY | Facility: CLINIC | Age: 68
End: 2023-06-08
Payer: MEDICARE

## 2023-06-08 NOTE — TELEPHONE ENCOUNTER
Call to pt.  Advise that Dr Dumont has authorized to hold plavix for 5 days prior to egd/c/s scheduled for 7/18 (hold 7/13 - 7/18).  Verb understanding.     Update to DR Ashraf.

## 2023-06-08 NOTE — TELEPHONE ENCOUNTER
Dr Dumont,  Dr Ashraf has this pt scheduled for an EGD and Colonoscopy on 7/18/23.  Dr Ashraf would like to know if it is ok for him to hold his Plavix for 5 days prior?  Thanks  Jacque GRIMALDO

## 2023-06-14 ENCOUNTER — TELEPHONE (OUTPATIENT)
Dept: GASTROENTEROLOGY | Facility: CLINIC | Age: 68
End: 2023-06-14
Payer: MEDICARE

## 2023-06-14 DIAGNOSIS — D50.9 IRON DEFICIENCY ANEMIA, UNSPECIFIED IRON DEFICIENCY ANEMIA TYPE: Primary | ICD-10-CM

## 2023-06-14 DIAGNOSIS — R19.4 CHANGE IN BOWEL HABITS: ICD-10-CM

## 2023-06-14 NOTE — TELEPHONE ENCOUNTER
Hub staff attempted to follow warm transfer process and was unsuccessful     Caller: Angel White Jr.    Relationship to patient: Self    Best call back number: [phone]     Patient is needing: PATIENT IS CALLING TO REPORT NEW SYMPTOM OF DARK BOWEL MOVEMENTS WITH POSSIBLE BLOOD IN STOOL.  PATIENT IS SCHEDULED FOR A COLONOSCOPY/ENDOSCOPY ON 7/18/23.  PLEASE CALL BACK AND ADVISE.

## 2023-06-15 NOTE — TELEPHONE ENCOUNTER
Called pt, he states he has been having dark pasty stools x1 month.  States he is having 3 BM's per day, occasionally loose. Pt denies cramping, fever, N/V, but states he does have a decreased appetite.  Pt is scheduled for scopes on 7/18, but would like to know if there is anything else he needs to do in the meantime.  Advised will send a msg to Dr Ashraf.

## 2023-06-15 NOTE — TELEPHONE ENCOUNTER
Called pt and advised of Dr Ashraf's note.  Pt verbalized understanding.   Pt will go to PCP off and have drawn.

## 2023-06-16 ENCOUNTER — LAB (OUTPATIENT)
Dept: LAB | Facility: HOSPITAL | Age: 68
End: 2023-06-16
Payer: MEDICARE

## 2023-06-16 PROCEDURE — 85014 HEMATOCRIT: CPT | Performed by: INTERNAL MEDICINE

## 2023-06-16 PROCEDURE — 85018 HEMOGLOBIN: CPT | Performed by: INTERNAL MEDICINE

## 2023-06-19 ENCOUNTER — TELEPHONE (OUTPATIENT)
Dept: GASTROENTEROLOGY | Facility: CLINIC | Age: 68
End: 2023-06-19

## 2023-06-19 NOTE — TELEPHONE ENCOUNTER
Caller: Angel hWite Jr.    Relationship: Self    Best call back number: [phone]     Caller requesting test results: YES    What test was performed: BLOOD WORK    When was the test performed: 6/16/23    Where was the test performed: LAB    Additional notes: PT CALLING FOR RESULTS CALL BACK ANYTIME IF CANNOT REACH [phone] PLEASE TRY PT [phone] WIFE'S NUMBER (AMY WHITE) OK TO [facility].

## 2023-07-13 ENCOUNTER — TELEPHONE (OUTPATIENT)
Dept: INTERNAL MEDICINE | Facility: CLINIC | Age: 68
End: 2023-07-13

## 2023-07-13 NOTE — TELEPHONE ENCOUNTER
UNABLE TO WARM TRANSFER    Caller: Jaylene White    Relationship to patient: Emergency Contact    Best call back number:     [phone]       Patient is needing: PATIENT'S WIFE IS CALLING TO GET HERSELF A HOSPITAL FOLLOW UP AND SHE WANTS HER  TO BE WORKED IN AS WELL FOR HAVING SOB AT NIGHT THAT IS WAKING HIM UP.     HUB WAS HAVING COMPUTER / PHONE ISSUES AND UNABLE TO VERIFY MESSAGE BEFORE HANGING UP WITH PATIENT.

## 2023-07-17 ENCOUNTER — APPOINTMENT (OUTPATIENT)
Dept: GENERAL RADIOLOGY | Facility: HOSPITAL | Age: 68
End: 2023-07-17
Payer: MEDICARE

## 2023-07-17 ENCOUNTER — HOSPITAL ENCOUNTER (OUTPATIENT)
Facility: HOSPITAL | Age: 68
Setting detail: OBSERVATION
Discharge: HOME | End: 2023-07-18
Attending: EMERGENCY MEDICINE | Admitting: INTERNAL MEDICINE
Payer: MEDICARE

## 2023-07-17 ENCOUNTER — TELEPHONE (OUTPATIENT)
Dept: GASTROENTEROLOGY | Facility: CLINIC | Age: 68
End: 2023-07-17

## 2023-07-17 DIAGNOSIS — I10 ESSENTIAL (PRIMARY) HYPERTENSION: ICD-10-CM

## 2023-07-17 DIAGNOSIS — I48.91 ATRIAL FIBRILLATION, UNSPECIFIED TYPE: Primary | ICD-10-CM

## 2023-07-17 DIAGNOSIS — I50.9 CONGESTIVE HEART FAILURE, UNSPECIFIED HF CHRONICITY, UNSPECIFIED HEART FAILURE TYPE: ICD-10-CM

## 2023-07-17 LAB
ALBUMIN SERPL-MCNC: 4 G/DL (ref 3.5–5.2)
ALBUMIN/GLOB SERPL: 1.2 G/DL
ALP SERPL-CCNC: 99 U/L (ref 39–117)
ALT SERPL W P-5'-P-CCNC: 28 U/L (ref 1–41)
ANION GAP SERPL CALCULATED.3IONS-SCNC: 10.4 MMOL/L (ref 5–15)
AST SERPL-CCNC: 20 U/L (ref 1–40)
BASOPHILS # BLD AUTO: 0.02 10*3/MM3 (ref 0–0.2)
BASOPHILS NFR BLD AUTO: 0.2 % (ref 0–1.5)
BILIRUB SERPL-MCNC: 0.6 MG/DL (ref 0–1.2)
BUN SERPL-MCNC: 10 MG/DL (ref 8–23)
BUN/CREAT SERPL: 9.4 (ref 7–25)
CALCIUM SPEC-SCNC: 9.2 MG/DL (ref 8.6–10.5)
CHLORIDE SERPL-SCNC: 102 MMOL/L (ref 98–107)
CO2 SERPL-SCNC: 24.6 MMOL/L (ref 22–29)
CREAT SERPL-MCNC: 1.06 MG/DL (ref 0.76–1.27)
DEPRECATED RDW RBC AUTO: 43.1 FL (ref 37–54)
EGFRCR SERPLBLD CKD-EPI 2021: 76.9 ML/MIN/1.73
EOSINOPHIL # BLD AUTO: 0.12 10*3/MM3 (ref 0–0.4)
EOSINOPHIL NFR BLD AUTO: 1.5 % (ref 0.3–6.2)
ERYTHROCYTE [DISTWIDTH] IN BLOOD BY AUTOMATED COUNT: 14.3 % (ref 12.3–15.4)
FLUAV SUBTYP SPEC NAA+PROBE: NOT DETECTED
FLUBV RNA ISLT QL NAA+PROBE: NOT DETECTED
GEN 5 2HR TROPONIN T REFLEX: 16 NG/L
GLOBULIN UR ELPH-MCNC: 3.3 GM/DL
GLUCOSE SERPL-MCNC: 160 MG/DL (ref 65–99)
HCT VFR BLD AUTO: 43.2 % (ref 37.5–51)
HGB BLD-MCNC: 14.1 G/DL (ref 13–17.7)
IMM GRANULOCYTES # BLD AUTO: 0.01 10*3/MM3 (ref 0–0.05)
IMM GRANULOCYTES NFR BLD AUTO: 0.1 % (ref 0–0.5)
LYMPHOCYTES # BLD AUTO: 1.49 10*3/MM3 (ref 0.7–3.1)
LYMPHOCYTES NFR BLD AUTO: 18.2 % (ref 19.6–45.3)
MAGNESIUM SERPL-MCNC: 2.4 MG/DL (ref 1.6–2.4)
MCH RBC QN AUTO: 26.9 PG (ref 26.6–33)
MCHC RBC AUTO-ENTMCNC: 32.6 G/DL (ref 31.5–35.7)
MCV RBC AUTO: 82.4 FL (ref 79–97)
MONOCYTES # BLD AUTO: 0.41 10*3/MM3 (ref 0.1–0.9)
MONOCYTES NFR BLD AUTO: 5 % (ref 5–12)
NEUTROPHILS NFR BLD AUTO: 6.14 10*3/MM3 (ref 1.7–7)
NEUTROPHILS NFR BLD AUTO: 75 % (ref 42.7–76)
NT-PROBNP SERPL-MCNC: 5679 PG/ML (ref 0–900)
PLATELET # BLD AUTO: 271 10*3/MM3 (ref 140–450)
PMV BLD AUTO: 8.8 FL (ref 6–12)
POTASSIUM SERPL-SCNC: 4.3 MMOL/L (ref 3.5–5.2)
PROT SERPL-MCNC: 7.3 G/DL (ref 6–8.5)
RBC # BLD AUTO: 5.24 10*6/MM3 (ref 4.14–5.8)
SARS-COV-2 RNA RESP QL NAA+PROBE: NOT DETECTED
SODIUM SERPL-SCNC: 137 MMOL/L (ref 136–145)
TROPONIN T DELTA: -1 NG/L
TROPONIN T SERPL HS-MCNC: 17 NG/L
WBC NRBC COR # BLD: 8.19 10*3/MM3 (ref 3.4–10.8)

## 2023-07-17 PROCEDURE — 85025 COMPLETE CBC W/AUTO DIFF WBC: CPT | Performed by: EMERGENCY MEDICINE

## 2023-07-17 PROCEDURE — 25010000002 ENOXAPARIN PER 10 MG: Performed by: EMERGENCY MEDICINE

## 2023-07-17 PROCEDURE — 36415 COLL VENOUS BLD VENIPUNCTURE: CPT

## 2023-07-17 PROCEDURE — 99285 EMERGENCY DEPT VISIT HI MDM: CPT | Performed by: EMERGENCY MEDICINE

## 2023-07-17 PROCEDURE — G0378 HOSPITAL OBSERVATION PER HR: HCPCS

## 2023-07-17 PROCEDURE — 96372 THER/PROPH/DIAG INJ SC/IM: CPT

## 2023-07-17 PROCEDURE — 96376 TX/PRO/DX INJ SAME DRUG ADON: CPT

## 2023-07-17 PROCEDURE — 93010 ELECTROCARDIOGRAM REPORT: CPT | Performed by: INTERNAL MEDICINE

## 2023-07-17 PROCEDURE — 84484 ASSAY OF TROPONIN QUANT: CPT | Performed by: EMERGENCY MEDICINE

## 2023-07-17 PROCEDURE — 99285 EMERGENCY DEPT VISIT HI MDM: CPT

## 2023-07-17 PROCEDURE — 96375 TX/PRO/DX INJ NEW DRUG ADDON: CPT

## 2023-07-17 PROCEDURE — 83735 ASSAY OF MAGNESIUM: CPT | Performed by: EMERGENCY MEDICINE

## 2023-07-17 PROCEDURE — 96374 THER/PROPH/DIAG INJ IV PUSH: CPT

## 2023-07-17 PROCEDURE — 25010000002 FUROSEMIDE PER 20 MG: Performed by: INTERNAL MEDICINE

## 2023-07-17 PROCEDURE — 87636 SARSCOV2 & INF A&B AMP PRB: CPT | Performed by: EMERGENCY MEDICINE

## 2023-07-17 PROCEDURE — 71045 X-RAY EXAM CHEST 1 VIEW: CPT

## 2023-07-17 PROCEDURE — 25010000002 FUROSEMIDE PER 20 MG: Performed by: EMERGENCY MEDICINE

## 2023-07-17 PROCEDURE — 80053 COMPREHEN METABOLIC PANEL: CPT | Performed by: EMERGENCY MEDICINE

## 2023-07-17 PROCEDURE — 83880 ASSAY OF NATRIURETIC PEPTIDE: CPT | Performed by: EMERGENCY MEDICINE

## 2023-07-17 RX ORDER — FUROSEMIDE 10 MG/ML
40 INJECTION INTRAMUSCULAR; INTRAVENOUS EVERY 12 HOURS
Status: COMPLETED | OUTPATIENT
Start: 2023-07-17 | End: 2023-07-18

## 2023-07-17 RX ORDER — SODIUM CHLORIDE 0.9 % (FLUSH) 0.9 %
10 SYRINGE (ML) INJECTION EVERY 12 HOURS SCHEDULED
Status: DISCONTINUED | OUTPATIENT
Start: 2023-07-17 | End: 2023-07-18 | Stop reason: HOSPADM

## 2023-07-17 RX ORDER — SODIUM CHLORIDE 9 MG/ML
40 INJECTION, SOLUTION INTRAVENOUS AS NEEDED
Status: DISCONTINUED | OUTPATIENT
Start: 2023-07-17 | End: 2023-07-18 | Stop reason: HOSPADM

## 2023-07-17 RX ORDER — ATORVASTATIN CALCIUM 20 MG/1
40 TABLET, FILM COATED ORAL DAILY
Status: DISCONTINUED | OUTPATIENT
Start: 2023-07-17 | End: 2023-07-18 | Stop reason: HOSPADM

## 2023-07-17 RX ORDER — DILTIAZEM HYDROCHLORIDE 5 MG/ML
10 INJECTION INTRAVENOUS ONCE
Status: COMPLETED | OUTPATIENT
Start: 2023-07-17 | End: 2023-07-17

## 2023-07-17 RX ORDER — CLOPIDOGREL BISULFATE 75 MG/1
75 TABLET ORAL DAILY
Status: DISCONTINUED | OUTPATIENT
Start: 2023-07-17 | End: 2023-07-18 | Stop reason: HOSPADM

## 2023-07-17 RX ORDER — FERROUS SULFATE 325(65) MG
325 TABLET ORAL
Status: DISCONTINUED | OUTPATIENT
Start: 2023-07-18 | End: 2023-07-18 | Stop reason: HOSPADM

## 2023-07-17 RX ORDER — SODIUM CHLORIDE 0.9 % (FLUSH) 0.9 %
10 SYRINGE (ML) INJECTION AS NEEDED
Status: DISCONTINUED | OUTPATIENT
Start: 2023-07-17 | End: 2023-07-18 | Stop reason: HOSPADM

## 2023-07-17 RX ORDER — CITALOPRAM 20 MG/1
20 TABLET ORAL DAILY
Status: DISCONTINUED | OUTPATIENT
Start: 2023-07-17 | End: 2023-07-18 | Stop reason: HOSPADM

## 2023-07-17 RX ORDER — ENOXAPARIN SODIUM 150 MG/ML
1 INJECTION SUBCUTANEOUS ONCE
Status: COMPLETED | OUTPATIENT
Start: 2023-07-17 | End: 2023-07-17

## 2023-07-17 RX ORDER — FUROSEMIDE 10 MG/ML
40 INJECTION INTRAMUSCULAR; INTRAVENOUS ONCE
Status: COMPLETED | OUTPATIENT
Start: 2023-07-17 | End: 2023-07-17

## 2023-07-17 RX ORDER — METOPROLOL SUCCINATE 25 MG/1
25 TABLET, EXTENDED RELEASE ORAL EVERY 12 HOURS SCHEDULED
Status: DISCONTINUED | OUTPATIENT
Start: 2023-07-17 | End: 2023-07-18 | Stop reason: HOSPADM

## 2023-07-17 RX ADMIN — FUROSEMIDE 40 MG: 10 INJECTION, SOLUTION INTRAMUSCULAR; INTRAVENOUS at 12:45

## 2023-07-17 RX ADMIN — CLOPIDOGREL BISULFATE 75 MG: 75 TABLET, FILM COATED ORAL at 19:56

## 2023-07-17 RX ADMIN — FUROSEMIDE 40 MG: 10 INJECTION, SOLUTION INTRAMUSCULAR; INTRAVENOUS at 16:47

## 2023-07-17 RX ADMIN — ATORVASTATIN CALCIUM 40 MG: 20 TABLET, FILM COATED ORAL at 20:00

## 2023-07-17 RX ADMIN — Medication 10 ML: at 21:55

## 2023-07-17 RX ADMIN — ENOXAPARIN SODIUM 120 MG: 150 INJECTION SUBCUTANEOUS at 12:44

## 2023-07-17 RX ADMIN — CITALOPRAM 20 MG: 20 TABLET, FILM COATED ORAL at 19:56

## 2023-07-17 RX ADMIN — METOPROLOL TARTRATE 5 MG: 1 INJECTION, SOLUTION INTRAVENOUS at 17:38

## 2023-07-17 RX ADMIN — METOPROLOL SUCCINATE 25 MG: 25 TABLET, EXTENDED RELEASE ORAL at 20:00

## 2023-07-17 RX ADMIN — DILTIAZEM HYDROCHLORIDE 10 MG: 5 INJECTION INTRAVENOUS at 11:14

## 2023-07-17 NOTE — ED NOTES
Pt given explicit instructions to head to [facility] immediately after dealing with issues at home. Pt verbalized understanding. Ambulated out of ED

## 2023-07-17 NOTE — TELEPHONE ENCOUNTER
Caller: Angel White Jr.    Relationship to patient: Self    Best call back number: [phone]    Patient is needing: PT IS CURRENTLY IN THE HOSPITAL AND WILL NEED TO RESCHEDULE PROCEDURE.  PLEASE REACH OUT.

## 2023-07-17 NOTE — FSED PROVIDER NOTE
"Subjective   History of Present Illness  68-year-old male presents complaining of shortness of breath.  Patient states symptoms began about a week ago.  It is worse at night and he wakes up and is not able to get back to sleep because he is \"suffocating\".  He denies any chest pain.  He does have a history of CAD with 6 stents, most recently around 2015.  He denies any history of atrial fibrillation or irregular heart rhythms.  Patient usually takes aspirin and Plavix but stopped them a week ago because of a colonoscopy scheduled tomorrow.    History provided by:  Patient   used: No      Review of Systems   Constitutional: Negative.  Negative for fever.   Respiratory:  Positive for shortness of breath.    Cardiovascular: Negative.  Negative for chest pain.   Gastrointestinal: Negative.  Negative for abdominal pain and vomiting.   Genitourinary: Negative.  Negative for dysuria.   All other systems reviewed and are negative.    Past Medical History:   Diagnosis Date    Anemia     AP (angina pectoris)     unstable    CAD (coronary artery disease)     6 stents placed    DDD (degenerative disc disease), lumbosacral     Diabetes     Essential hypertension     Fatty liver     ?    History of Clostridioides difficile infection 10/2021    History of MI (myocardial infarction)     Hyperlipidemia     Low back pain     Mass of colon     Memory loss     SOME SHORT TERM MEMORY ISSUES AND FORGETFULNESS    Osteoarthritis of knee     Screening for prostate cancer 2010    normal    Skin cancer     left eyebrow, side of face and back     Sleep apnea     NO CPAP    Tachycardia        Allergies   Allergen Reactions    Ambien [Zolpidem] Other (See Comments)     Sleep walking      Morphine And Related Itching and Rash       Past Surgical History:   Procedure Laterality Date    COLON RESECTION N/A 2/3/2022    Procedure: ILEUM COLON RESECTION FOR TERMINAL ILEUM MASS LAPAROSCOPIC;  Surgeon: Roscoe Donovan MD;  Location: "  [facility];  Service: General;  Laterality: N/A;    COLONOSCOPY N/A 2016    normal per patient    COLONOSCOPY N/A 8/3/2020    Procedure: COLONOSCOPY to cecum and TI with cold biopsies;  Surgeon: Kelly Ashraf MD;  Location: [facility] ENDOSCOPY;  Service: Gastroenterology;  Laterality: N/A;  pre-change in bowel habits, hx polyps   post-hemorrhoids, lipomas, abnormal cecal tissue     CORONARY ANGIOPLASTY WITH STENT PLACEMENT  08/28/2015    6 stents-Dr. Hall, [facility]    FOOT SURGERY Right 05/23/2017    Dr. Cervantes, [facility]    REPLACEMENT TOTAL KNEE Left 2010    Dr. Arnaud Reynaga, [facility]    REPLACEMENT TOTAL KNEE Right 2009    Dr. Arnaud Reynaga, [facility]    SKIN BIOPSY      SKIN CANCER EXCISION Left 03/30/2016    left ear and left eyebrow, graft took from face, placed on ear    SKIN CANCER EXCISION  2001    back       Family History   Problem Relation Age of Onset    Hypertension Mother     Arthritis Mother     Diabetes Mother     Heart disease Father     Hypertension Father     Macular degeneration Father     Arthritis Father     Diabetes Father     Arthritis Maternal Grandmother     Heart disease Maternal Grandmother     Arthritis Maternal Grandfather     Heart disease Maternal Grandfather     Diabetes Maternal Grandfather     Hypertension Maternal Grandfather     Colon cancer Maternal Grandfather     Arthritis Paternal Grandmother     Heart disease Paternal Grandmother     Diabetes Paternal Grandmother     Hypertension Paternal Grandmother     Stroke Paternal Grandmother     Colon cancer Paternal Grandmother     Arthritis Paternal Grandfather     Heart disease Paternal Grandfather     Colon cancer Paternal Grandfather     Macular degeneration Sister     Malig Hyperthermia Neg Hx        Social History     Socioeconomic History    Marital status:      Spouse name: Jaylene   Tobacco Use    Smoking status: Never    Smokeless tobacco: Never    Tobacco comments:     Caffeine daily   Vaping Use    Vaping Use: Never  used   Substance and Sexual Activity    Alcohol use: No    Drug use: Not Currently    Sexual activity: Defer     Partners: Female           Objective   Physical Exam  Vitals and nursing note reviewed.   Constitutional:       General: He is not in acute distress.     Appearance: He is not diaphoretic.   HENT:      Head: Normocephalic and atraumatic.      Right Ear: External ear normal.      Left Ear: External ear normal.      Nose: Nose normal.   Eyes:      Pupils: Pupils are equal, round, and reactive to light.   Cardiovascular:      Rate and Rhythm: Normal rate and regular rhythm. Rhythm irregularly irregular.      Heart sounds: Normal heart sounds.   Pulmonary:      Effort: Pulmonary effort is normal. No respiratory distress.      Breath sounds: Normal breath sounds.   Abdominal:      Palpations: Abdomen is soft.      Tenderness: There is no abdominal tenderness.   Musculoskeletal:         General: No swelling or tenderness. Normal range of motion.      Cervical back: Normal range of motion and neck supple.      Comments: Trace edema BLE   Skin:     General: Skin is warm and dry.      Findings: No rash.   Neurological:      General: No focal deficit present.      Mental Status: He is alert and oriented to person, place, and time.   Psychiatric:         Mood and Affect: Mood normal.         Behavior: Behavior normal.       Procedures           ED Course  ED Course as of 07/17/23 1521   Mon Jul 17, 2023   1002 EKG: Atrial fibrillation, rate 88, , QTc 487, nonspecific changes, no STEMI. [DH]   1230 Discussed with Dr. Dumont, accepted for admission. [DH]   1315 Patient is adamantly refusing ambulance transport as I recommended, he states he is going to get a ride to the hospital instead.  I have explained the risks including but not limited to worsening arrhythmia, permanent disability, cardiac arrest, death.  Patient expressed understanding of my warning and acceptance of these risks.  He continues to refuse  ambulance transport.  He has capacity to make this decision, thus he will be going by private vehicle. [DH]      ED Course User Index  [DH] Leonel Fermin MD                  [id number] Score: 5                          Medical Decision Making  Number and Complexity of Problems  Differential Diagnosis: COVID, flu, other viral illness, pneumonia, arrhythmia, ACS    MDM Data  My EKG interpretation: Atrial fibrillation  My Laboratory interpretation: Troponin 17, elevated BNP, otherwise unremarkable  My Radiology interpretation: Pulmonary edema  Discussed with: Dr. Feldman    Treatment and Disposition  ED Course: 68-year-old male presenting with symptoms as described.  He was mildly tachycardic on arrival up to about 120 but that has improved with 10 mg of IV diltiazem, he did not require a gtt.  He is relatively symptomatic, complaining of dyspnea and orthopnea with markedly elevated BNP and some pulmonary edema on CXR.  [id number] score = 5.  Patient administered Lasix and Lovenox.  Will admit for further evaluation and management.    Shared decision making: admission    Problems Addressed:  Atrial fibrillation, unspecified type: complicated acute illness or injury  Congestive heart failure, unspecified HF chronicity, unspecified heart failure type: complicated acute illness or injury    Amount and/or Complexity of Data Reviewed  Labs: ordered.  Radiology: ordered.  ECG/medicine tests: ordered.    Risk  Prescription drug management.  Decision regarding hospitalization.        Final diagnoses:   Atrial fibrillation, unspecified type   Congestive heart failure, unspecified HF chronicity, unspecified heart failure type       ED Disposition  ED Disposition       ED Disposition   Decision to Admit    Condition   --    Comment   Level of Care: Telemetry [5]   Diagnosis: Atrial fibrillation [427.31.ICD-9-CM]   Admitting Physician: KAREN FELDMAN [[id number]]   Attending Physician: KAREN FELDMAN [[id number]]                  No follow-up provider specified.       Medication List      No changes were made to your prescriptions during this visit.

## 2023-07-18 ENCOUNTER — APPOINTMENT (OUTPATIENT)
Dept: CARDIOLOGY | Facility: HOSPITAL | Age: 68
End: 2023-07-18
Payer: MEDICARE

## 2023-07-18 VITALS
RESPIRATION RATE: 18 BRPM | OXYGEN SATURATION: 95 % | HEIGHT: 73 IN | TEMPERATURE: 99.1 F | BODY MASS INDEX: 35.94 KG/M2 | HEART RATE: 124 BPM | SYSTOLIC BLOOD PRESSURE: 110 MMHG | DIASTOLIC BLOOD PRESSURE: 85 MMHG | WEIGHT: 271.17 LBS

## 2023-07-18 LAB
AORTIC DIMENSIONLESS INDEX: 0.8 (DI)
ASCENDING AORTA: 3.8 CM
BH CV ECHO MEAS - ACS: 2.14 CM
BH CV ECHO MEAS - AI P1/2T: 487.1 MSEC
BH CV ECHO MEAS - AO MAX PG: 5.8 MMHG
BH CV ECHO MEAS - AO MEAN PG: 3.6 MMHG
BH CV ECHO MEAS - AO ROOT DIAM: 4.1 CM
BH CV ECHO MEAS - AO V2 MAX: 120.8 CM/SEC
BH CV ECHO MEAS - AO V2 VTI: 22.2 CM
BH CV ECHO MEAS - AVA(I,D): 2.9 CM2
BH CV ECHO MEAS - EDV(MOD-SP2): 190 ML
BH CV ECHO MEAS - EDV(MOD-SP4): 239 ML
BH CV ECHO MEAS - EF(MOD-BP): 18.3 %
BH CV ECHO MEAS - EF(MOD-SP2): 24.2 %
BH CV ECHO MEAS - EF(MOD-SP4): 13.4 %
BH CV ECHO MEAS - ESV(MOD-SP2): 144 ML
BH CV ECHO MEAS - ESV(MOD-SP4): 207 ML
BH CV ECHO MEAS - LAT PEAK E' VEL: 10.1 CM/SEC
BH CV ECHO MEAS - LV DIASTOLIC VOL/BSA (35-75): 97.5 CM2
BH CV ECHO MEAS - LV MAX PG: 2.1 MMHG
BH CV ECHO MEAS - LV MEAN PG: 0.96 MMHG
BH CV ECHO MEAS - LV SYSTOLIC VOL/BSA (12-30): 84.4 CM2
BH CV ECHO MEAS - LV V1 MAX: 72.4 CM/SEC
BH CV ECHO MEAS - LV V1 VTI: 11.9 CM
BH CV ECHO MEAS - LVOT AREA: 5.4 CM2
BH CV ECHO MEAS - LVOT DIAM: 2.6 CM
BH CV ECHO MEAS - MED PEAK E' VEL: 4.6 CM/SEC
BH CV ECHO MEAS - MR MAX PG: 21.8 MMHG
BH CV ECHO MEAS - MR MAX VEL: 233.5 CM/SEC
BH CV ECHO MEAS - MV DEC SLOPE: 586.9 CM/SEC2
BH CV ECHO MEAS - MV DEC TIME: 126 MSEC
BH CV ECHO MEAS - MV E MAX VEL: 81.9 CM/SEC
BH CV ECHO MEAS - MV MAX PG: 3.6 MMHG
BH CV ECHO MEAS - MV MEAN PG: 1.56 MMHG
BH CV ECHO MEAS - MV P1/2T: 53.5 MSEC
BH CV ECHO MEAS - MV V2 VTI: 19.3 CM
BH CV ECHO MEAS - MVA(P1/2T): 4.1 CM2
BH CV ECHO MEAS - MVA(VTI): 3.3 CM2
BH CV ECHO MEAS - PA V2 MAX: 58.4 CM/SEC
BH CV ECHO MEAS - RAP SYSTOLE: 8 MMHG
BH CV ECHO MEAS - RV MAX PG: 1.48 MMHG
BH CV ECHO MEAS - RV V1 MAX: 60.8 CM/SEC
BH CV ECHO MEAS - RV V1 VTI: 10.1 CM
BH CV ECHO MEAS - RVSP: 16 MMHG
BH CV ECHO MEAS - SI(MOD-SP2): 18.8 ML/M2
BH CV ECHO MEAS - SI(MOD-SP4): 13 ML/M2
BH CV ECHO MEAS - SV(LVOT): 63.8 ML
BH CV ECHO MEAS - SV(MOD-SP2): 46 ML
BH CV ECHO MEAS - SV(MOD-SP4): 32 ML
BH CV ECHO MEAS - TAPSE (>1.6): 1.4 CM
BH CV ECHO MEAS - TR MAX PG: 8.1 MMHG
BH CV ECHO MEAS - TR MAX VEL: 142.5 CM/SEC
BH CV ECHO MEASUREMENTS AVERAGE E/E' RATIO: 11.14
BH CV XLRA - RV BASE: 3.2 CM
BH CV XLRA - RV LENGTH: 8.5 CM
BH CV XLRA - RV MID: 2.8 CM
BH CV XLRA - TDI S': 9.6 CM/SEC
LEFT ATRIUM VOLUME INDEX: 40.4 ML/M2
QT INTERVAL: 402 MS
QT INTERVAL: 443 MS
SINUS: 3.8 CM
TSH SERPL DL<=0.05 MIU/L-ACNC: 1.96 UIU/ML (ref 0.27–4.2)

## 2023-07-18 PROCEDURE — 25010000002 FUROSEMIDE PER 20 MG: Performed by: INTERNAL MEDICINE

## 2023-07-18 PROCEDURE — G0378 HOSPITAL OBSERVATION PER HR: HCPCS

## 2023-07-18 PROCEDURE — 93320 DOPPLER ECHO COMPLETE: CPT | Performed by: INTERNAL MEDICINE

## 2023-07-18 PROCEDURE — 93306 TTE W/DOPPLER COMPLETE: CPT | Performed by: INTERNAL MEDICINE

## 2023-07-18 PROCEDURE — 93312 ECHO TRANSESOPHAGEAL: CPT | Performed by: INTERNAL MEDICINE

## 2023-07-18 PROCEDURE — 96376 TX/PRO/DX INJ SAME DRUG ADON: CPT

## 2023-07-18 PROCEDURE — 84443 ASSAY THYROID STIM HORMONE: CPT | Performed by: INTERNAL MEDICINE

## 2023-07-18 PROCEDURE — 93010 ELECTROCARDIOGRAM REPORT: CPT | Performed by: INTERNAL MEDICINE

## 2023-07-18 PROCEDURE — 93005 ELECTROCARDIOGRAM TRACING: CPT | Performed by: EMERGENCY MEDICINE

## 2023-07-18 PROCEDURE — 93325 DOPPLER ECHO COLOR FLOW MAPG: CPT | Performed by: INTERNAL MEDICINE

## 2023-07-18 PROCEDURE — 93325 DOPPLER ECHO COLOR FLOW MAPG: CPT

## 2023-07-18 PROCEDURE — 93320 DOPPLER ECHO COMPLETE: CPT

## 2023-07-18 PROCEDURE — 93312 ECHO TRANSESOPHAGEAL: CPT

## 2023-07-18 PROCEDURE — 93005 ELECTROCARDIOGRAM TRACING: CPT | Performed by: INTERNAL MEDICINE

## 2023-07-18 PROCEDURE — 93321 DOPPLER ECHO F-UP/LMTD STD: CPT

## 2023-07-18 PROCEDURE — 99223 1ST HOSP IP/OBS HIGH 75: CPT | Performed by: INTERNAL MEDICINE

## 2023-07-18 PROCEDURE — 25510000001 PERFLUTREN (DEFINITY) 8.476 MG IN SODIUM CHLORIDE (PF) 0.9 % 10 ML INJECTION: Performed by: INTERNAL MEDICINE

## 2023-07-18 PROCEDURE — 93306 TTE W/DOPPLER COMPLETE: CPT

## 2023-07-18 RX ORDER — DIGOXIN 250 MCG
250 TABLET ORAL ONCE
Status: COMPLETED | OUTPATIENT
Start: 2023-07-18 | End: 2023-07-18

## 2023-07-18 RX ORDER — SODIUM CHLORIDE 9 MG/ML
INJECTION, SOLUTION INTRAVENOUS
Status: COMPLETED | OUTPATIENT
Start: 2023-07-18 | End: 2023-07-18

## 2023-07-18 RX ORDER — FUROSEMIDE 40 MG/1
40 TABLET ORAL
Status: DISCONTINUED | OUTPATIENT
Start: 2023-07-18 | End: 2023-07-18 | Stop reason: HOSPADM

## 2023-07-18 RX ORDER — DIGOXIN 125 MCG
125 TABLET ORAL
Status: DISCONTINUED | OUTPATIENT
Start: 2023-07-19 | End: 2023-07-18 | Stop reason: HOSPADM

## 2023-07-18 RX ORDER — DIGOXIN 125 MCG
125 TABLET ORAL DAILY
Qty: 90 TABLET | Refills: 3 | Status: SHIPPED | OUTPATIENT
Start: 2023-07-18

## 2023-07-18 RX ORDER — DIGOXIN 125 MCG
125 TABLET ORAL ONCE
Status: DISCONTINUED | OUTPATIENT
Start: 2023-07-18 | End: 2023-07-18 | Stop reason: HOSPADM

## 2023-07-18 RX ORDER — METOPROLOL SUCCINATE 25 MG/1
25 TABLET, EXTENDED RELEASE ORAL EVERY 12 HOURS SCHEDULED
Qty: 180 TABLET | Refills: 3 | Status: SHIPPED | OUTPATIENT
Start: 2023-07-18

## 2023-07-18 RX ORDER — LOSARTAN POTASSIUM 25 MG/1
25 TABLET ORAL DAILY
Qty: 30 TABLET | Refills: 3 | Status: SHIPPED | OUTPATIENT
Start: 2023-07-18

## 2023-07-18 RX ORDER — LOSARTAN POTASSIUM 25 MG/1
25 TABLET ORAL
Status: DISCONTINUED | OUTPATIENT
Start: 2023-07-18 | End: 2023-07-18 | Stop reason: HOSPADM

## 2023-07-18 RX ORDER — FUROSEMIDE 40 MG/1
40 TABLET ORAL 2 TIMES DAILY
Qty: 180 TABLET | Refills: 3 | Status: SHIPPED | OUTPATIENT
Start: 2023-07-18

## 2023-07-18 RX ORDER — LIDOCAINE HYDROCHLORIDE 20 MG/ML
SOLUTION OROPHARYNGEAL
Status: COMPLETED | OUTPATIENT
Start: 2023-07-18 | End: 2023-07-18

## 2023-07-18 RX ADMIN — Medication 10 ML: at 08:36

## 2023-07-18 RX ADMIN — LIDOCAINE HYDROCHLORIDE 10 ML: 20 SOLUTION ORAL at 12:01

## 2023-07-18 RX ADMIN — CITALOPRAM 20 MG: 20 TABLET, FILM COATED ORAL at 08:36

## 2023-07-18 RX ADMIN — FERROUS SULFATE TAB 325 MG (65 MG ELEMENTAL FE) 325 MG: 325 (65 FE) TAB at 08:36

## 2023-07-18 RX ADMIN — METOPROLOL SUCCINATE 25 MG: 25 TABLET, EXTENDED RELEASE ORAL at 08:36

## 2023-07-18 RX ADMIN — LIDOCAINE HYDROCHLORIDE 5 ML: 20 SOLUTION ORAL at 12:01

## 2023-07-18 RX ADMIN — PERFLUTREN 2 ML: 6.52 INJECTION, SUSPENSION INTRAVENOUS at 09:53

## 2023-07-18 RX ADMIN — LOSARTAN POTASSIUM 25 MG: 25 TABLET, FILM COATED ORAL at 15:20

## 2023-07-18 RX ADMIN — METHOHEXITAL SODIUM 50 MG: 500 INJECTION, POWDER, LYOPHILIZED, FOR SOLUTION INTRAMUSCULAR; INTRAVENOUS; RECTAL at 12:05

## 2023-07-18 RX ADMIN — FUROSEMIDE 40 MG: 10 INJECTION, SOLUTION INTRAMUSCULAR; INTRAVENOUS at 03:58

## 2023-07-18 RX ADMIN — CLOPIDOGREL BISULFATE 75 MG: 75 TABLET, FILM COATED ORAL at 08:36

## 2023-07-18 RX ADMIN — DIGOXIN 250 MCG: 250 TABLET ORAL at 15:19

## 2023-07-18 RX ADMIN — METHOHEXITAL SODIUM 25 MG: 500 INJECTION, POWDER, LYOPHILIZED, FOR SOLUTION INTRAMUSCULAR; INTRAVENOUS; RECTAL at 12:07

## 2023-07-18 RX ADMIN — SODIUM CHLORIDE 75 ML/HR: 9 INJECTION, SOLUTION INTRAVENOUS at 11:50

## 2023-07-18 NOTE — PLAN OF CARE
Goal Outcome Evaluation:  Plan of Care Reviewed With: patient        Progress: improving  Outcome Evaluation: Pt admitted with c/o shortness of breath, ECG revealed new onset A. Fib RVR, did receive IV Lopressor & Lasix. Remains in A. Fib HR 80s. Pt does report shortness of breath with exertion, HR increased 120-130s with activity. Blood pressures obtained manually. 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 Pt received 2 doses of IV Lasix, output documented in flowsheet. NPO per order. No other complaints, will continue to monitor

## 2023-07-18 NOTE — H&P
Patient Name: Angel White Jr.  :1955  68 y.o.    Date of Admission: 2023  Date of Consultation:  23  Encounter Provider: Crys Dumont MD  Place of Service: [facility] CARDIOLOGY  Referring Provider: Momo Meza MD  Patient Care Team:  Momo Meza MD as PCP - General (Family Medicine)  Crys Dumont MD as Consulting Physician (Cardiology)  Akash Velasquez MD as Consulting Physician (Hematology and Oncology)  Roscoe Donovan MD as Referring Physician (General Surgery)      Chief complaint:AF CHF    History of Present Illness:  This is a 66-year-old man who has a history of multivessel coronary disease, hypertension, hyperlipidemia, NAFLD, diabetes. In  he had unstable angina and underwent drug-eluting stenting of the proximal to mid LAD as well as the ramus. He also had stenting of the PDA and proximal RCA in .   He presented to urgent care yesterday with one week history of dyspnea, orthopnea, fatigue. No tachycardia or palpitations. No angina.   He is the caregiver of his wife and disabled son with a lot of resultant stress.   He was found to be in new onset AF with CHF on CXR. Was admitted directly, given dilt iv, metoprolol po, and lasix IV. Improved dyspnea nad orthopnea, remains tachycardic in AF. -140 at rest.       Past Medical History:   Diagnosis Date    Anemia     AP (angina pectoris)     unstable    CAD (coronary artery disease)     6 stents placed    DDD (degenerative disc disease), lumbosacral     Diabetes     Essential hypertension     Fatty liver     ?    History of Clostridioides difficile infection 10/2021    History of MI (myocardial infarction)     Hyperlipidemia     Low back pain     Mass of colon     Memory loss     SOME SHORT TERM MEMORY ISSUES AND FORGETFULNESS    Osteoarthritis of knee     Screening for prostate cancer     normal    Skin cancer     left eyebrow, side of face and back     Sleep apnea     NO  CPAP    Tachycardia        Past Surgical History:   Procedure Laterality Date    COLON RESECTION N/A 2/3/2022    Procedure: ILEUM COLON RESECTION FOR TERMINAL ILEUM MASS LAPAROSCOPIC;  Surgeon: Roscoe Donovan MD;  Location: [facility] MAIN OR;  Service: General;  Laterality: N/A;    COLONOSCOPY N/A 2016    normal per patient    COLONOSCOPY N/A 8/3/2020    Procedure: COLONOSCOPY to cecum and TI with cold biopsies;  Surgeon: Kelly Ashraf MD;  Location: [facility] ENDOSCOPY;  Service: Gastroenterology;  Laterality: N/A;  pre-change in bowel habits, hx polyps   post-hemorrhoids, lipomas, abnormal cecal tissue     CORONARY ANGIOPLASTY WITH STENT PLACEMENT  08/28/2015    6 stents-Dr. Hall, [facility]    FOOT SURGERY Right 05/23/2017    Dr. Cervantes, [facility] and [facility]    REPLACEMENT TOTAL KNEE Left 2010    Dr. Arnaud Reynaga, [facility]    REPLACEMENT TOTAL KNEE Right 2009    Dr. Arnaud Reynaga, [facility]    SKIN BIOPSY      SKIN CANCER EXCISION Left 03/30/2016    left ear and left eyebrow, graft took from face, placed on ear    SKIN CANCER EXCISION  2001    back         Prior to Admission medications    Medication Sig Start Date End Date Taking? Authorizing Provider   amLODIPine (NORVASC) 5 MG tablet TAKE 1 TABLET BY MOUTH EVERY DAY 1/16/23  Yes Momo Meza MD   citalopram (CeleXA) 20 MG tablet Take 1 tablet by mouth Daily. 5/23/23  Yes Momo Meza MD   clopidogrel (PLAVIX) 75 MG tablet TAKE 1 TABLET BY MOUTH EVERY DAY 1/16/23  Yes Crys Dumont MD   ferrous sulfate 325 (65 FE) MG tablet TAKE 1 TABLET BY MOUTH EVERY DAY WITH BREAKFAST 1/9/23  Yes Akash Velasquez MD   losartan (COZAAR) 100 MG tablet TAKE 1 TABLET BY MOUTH EVERY DAY 9/14/22  Yes Momo Meza MD   Pseudoeph-Doxylamine-DM-APAP (NYQUIL PO) Take  by mouth every night at bedtime.   Yes Provider, MD Barbra   atorvastatin (LIPITOR) 40 MG tablet TAKE 1 TABLET BY MOUTH EVERY DAY  Patient not taking: Reported on 7/17/2023 9/14/22   Momo Meza MD   metFORMIN ER  (GLUCOPHAGE-XR) 500 MG 24 hr tablet Take 1 tablet by mouth Daily With Breakfast. 5/24/23   Momo Meza MD       Allergies   Allergen Reactions    Ambien [Zolpidem] Other (See Comments)     Sleep walking      Morphine And Related Itching and Rash       Social History     Socioeconomic History    Marital status:      Spouse name: Jaylene   Tobacco Use    Smoking status: Never    Smokeless tobacco: Never    Tobacco comments:     Caffeine daily   Vaping Use    Vaping Use: Never used   Substance and Sexual Activity    Alcohol use: No    Drug use: Not Currently    Sexual activity: Defer     Partners: Female       Family History   Problem Relation Age of Onset    Hypertension Mother     Arthritis Mother     Diabetes Mother     Heart disease Father     Hypertension Father     Macular degeneration Father     Arthritis Father     Diabetes Father     Arthritis Maternal Grandmother     Heart disease Maternal Grandmother     Arthritis Maternal Grandfather     Heart disease Maternal Grandfather     Diabetes Maternal Grandfather     Hypertension Maternal Grandfather     Colon cancer Maternal Grandfather     Arthritis Paternal Grandmother     Heart disease Paternal Grandmother     Diabetes Paternal Grandmother     Hypertension Paternal Grandmother     Stroke Paternal Grandmother     Colon cancer Paternal Grandmother     Arthritis Paternal Grandfather     Heart disease Paternal Grandfather     Colon cancer Paternal Grandfather     Macular degeneration Sister     Malig Hyperthermia Neg Hx        REVIEW OF SYSTEMS:   All systems reviewed.  Pertinent positives identified in HPI.  All other systems are negative.      Objective:     Vitals:    07/18/23 0000 07/18/23 0400 07/18/23 0800 07/18/23 1002   BP: 140/100 110/90 130/94 130/94   BP Location: Left arm Left arm Left arm    Patient Position: Lying Lying Lying    Pulse: 90 88  88   Resp:   20    Temp: 98.5 °F (36.9 °C) 97.3 °F (36.3 °C) 99.1 °F (37.3 °C)    TempSrc: Oral Oral   "   SpO2:  91%     Weight:    123 kg (272 lb)   Height:    185.4 cm (73\")     Body mass index is 35.89 kg/m².    General Appearance:    Alert, cooperative, in no acute distress   Head:    Normocephalic, without obvious abnormality, atraumatic   Eyes:            Lids and lashes normal, conjunctivae and sclerae normal, no icterus, no pallor, corneas clear, PERRLA   Ears:    Ears appear intact with no abnormalities noted   Throat:   No oral lesions, no thrush, oral mucosa moist   Neck:   No adenopathy, supple, trachea midline, no thyromegaly, no carotid bruit, no JVD   Back:     No kyphosis present, no scoliosis present, no skin lesions, erythema or scars, no tenderness to percussion or palpation, range of motion normal   Lungs:     Clear to auscultation, respirations regular, even and unlabored    Heart:   Tachy irregular normal S1 and S2, no murmur, no gallop, no rub, no click   Chest Wall:    No abnormalities observed   Abdomen:     Normal bowel sounds, no masses, no organomegaly, soft, nontender, nondistended, no guarding, no rebound  tenderness   Extremities:   Moves all extremities well, no edema, no cyanosis, no redness   Pulses:   Pulses palpable and equal bilaterally. Normal radial, carotid, femoral, dorsalis pedis and posterior tibial pulses bilaterally. Normal abdominal aorta   Skin:  Psychiatric:   No bleeding, bruising or rash    Alert and oriented x 3, normal mood and affect   Lab Review:     Results from last 7 days   Lab Units 07/17/23  1030   SODIUM mmol/L 137   POTASSIUM mmol/L 4.3   CHLORIDE mmol/L 102   CO2 mmol/L 24.6   BUN mg/dL 10   CREATININE mg/dL 1.06   CALCIUM mg/dL 9.2   BILIRUBIN mg/dL 0.6   ALK PHOS U/L 99   ALT (SGPT) U/L 28   AST (SGOT) U/L 20   GLUCOSE mg/dL 160*     Results from last 7 days   Lab Units 07/17/23  1239 07/17/23  1030   HSTROP T ng/L 16* 17*     Results from last 7 days   Lab Units 07/17/23  1030   WBC 10*3/mm3 8.19   HEMOGLOBIN g/dL 14.1   HEMATOCRIT % 43.2   PLATELETS " 10*3/mm3 271         Results from last 7 days   Lab Units 07/17/23  1030   MAGNESIUM mg/dL 2.4                   I personally viewed and interpreted the patient's EKG/Telemetry data.        Assessment and Plan:       New onset AF: Received lovenox. Symptoms >1 week. Plan LIONEL/CV today  CHF: new onset, likely tachy-mediated. Echo with severely reduced EF. Will start toprol 25, continue losartan, add diuretic lasix 40 BID.   CAD h/o PDA, RCA, LAD, Ramus stenting in 2015. No angina. No ischemic changes on EKG.   Pt hopeful to go home after d/c because of responsibilities at home. Will need close f/u in the office to adjust medications.     Crys Dumont MD  07/18/23  11:26 EDT

## 2023-07-18 NOTE — PROGRESS NOTES
[facility] Clinical Pharmacy Services: Patient Counseling Consult    Angel White JrRamirez Has been counseled on the following new medications: Metoprolol XL, digoxin, furosemide, and a decreased dose of losartan. Counseling points included the following:    Explained indication of each medication, and patient's need for these medications.  Went over dosing and frequency of each medication.  Discussed any special administration, storage, or monitoring instructions with medications.  Discussed all important drug interactions, including over-the-counter medications and supplements.  Explained possible side effects for each medication.  Instructed the patient not to begin or discontinue any medications without informing his/her physician/pharmacist.  Addressed any specific questions the patient had in regards to their medication: Addressed patient's concern about cramping in the legs while on furosemide. Instructed him to get over-the-counter potassium if it continues or call provider if it gets worse.     Patient expressed understanding and had no further questions.      Alexus Huitron, Pharm.D., [facility]   Clinical Pharmacist  Phone Extension #[phone]

## 2023-07-18 NOTE — PROGRESS NOTES
[facility] Clinical Pharmacy Services: Pharmacy Education - Direct Oral Anticoagulant - Eliquis    Angel White Jr. has been ordered Eliquis for new onset atrial fibrillation.     Counseling points included the following:  Eliquis' indication, patient's need for the medication, and dosing/frequency of this medication.  Enforced the importance of taking their medication as instructed every day and the reason why the medication is dosed that way.  Explained possible side effects of Eliquis, including increased risk of bleeding, and s/sx of bleeding. Also talked about ways to control bleeding for minor cuts and scrapes.  Emphasized the importance of going to the emergency room if any of the following occur: Falling and hitting your head; noticing bright red blood in urine or dark/tarry stools; vomiting up blood or vomit has a coffee-ground like texture; coughing up blood.  Discussed the importance of informing any physician or dentist that they have been started on Eliquis, in case they need to be taken off for a procedure.  Discussed all important drug interactions, including over-the-counter medications and supplements.  Instructed the patient not to begin or discontinue any medications without informing his/her physician/pharmacist.     Patient expressed understanding and had no further questions.      Alexus Huitron, Pharm.D., [facility]   Clinical Pharmacist   Phone Extension #[phone]

## 2023-07-18 NOTE — DISCHARGE SUMMARY
Hospital Discharge    Patient Name: Angel White Jr.  Age/Sex: 68 y.o. male  : 1955  MRN: [id number]    Encounter Provider: Crys Dumont MD  Referring Provider: Momo Meza MD  Place of Service: [facility] CARDIOLOGY  Patient Care Team:  Momo Meza MD as PCP - General (Family Medicine)  Crys Dumont MD as Consulting Physician (Cardiology)  Akash Velasquez MD as Consulting Physician (Hematology and Oncology)  Roscoe Donovan MD as Referring Physician (General Surgery)         Date of Discharge:  2023   Date of Admit: 2023    Discharge Condition: Fair  Discharge Diagnosis:    Atrial fibrillation      Hospital Course:   Angel White Jr. is a 68 y.o. male who presented with dyspnea and orthopnea.  Found to be in new onset atrial fibrillation with acute onset CHF.  Heart rate did not improve substantially with IV metoprolol or diltiazem.  He had an echocardiogram which showed severely reduced LV systolic ejection fraction, less than 20%.   I then attempted to perform cardioversion.  His symptoms have been going on for a week and this a LIONEL was indicated.  LIONEL showed a possible mobile thrombus in the left atrial appendage with a low velocity and smoking noted.  As a result cardioversion was deferred.  The patient takes care of his disabled child and wife.  He refused an additional night stay due to his responsibilities at home.  Therefore he will be treated aggressively as an outpatient.  Home losartan dose was decreased to allow for up titration of beta-blockade.  A oral digoxin load will be started prior to discharge and continued at home.  He will be diuresed with Lasix 40 twice daily.  Started on Eliquis for anticoagulation.  I will see him in the office in 1 week.  Can consider cardioversion after 4 weeks of anticoagulation.  He does have a history of CAD with multivessel stenting in .  Plavix will be continued.  CHF is considered to be a  dilated, tachyarrhythmia mediated cardiomyopathy  Objective:  Temp:  [97.3 °F (36.3 °C)-99.1 °F (37.3 °C)] 99.1 °F (37.3 °C)  Heart Rate:  [] 124  Resp:  [15-25] 18  BP: ()/() 110/85    Intake/Output Summary (Last 24 hours) at 7/18/2023 1334  Last data filed at 7/18/2023 0800  Gross per 24 hour   Intake 480 ml   Output 1100 ml   Net -620 ml     Body mass index is 35.89 kg/m².      07/17/23  0950 07/18/23  1002   Weight: 123 kg (272 lb) 123 kg (272 lb)     Weight change:     Physical Exam:  GEN: Alert, cooperative, no distress  Head: normocephalic, atruamatic  Eyes: Normal conjunctiva and sclera, no icterus, PERRL  Neck: No JVD, normal carotid pulsation without bruits  Lungs: clear to auscultation bilaterally, normal rate and effort  Heart: RRR, no murmurs or gallops. Normal s1 and S2.   Abdomen: Soft, nontender, normal bowel sounds  Extremities: No edema or marked deformities. Normal strength.   Skin: No rash, no cyanosis.   Pscyhciatric: Alert and Oriented x 3  Back: No Kypohsis or scoliosis.       Procedures Performed  * No procedures listed *       Consults:  Consults       No orders found for last 30 day(s).            Pertinent Test Results:  Results from last 7 days   Lab Units 07/17/23  1030   SODIUM mmol/L 137   POTASSIUM mmol/L 4.3   CHLORIDE mmol/L 102   CO2 mmol/L 24.6   BUN mg/dL 10   CREATININE mg/dL 1.06   GLUCOSE mg/dL 160*   CALCIUM mg/dL 9.2   AST (SGOT) U/L 20   ALT (SGPT) U/L 28     Results from last 7 days   Lab Units 07/17/23  1239 07/17/23  1030   HSTROP T ng/L 16* 17*     Results from last 7 days   Lab Units 07/17/23  1030   WBC 10*3/mm3 8.19   HEMOGLOBIN g/dL 14.1   HEMATOCRIT % 43.2   PLATELETS 10*3/mm3 271         Results from last 7 days   Lab Units 07/17/23  1030   MAGNESIUM mg/dL 2.4           Invalid input(s): LDLCALC  Results from last 7 days   Lab Units 07/17/23  1030   PROBNP pg/mL 5,679.0*           Discharge Medications     Discharge Medications        New  Medications        Instructions Start Date   apixaban 5 MG tablet tablet  Commonly known as: ELIQUIS   5 mg, Oral, 2 Times Daily      digoxin 125 MCG tablet  Commonly known as: LANOXIN   125 mcg, Oral, Daily      furosemide 40 MG tablet  Commonly known as: LASIX   40 mg, Oral, 2 Times Daily      metoprolol succinate XL 25 MG 24 hr tablet  Commonly known as: TOPROL-XL   25 mg, Oral, Every 12 Hours Scheduled             Changes to Medications        Instructions Start Date   losartan 25 MG tablet  Commonly known as: COZAAR  What changed:   medication strength  how much to take   25 mg, Oral, Daily             Continue These Medications        Instructions Start Date   citalopram 20 MG tablet  Commonly known as: CeleXA   20 mg, Oral, Daily      clopidogrel 75 MG tablet  Commonly known as: PLAVIX   TAKE 1 TABLET BY MOUTH EVERY DAY      ferrous sulfate 325 (65 FE) MG tablet   TAKE 1 TABLET BY MOUTH EVERY DAY WITH BREAKFAST      metFORMIN  MG 24 hr tablet  Commonly known as: GLUCOPHAGE-XR   500 mg, Oral, Daily With Breakfast      NYQUIL PO   Oral, Every Night at Bedtime             Stop These Medications      amLODIPine 5 MG tablet  Commonly known as: NORVASC            ASK your doctor about these medications        Instructions Start Date   atorvastatin 40 MG tablet  Commonly known as: LIPITOR   TAKE 1 TABLET BY MOUTH EVERY DAY               Discharge Diet:    Dietary Orders (From admission, onward)       Start     Ordered    07/18/23 0001  NPO Diet NPO Type: Strict NPO  Diet Effective Midnight        Question:  NPO Type  Answer:  Strict NPO    07/17/23 4816                    Activity at Discharge:       Discharge disposition: home     Discharge Instructions and Follow ups:  Future Appointments   Date Time Provider Department Center   7/28/2023  1:30 PM Momo Meza MD [facility]   8/24/2023  2:00 PM Momo Meza MD [facility]   9/18/2023  3:00 PM [facility] [address] [facility]   9/25/2023   1:40 PM VITALS ONLY [facility]   9/25/2023  2:00 PM Akash Velasquez MD [facility]   9/28/2023  2:30 PM Crys Dumont MD [facility] CD [facility]      Follow-up Information       Momo Meza MD .    Specialty: Family Medicine  Contact information:  [address] [place], [place] [id number] [phone]                             Test Results Pending at Discharge:      Crys Dumont MD  07/18/23  13:34 EDT    Time: Discharge 30 min    EMR Dragon/Transcription disclaimer:   Part of this note may be an electronic transcription/translation of spoken language to printed text using the Dragon dictation system.

## 2023-07-18 NOTE — CASE MANAGEMENT/SOCIAL WORK
Discharge Planning Assessment  [facility]     Patient Name: Angel White Jr.  MRN: [id number]  Today's Date: 7/18/2023    Admit Date: 7/17/2023    Plan: Home   Discharge Needs Assessment       Row Name 07/18/23 1444       Living Environment    People in Home spouse;child(pan), dependent    Name(s) of People in Home Jaylene White wife [phone]    Current Living Arrangements home    Potentially Unsafe Housing Conditions none    Primary Care Provided by self    Provides Primary Care For spouse;child(pan)    Caregiving Concerns Wife is wheelchair bound and special needs son.    Family Caregiver if Needed none    Quality of Family Relationships stressful    Able to Return to Prior Arrangements yes       Resource/Environmental Concerns    Resource/Environmental Concerns financial    Financial Concerns food, unable to afford    Transportation Concerns none       Transition Planning    Patient/Family Anticipates Transition to home with family    Patient/Family Anticipated Services at Transition     Transportation Anticipated family or friend will provide;car, drives self       Discharge Needs Assessment    Readmission Within the Last 30 Days no previous admission in last 30 days    Concerns to be Addressed no discharge needs identified;denies needs/concerns at this time    Anticipated Changes Related to Illness none    Equipment Needed After Discharge none    Provided Post Acute Provider List? N/A    N/A Provider List Comment No SNF or home health needs identified    Provided Post Acute Provider Quality & Resource List? N/A                   Discharge Plan       Row Name 07/18/23 5823       Plan    Plan Home    Patient/Family in Agreement with Plan yes    Plan Comments [facility] 7/18/2023. Met with patient at bedside. Face sheet verified. Prior to admission patient was primary caregiver for his wife who is wheelchair bound and special needs son (functioning 12-13 years of age). Patient states he is able to perform  his own ADL’s but he is taxed by the care needs of his family. Encouraged patient to talk to his wife’s PCP about getting home health services. Patient’s monthly income is $8300, he states he is over income for services. Patient will use Meds to Beds at discharge. Patient does not have POA or living will. Discharge plan is to return home. Patient or family will transport home. Will continue to monitor for new or changing discharge needs. Anjelica HIGGINBOTHAM RN CCP                  Continued Care and Services - Admitted Since 7/17/2023    Coordination has not been started for this encounter.       Expected Discharge Date and Time       Expected Discharge Date Expected Discharge Time    Jul 18, 2023            Demographic Summary       Row Name 07/18/23 1443       General Information    Admission Type observation    Arrived From home;physician office - external    Required Notices Provided Observation Status Notice    Referral Source admission list    Reason for Consult discharge planning    Preferred Language English       Contact Information    Permission Granted to Share Info With family/designee  Jaylene White wife [phone]                   Functional Status       Row Name 07/18/23 1444       Functional Status    Usual Activity Tolerance good    Current Activity Tolerance moderate       Functional Status, IADL    Medications independent    Meal Preparation independent    Housekeeping independent    Laundry independent    Shopping independent       Mental Status Summary    Recent Changes in Mental Status/Cognitive Functioning no changes       Employment/    Employment Status retired                   Psychosocial    No documentation.                  Abuse/Neglect    No documentation.                  Legal    No documentation.                  Substance Abuse    No documentation.                  Patient Forms    No documentation.                     Anjelica Loera RN

## 2023-07-18 NOTE — NURSING NOTE
Pt arrived to rm 2220 from home a-fib per monitor . Hr fluctuating  . Pt soa with exertion. Cardiology notified of arrival and pt status. 5mg metoprolol iv given x1 perorders. Pt instructed on npo staus after mn tonight with verbalized understanding. wctm

## 2023-07-19 LAB — BH CV ECHO SHUNT ASSESSMENT PERFORMED (HIDDEN SCRIPTING): 1

## 2023-07-19 NOTE — PROGRESS NOTES
Discharge Planning Assessment  [facility]     Patient Name: Angel White Jr.  MRN: [id number]  Today's Date: 7/19/2023    Admit Date: 7/17/2023    Plan: Home   Discharge Needs Assessment    No documentation.                  Discharge Plan       Row Name 07/19/23 1653       Plan    Plan Home    Final Discharge Disposition Code 01 - home or self-care    Final Note Home                  Continued Care and Services - Discharged on 7/18/2023 Admission date: 7/17/2023 - Discharge disposition: Home or Self Care   Coordination has not been started for this encounter.       Expected Discharge Date and Time       Expected Discharge Date Expected Discharge Time    Jul 18, 2023            Demographic Summary    No documentation.                  Functional Status    No documentation.                  Psychosocial    No documentation.                  Abuse/Neglect    No documentation.                  Legal    No documentation.                  Substance Abuse    No documentation.                  Patient Forms    No documentation.                     Leanne Varela RN

## 2023-07-24 ENCOUNTER — OFFICE VISIT (OUTPATIENT)
Dept: CARDIOLOGY | Facility: CLINIC | Age: 68
End: 2023-07-24
Payer: MEDICARE

## 2023-07-24 ENCOUNTER — TRANSCRIBE ORDERS (OUTPATIENT)
Dept: CARDIOLOGY | Facility: CLINIC | Age: 68
End: 2023-07-24

## 2023-07-24 VITALS
BODY MASS INDEX: 35.52 KG/M2 | WEIGHT: 268 LBS | HEART RATE: 89 BPM | DIASTOLIC BLOOD PRESSURE: 84 MMHG | SYSTOLIC BLOOD PRESSURE: 144 MMHG | HEIGHT: 73 IN

## 2023-07-24 DIAGNOSIS — I48.91 ATRIAL FIBRILLATION WITH RVR: Primary | ICD-10-CM

## 2023-07-24 DIAGNOSIS — Z13.6 SCREENING FOR ISCHEMIC HEART DISEASE: Primary | ICD-10-CM

## 2023-07-24 PROCEDURE — 3079F DIAST BP 80-89 MM HG: CPT | Performed by: INTERNAL MEDICINE

## 2023-07-24 PROCEDURE — 93000 ELECTROCARDIOGRAM COMPLETE: CPT | Performed by: INTERNAL MEDICINE

## 2023-07-24 PROCEDURE — 3077F SYST BP >= 140 MM HG: CPT | Performed by: INTERNAL MEDICINE

## 2023-07-24 PROCEDURE — 99214 OFFICE O/P EST MOD 30 MIN: CPT | Performed by: INTERNAL MEDICINE

## 2023-07-24 NOTE — PROGRESS NOTES
Subjective:     Encounter Date: 07/24/23      Patient ID: Angel White Jr. is a 68 y.o. male.    Chief Complaint: CAD    HPI:   This is a 68-year-old man who has a history of multivessel coronary disease, hypertension, hyperlipidemia, NAFLD, diabetes, atrial fibrillation.  In 2015 he had unstable angina and underwent drug-eluting stenting of the proximal to mid LAD as well as the ramus.  He also had stenting of the PDA and proximal RCA in 2015.   He was admitted to the hospital in July 23 with new onset atrial fibrillation and cardiomyopathy.  EF was about 20%, presumed related to tachyarrhythmia..  We attempted LIONEL cardioversion, however he had what appeared to be possible thrombus in his left atrial appendage and this was abandoned.  He was unable to stay an additional night in the hospital due to responsibilities taking care of his disabled wife and child at home, so he was treated aggressively with beta-blockade, digoxin and anticoagulated with Eliquis with plans for cardioversion in 1 month.  Today he returns for follow-up.  He is still winded.  His lower extremity edema has improved.  His orthopnea has resolved.  His heart rates at rest are mostly in the 90s.  Blood pressures in the 120s to 130s.  He has 14 cats and one dog. His wife is bed bound and he cares for her. He has a son who has developmental disabilities and has 4 young grandchildren    The following portions of the patient's history were reviewed and updated as appropriate: allergies, current medications, past family history, past medical history, past social history, past surgical history and problem list.     REVIEW OF SYSTEMS:   All systems reviewed.  Pertinent positives identified in HPI.  All other systems are negative.    Past Medical History:   Diagnosis Date    Anemia     AP (angina pectoris)     unstable    CAD (coronary artery disease)     6 stents placed    DDD (degenerative disc disease), lumbosacral     Diabetes     Essential  hypertension     Fatty liver     ?    History of Clostridioides difficile infection 10/2021    History of MI (myocardial infarction)     Hyperlipidemia     Low back pain     Mass of colon     Memory loss     SOME SHORT TERM MEMORY ISSUES AND FORGETFULNESS    Osteoarthritis of knee     Screening for prostate cancer 2010    normal    Skin cancer     left eyebrow, side of face and back     Sleep apnea     NO CPAP    Tachycardia        Family History   Problem Relation Age of Onset    Hypertension Mother     Arthritis Mother     Diabetes Mother     Heart disease Father     Hypertension Father     Macular degeneration Father     Arthritis Father     Diabetes Father     Arthritis Maternal Grandmother     Heart disease Maternal Grandmother     Arthritis Maternal Grandfather     Heart disease Maternal Grandfather     Diabetes Maternal Grandfather     Hypertension Maternal Grandfather     Colon cancer Maternal Grandfather     Arthritis Paternal Grandmother     Heart disease Paternal Grandmother     Diabetes Paternal Grandmother     Hypertension Paternal Grandmother     Stroke Paternal Grandmother     Colon cancer Paternal Grandmother     Arthritis Paternal Grandfather     Heart disease Paternal Grandfather     Colon cancer Paternal Grandfather     Macular degeneration Sister     Malig Hyperthermia Neg Hx        Social History     Socioeconomic History    Marital status:      Spouse name: Jaylene   Tobacco Use    Smoking status: Never    Smokeless tobacco: Never    Tobacco comments:     Caffeine daily   Vaping Use    Vaping Use: Never used   Substance and Sexual Activity    Alcohol use: No    Drug use: Not Currently    Sexual activity: Defer     Partners: Female       Allergies   Allergen Reactions    Ambien [Zolpidem] Other (See Comments)     Sleep walking      Morphine And Related Itching and Rash       Past Surgical History:   Procedure Laterality Date    COLON RESECTION N/A 2/3/2022    Procedure: ILEUM COLON RESECTION  FOR TERMINAL ILEUM MASS LAPAROSCOPIC;  Surgeon: Roscoe Donovan MD;  Location: [facility] MAIN OR;  Service: General;  Laterality: N/A;    COLONOSCOPY N/A 2016    normal per patient    COLONOSCOPY N/A 8/3/2020    Procedure: COLONOSCOPY to cecum and TI with cold biopsies;  Surgeon: Kelly Ashraf MD;  Location: [facility] ENDOSCOPY;  Service: Gastroenterology;  Laterality: N/A;  pre-change in bowel habits, hx polyps   post-hemorrhoids, lipomas, abnormal cecal tissue     CORONARY ANGIOPLASTY WITH STENT PLACEMENT  08/28/2015    6 stents-Dr. Hall, [facility]    FOOT SURGERY Right 05/23/2017    Dr. Cervantes, [facility] and [facility]    REPLACEMENT TOTAL KNEE Left 2010    Dr. Arnaud Reynaga, [facility]    REPLACEMENT TOTAL KNEE Right 2009    Dr. Arnaud Reynaga, [facility]    SKIN BIOPSY      SKIN CANCER EXCISION Left 03/30/2016    left ear and left eyebrow, graft took from face, placed on ear    SKIN CANCER EXCISION  2001    back         ECG 12 Lead    Date/Time: 7/24/2023 12:16 PM  Performed by: Crys Dumont MD  Authorized by: Crys Dumont MD   Comparison: compared with previous ECG from 7/18/2023  Rhythm: atrial fibrillation  Rate: normal  Conduction: conduction normal  QRS axis: normal  Other findings: non-specific ST-T wave changes    Clinical impression: abnormal EKG           Objective:         PHYSICAL EXAM:  GEN: VSS, no distress,   Eyes: normal sclera, normal lids and lashes  HENT: moist mucus membranes,   Respiratory: CTAB, no rales or wheezes  CV: RRR, no murmurs, , +2 DP and 2+ carotid pulses b/l  GI: NABS, soft,  Nontender, nondistended  MSK: +1 lower extremity edema with chronic venous stasis changes, no scoliosis or kyphosis  Skin: no rash, warm, dry  Heme/Lymph: no bruising or bleeding  Psych: organized thought, normal behavior and affect  Neuro: Cranial nerves grossly intact, Alert and Oriented x 3.         Assessment:          Diagnosis Plan   1. Atrial fibrillation with RVR  Cardioversion External in Cardiology Department              Plan:       1.  Coronary artery disease: Remote stenting of the LAD, ramus, right coronary artery in the setting of unstable angina in 2015.  Plavix.  2.  Atrial fibrillation: Increase metoprolol to 50 in the morning and 20 5 at night.  Continue digoxin 125 mcg daily.  Continue Lasix 40 twice daily.  Plan cardioversion in 3 weeks.  Eliquis 5 twice daily.  Has a intermittently bleeding hemorrhoid which has been stable since initiation of anticoagulation.  3.  Presumed nonischemic, dilated cardiomyopathy, EF 20%.  Toprol and losartan.  NYHA class II-III symptoms currently.  4.  Diabetes: Diet controlled, on high intensity statin.    Dr. Meza, thank you very much for referring this kind patient to me. Please call me with any questions or concerns. I will see the patient again in the office in 12 months.         Crys Dumont MD  07/24/23  [facility]    Outpatient Encounter Medications as of 7/24/2023   Medication Sig Dispense Refill    apixaban (ELIQUIS) 5 MG tablet tablet Take 1 tablet by mouth 2 (Two) Times a Day. 60 tablet 3    citalopram (CeleXA) 20 MG tablet Take 1 tablet by mouth Daily. 90 tablet 2    clopidogrel (PLAVIX) 75 MG tablet TAKE 1 TABLET BY MOUTH EVERY DAY 90 tablet 3    digoxin (LANOXIN) 125 MCG tablet Take 1 tablet by mouth Daily. 90 tablet 3    ferrous sulfate 325 (65 FE) MG tablet TAKE 1 TABLET BY MOUTH EVERY DAY WITH BREAKFAST 90 tablet 1    furosemide (LASIX) 40 MG tablet Take 1 tablet by mouth 2 (Two) Times a Day. 180 tablet 3    losartan (COZAAR) 25 MG tablet Take 1 tablet by mouth Daily. 30 tablet 3    metFORMIN ER (GLUCOPHAGE-XR) 500 MG 24 hr tablet Take 1 tablet by mouth Daily With Breakfast. 30 tablet 3    metoprolol succinate XL (TOPROL-XL) 25 MG 24 hr tablet Take 1 tablet by mouth Every 12 (Twelve) Hours. 180 tablet 3    Pseudoeph-Doxylamine-DM-APAP (NYQUIL PO) Take  by mouth every night at bedtime.       No facility-administered encounter medications on file as of  7/24/2023.

## 2023-07-28 ENCOUNTER — OFFICE VISIT (OUTPATIENT)
Dept: INTERNAL MEDICINE | Facility: CLINIC | Age: 68
End: 2023-07-28
Payer: MEDICARE

## 2023-07-28 VITALS
OXYGEN SATURATION: 97 % | HEART RATE: 67 BPM | SYSTOLIC BLOOD PRESSURE: 100 MMHG | WEIGHT: 259 LBS | RESPIRATION RATE: 16 BRPM | DIASTOLIC BLOOD PRESSURE: 70 MMHG | BODY MASS INDEX: 34.32 KG/M2

## 2023-07-28 DIAGNOSIS — I10 ESSENTIAL HYPERTENSION: ICD-10-CM

## 2023-07-28 DIAGNOSIS — I48.0 PAROXYSMAL ATRIAL FIBRILLATION: Primary | ICD-10-CM

## 2023-07-28 DIAGNOSIS — E78.2 MIXED HYPERLIPIDEMIA: ICD-10-CM

## 2023-07-28 DIAGNOSIS — E11.51 CONTROLLED TYPE 2 DIABETES MELLITUS WITH DIABETIC PERIPHERAL ANGIOPATHY WITHOUT GANGRENE, WITHOUT LONG-TERM CURRENT USE OF INSULIN: ICD-10-CM

## 2023-07-28 PROCEDURE — 99214 OFFICE O/P EST MOD 30 MIN: CPT | Performed by: FAMILY MEDICINE

## 2023-07-28 PROCEDURE — 3078F DIAST BP <80 MM HG: CPT | Performed by: FAMILY MEDICINE

## 2023-07-28 PROCEDURE — 3051F HG A1C>EQUAL 7.0%<8.0%: CPT | Performed by: FAMILY MEDICINE

## 2023-07-28 PROCEDURE — 3074F SYST BP LT 130 MM HG: CPT | Performed by: FAMILY MEDICINE

## 2023-07-28 NOTE — PROGRESS NOTES
"Chief Complaint  Atrial Fibrillation    Subjective        Angel White Jr. presents to [facility] PRIMARY CARE  History of Present Illness  Patiently recently seen at [facility] admit date 717 discharge date 718 reason for admission atrial fibrillation new onset with acute onset CHF  Cardiogram reviewed revealed severely reduced LV function he was treated with lower dose of losartan to allow for titration of beta-blockade started on oral digoxin and recommend to initiate Lasix for diuresis he was also started on Eliquis this will be continued he will follow-up with cardiology and may have cardioversion in the future  Losartan 25 mg initiated  Discontinue amlodipine 5 mg  Diabetes he is no longer taking metformin last A1c is 7.5  Objective   Vital Signs:  /70 (BP Location: Left arm, Patient Position: Sitting, Cuff Size: Adult)   Pulse 67   Resp 16   Wt 117 kg (259 lb)   SpO2 97%   BMI 34.32 kg/m²   Estimated body mass index is 34.32 kg/m² as calculated from the following:    Height as of 7/24/23: 185 cm (72.84\").    Weight as of this encounter: 117 kg (259 lb).             Physical Exam  Constitutional:       Appearance: Normal appearance.   HENT:      Head: Normocephalic and atraumatic.   Cardiovascular:      Rate and Rhythm: Rhythm irregular.   Pulmonary:      Effort: Pulmonary effort is normal.      Breath sounds: Normal breath sounds.   Musculoskeletal:      Right lower leg: No edema.      Left lower leg: No edema.   Neurological:      Mental Status: He is alert.   Psychiatric:         Mood and Affect: Mood normal.         Behavior: Behavior normal.         Thought Content: Thought content normal.         Judgment: Judgment normal.      Result Review :    Common labs          5/23/2023    15:41 6/16/2023    15:03 7/17/2023    10:30   Common Labs   Glucose   160    BUN   10    Creatinine   1.06    Sodium   137    Potassium   4.3    Chloride   102    Calcium   9.2    Albumin   " 4.0    Total Bilirubin   0.6    Alkaline Phosphatase   99    AST (SGOT)   20    ALT (SGPT)   28    WBC   8.19    Hemoglobin  13.7  14.1    Hematocrit  42.0  43.2    Platelets   271    Hemoglobin A1C 7.5                     Assessment and Plan   Diagnoses and all orders for this visit:    1. Paroxysmal atrial fibrillation (Primary)    2. Essential hypertension    3. Mixed hyperlipidemia  -     Lipid Panel; Future    4. Controlled type 2 diabetes mellitus with diabetic peripheral angiopathy without gangrene, without long-term current use of insulin  -     Hemoglobin A1c; Future    Monitor blood glucose if any reading postprandials greater than 200 recommend restarting metformin  Hypertension patient is taking metoprolol 50 a.m. 20 5 PM losartan 25 furosemide 40 twice daily  A-fib patient taking digoxin  Follow-up fasting lipids in 1 month       Follow Up   Return in about 3 months (around 10/28/2023), or if symptoms worsen or fail to improve, for Recheck.  Patient was given instructions and counseling regarding his condition or for health maintenance advice. Please see specific information pulled into the AVS if appropriate.

## 2023-08-03 LAB — BH CV ECHO SHUNT ASSESSMENT PERFORMED (HIDDEN SCRIPTING): 1

## 2023-08-10 ENCOUNTER — LAB (OUTPATIENT)
Dept: LAB | Facility: HOSPITAL | Age: 68
End: 2023-08-10
Payer: MEDICARE

## 2023-08-10 DIAGNOSIS — Z13.6 SCREENING FOR ISCHEMIC HEART DISEASE: ICD-10-CM

## 2023-08-10 LAB
ANION GAP SERPL CALCULATED.3IONS-SCNC: 9.8 MMOL/L (ref 5–15)
BUN SERPL-MCNC: 11 MG/DL (ref 8–23)
BUN/CREAT SERPL: 9.3 (ref 7–25)
CALCIUM SPEC-SCNC: 9.2 MG/DL (ref 8.6–10.5)
CHLORIDE SERPL-SCNC: 100 MMOL/L (ref 98–107)
CO2 SERPL-SCNC: 27.2 MMOL/L (ref 22–29)
CREAT SERPL-MCNC: 1.18 MG/DL (ref 0.76–1.27)
EGFRCR SERPLBLD CKD-EPI 2021: 67.6 ML/MIN/1.73
GLUCOSE SERPL-MCNC: 137 MG/DL (ref 65–99)
POTASSIUM SERPL-SCNC: 4.2 MMOL/L (ref 3.5–5.2)
SODIUM SERPL-SCNC: 137 MMOL/L (ref 136–145)

## 2023-08-10 PROCEDURE — 80048 BASIC METABOLIC PNL TOTAL CA: CPT

## 2023-08-10 PROCEDURE — 36415 COLL VENOUS BLD VENIPUNCTURE: CPT

## 2023-08-15 ENCOUNTER — ANESTHESIA (OUTPATIENT)
Dept: POSTOP/PACU | Facility: HOSPITAL | Age: 68
End: 2023-08-15
Payer: MEDICARE

## 2023-08-15 ENCOUNTER — ANESTHESIA EVENT (OUTPATIENT)
Dept: POSTOP/PACU | Facility: HOSPITAL | Age: 68
End: 2023-08-15
Payer: MEDICARE

## 2023-08-15 ENCOUNTER — HOSPITAL ENCOUNTER (OUTPATIENT)
Dept: POSTOP/PACU | Facility: HOSPITAL | Age: 68
Discharge: HOME | End: 2023-08-15
Payer: MEDICARE

## 2023-08-15 VITALS — OXYGEN SATURATION: 93 % | HEART RATE: 89 BPM | DIASTOLIC BLOOD PRESSURE: 89 MMHG | SYSTOLIC BLOOD PRESSURE: 141 MMHG

## 2023-08-15 VITALS
DIASTOLIC BLOOD PRESSURE: 90 MMHG | OXYGEN SATURATION: 97 % | RESPIRATION RATE: 16 BRPM | HEART RATE: 65 BPM | TEMPERATURE: 98.2 F | SYSTOLIC BLOOD PRESSURE: 133 MMHG

## 2023-08-15 DIAGNOSIS — I48.91 ATRIAL FIBRILLATION WITH RVR: ICD-10-CM

## 2023-08-15 LAB
ANION GAP SERPL CALCULATED.3IONS-SCNC: 11 MMOL/L (ref 5–15)
BUN SERPL-MCNC: 14 MG/DL (ref 8–23)
BUN/CREAT SERPL: 12.8 (ref 7–25)
CALCIUM SPEC-SCNC: 9.3 MG/DL (ref 8.6–10.5)
CHLORIDE SERPL-SCNC: 101 MMOL/L (ref 98–107)
CO2 SERPL-SCNC: 23 MMOL/L (ref 22–29)
CREAT SERPL-MCNC: 1.09 MG/DL (ref 0.76–1.27)
EGFRCR SERPLBLD CKD-EPI 2021: 74.4 ML/MIN/1.73
GLUCOSE SERPL-MCNC: 144 MG/DL (ref 65–99)
POTASSIUM SERPL-SCNC: 4.4 MMOL/L (ref 3.5–5.2)
QT INTERVAL: 370 MS
SODIUM SERPL-SCNC: 135 MMOL/L (ref 136–145)

## 2023-08-15 PROCEDURE — 25010000002 PROPOFOL 10 MG/ML EMULSION: Performed by: NURSE ANESTHETIST, CERTIFIED REGISTERED

## 2023-08-15 PROCEDURE — 92960 CARDIOVERSION ELECTRIC EXT: CPT

## 2023-08-15 PROCEDURE — 93005 ELECTROCARDIOGRAM TRACING: CPT | Performed by: INTERNAL MEDICINE

## 2023-08-15 PROCEDURE — 93010 ELECTROCARDIOGRAM REPORT: CPT | Performed by: INTERNAL MEDICINE

## 2023-08-15 PROCEDURE — 80048 BASIC METABOLIC PNL TOTAL CA: CPT | Performed by: INTERNAL MEDICINE

## 2023-08-15 PROCEDURE — 0 LIDOCAINE 1 % SOLUTION: Performed by: NURSE ANESTHETIST, CERTIFIED REGISTERED

## 2023-08-15 PROCEDURE — 92960 CARDIOVERSION ELECTRIC EXT: CPT | Performed by: INTERNAL MEDICINE

## 2023-08-15 RX ORDER — PROPOFOL 10 MG/ML
VIAL (ML) INTRAVENOUS AS NEEDED
Status: DISCONTINUED | OUTPATIENT
Start: 2023-08-15 | End: 2023-08-15 | Stop reason: SURG

## 2023-08-15 RX ORDER — DROPERIDOL 2.5 MG/ML
0.62 INJECTION, SOLUTION INTRAMUSCULAR; INTRAVENOUS
Status: DISCONTINUED | OUTPATIENT
Start: 2023-08-15 | End: 2023-08-16 | Stop reason: HOSPADM

## 2023-08-15 RX ORDER — DIPHENHYDRAMINE HYDROCHLORIDE 50 MG/ML
12.5 INJECTION INTRAMUSCULAR; INTRAVENOUS
Status: DISCONTINUED | OUTPATIENT
Start: 2023-08-15 | End: 2023-08-16 | Stop reason: HOSPADM

## 2023-08-15 RX ORDER — ONDANSETRON 2 MG/ML
4 INJECTION INTRAMUSCULAR; INTRAVENOUS ONCE AS NEEDED
Status: DISCONTINUED | OUTPATIENT
Start: 2023-08-15 | End: 2023-08-16 | Stop reason: HOSPADM

## 2023-08-15 RX ORDER — FLUMAZENIL 0.1 MG/ML
0.2 INJECTION INTRAVENOUS AS NEEDED
Status: DISCONTINUED | OUTPATIENT
Start: 2023-08-15 | End: 2023-08-16 | Stop reason: HOSPADM

## 2023-08-15 RX ORDER — SODIUM CHLORIDE 9 MG/ML
INJECTION, SOLUTION INTRAVENOUS CONTINUOUS PRN
Status: DISCONTINUED | OUTPATIENT
Start: 2023-08-15 | End: 2023-08-15 | Stop reason: SURG

## 2023-08-15 RX ORDER — METOPROLOL SUCCINATE 25 MG/1
25 TABLET, EXTENDED RELEASE ORAL DAILY
Qty: 180 TABLET | Refills: 3
Start: 2023-08-15

## 2023-08-15 RX ORDER — NALOXONE HCL 0.4 MG/ML
0.2 VIAL (ML) INJECTION AS NEEDED
Status: DISCONTINUED | OUTPATIENT
Start: 2023-08-15 | End: 2023-08-16 | Stop reason: HOSPADM

## 2023-08-15 RX ORDER — PROMETHAZINE HYDROCHLORIDE 25 MG/1
25 SUPPOSITORY RECTAL ONCE AS NEEDED
Status: DISCONTINUED | OUTPATIENT
Start: 2023-08-15 | End: 2023-08-16 | Stop reason: HOSPADM

## 2023-08-15 RX ORDER — LIDOCAINE HYDROCHLORIDE 10 MG/ML
INJECTION, SOLUTION INFILTRATION; PERINEURAL AS NEEDED
Status: DISCONTINUED | OUTPATIENT
Start: 2023-08-15 | End: 2023-08-15 | Stop reason: SURG

## 2023-08-15 RX ORDER — PROMETHAZINE HYDROCHLORIDE 25 MG/1
25 TABLET ORAL ONCE AS NEEDED
Status: DISCONTINUED | OUTPATIENT
Start: 2023-08-15 | End: 2023-08-16 | Stop reason: HOSPADM

## 2023-08-15 RX ADMIN — PROPOFOL 50 MG: 10 INJECTION, EMULSION INTRAVENOUS at 07:33

## 2023-08-15 RX ADMIN — SODIUM CHLORIDE: 9 INJECTION, SOLUTION INTRAVENOUS at 07:05

## 2023-08-15 RX ADMIN — LIDOCAINE HYDROCHLORIDE 50 ML: 10 INJECTION, SOLUTION INFILTRATION; PERINEURAL at 07:33

## 2023-08-15 RX ADMIN — PROPOFOL 20 MG: 10 INJECTION, EMULSION INTRAVENOUS at 07:34

## 2023-08-15 NOTE — ANESTHESIA POSTPROCEDURE EVALUATION
Patient: Angel White Jr.    Procedure Summary       Date: 08/15/23 Room / Location: [facility] PACU    Anesthesia Start: 0728 Anesthesia Stop: 0738    Procedure: CARDIOVERSION EXTERNAL IN CARDIOLOGY DEPARTMENT Diagnosis:       Atrial fibrillation with RVR      (AF)    Scheduled Providers: Crys Dumont MD Provider: Samir Lewis MD    Anesthesia Type: MAC ASA Status: 4            Anesthesia Type: MAC    Vitals  Vitals Value Taken Time   /85 08/15/23 0745   Temp     Pulse 66 08/15/23 0745   Resp 16 08/15/23 0745   SpO2 98 % 08/15/23 0745           Post Anesthesia Care and Evaluation    Patient location during evaluation: PACU  Patient participation: complete - patient participated  Level of consciousness: awake and alert  Pain management: adequate    Airway patency: patent  Anesthetic complications: No anesthetic complications  PONV Status: controlled  Cardiovascular status: acceptable and hemodynamically stable  Respiratory status: acceptable  Hydration status: acceptable    Comments: /85 (BP Location: Left arm, Patient Position: Lying)   Pulse 66   Temp 36.8 øC (98.3 øF) (Oral)   Resp 16   SpO2 98%

## 2023-08-15 NOTE — DISCHARGE INSTRUCTIONS
Electrical Cardioversion  Electrical cardioversion is the delivery of a jolt of electricity to restore a normal rhythm to the heart. A rhythm that is too fast or is not regular keeps the heart from pumping well. In this procedure, sticky patches or metal paddles are placed on the chest to deliver electricity to the heart from a device.  This procedure may be done in an emergency if:  There is low or no blood pressure as a result of the heart rhythm.  Normal rhythm must be restored as fast as possible to protect the brain and heart from further damage.  It may save a life.  This may also be a scheduled procedure for irregular or fast heart rhythms that are not immediately life-threatening.  What are the risks?  Generally, this is a safe procedure. However, problems may occur, including:  Allergic reactions to medicines.  A blood clot that breaks free and travels to other parts of your body (a stroke).    The possible return of an abnormal heart rhythm within hours or days after the procedure.  Your heart stopping (cardiac arrest). This is rare.  .  Follow these instructions at home:  Do not drive for 24 hours if you were given a sedative during your procedure.  Take over-the-counter and prescription medicines only as told by your health care provider.  Ask your health care provider how to check your pulse. Check it often.  Rest for 24 hours after the procedure or as told by your health care provider.  Avoid or limit your caffeine use as told by your health care provider.  Keep all follow-up visits as told by your health care provider. This is important.  Contact a health care provider if:  You feel like your heart is beating too quickly or your pulse is not regular.  You have a serious muscle cramp that does not go away.  Get help right away if:  You have discomfort in your chest.  You are dizzy or you feel faint.  You have trouble breathing or you are short of breath.  Your speech is slurred.  You have trouble  moving an arm or leg on one side of your body.  Your fingers or toes turn cold or blue.      Call 911 if:     You have any symptoms of a stroke.  Remember BE FAST  B-balance. Sudden trouble walking or loss of balance.  E-eyes.  Sudden changes in how you see or a sudden onset of a very bad headache.   F-face. Sudden weakness or loss of feeling of the face or facial droop on one side.   A-arms Sudden weakness or numbness in one arm.  One arm drifts down if they are both held out in front of you. This happens suddenly and usually on one side of the body.  S-speech.  Sudden trouble speaking, slurred speech or trouble understanding what people are saying.   T-time Time to call emergency services.  Write down the symptoms and the time they started.       This information is not intended to replace advice given to you by your health care provider. Make sure you discuss any questions you have with your health care provider.

## 2023-08-15 NOTE — ANESTHESIA PREPROCEDURE EVALUATION
Anesthesia Evaluation     Patient summary reviewed and Nursing notes reviewed                Airway   Mallampati: III  TM distance: >3 FB  Neck ROM: full  Dental - normal exam     Pulmonary    (+) ,sleep apnea  Cardiovascular     ECG reviewed    (+) hypertension, CAD, cardiac stents Drug eluting stent more than 12 months ago dysrhythmias Atrial Fib, CHF Systolic <55%, PVD, hyperlipidemia    ROS comment: TTE:  ú  Left ventricular systolic function is severely decreased. Calculated left ventricular EF = 18.3%  ú  The left ventricular cavity is moderately dilated.  ú  There is left ventricular global hypokinesis noted.  ú  Left atrial volume is mildly increased.  ú  Estimated right ventricular systolic pressure from tricuspid regurgitation is normal (<35 mmHg).      Neuro/Psych  (+) psychiatric history Anxiety  GI/Hepatic/Renal/Endo    (+) obesity, GERD, diabetes mellitus    Musculoskeletal     Abdominal    Substance History      OB/GYN          Other   arthritis,                     Anesthesia Plan    ASA 4     MAC   total IV anesthesia  (I have reviewed the patient's history with the patient and the chart, including all pertinent laboratory results and imaging. I have explained the risks of anesthesia including but not limited to dental damage, corneal abrasion, nerve injury, MI, stroke, and death. Questions asked and answered. Anesthetic plan discussed with patient and team as indicated. Patient expressed understanding of the above.  )    Anesthetic plan, risks, benefits, and alternatives have been provided, discussed and informed consent has been obtained with: patient.    Plan discussed with CRNA.      CODE STATUS:

## 2023-08-24 ENCOUNTER — OFFICE VISIT (OUTPATIENT)
Dept: INTERNAL MEDICINE | Facility: CLINIC | Age: 68
End: 2023-08-24
Payer: MEDICARE

## 2023-08-24 VITALS
SYSTOLIC BLOOD PRESSURE: 114 MMHG | HEIGHT: 73 IN | OXYGEN SATURATION: 97 % | HEART RATE: 72 BPM | WEIGHT: 274.1 LBS | DIASTOLIC BLOOD PRESSURE: 70 MMHG | BODY MASS INDEX: 36.33 KG/M2

## 2023-08-24 DIAGNOSIS — E78.2 MIXED HYPERLIPIDEMIA: ICD-10-CM

## 2023-08-24 DIAGNOSIS — I10 ESSENTIAL HYPERTENSION: ICD-10-CM

## 2023-08-24 DIAGNOSIS — E11.59 TYPE 2 DIABETES MELLITUS WITH OTHER CIRCULATORY COMPLICATION, WITHOUT LONG-TERM CURRENT USE OF INSULIN: Primary | ICD-10-CM

## 2023-08-24 DIAGNOSIS — E66.01 CLASS 2 SEVERE OBESITY DUE TO EXCESS CALORIES WITH SERIOUS COMORBIDITY AND BODY MASS INDEX (BMI) OF 35.0 TO 35.9 IN ADULT: ICD-10-CM

## 2023-08-24 DIAGNOSIS — Z12.5 PROSTATE CANCER SCREENING: ICD-10-CM

## 2023-08-24 DIAGNOSIS — Z00.00 MEDICARE ANNUAL WELLNESS VISIT, SUBSEQUENT: ICD-10-CM

## 2023-08-24 NOTE — PROGRESS NOTES
"Chief Complaint  Diabetes    Subjective        Angel White Jr. presents to [facility] PRIMARY CARE  History of Present Illness  Patient follows up for ongoing management of chronic problems of diabetes hypertension hyperlipidemia he does not monitor his blood pressure or his blood sugars has no unwanted side effects of medications he has no chest pain or shortness of breath he has had cardioversion for atrial fibrillation and now is reduced dose of metoprolol losartan to 25 mg  Been taking metformin once a day  Anti-platelet medication includes Plavix and Eliquis for anticoagulation  Objective   Vital Signs:  /70   Pulse 72   Ht 185 cm (72.84\")   Wt 124 kg (274 lb 1.6 oz)   SpO2 97%   BMI 36.33 kg/mý   Estimated body mass index is 36.33 kg/mý as calculated from the following:    Height as of this encounter: 185 cm (72.84\").    Weight as of this encounter: 124 kg (274 lb 1.6 oz).             Physical Exam  Vitals and nursing note reviewed.   Constitutional:       Appearance: Normal appearance. He is well-developed. He is not diaphoretic.   HENT:      Head: Normocephalic and atraumatic.      Right Ear: Tympanic membrane, ear canal and external ear normal.      Left Ear: Tympanic membrane, ear canal and external ear normal.   Eyes:      General: Lids are normal. No scleral icterus.     Extraocular Movements: Extraocular movements intact.      Conjunctiva/sclera: Conjunctivae normal.   Neck:      Thyroid: No thyroid mass or thyromegaly.      Vascular: No carotid bruit or JVD.   Cardiovascular:      Rate and Rhythm: Normal rate and regular rhythm.      Pulses: Normal pulses.           Radial pulses are 2+ on the right side and 2+ on the left side.      Heart sounds: Normal heart sounds. No murmur heard.  Pulmonary:      Effort: Pulmonary effort is normal. No respiratory distress.      Breath sounds: Normal breath sounds.   Abdominal:      Palpations: Abdomen is soft.   Musculoskeletal:     "  Cervical back: Normal range of motion.      Right lower leg: No edema.      Left lower leg: No edema.   Skin:     General: Skin is warm and dry.      Coloration: Skin is not pale.      Findings: No erythema or rash.   Neurological:      General: No focal deficit present.      Mental Status: He is alert and oriented to person, place, and time.      Sensory: No sensory deficit.      Deep Tendon Reflexes: Reflexes are normal and symmetric.   Psychiatric:         Mood and Affect: Mood normal.         Behavior: Behavior normal. Behavior is cooperative.         Thought Content: Thought content normal.         Judgment: Judgment normal.      Result Review :    Common labs          7/17/2023    10:30 8/10/2023    13:12 8/15/2023    06:27   Common Labs   Glucose 160  137  144    BUN 10  11  14    Creatinine 1.06  1.18  1.09    Sodium 137  137  135    Potassium 4.3  4.2  4.4    Chloride 102  100  101    Calcium 9.2  9.2  9.3    Albumin 4.0      Total Bilirubin 0.6      Alkaline Phosphatase 99      AST (SGOT) 20      ALT (SGPT) 28      WBC 8.19      Hemoglobin 14.1      Hematocrit 43.2      Platelets 271                     Assessment and Plan   Diagnoses and all orders for this visit:    1. Type 2 diabetes mellitus with other circulatory complication, without long-term current use of insulin (Primary)  -     Microalbumin / Creatinine Urine Ratio - Urine, Clean Catch  -     Hemoglobin A1c    2. Class 2 severe obesity due to excess calories with serious comorbidity and body mass index (BMI) of 35.0 to 35.9 in adult    3. Essential hypertension    4. Mixed hyperlipidemia  -     Lipid Panel    Results of testing for ongoing management of hyperlipidemia and diabetes continue losartan for hypertension follow-up otherwise as needed or         Follow Up   Return in about 6 months (around 2/24/2024), or if symptoms worsen or fail to improve, for Recheck.  Patient was given instructions and counseling regarding his condition or for  health maintenance advice. Please see specific information pulled into the AVS if appropriate.

## 2023-08-24 NOTE — PROGRESS NOTES
The ABCs of the Annual Wellness Visit  Subsequent Medicare Wellness Visit    Subjective      Angel White Jr. is a 68 y.o. male who presents for a Subsequent Medicare Wellness Visit.    The following portions of the patient's history were reviewed and   updated as appropriate: allergies, current medications, past family history, past medical history, past social history, past surgical history, and problem list.    Compared to one year ago, the patient feels his physical   health is the same.    Compared to one year ago, the patient feels his mental   health is the same.    Recent Hospitalizations:  This patient has had a  admission record on file within the last 365 days.    Current Medical Providers:  Patient Care Team:  Momo Meza MD as PCP - General (Family Medicine)  Crys Dumont MD as Consulting Physician (Cardiology)  Akash Velasquez MD as Consulting Physician (Hematology and Oncology)  Roscoe Donovan MD as Referring Physician (General Surgery)    Outpatient Medications Prior to Visit   Medication Sig Dispense Refill    apixaban (ELIQUIS) 5 MG tablet tablet Take 1 tablet by mouth 2 (Two) Times a Day. 60 tablet 3    citalopram (CeleXA) 20 MG tablet Take 1 tablet by mouth Daily. 90 tablet 2    clopidogrel (PLAVIX) 75 MG tablet TAKE 1 TABLET BY MOUTH EVERY DAY 90 tablet 3    ferrous sulfate 325 (65 FE) MG tablet TAKE 1 TABLET BY MOUTH EVERY DAY WITH BREAKFAST 90 tablet 1    furosemide (LASIX) 40 MG tablet Take 1 tablet by mouth 2 (Two) Times a Day. 180 tablet 3    losartan (COZAAR) 25 MG tablet Take 1 tablet by mouth Daily. 30 tablet 3    metFORMIN ER (GLUCOPHAGE-XR) 500 MG 24 hr tablet Take 1 tablet by mouth Daily With Breakfast. 30 tablet 3    metoprolol succinate XL (TOPROL-XL) 25 MG 24 hr tablet Take 1 tablet by mouth Daily. 180 tablet 3    Pseudoeph-Doxylamine-DM-APAP (NYQUIL PO) Take  by mouth every night at bedtime.       No facility-administered medications prior to visit.  "      No opioid medication identified on active medication list. I have reviewed chart for other potential  high risk medication/s and harmful drug interactions in the elderly.        Aspirin is not on active medication list.  Aspirin use is not indicated based on review of current medical condition/s. Risk of harm outweighs potential benefits.  .    Patient Active Problem List   Diagnosis    CAD (coronary artery disease)    Essential hypertension    DDD (degenerative disc disease), lumbosacral    Osteoarthritis of knee    Post-traumatic osteoarthritis of multiple joints    Posterior tibialis tendon insufficiency    Arthritis of foot, right    Onychomycosis    Hyperlipidemia    Controlled type 2 diabetes mellitus with diabetic peripheral angiopathy without gangrene, without long-term current use of insulin    History of coronary artery stent placement    Diabetes    Class 2 severe obesity with serious comorbidity and body mass index (BMI) of 35.0 to 35.9 in adult    Other constipation    Allergic rhinitis    Trigger finger of right thumb    Change in bowel habits    Personal history of colonic polyps    Chronic GERD    Primary insomnia    Anxiety    Prostate cancer screening    Memory loss    Functional diarrhea    Vitamin D deficiency    Anemia    Gastrointestinal hemorrhage associated with anorectal source    Malignant carcinoid tumor of ileum    Iron deficiency anemia due to chronic blood loss    Nausea    Neuroendocrine tumor status post surgical treatment    Atrial fibrillation    Medicare annual wellness visit, subsequent     Advance Care Planning   Advance Care Planning     Advance Directive is not on file.  ACP discussion was held with the patient during this visit. Patient does not have an advance directive, information provided.     Objective    Vitals:    08/24/23 1401   BP: 114/70   Pulse: 72   SpO2: 97%   Weight: 124 kg (274 lb 1.6 oz)   Height: 185 cm (72.84\")     Estimated body mass index is 36.33 " "kg/mý as calculated from the following:    Height as of this encounter: 185 cm (72.84\").    Weight as of this encounter: 124 kg (274 lb 1.6 oz).    Class 2 Severe Obesity (BMI >=35 and <=39.9). Obesity-related health conditions include the following: hypertension, coronary heart disease, and diabetes mellitus. Obesity is unchanged. BMI is is above average; BMI management plan is completed. We discussed portion control and increasing exercise.      Does the patient have evidence of cognitive impairment?   No            HEALTH RISK ASSESSMENT    Smoking Status:  Social History     Tobacco Use   Smoking Status Never   Smokeless Tobacco Never   Tobacco Comments    Caffeine daily     Alcohol Consumption:  Social History     Substance and Sexual Activity   Alcohol Use No     Fall Risk Screen:    STEADI Fall Risk Assessment was completed, and patient is at LOW risk for falls.Assessment completed on:2023    Depression Screenin/10/2023     4:02 PM   PHQ-2/PHQ-9 Depression Screening   Little Interest or Pleasure in Doing Things 0-->not at all   Feeling Down, Depressed or Hopeless 0-->not at all   PHQ-9: Brief Depression Severity Measure Score 0       Health Habits and Functional and Cognitive Screenin/24/2023     2:00 PM   Functional & Cognitive Status   Do you have difficulty preparing food and eating? No   Do you have difficulty bathing yourself, getting dressed or grooming yourself? No   Do you have difficulty using the toilet? No   Do you have difficulty moving around from place to place? No   Do you have trouble with steps or getting out of a bed or a chair? No   Current Diet Limited Junk Food   Dental Exam Not up to date   Eye Exam Not up to date   Exercise (times per week) 3 times per week   Current Exercises Include Yard Work;Walking   Do you need help using the phone?  No   Are you deaf or do you have serious difficulty hearing?  Yes   Do you need help to go to places out of walking distance? " No   Do you need help shopping? No   Do you need help preparing meals?  No   Do you need help with housework?  No   Do you need help with laundry? No   Do you need help taking your medications? No   Do you need help managing money? No   Do you ever drive or ride in a car without wearing a seat belt? No   Have you felt unusual stress, anger or loneliness in the last month? No   Who do you live with? Spouse   If you need help, do you have trouble finding someone available to you? Yes   Have you been bothered in the last four weeks by sexual problems? No   Do you have difficulty concentrating, remembering or making decisions? Yes       Age-appropriate Screening Schedule:  Refer to the list below for future screening recommendations based on patient's age, sex and/or medical conditions. Orders for these recommended tests are listed in the plan section. The patient has been provided with a written plan.    Health Maintenance   Topic Date Due    DIABETIC EYE EXAM  03/05/2019    ANNUAL WELLNESS VISIT  05/05/2023    LIPID PANEL  05/05/2023    ZOSTER VACCINE (2 of 2) 08/24/2023 (Originally 4/25/2017)    COVID-19 Vaccine (3 - Pfizer series) 08/26/2023 (Originally 6/30/2021)    Pneumococcal Vaccine 65+ (1 - PCV) 08/24/2024 (Originally 2/4/1961)    COLORECTAL CANCER SCREENING  08/03/2025    TDAP/TD VACCINES (3 - Td or Tdap) 02/07/2029    HEPATITIS C SCREENING  Discontinued    INFLUENZA VACCINE  Discontinued    DIABETIC FOOT EXAM  Discontinued    HEMOGLOBIN A1C  Discontinued    URINE MICROALBUMIN  Discontinued                  CMS Preventative Services Quick Reference  Risk Factors Identified During Encounter:  Recommend evaluation through audiology and neurology for memory loss  Immunizations Discussed/Encouraged: Influenza, Prevnar 20 (Pneumococcal 20-valent conjugate), Shingrix, and COVID19 patient declines immunizations  Inactivity/Sedentary: Patient was advised to exercise at least 150 minutes a week per CDC  recommendations.  Dental Screening Recommended  Vision Screening Recommended    The above risks/problems have been discussed with the patient.  Pertinent information has been shared with the patient in the After Visit Summary.    Diagnoses and all orders for this visit:      1. Medicare annual wellness visit, subsequent        2. Prostate cancer screening  -     PSA Screen      Follow Up:   Next Medicare Wellness visit to be scheduled in 1 year.      An After Visit Summary and PPPS were made available to the patient.

## 2023-08-25 LAB
ALBUMIN/CREAT UR: <7 MG/G CREAT (ref 0–29)
CREAT UR-MCNC: 45.8 MG/DL
HBA1C MFR BLD: 7.7 % (ref 4.8–5.6)
MICROALBUMIN UR-MCNC: <3 UG/ML
PSA SERPL-MCNC: 2.25 NG/ML (ref 0–4)

## 2023-08-25 RX ORDER — METFORMIN HYDROCHLORIDE 500 MG/1
1000 TABLET, EXTENDED RELEASE ORAL
Qty: 60 TABLET | Refills: 3 | Status: SHIPPED | OUTPATIENT
Start: 2023-08-25

## 2023-09-01 DIAGNOSIS — I10 ESSENTIAL (PRIMARY) HYPERTENSION: ICD-10-CM

## 2023-09-02 RX ORDER — LOSARTAN POTASSIUM 100 MG/1
TABLET ORAL
Qty: 90 TABLET | Refills: 1 | OUTPATIENT
Start: 2023-09-02

## 2023-09-07 ENCOUNTER — TELEPHONE (OUTPATIENT)
Dept: CARDIOLOGY | Facility: CLINIC | Age: 68
End: 2023-09-07
Payer: MEDICARE

## 2023-09-07 NOTE — TELEPHONE ENCOUNTER
Called pt per Dr. Dumont and told him to take an additional Metoprolol 25mg now, and call us in the morning w/ his BP and HR.    Pt understood instructions.    Sanjay marin  9/7/23  415pm

## 2023-09-07 NOTE — TELEPHONE ENCOUNTER
Caller: Angel White Jr.     Relationship: SELF    Best call back number: [phone]    What is your medical concern? PATIENT IS EXPERIENCING SOB SIMILAR TO BEFORE PROCEDURE ON 7/18. PLEASE REACH OUT TO PATIENT FOR CONSULTATION OR SOONER APPOINTMENT.    How long has this issue been going on? ABOUT 2 AND HALF WEEKS AGO    Is your provider already aware of this issue? NO    Have you been treated for this issue? NO

## 2023-09-08 NOTE — TELEPHONE ENCOUNTER
Pt called to report BP/HR and symptoms. I reported info to Dr. Dumont and these are the instructions I gave pt per Dr. Dumont.    His BP today 172/110, HR 79, still having SOA.    Dr. Dumont told me to have him take Losartan 50mg 1 tab qam and 1 tab qhs, also take an extra Metoprolol 25mg today (when he gets home/he wasn't home when I called him),   He will also take Metoprolol 25mg 2 tabs Saturday through Monday.   He has a f/u w/ Danielle Robles on Monday, Sept 11, 2023 @ 230pm    Sanjay marin  9/8/23  310pm

## 2023-09-12 ENCOUNTER — OFFICE VISIT (OUTPATIENT)
Dept: CARDIOLOGY | Facility: CLINIC | Age: 68
End: 2023-09-12
Payer: MEDICARE

## 2023-09-12 VITALS
WEIGHT: 275 LBS | HEIGHT: 73 IN | BODY MASS INDEX: 36.45 KG/M2 | HEART RATE: 95 BPM | SYSTOLIC BLOOD PRESSURE: 146 MMHG | DIASTOLIC BLOOD PRESSURE: 92 MMHG

## 2023-09-12 DIAGNOSIS — E78.2 MIXED HYPERLIPIDEMIA: Primary | ICD-10-CM

## 2023-09-12 DIAGNOSIS — I10 ESSENTIAL HYPERTENSION: ICD-10-CM

## 2023-09-12 DIAGNOSIS — E11.51 CONTROLLED TYPE 2 DIABETES MELLITUS WITH DIABETIC PERIPHERAL ANGIOPATHY WITHOUT GANGRENE, WITHOUT LONG-TERM CURRENT USE OF INSULIN: ICD-10-CM

## 2023-09-12 DIAGNOSIS — I48.19 PERSISTENT ATRIAL FIBRILLATION: ICD-10-CM

## 2023-09-12 DIAGNOSIS — Z95.5 HISTORY OF CORONARY ARTERY STENT PLACEMENT: ICD-10-CM

## 2023-09-12 PROCEDURE — 3080F DIAST BP >= 90 MM HG: CPT | Performed by: NURSE PRACTITIONER

## 2023-09-12 PROCEDURE — 1159F MED LIST DOCD IN RCRD: CPT | Performed by: NURSE PRACTITIONER

## 2023-09-12 PROCEDURE — 1160F RVW MEDS BY RX/DR IN RCRD: CPT | Performed by: NURSE PRACTITIONER

## 2023-09-12 PROCEDURE — 93000 ELECTROCARDIOGRAM COMPLETE: CPT | Performed by: NURSE PRACTITIONER

## 2023-09-12 PROCEDURE — 99214 OFFICE O/P EST MOD 30 MIN: CPT | Performed by: NURSE PRACTITIONER

## 2023-09-12 PROCEDURE — 3077F SYST BP >= 140 MM HG: CPT | Performed by: NURSE PRACTITIONER

## 2023-09-12 RX ORDER — AMIODARONE HYDROCHLORIDE 200 MG/1
TABLET ORAL
Qty: 66 TABLET | Refills: 3 | Status: SHIPPED | OUTPATIENT
Start: 2023-09-12

## 2023-09-12 RX ORDER — METOPROLOL SUCCINATE 50 MG/1
50 TABLET, EXTENDED RELEASE ORAL 2 TIMES DAILY
Qty: 60 TABLET | Refills: 11
Start: 2023-09-12 | End: 2023-09-14 | Stop reason: SDUPTHER

## 2023-09-12 RX ORDER — FUROSEMIDE 40 MG/1
40 TABLET ORAL 2 TIMES DAILY
Qty: 60 TABLET | Refills: 11 | Status: SHIPPED | OUTPATIENT
Start: 2023-09-12 | End: 2023-09-14 | Stop reason: SDUPTHER

## 2023-09-12 NOTE — PROGRESS NOTES
Date of Office Visit: 2023  Encounter Provider: TREY Breaux  Place of Service: [facility] CARDIOLOGY  Patient Name: Angel White Jr.  :1955    Chief Complaint   Patient presents with    Atrial Fibrillation     Follow-up   :     HPI: Angel White Jr. is a 68 y.o. male who is a patient of Dr. Ferro and is new to me today.  He has a history of coronary disease, hypertension hyperlipidemia, diabetes mellitus and atrial fibrillation.   he was admitted with unstable angina got a drug-eluting stent to his proximal to mid LAD as well as the ramus.  Later he had stenting of the PDA and proximal RCA.  He recently was admitted on  with a new onset of A-fib and cardiomyopathy.  His echocardiogram showed an EF of about 20% presumed to be related to tachyarrhythmia.  A LIONEL was cardioversion was attempted but due to thrombus in the left atrial appendage this was abandoned.  He was started on medical therapy with beta-blocker and anticoagulated with Eliquis.  We plan for cardioversion in a month.  However he came back to the office today after discharge he was really winded his orthopnea had improved blood pressure was stable.    He has a stressful life at home as wife is handicapped and is bedbound he cares for her as well as a son with developmental delays and has 4 young grandchildren.  On August 15 he was admitted to the hospital for elective cardioversion.  It was successful with 1 shock.  Today he comes in for follow-up he is back in A-fib on his EKG in the 90s. He is tired and short of breath.  Very fatigued like he was before his cardioversion.  It started about 2 weeks ago.  He had called and we upped his metoprolol to twice a day.  His weight is up around 15 pounds from July.  Previous testing and notes have been reviewed by me.   Past Medical History:   Diagnosis Date    Anemia     AP (angina pectoris)     unstable    CAD (coronary artery disease)      6 stents placed    DDD (degenerative disc disease), lumbosacral     Diabetes     Essential hypertension     Fatty liver     ?    History of Clostridioides difficile infection 10/2021    History of MI (myocardial infarction)     Hyperlipidemia     Low back pain     Mass of colon     Memory loss     SOME SHORT TERM MEMORY ISSUES AND FORGETFULNESS    Osteoarthritis of knee     Screening for prostate cancer 2010    normal    Skin cancer     left eyebrow, side of face and back     Sleep apnea     NO CPAP    Tachycardia        Past Surgical History:   Procedure Laterality Date    COLON RESECTION N/A 2/3/2022    Procedure: ILEUM COLON RESECTION FOR TERMINAL ILEUM MASS LAPAROSCOPIC;  Surgeon: Roscoe Donovan MD;  Location: [facility] MAIN OR;  Service: General;  Laterality: N/A;    COLONOSCOPY N/A 2016    normal per patient    COLONOSCOPY N/A 8/3/2020    Procedure: COLONOSCOPY to cecum and TI with cold biopsies;  Surgeon: Kelly Ashraf MD;  Location: [facility] ENDOSCOPY;  Service: Gastroenterology;  Laterality: N/A;  pre-change in bowel habits, hx polyps   post-hemorrhoids, lipomas, abnormal cecal tissue     CORONARY ANGIOPLASTY WITH STENT PLACEMENT  08/28/2015    6 stents-Dr. Hall, [facility]    FOOT SURGERY Right 05/23/2017    Dr. Cervantes, [facility]    REPLACEMENT TOTAL KNEE Left 2010    Dr. Arnaud Reynaga, [facility]    REPLACEMENT TOTAL KNEE Right 2009    Dr. Arnaud Reynaga, [facility]    SKIN BIOPSY      SKIN CANCER EXCISION Left 03/30/2016    left ear and left eyebrow, graft took from face, placed on ear    SKIN CANCER EXCISION  2001    back       Social History     Socioeconomic History    Marital status:      Spouse name: Jaylene   Tobacco Use    Smoking status: Never    Smokeless tobacco: Never    Tobacco comments:     Caffeine daily   Vaping Use    Vaping Use: Never used   Substance and Sexual Activity    Alcohol use: No    Drug use: Not Currently    Sexual activity: Defer     Partners: Female       Family History   Problem  Relation Age of Onset    Hypertension Mother     Arthritis Mother     Diabetes Mother     Heart disease Father     Hypertension Father     Macular degeneration Father     Arthritis Father     Diabetes Father     Arthritis Maternal Grandmother     Heart disease Maternal Grandmother     Arthritis Maternal Grandfather     Heart disease Maternal Grandfather     Diabetes Maternal Grandfather     Hypertension Maternal Grandfather     Colon cancer Maternal Grandfather     Arthritis Paternal Grandmother     Heart disease Paternal Grandmother     Diabetes Paternal Grandmother     Hypertension Paternal Grandmother     Stroke Paternal Grandmother     Colon cancer Paternal Grandmother     Arthritis Paternal Grandfather     Heart disease Paternal Grandfather     Colon cancer Paternal Grandfather     Macular degeneration Sister     Malig Hyperthermia Neg Hx        Review of Systems   Constitutional: Positive for malaise/fatigue. Negative for diaphoresis.   Cardiovascular:  Positive for leg swelling and palpitations. Negative for chest pain, claudication, dyspnea on exertion, irregular heartbeat, near-syncope, orthopnea, paroxysmal nocturnal dyspnea and syncope.   Respiratory:  Negative for cough, shortness of breath and sleep disturbances due to breathing.    Musculoskeletal:  Negative for falls.   Neurological:  Negative for dizziness and weakness.   Psychiatric/Behavioral:  Negative for altered mental status and substance abuse.      Allergies   Allergen Reactions    Ambien [Zolpidem] Other (See Comments)     Sleep walking      Morphine And Related Itching and Rash         Current Outpatient Medications:     apixaban (ELIQUIS) 5 MG tablet tablet, Take 1 tablet by mouth 2 (Two) Times a Day., Disp: 60 tablet, Rfl: 3    citalopram (CeleXA) 20 MG tablet, Take 1 tablet by mouth Daily., Disp: 90 tablet, Rfl: 2    clopidogrel (PLAVIX) 75 MG tablet, TAKE 1 TABLET BY MOUTH EVERY DAY, Disp: 90 tablet, Rfl: 3    ferrous sulfate 325 (65  "FE) MG tablet, TAKE 1 TABLET BY MOUTH EVERY DAY WITH BREAKFAST, Disp: 90 tablet, Rfl: 1    furosemide (LASIX) 40 MG tablet, Take 1 tablet by mouth 2 (Two) Times a Day. Pt takes qd, Disp: 60 tablet, Rfl: 11    losartan (COZAAR) 25 MG tablet, Take 1 tablet by mouth Daily., Disp: 30 tablet, Rfl: 3    metFORMIN ER (GLUCOPHAGE-XR) 500 MG 24 hr tablet, Take 2 tablets by mouth Daily With Breakfast. (Patient taking differently: Take 1 tablet by mouth Daily With Breakfast. Pt takes 1 tablet qd), Disp: 60 tablet, Rfl: 3    metoprolol succinate XL (TOPROL-XL) 50 MG 24 hr tablet, Take 1 tablet by mouth 2 (Two) Times a Day., Disp: 60 tablet, Rfl: 11    Pseudoeph-Doxylamine-DM-APAP (NYQUIL PO), Take  by mouth every night at bedtime., Disp: , Rfl:     amiodarone (PACERONE) 200 MG tablet, Take 400mg PO BID x 3 days then 200mg PO BID, Disp: 66 tablet, Rfl: 3      Objective:     Vitals:    09/12/23 1504   BP: 146/92   BP Location: Left arm   Patient Position: Sitting   Pulse: 95   Weight: 125 kg (275 lb)   Height: 185 cm (72.84\")     Body mass index is 36.44 kg/m².    PHYSICAL EXAM:    Constitutional:       General: Not in acute distress.     Appearance: Normal appearance. Well-developed.   Eyes:      Pupils: Pupils are equal, round, and reactive to light.   HENT:      Head: Normocephalic.   Neck:      Vascular: No carotid bruit or JVD.   Pulmonary:      Effort: Pulmonary effort is normal. No tachypnea.      Breath sounds: Normal breath sounds. No wheezing. No rales.   Cardiovascular:      Normal rate. Regularly irregular rhythm.      No gallop.    Pulses:     Intact distal pulses.   Edema:     Peripheral edema present.     Pretibial: bilateral trace edema of the pretibial area.     Ankle: bilateral trace edema of the ankle.  Abdominal:      General: Bowel sounds are normal.      Palpations: Abdomen is soft.      Tenderness: There is no abdominal tenderness.   Musculoskeletal: Normal range of motion.      Cervical back: Normal range " of motion and neck supple. No edema. Skin:     General: Skin is warm and dry.   Neurological:      Mental Status: Alert and oriented to person, place, and time.         ECG 12 Lead    Date/Time: 9/12/2023 3:52 PM  Performed by: Danielle Robles APRN  Authorized by: Danielle Robles APRN   Comparison: compared with previous ECG from 8/15/2023  Comparison to previous ECG: Now in Afib  Rhythm: atrial fibrillation  Rate: normal  BPM: 95  QRS axis: normal    Clinical impression: abnormal EKG          Assessment:       Diagnosis Plan   1. Mixed hyperlipidemia     Stable on current regimen   2. History of coronary artery stent placement     On plavix., No angina symptoms   3. Essential hypertension     Up a little high today  at 146/92. Will see if this improves with increasing his BB.    4. Persistent atrial fibrillation  Cardioversion External in Cardiology Department   -Back in A-fib.  Will increase metoprolol to 50mg BID. Plan to start antiarrhythmic agent with amiodarone 400mg BID for 3 days then 200mg BID. Then will plan cardioversion next week. I asked him if he is taking his eliquis and he said yes but  thinks possibly once a day. He is going to go home and check and call me back. If he is only doing it once a day we will have to post pone.  Spoke with him today on the risks of amiodarone therapy. Stable TSH and LFT's.    5. Controlled type 2 diabetes mellitus with diabetic peripheral angiopathy without gangrene, without long-term current use of insulin     6. Acute on chronic systolic CHF- EF 20% on GDMT with BB, ARB. Once stabilized from a rhythm standpoint will likely optimize meds with aldactone or jardiance. Looks volume overloaded today most likely from afib. Will increase lasix to 40mg BID     Orders Placed This Encounter   Procedures    Cardioversion External in Cardiology Department     Standing Status:   Future     Standing Expiration Date:   9/12/2024     Order Specific Question:   What type of  sedation does the patient require?     Answer:   Moderate Sedation     Order Specific Question:   At which hospital location will this be performed?     Answer:   [facility]     Order Specific Question:   Reason for Exam:     Answer:   afib     Order Specific Question:   Release to patient     Answer:   Routine Release [[id number]]    ECG 12 Lead     This order was created via procedure documentation     Order Specific Question:   Release to patient     Answer:   Routine Release [[id number]]          Plan:       Follow up in 2 weeks.          Your medication list            Accurate as of September 12, 2023  3:57 PM. If you have any questions, ask your nurse or doctor.                START taking these medications        Instructions Last Dose Given Next Dose Due   amiodarone 200 MG tablet  Commonly known as: PACERONE  Started by: TREY Breaux      Take 400mg PO BID x 3 days then 200mg PO BID              CHANGE how you take these medications        Instructions Last Dose Given Next Dose Due   metFORMIN  MG 24 hr tablet  Commonly known as: GLUCOPHAGE-XR  What changed:   how much to take  additional instructions      Take 2 tablets by mouth Daily With Breakfast.       metoprolol succinate XL 50 MG 24 hr tablet  Commonly known as: TOPROL-XL  What changed:   medication strength  how much to take  when to take this  Changed by: TREY Breaux      Take 1 tablet by mouth 2 (Two) Times a Day.              CONTINUE taking these medications        Instructions Last Dose Given Next Dose Due   apixaban 5 MG tablet tablet  Commonly known as: ELIQUIS      Take 1 tablet by mouth 2 (Two) Times a Day.       citalopram 20 MG tablet  Commonly known as: CeleXA      Take 1 tablet by mouth Daily.       clopidogrel 75 MG tablet  Commonly known as: PLAVIX      TAKE 1 TABLET BY MOUTH EVERY DAY       ferrous sulfate 325 (65 FE) MG tablet      TAKE 1 TABLET BY MOUTH EVERY DAY WITH BREAKFAST       furosemide 40 MG  tablet  Commonly known as: LASIX      Take 1 tablet by mouth 2 (Two) Times a Day. Pt takes qd       losartan 25 MG tablet  Commonly known as: COZAAR      Take 1 tablet by mouth Daily.       NYQUIL PO      Take  by mouth every night at bedtime.                 Where to Get Your Medications        These medications were sent to [facility]/pharmacy #[id number] - [facility], [place] - [address] AT [facility] [phone] [facility] [phone]   [address], [facility] [address]      Phone: [phone]   amiodarone 200 MG tablet  furosemide 40 MG tablet       Information about where to get these medications is not yet available    Ask your nurse or doctor about these medications  metoprolol succinate XL 50 MG 24 hr tablet           As always, it has been a pleasure to participate in your patient's care.      Sincerely,     Danielle YANG

## 2023-09-13 ENCOUNTER — TELEPHONE (OUTPATIENT)
Dept: CARDIOLOGY | Facility: CLINIC | Age: 68
End: 2023-09-13
Payer: MEDICARE

## 2023-09-13 NOTE — TELEPHONE ENCOUNTER
Pt called and lvm. He wanted to let you know that he was taking his Eliquis incorrectly. Instead of bid, he was taking qd.    Anything you would like to mention to the pt?    Thank you,    ANANT Cornell

## 2023-09-13 NOTE — TELEPHONE ENCOUNTER
Pt advised and verbalized understanding to take Eliquis bid as prescribed, along with other meds as prescribed.    Sanjay marin  9/13/23  412pm

## 2023-09-14 ENCOUNTER — TRANSCRIBE ORDERS (OUTPATIENT)
Dept: CARDIOLOGY | Facility: CLINIC | Age: 68
End: 2023-09-14
Payer: MEDICARE

## 2023-09-14 ENCOUNTER — TELEPHONE (OUTPATIENT)
Dept: CARDIOLOGY | Facility: CLINIC | Age: 68
End: 2023-09-14
Payer: MEDICARE

## 2023-09-14 DIAGNOSIS — Z01.810 PRE-OPERATIVE CARDIOVASCULAR EXAMINATION: ICD-10-CM

## 2023-09-14 DIAGNOSIS — Z13.6 SCREENING FOR ISCHEMIC HEART DISEASE: Primary | ICD-10-CM

## 2023-09-14 RX ORDER — METOPROLOL SUCCINATE 50 MG/1
50 TABLET, EXTENDED RELEASE ORAL 2 TIMES DAILY
Qty: 60 TABLET | Refills: 11
Start: 2023-09-14

## 2023-09-14 RX ORDER — FUROSEMIDE 40 MG/1
40 TABLET ORAL 2 TIMES DAILY
Qty: 60 TABLET | Refills: 11 | Status: SHIPPED | OUTPATIENT
Start: 2023-09-14

## 2023-09-14 NOTE — TELEPHONE ENCOUNTER
Spoke to patient about his medications.  We we will have to do the cardioversion in 4 weeks as he was only taking his Eliquis once today.  He started taking it twice a day yesterday so he can be cardioverted on October 13.

## 2023-09-18 ENCOUNTER — HOSPITAL ENCOUNTER (OUTPATIENT)
Dept: CT IMAGING | Facility: HOSPITAL | Age: 68
Discharge: HOME | End: 2023-09-18
Admitting: INTERNAL MEDICINE
Payer: MEDICARE

## 2023-09-18 DIAGNOSIS — D64.89 ANEMIA DUE TO OTHER CAUSE, NOT CLASSIFIED: ICD-10-CM

## 2023-09-18 DIAGNOSIS — Z98.890 NEUROENDOCRINE TUMOR STATUS POST SURGICAL TREATMENT: ICD-10-CM

## 2023-09-18 DIAGNOSIS — C7A.012 MALIGNANT CARCINOID TUMOR OF ILEUM: ICD-10-CM

## 2023-09-18 LAB — CREAT BLDA-MCNC: 1.4 MG/DL (ref 0.6–1.3)

## 2023-09-18 PROCEDURE — 82565 ASSAY OF CREATININE: CPT

## 2023-09-18 PROCEDURE — 71260 CT THORAX DX C+: CPT

## 2023-09-18 PROCEDURE — 25510000001 IOPAMIDOL 61 % SOLUTION: Performed by: INTERNAL MEDICINE

## 2023-09-18 PROCEDURE — 74177 CT ABD & PELVIS W/CONTRAST: CPT

## 2023-09-18 RX ADMIN — IOPAMIDOL 85 ML: 612 INJECTION, SOLUTION INTRAVENOUS at 15:07

## 2023-09-19 NOTE — PROGRESS NOTES
Subjective     REASON FOR CONSULTATION:  Carcinoid tumor ileum  Provide an opinion on any further workup or treatment                             REQUESTING PHYSICIAN:  Dr. Donovan    RECORDS OBTAINED:  Records of the patients history including those obtained from the referring provider were reviewed and summarized in detail.    History of Present Illness   This is a very pleasant 67-year-old man who has medical history pertinent for coronary artery disease, diabetes, hypertension, hyperlipidemia, sleep apnea.  He recently presented with 50 pound weight loss, nausea, decreased appetite, and hematochezia.  The patient was noted to have iron deficiency anemia and was admitted to the hospital in October 2021 with hemoglobin 7.7 requiring transfusion support.  A CT of the abdomen and pelvis on 10/30/2021 showed some fatty infiltration in the liver, small mural lipoma in the wall of the cecum and slight prostamegaly.  The patient continued to have symptoms, and a repeat CT abdomen/pelvis on 11/19/2021 showed a 3 cm mass in the terminal ileum with enlarged lymph nodes adjacent up to 1.4 cm.  There was no evidence of small bowel obstruction.  There was a 2 cm lipoma in the cecum and a 2.4 cm lipoma in the ascending colon which were stable from previous.  The liver was unremarkable.    The patient was taken to the operating room on 2/3/2022 by Dr. Donovan and underwent a laparoscopic ileocolonic resection.  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  The tumor measured 2.5 cm in greatest dimension and invaded the muscularis propria into the subserosal tissue without penetration of the serosa.  There was lymphovascular but no perineural invasion.  12 lymph nodes were resected for of which were positive for tumor.  Final pathology pT3 N2 M0.    A neuroendocrine PET scan ([facility]) on 3/29/2022 was  negative for residual or metastatic disease.    The patient returned today for follow-up.  He denies abdominal pain nausea vomiting.  He has had no further hematochezia since last visit.  He has chronic loose pasty appearing stool unchanged.  He denies weight loss.    Past Medical History:   Diagnosis Date    Anemia     AP (angina pectoris)     unstable    CAD (coronary artery disease)     6 stents placed    DDD (degenerative disc disease), lumbosacral     Diabetes     Essential hypertension     Fatty liver     ?    History of Clostridioides difficile infection 10/2021    History of MI (myocardial infarction)     Hyperlipidemia     Low back pain     Mass of colon     Memory loss     SOME SHORT TERM MEMORY ISSUES AND FORGETFULNESS    Osteoarthritis of knee     Screening for prostate cancer 2010    normal    Skin cancer     left eyebrow, side of face and back     Sleep apnea     NO CPAP    Tachycardia         Past Surgical History:   Procedure Laterality Date    COLON RESECTION N/A 2/3/2022    Procedure: ILEUM COLON RESECTION FOR TERMINAL ILEUM MASS LAPAROSCOPIC;  Surgeon: Roscoe Donovan MD;  Location: [facility] MAIN OR;  Service: General;  Laterality: N/A;    COLONOSCOPY N/A 2016    normal per patient    COLONOSCOPY N/A 8/3/2020    Procedure: COLONOSCOPY to cecum and TI with cold biopsies;  Surgeon: Kelly Ashraf MD;  Location: [facility] ENDOSCOPY;  Service: Gastroenterology;  Laterality: N/A;  pre-change in bowel habits, hx polyps   post-hemorrhoids, lipomas, abnormal cecal tissue     CORONARY ANGIOPLASTY WITH STENT PLACEMENT  08/28/2015    6 stents-Dr. Hall, [facility]    FOOT SURGERY Right 05/23/2017    Dr. Cervantes, [facility]    REPLACEMENT TOTAL KNEE Left 2010    Dr. Arnaud Reynaga, [facility]    REPLACEMENT TOTAL KNEE Right 2009    Dr. Arnaud Reynaga, [facility]    SKIN BIOPSY      SKIN CANCER EXCISION Left 03/30/2016    left ear and left eyebrow, graft took from face, placed on ear    SKIN CANCER EXCISION  2001    back         Current Outpatient Medications on File Prior to Visit   Medication Sig Dispense Refill    amiodarone (PACERONE) 200 MG tablet Take 400mg PO BID x 3 days then 200mg PO BID 66 tablet 3    apixaban (ELIQUIS) 5 MG tablet tablet Take 1 tablet by mouth 2 (Two) Times a Day. 60 tablet 3    citalopram (CeleXA) 20 MG tablet Take 1 tablet by mouth Daily. 90 tablet 2    clopidogrel (PLAVIX) 75 MG tablet TAKE 1 TABLET BY MOUTH EVERY DAY 90 tablet 3    ferrous sulfate 325 (65 FE) MG tablet TAKE 1 TABLET BY MOUTH EVERY DAY WITH BREAKFAST 90 tablet 1    furosemide (LASIX) 40 MG tablet Take 1 tablet by mouth 2 (Two) Times a Day. Pt takes qd 60 tablet 11    losartan (COZAAR) 25 MG tablet Take 1 tablet by mouth Daily. 30 tablet 3    metFORMIN ER (GLUCOPHAGE-XR) 500 MG 24 hr tablet Take 2 tablets by mouth Daily With Breakfast. (Patient taking differently: Take 1 tablet by mouth Daily With Breakfast. Pt takes 1 tablet qd) 60 tablet 3    metoprolol succinate XL (TOPROL-XL) 50 MG 24 hr tablet Take 1 tablet by mouth 2 (Two) Times a Day. 60 tablet 11    Pseudoeph-Doxylamine-DM-APAP (NYQUIL PO) Take  by mouth every night at bedtime.       No current facility-administered medications on file prior to visit.        ALLERGIES:    Allergies   Allergen Reactions    Ambien [Zolpidem] Other (See Comments)     Sleep walking      Morphine And Related Itching and Rash        Social History     Socioeconomic History    Marital status:      Spouse name: Jaylene   Tobacco Use    Smoking status: Never    Smokeless tobacco: Never    Tobacco comments:     Caffeine daily   Vaping Use    Vaping Use: Never used   Substance and Sexual Activity    Alcohol use: No    Drug use: Not Currently    Sexual activity: Defer     Partners: Female        Family History   Problem Relation Age of Onset    Hypertension Mother     Arthritis Mother     Diabetes Mother     Heart disease Father     Hypertension Father     Macular degeneration Father     Arthritis Father   "   Diabetes Father     Arthritis Maternal Grandmother     Heart disease Maternal Grandmother     Arthritis Maternal Grandfather     Heart disease Maternal Grandfather     Diabetes Maternal Grandfather     Hypertension Maternal Grandfather     Colon cancer Maternal Grandfather     Arthritis Paternal Grandmother     Heart disease Paternal Grandmother     Diabetes Paternal Grandmother     Hypertension Paternal Grandmother     Stroke Paternal Grandmother     Colon cancer Paternal Grandmother     Arthritis Paternal Grandfather     Heart disease Paternal Grandfather     Colon cancer Paternal Grandfather     Macular degeneration Sister     Malig Hyperthermia Neg Hx         Review of Systems   Constitutional:  Positive for fatigue. Negative for appetite change and unexpected weight change.   HENT: Negative.     Respiratory:  Negative for cough and shortness of breath.    Cardiovascular:  Negative for chest pain and palpitations.   Gastrointestinal:  Positive for diarrhea. Negative for blood in stool, constipation, nausea and vomiting.   Genitourinary: Negative.    Musculoskeletal: Negative.    Skin: Negative.    Allergic/Immunologic: Negative.    Neurological: Negative.    Hematological: Negative.    Psychiatric/Behavioral: Negative.        Objective     Vitals:    09/25/23 1412   BP: 147/96   Pulse: 76   Resp: 16   Temp: 97.5 °F (36.4 °C)   TempSrc: Infrared   SpO2: 97%   Weight: 126 kg (278 lb 12.8 oz)   Height: 185 cm (72.84\")   PainSc: 0-No pain         9/25/2023     2:16 PM   Current Status   ECOG score 0       Physical Exam    CONSTITUTIONAL: pleasant well-developed adult man  HEENT: no icterus, no thrush, moist membranes  LYMPH: no cervical or supraclavicular lad  RESP: cta bilat, no wheezing, no rales  GI: soft, non-tender, no splenomegaly, +bs, laparoscopic scars healing well  MUSC: no edema, normal gait  NEURO: alert and oriented x3, normal strength  PSYCH: normal mood and affect    RECENT LABS:  Hematology WBC "   Date Value Ref Range Status   09/25/2023 8.72 3.40 - 10.80 10*3/mm3 Final   12/23/2021 7.9 3.4 - 10.8 x10E3/uL Final     RBC   Date Value Ref Range Status   09/25/2023 4.97 4.14 - 5.80 10*6/mm3 Final   12/23/2021 3.97 (L) 4.14 - 5.80 x10E6/uL Final     Hemoglobin   Date Value Ref Range Status   09/25/2023 14.2 13.0 - 17.7 g/dL Final     Hematocrit   Date Value Ref Range Status   09/25/2023 43.1 37.5 - 51.0 % Final     Platelets   Date Value Ref Range Status   09/25/2023 270 140 - 450 10*3/mm3 Final        Lab Results   Component Value Date    GLUCOSE 161 (H) 09/25/2023    BUN 12 09/25/2023    CREATININE 1.24 09/25/2023    EGFRIFNONA 93 02/04/2022    EGFRIFAFRI 103 10/28/2021    BCR 9.7 09/25/2023    K 4.1 09/25/2023    CO2 22.9 09/25/2023    CALCIUM 8.7 09/25/2023    PROTENTOTREF 6.1 10/28/2021    ALBUMIN 3.9 09/25/2023    LABIL2 1.7 10/28/2021    AST 18 09/25/2023    ALT 13 09/25/2023              CT Chest Abdomen Pelvis With Contrast 9/18/2023 - IMPRESSION:  1. No significant change from the prior exam.  2. Recommend continued oncologic surveillance imaging.    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  The tumor measured 2.5 cm in greatest dimension and invaded the muscularis propria into the subserosal tissue without penetration of the serosa.  There was lymphovascular but no perineural invasion.  12 lymph nodes were resected for of which were positive for tumor.  Final pathology pT3 N2 M0.  [facility]  scan 3/29/2022-no evidence of residual disease  CT scan chest abdomen pelvis with contrast 10/13/2022- MARIAM (tiny right upper lobe lung nodule to be monitored)  CT chest abdomen pelvis with contrast 4/3/2023-MARIAM; CT chest abdomen pelvis 9/18/2023-stable 4 mm right upper lobe pulmonary nodule, mildly prominent ileocolic nodes up to 1.1 cm stable in size, heterogeneously enlarged prostate gland, scattered sclerotic foci thoracolumbar spine and pelvis stable    *Microcytic, hypochromic anemia  Labs look consistent with iron deficiency anemia secondary to GI blood loss related to his malignancy  Hemoglobin is normal today 14.2      *Multiple comorbidities of CAD, hypertension, hyperlipidemia, sleep apnea etc.    Hematology/oncology plan/recommendations:  6-month follow-up CBC CMP  CT chest abdomen and pelvis ordered  DC oral iron  Patient is due for follow-up colonoscopy

## 2023-09-25 ENCOUNTER — LAB (OUTPATIENT)
Dept: LAB | Facility: HOSPITAL | Age: 68
End: 2023-09-25
Payer: MEDICARE

## 2023-09-25 ENCOUNTER — OFFICE VISIT (OUTPATIENT)
Dept: ONCOLOGY | Facility: CLINIC | Age: 68
End: 2023-09-25

## 2023-09-25 VITALS
WEIGHT: 278.8 LBS | SYSTOLIC BLOOD PRESSURE: 147 MMHG | DIASTOLIC BLOOD PRESSURE: 96 MMHG | BODY MASS INDEX: 36.95 KG/M2 | TEMPERATURE: 97.5 F | HEIGHT: 73 IN | RESPIRATION RATE: 16 BRPM | HEART RATE: 76 BPM | OXYGEN SATURATION: 97 %

## 2023-09-25 DIAGNOSIS — C7A.012 MALIGNANT CARCINOID TUMOR OF ILEUM: Primary | ICD-10-CM

## 2023-09-25 DIAGNOSIS — C7A.012 MALIGNANT CARCINOID TUMOR OF ILEUM: ICD-10-CM

## 2023-09-25 DIAGNOSIS — Z98.890 NEUROENDOCRINE TUMOR STATUS POST SURGICAL TREATMENT: ICD-10-CM

## 2023-09-25 DIAGNOSIS — D64.89 ANEMIA DUE TO OTHER CAUSE, NOT CLASSIFIED: ICD-10-CM

## 2023-09-25 DIAGNOSIS — D50.0 IRON DEFICIENCY ANEMIA DUE TO CHRONIC BLOOD LOSS: ICD-10-CM

## 2023-09-25 LAB
ALBUMIN SERPL-MCNC: 3.9 G/DL (ref 3.5–5.2)
ALBUMIN/GLOB SERPL: 1.4 G/DL
ALP SERPL-CCNC: 79 U/L (ref 39–117)
ALT SERPL W P-5'-P-CCNC: 13 U/L (ref 1–41)
ANION GAP SERPL CALCULATED.3IONS-SCNC: 12.1 MMOL/L (ref 5–15)
AST SERPL-CCNC: 18 U/L (ref 1–40)
BASOPHILS # BLD AUTO: 0.08 10*3/MM3 (ref 0–0.2)
BASOPHILS NFR BLD AUTO: 0.9 % (ref 0–1.5)
BILIRUB SERPL-MCNC: 0.5 MG/DL (ref 0–1.2)
BUN SERPL-MCNC: 12 MG/DL (ref 8–23)
BUN/CREAT SERPL: 9.7 (ref 7–25)
CALCIUM SPEC-SCNC: 8.7 MG/DL (ref 8.6–10.5)
CHLORIDE SERPL-SCNC: 98 MMOL/L (ref 98–107)
CO2 SERPL-SCNC: 22.9 MMOL/L (ref 22–29)
CREAT SERPL-MCNC: 1.24 MG/DL (ref 0.7–1.3)
DEPRECATED RDW RBC AUTO: 46.5 FL (ref 37–54)
EGFRCR SERPLBLD CKD-EPI 2021: 63.7 ML/MIN/1.73
EOSINOPHIL # BLD AUTO: 0.23 10*3/MM3 (ref 0–0.4)
EOSINOPHIL NFR BLD AUTO: 2.6 % (ref 0.3–6.2)
ERYTHROCYTE [DISTWIDTH] IN BLOOD BY AUTOMATED COUNT: 14.6 % (ref 12.3–15.4)
GLOBULIN UR ELPH-MCNC: 2.8 GM/DL
GLUCOSE SERPL-MCNC: 161 MG/DL (ref 65–99)
HCT VFR BLD AUTO: 43.1 % (ref 37.5–51)
HGB BLD-MCNC: 14.2 G/DL (ref 13–17.7)
IMM GRANULOCYTES # BLD AUTO: 0.03 10*3/MM3 (ref 0–0.05)
IMM GRANULOCYTES NFR BLD AUTO: 0.3 % (ref 0–0.5)
LYMPHOCYTES # BLD AUTO: 1.83 10*3/MM3 (ref 0.7–3.1)
LYMPHOCYTES NFR BLD AUTO: 21 % (ref 19.6–45.3)
MCH RBC QN AUTO: 28.6 PG (ref 26.6–33)
MCHC RBC AUTO-ENTMCNC: 32.9 G/DL (ref 31.5–35.7)
MCV RBC AUTO: 86.7 FL (ref 79–97)
MONOCYTES # BLD AUTO: 0.48 10*3/MM3 (ref 0.1–0.9)
MONOCYTES NFR BLD AUTO: 5.5 % (ref 5–12)
NEUTROPHILS NFR BLD AUTO: 6.07 10*3/MM3 (ref 1.7–7)
NEUTROPHILS NFR BLD AUTO: 69.7 % (ref 42.7–76)
NRBC BLD AUTO-RTO: 0 /100 WBC (ref 0–0.2)
PLATELET # BLD AUTO: 270 10*3/MM3 (ref 140–450)
PMV BLD AUTO: 8.4 FL (ref 6–12)
POTASSIUM SERPL-SCNC: 4.1 MMOL/L (ref 3.5–5.2)
PROT SERPL-MCNC: 6.7 G/DL (ref 6–8.5)
RBC # BLD AUTO: 4.97 10*6/MM3 (ref 4.14–5.8)
SODIUM SERPL-SCNC: 133 MMOL/L (ref 136–145)
WBC NRBC COR # BLD: 8.72 10*3/MM3 (ref 3.4–10.8)

## 2023-09-25 PROCEDURE — 36415 COLL VENOUS BLD VENIPUNCTURE: CPT

## 2023-09-25 PROCEDURE — 99213 OFFICE O/P EST LOW 20 MIN: CPT | Performed by: INTERNAL MEDICINE

## 2023-09-25 PROCEDURE — 3080F DIAST BP >= 90 MM HG: CPT | Performed by: INTERNAL MEDICINE

## 2023-09-25 PROCEDURE — 85025 COMPLETE CBC W/AUTO DIFF WBC: CPT

## 2023-09-25 PROCEDURE — 1126F AMNT PAIN NOTED NONE PRSNT: CPT | Performed by: INTERNAL MEDICINE

## 2023-09-25 PROCEDURE — 80053 COMPREHEN METABOLIC PANEL: CPT

## 2023-09-25 PROCEDURE — 3077F SYST BP >= 140 MM HG: CPT | Performed by: INTERNAL MEDICINE

## 2023-09-26 ENCOUNTER — OFFICE VISIT (OUTPATIENT)
Dept: CARDIOLOGY | Facility: CLINIC | Age: 68
End: 2023-09-26
Payer: MEDICARE

## 2023-09-26 VITALS
HEART RATE: 73 BPM | SYSTOLIC BLOOD PRESSURE: 130 MMHG | HEIGHT: 72 IN | OXYGEN SATURATION: 94 % | BODY MASS INDEX: 37.65 KG/M2 | DIASTOLIC BLOOD PRESSURE: 86 MMHG | WEIGHT: 278 LBS

## 2023-09-26 DIAGNOSIS — I10 ESSENTIAL HYPERTENSION: ICD-10-CM

## 2023-09-26 DIAGNOSIS — I48.19 PERSISTENT ATRIAL FIBRILLATION: ICD-10-CM

## 2023-09-26 DIAGNOSIS — Z95.5 HISTORY OF CORONARY ARTERY STENT PLACEMENT: ICD-10-CM

## 2023-09-26 DIAGNOSIS — E78.2 MIXED HYPERLIPIDEMIA: Primary | ICD-10-CM

## 2023-09-26 PROCEDURE — 3075F SYST BP GE 130 - 139MM HG: CPT | Performed by: NURSE PRACTITIONER

## 2023-09-26 PROCEDURE — 93000 ELECTROCARDIOGRAM COMPLETE: CPT | Performed by: NURSE PRACTITIONER

## 2023-09-26 PROCEDURE — 3079F DIAST BP 80-89 MM HG: CPT | Performed by: NURSE PRACTITIONER

## 2023-09-26 PROCEDURE — 99214 OFFICE O/P EST MOD 30 MIN: CPT | Performed by: NURSE PRACTITIONER

## 2023-09-26 PROCEDURE — 1159F MED LIST DOCD IN RCRD: CPT | Performed by: NURSE PRACTITIONER

## 2023-09-26 PROCEDURE — 1160F RVW MEDS BY RX/DR IN RCRD: CPT | Performed by: NURSE PRACTITIONER

## 2023-09-26 NOTE — PROGRESS NOTES
Date of Office Visit: 2023  Encounter Provider: TREY Breaux  Place of Service: [facility] CARDIOLOGY  Patient Name: Angel White Jr.  :1955    Chief Complaint   Patient presents with    Follow-up    Atrial Fibrillation   :     HPI: Angel White Jr. is a 68 y.o. male who is a patient of Dr. Dumont and is known to me from previous.He has a history of coronary disease, hypertension hyperlipidemia, diabetes mellitus and atrial fibrillation.  he was admitted with unstable angina got a drug-eluting stent to his proximal to mid LAD as well as the ramus. Later he had stenting of the PDA and proximal RCA. He recently was admitted on  with a new onset of A-fib and cardiomyopathy. His echocardiogram showed an EF of about 20% presumed to be related to tachyarrhythmia. A LIONEL was cardioversion was attempted but due to thrombus in the left atrial appendage this was abandoned. He was started on medical therapy with beta-blocker and anticoagulated with Eliquis. We plan for cardioversion in a month. However he came back to the office today after discharge, he was really winded his orthopnea had improved blood pressure was stable.     I saw him about 2 weeks ago he was under a lot of stress he is back in A-fib on his EKG he was fatigued like he was before his cardioversion.  I increase his metoprolol started him on amiodarone and clarified his anticoagulation however he had only been taking it once a day instead of twice a day.  So we had to postpone his cardioversion.  I also increased his diuretic therapy to Lasix 40mg BID  He is here for follow-up today.    His HR is controlled in the 70's. BP is stable. He had some labs done yesterday and his renal function looks better. Still symptomatic from his afib with shortness of breath and fatigue.  Previous testing and notes have been reviewed by me.   Past Medical History:   Diagnosis Date    Anemia     AP (angina  pectoris)     unstable    CAD (coronary artery disease)     6 stents placed    DDD (degenerative disc disease), lumbosacral     Diabetes     Essential hypertension     Fatty liver     ?    History of Clostridioides difficile infection 10/2021    History of MI (myocardial infarction)     Hyperlipidemia     Low back pain     Mass of colon     Memory loss     SOME SHORT TERM MEMORY ISSUES AND FORGETFULNESS    Osteoarthritis of knee     Screening for prostate cancer 2010    normal    Skin cancer     left eyebrow, side of face and back     Sleep apnea     NO CPAP    Tachycardia        Past Surgical History:   Procedure Laterality Date    COLON RESECTION N/A 2/3/2022    Procedure: ILEUM COLON RESECTION FOR TERMINAL ILEUM MASS LAPAROSCOPIC;  Surgeon: Roscoe Donovan MD;  Location: [facility] MAIN OR;  Service: General;  Laterality: N/A;    COLONOSCOPY N/A 2016    normal per patient    COLONOSCOPY N/A 8/3/2020    Procedure: COLONOSCOPY to cecum and TI with cold biopsies;  Surgeon: Kelly Ashraf MD;  Location: [facility] ENDOSCOPY;  Service: Gastroenterology;  Laterality: N/A;  pre-change in bowel habits, hx polyps   post-hemorrhoids, lipomas, abnormal cecal tissue     CORONARY ANGIOPLASTY WITH STENT PLACEMENT  08/28/2015    6 stents-Dr. Hall, [facility]    FOOT SURGERY Right 05/23/2017    Dr. Cervantes, [facility]    REPLACEMENT TOTAL KNEE Left 2010    Dr. Arnaud Reynaga, [facility]    REPLACEMENT TOTAL KNEE Right 2009    Dr. Arnaud Reynaga, [facility]    SKIN BIOPSY      SKIN CANCER EXCISION Left 03/30/2016    left ear and left eyebrow, graft took from face, placed on ear    SKIN CANCER EXCISION  2001    back       Social History     Socioeconomic History    Marital status:      Spouse name: Jaylene   Tobacco Use    Smoking status: Never    Smokeless tobacco: Never    Tobacco comments:     Caffeine daily   Vaping Use    Vaping Use: Never used   Substance and Sexual Activity    Alcohol use: No    Drug use: Not Currently    Sexual activity:  Defer     Partners: Female       Family History   Problem Relation Age of Onset    Hypertension Mother     Arthritis Mother     Diabetes Mother     Heart disease Father     Hypertension Father     Macular degeneration Father     Arthritis Father     Diabetes Father     Arthritis Maternal Grandmother     Heart disease Maternal Grandmother     Arthritis Maternal Grandfather     Heart disease Maternal Grandfather     Diabetes Maternal Grandfather     Hypertension Maternal Grandfather     Colon cancer Maternal Grandfather     Arthritis Paternal Grandmother     Heart disease Paternal Grandmother     Diabetes Paternal Grandmother     Hypertension Paternal Grandmother     Stroke Paternal Grandmother     Colon cancer Paternal Grandmother     Arthritis Paternal Grandfather     Heart disease Paternal Grandfather     Colon cancer Paternal Grandfather     Macular degeneration Sister     Malig Hyperthermia Neg Hx        Review of Systems   Constitutional: Positive for malaise/fatigue. Negative for diaphoresis.   Cardiovascular:  Positive for irregular heartbeat. Negative for chest pain, claudication, dyspnea on exertion, leg swelling, near-syncope, orthopnea, palpitations, paroxysmal nocturnal dyspnea and syncope.   Respiratory:  Positive for shortness of breath. Negative for cough and sleep disturbances due to breathing.    Musculoskeletal:  Negative for falls.   Neurological:  Negative for dizziness and weakness.   Psychiatric/Behavioral:  Negative for altered mental status and substance abuse.      Allergies   Allergen Reactions    Ambien [Zolpidem] Other (See Comments)     Sleep walking      Morphine And Related Itching and Rash         Current Outpatient Medications:     amiodarone (PACERONE) 200 MG tablet, Take 400mg PO BID x 3 days then 200mg PO BID, Disp: 66 tablet, Rfl: 3    apixaban (ELIQUIS) 5 MG tablet tablet, Take 1 tablet by mouth 2 (Two) Times a Day., Disp: 60 tablet, Rfl: 3    citalopram (CeleXA) 20 MG tablet,  "Take 1 tablet by mouth Daily., Disp: 90 tablet, Rfl: 2    clopidogrel (PLAVIX) 75 MG tablet, TAKE 1 TABLET BY MOUTH EVERY DAY, Disp: 90 tablet, Rfl: 3    ferrous sulfate 325 (65 FE) MG tablet, TAKE 1 TABLET BY MOUTH EVERY DAY WITH BREAKFAST, Disp: 90 tablet, Rfl: 1    furosemide (LASIX) 40 MG tablet, Take 1 tablet by mouth 2 (Two) Times a Day. Pt takes qd, Disp: 60 tablet, Rfl: 11    losartan (COZAAR) 25 MG tablet, Take 1 tablet by mouth Daily., Disp: 30 tablet, Rfl: 3    metFORMIN ER (GLUCOPHAGE-XR) 500 MG 24 hr tablet, Take 2 tablets by mouth Daily With Breakfast. (Patient taking differently: Take 1 tablet by mouth Daily With Breakfast. Pt takes 1 tablet qd), Disp: 60 tablet, Rfl: 3    metoprolol succinate XL (TOPROL-XL) 50 MG 24 hr tablet, Take 1 tablet by mouth 2 (Two) Times a Day., Disp: 60 tablet, Rfl: 11    Pseudoeph-Doxylamine-DM-APAP (NYQUIL PO), Take  by mouth every night at bedtime., Disp: , Rfl:       Objective:     Vitals:    09/26/23 1211   BP: 130/86   Pulse: 73   SpO2: 94%   Weight: 126 kg (278 lb)   Height: 182.9 cm (72\")     Body mass index is 37.7 kg/m².    PHYSICAL EXAM:    Constitutional:       General: Not in acute distress.     Appearance: Normal appearance. Well-developed.   Eyes:      Pupils: Pupils are equal, round, and reactive to light.   HENT:      Head: Normocephalic.   Neck:      Vascular: No carotid bruit or JVD.   Pulmonary:      Effort: Pulmonary effort is normal. No tachypnea.      Breath sounds: Normal breath sounds. No wheezing. No rales.   Cardiovascular:      Normal rate. Irregularly irregular rhythm.      No gallop.    Pulses:     Intact distal pulses.   Edema:     Peripheral edema absent.   Abdominal:      General: Bowel sounds are normal.      Palpations: Abdomen is soft.      Tenderness: There is no abdominal tenderness.   Musculoskeletal: Normal range of motion.      Cervical back: Normal range of motion and neck supple. No edema. Skin:     General: Skin is warm and dry. "   Neurological:      Mental Status: Alert and oriented to person, place, and time.         ECG 12 Lead    Date/Time: 9/26/2023 12:22 PM  Performed by: Danielle Robles APRN  Authorized by: Danielle Robles APRN   Comparison: compared with previous ECG from 9/12/2023  Similar to previous ECG  Rhythm: atrial fibrillation  Rate: normal  Conduction: non-specific intraventricular conduction delay  QRS axis: normal  Other findings: non-specific ST-T wave changes    Clinical impression: abnormal EKG          Assessment:       Diagnosis Plan   1. Mixed hyperlipidemia     Diet managed    2. History of coronary artery stent placement     On plavix. No angina symptoms.   3. Essential hypertension     Controlled on current regimen   4. Persistent atrial fibrillation     Was taking eliquis daily instead of twice a day. Started back on twice a day on September 13th. Will plan for cardioversion on October 13th. Volume status appears stable on current dose of lasix.    5. MARIA- Untreated. Could not tolerate   Orders Placed This Encounter   Procedures    Cardioversion External in Cardiology Department     Standing Status:   Future     Standing Expiration Date:   9/26/2024     Order Specific Question:   What type of sedation does the patient require?     Answer:   Monitored Anesthesia Care     Order Specific Question:   At which hospital location will this be performed?     Answer:   [place]     Order Specific Question:   Is this procedure to be performed in the PACU?     Answer:   Yes     Order Specific Question:   Reason for Exam:     Answer:   afib     Order Specific Question:   Release to patient     Answer:   Routine Release [[id number]]    ECG 12 Lead     This order was created via procedure documentation     Order Specific Question:   Release to patient     Answer:   Routine Release [[id number]]          Plan:       Plan for cardioversion in two weeks with dr. Dumont then 1 week follow up/         Your medication list             Accurate as of September 26, 2023  1:14 PM. If you have any questions, ask your nurse or doctor.                CHANGE how you take these medications        Instructions Last Dose Given Next Dose Due   metFORMIN  MG 24 hr tablet  Commonly known as: GLUCOPHAGE-XR  What changed:   how much to take  additional instructions      Take 2 tablets by mouth Daily With Breakfast.              CONTINUE taking these medications        Instructions Last Dose Given Next Dose Due   amiodarone 200 MG tablet  Commonly known as: PACERONE      Take 400mg PO BID x 3 days then 200mg PO BID       apixaban 5 MG tablet tablet  Commonly known as: ELIQUIS      Take 1 tablet by mouth 2 (Two) Times a Day.       citalopram 20 MG tablet  Commonly known as: CeleXA      Take 1 tablet by mouth Daily.       clopidogrel 75 MG tablet  Commonly known as: PLAVIX      TAKE 1 TABLET BY MOUTH EVERY DAY       ferrous sulfate 325 (65 FE) MG tablet      TAKE 1 TABLET BY MOUTH EVERY DAY WITH BREAKFAST       furosemide 40 MG tablet  Commonly known as: LASIX      Take 1 tablet by mouth 2 (Two) Times a Day. Pt takes qd       losartan 25 MG tablet  Commonly known as: COZAAR      Take 1 tablet by mouth Daily.       metoprolol succinate XL 50 MG 24 hr tablet  Commonly known as: TOPROL-XL      Take 1 tablet by mouth 2 (Two) Times a Day.       NYQUIL PO      Take  by mouth every night at bedtime.                  As always, it has been a pleasure to participate in your patient's care.      Sincerely,     Danielle YANG

## 2023-09-29 ENCOUNTER — TELEPHONE (OUTPATIENT)
Dept: ONCOLOGY | Facility: CLINIC | Age: 68
End: 2023-09-29
Payer: MEDICARE

## 2023-09-29 ENCOUNTER — TELEPHONE (OUTPATIENT)
Dept: GASTROENTEROLOGY | Facility: CLINIC | Age: 68
End: 2023-09-29

## 2023-09-29 DIAGNOSIS — D50.0 IRON DEFICIENCY ANEMIA DUE TO CHRONIC BLOOD LOSS: Primary | ICD-10-CM

## 2023-09-29 NOTE — TELEPHONE ENCOUNTER
Can you send this to another provider it looks as if she was supposed to have it in July but was in the hospital     Hub staff attempted to follow warm transfer process and was unsuccessful     Caller: Angel White Jr.    Relationship to patient: Self    Best call back number: [phone]     Patient is needing: PT IS A FORMER [facility] PT AND NEEDS TO SCHEDULE COLONOSCOPY. PLEASE CALL PT BACK TO SCHEDULE WITH AVAIL DR. WARM TRANSFER UNSUCCESSFUL.

## 2023-09-29 NOTE — TELEPHONE ENCOUNTER
Caller: Angel White Jr.    Relationship: Self    Best call back number: [phone]    What is the best time to reach you: ANYTIME    Who are you requesting to speak with (clinical staff, provider,  specific staff member): CLINICAL     Do you know the name of the person who called:     What was the call regarding: PATIENT WOULD LIKE FOR DR. WEBER TO PUT THE REFERRAL IN FOR THE COLONOSCOPY AND CALL HIM TO LET HIM KNOW WHO THE DRRamirez WILL BE.     Is it okay if the provider responds through MyChart: NO

## 2023-09-29 NOTE — TELEPHONE ENCOUNTER
Hub staff attempted to follow warm transfer process and was unsuccessful     Caller: Angel White Jr.    Relationship to patient: Self    Best call back number: [phone]     Patient is needing: PT IS A FORMER [facility] PT AND NEEDS TO SCHEDULE COLONOSCOPY. PLEASE CALL PT BACK TO SCHEDULE WITH AVAIL DR. WARM TRANSFER UNSUCCESSFUL.

## 2023-09-29 NOTE — TELEPHONE ENCOUNTER
Called the patient and the wife answered. She stated they both wanted to be referred to the same GI doctor. I let her know that I was going to be placing a referral for the [facility], [place], operated by [facility] off [facility] and she stated she wanted to be seen as well. I let her know her PCP could send a referral to the same group and she v/u. Referral was entered.

## 2023-10-03 RX ORDER — METOPROLOL SUCCINATE 50 MG/1
50 TABLET, EXTENDED RELEASE ORAL 2 TIMES DAILY
Qty: 60 TABLET | Refills: 11
Start: 2023-10-03 | End: 2023-10-09 | Stop reason: SDUPTHER

## 2023-10-06 ENCOUNTER — TELEPHONE (OUTPATIENT)
Dept: GASTROENTEROLOGY | Facility: CLINIC | Age: 68
End: 2023-10-06
Payer: MEDICARE

## 2023-10-06 NOTE — TELEPHONE ENCOUNTER
LAST C/S 8/3/2020 IN EPIC    PERSONAL HX OF POLYPS    UNSURE OF FAMILY HX OF POLYPS    FAMILY HX OF COLON CA    NO BLOOD THINNERS OR ASA      LIST OF MEDICATIONS        amiodarone (PACERONE) 200 MG tablet  apixaban (ELIQUIS) 5 MG tablet tablet  citalopram (CeleXA) 20 MG tablet  clopidogrel (PLAVIX) 75 MG tablet  ferrous sulfate 325 (65 FE) MG tablet  furosemide (LASIX) 40 MG tablet  losartan (COZAAR) 25 MG tablet  metFORMIN ER (GLUCOPHAGE-XR) 500 MG 24 hr tablet  metoprolol succinate XL (TOPROL-XL) 50 MG 24 hr tablet  Pseudoeph-Doxylamine-DM-APAP (NYQUIL PO)              OA QUESTIONNAIRE SCANNED IN MEDIA

## 2023-10-09 RX ORDER — METOPROLOL SUCCINATE 50 MG/1
50 TABLET, EXTENDED RELEASE ORAL 2 TIMES DAILY
Qty: 60 TABLET | Refills: 11 | Status: SHIPPED | OUTPATIENT
Start: 2023-10-09

## 2023-10-10 ENCOUNTER — HOSPITAL ENCOUNTER (OUTPATIENT)
Dept: CARDIOLOGY | Facility: HOSPITAL | Age: 68
Discharge: HOME | End: 2023-10-10
Payer: MEDICARE

## 2023-10-15 DIAGNOSIS — D50.9 IRON DEFICIENCY ANEMIA, UNSPECIFIED IRON DEFICIENCY ANEMIA TYPE: Primary | ICD-10-CM

## 2023-10-15 DIAGNOSIS — R19.4 CHANGE IN BOWEL HABITS: ICD-10-CM

## 2023-10-15 RX ORDER — SODIUM CHLORIDE, SODIUM LACTATE, POTASSIUM CHLORIDE, CALCIUM CHLORIDE 600; 310; 30; 20 MG/100ML; MG/100ML; MG/100ML; MG/100ML
30 INJECTION, SOLUTION INTRAVENOUS CONTINUOUS
OUTPATIENT
Start: 2023-10-15

## 2023-10-18 ENCOUNTER — ANESTHESIA EVENT (OUTPATIENT)
Dept: POSTOP/PACU | Facility: HOSPITAL | Age: 68
End: 2023-10-18
Payer: MEDICARE

## 2023-10-18 ENCOUNTER — HOSPITAL ENCOUNTER (OUTPATIENT)
Dept: POSTOP/PACU | Facility: HOSPITAL | Age: 68
Discharge: HOME | End: 2023-10-18
Payer: MEDICARE

## 2023-10-18 ENCOUNTER — ANESTHESIA (OUTPATIENT)
Dept: POSTOP/PACU | Facility: HOSPITAL | Age: 68
End: 2023-10-18
Payer: MEDICARE

## 2023-10-18 VITALS — HEART RATE: 56 BPM | DIASTOLIC BLOOD PRESSURE: 92 MMHG | SYSTOLIC BLOOD PRESSURE: 128 MMHG | OXYGEN SATURATION: 96 %

## 2023-10-18 VITALS
TEMPERATURE: 98.2 F | HEART RATE: 58 BPM | SYSTOLIC BLOOD PRESSURE: 155 MMHG | DIASTOLIC BLOOD PRESSURE: 100 MMHG | RESPIRATION RATE: 16 BRPM | OXYGEN SATURATION: 100 %

## 2023-10-18 DIAGNOSIS — I48.19 PERSISTENT ATRIAL FIBRILLATION: ICD-10-CM

## 2023-10-18 LAB
ANION GAP SERPL CALCULATED.3IONS-SCNC: 10.8 MMOL/L (ref 5–15)
BUN SERPL-MCNC: 10 MG/DL (ref 8–23)
BUN/CREAT SERPL: 9 (ref 7–25)
CALCIUM SPEC-SCNC: 9.1 MG/DL (ref 8.6–10.5)
CHLORIDE SERPL-SCNC: 101 MMOL/L (ref 98–107)
CO2 SERPL-SCNC: 24.2 MMOL/L (ref 22–29)
CREAT SERPL-MCNC: 1.11 MG/DL (ref 0.76–1.27)
EGFRCR SERPLBLD CKD-EPI 2021: 72.8 ML/MIN/1.73
GLUCOSE BLDC GLUCOMTR-MCNC: 159 MG/DL (ref 70–130)
GLUCOSE SERPL-MCNC: 151 MG/DL (ref 65–99)
POTASSIUM SERPL-SCNC: 3.6 MMOL/L (ref 3.5–5.2)
QT INTERVAL: 445 MS
QT INTERVAL: 514 MS
QTC INTERVAL: 483 MS
QTC INTERVAL: 488 MS
SODIUM SERPL-SCNC: 136 MMOL/L (ref 136–145)

## 2023-10-18 PROCEDURE — 93005 ELECTROCARDIOGRAM TRACING: CPT | Performed by: INTERNAL MEDICINE

## 2023-10-18 PROCEDURE — 82948 REAGENT STRIP/BLOOD GLUCOSE: CPT

## 2023-10-18 PROCEDURE — 80048 BASIC METABOLIC PNL TOTAL CA: CPT | Performed by: INTERNAL MEDICINE

## 2023-10-18 PROCEDURE — 92960 CARDIOVERSION ELECTRIC EXT: CPT

## 2023-10-18 PROCEDURE — 25810000003 LACTATED RINGERS PER 1000 ML: Performed by: ANESTHESIOLOGY

## 2023-10-18 PROCEDURE — 25010000002 PROPOFOL 10 MG/ML EMULSION: Performed by: ANESTHESIOLOGY

## 2023-10-18 PROCEDURE — 25010000002 LIDOCAINE 1 % SOLUTION: Performed by: ANESTHESIOLOGY

## 2023-10-18 RX ORDER — LIDOCAINE HYDROCHLORIDE 10 MG/ML
INJECTION, SOLUTION INFILTRATION; PERINEURAL AS NEEDED
Status: DISCONTINUED | OUTPATIENT
Start: 2023-10-18 | End: 2023-10-18 | Stop reason: SURG

## 2023-10-18 RX ORDER — PROPOFOL 10 MG/ML
VIAL (ML) INTRAVENOUS AS NEEDED
Status: DISCONTINUED | OUTPATIENT
Start: 2023-10-18 | End: 2023-10-18 | Stop reason: SURG

## 2023-10-18 RX ORDER — METFORMIN HYDROCHLORIDE 500 MG/1
500 TABLET, EXTENDED RELEASE ORAL
Start: 2023-10-18

## 2023-10-18 RX ORDER — AMIODARONE HYDROCHLORIDE 200 MG/1
200 TABLET ORAL DAILY
Start: 2023-10-18

## 2023-10-18 RX ORDER — SODIUM CHLORIDE, SODIUM LACTATE, POTASSIUM CHLORIDE, CALCIUM CHLORIDE 600; 310; 30; 20 MG/100ML; MG/100ML; MG/100ML; MG/100ML
INJECTION, SOLUTION INTRAVENOUS CONTINUOUS PRN
Status: DISCONTINUED | OUTPATIENT
Start: 2023-10-18 | End: 2023-10-18 | Stop reason: SURG

## 2023-10-18 RX ADMIN — SODIUM CHLORIDE, POTASSIUM CHLORIDE, SODIUM LACTATE AND CALCIUM CHLORIDE: 600; 310; 30; 20 INJECTION, SOLUTION INTRAVENOUS at 07:41

## 2023-10-18 RX ADMIN — LIDOCAINE HYDROCHLORIDE 60 ML: 10 INJECTION, SOLUTION INFILTRATION; PERINEURAL at 07:42

## 2023-10-18 RX ADMIN — PROPOFOL 100 MG: 10 INJECTION, EMULSION INTRAVENOUS at 07:42

## 2023-10-18 NOTE — ANESTHESIA POSTPROCEDURE EVALUATION
Patient: Angel White Jr.    Procedure Summary       Date: 10/18/23 Room / Location: [facility] PACU    Anesthesia Start: 0738 Anesthesia Stop: 0745    Procedure: CARDIOVERSION EXTERNAL IN CARDIOLOGY DEPARTMENT Diagnosis:       Persistent atrial fibrillation      (afib)    Scheduled Providers: Crys Dumont MD Provider: Angel Eagle MD    Anesthesia Type: MAC ASA Status: 4            Anesthesia Type: MAC    Vitals  Vitals Value Taken Time   /93 10/18/23 0810   Temp 36.8 °C (98.2 °F) 10/18/23 0800   Pulse 55 10/18/23 0813   Resp 16 10/18/23 0800   SpO2 99 % 10/18/23 0813   Vitals shown include unfiled device data.        Post Anesthesia Care and Evaluation    Patient location during evaluation: PHASE II  Patient participation: complete - patient participated  Level of consciousness: awake  Pain management: adequate    Airway patency: patent  Anesthetic complications: No anesthetic complications  PONV Status: none  Cardiovascular status: stable  Respiratory status: acceptable  Hydration status: acceptable

## 2023-10-18 NOTE — ANESTHESIA PREPROCEDURE EVALUATION
Anesthesia Evaluation     Patient summary reviewed and Nursing notes reviewed                Airway   Mallampati: III  TM distance: >3 FB  Neck ROM: full  Dental - normal exam     Pulmonary    (+) ,sleep apnea  Cardiovascular     ECG reviewed    (+) hypertension, CAD, cardiac stents Drug eluting stent more than 12 months ago , dysrhythmias Atrial Fib, CHF Systolic <55%, PVD, hyperlipidemia    ROS comment: TTE:  ·  Left ventricular systolic function is severely decreased. Calculated left ventricular EF = 18.3%  ·  The left ventricular cavity is moderately dilated.  ·  There is left ventricular global hypokinesis noted.  ·  Left atrial volume is mildly increased.  ·  Estimated right ventricular systolic pressure from tricuspid regurgitation is normal (<35 mmHg).      Neuro/Psych  (+) psychiatric history Anxiety  GI/Hepatic/Renal/Endo    (+) obesity, GERD, diabetes mellitus    Musculoskeletal     Abdominal    Substance History      OB/GYN          Other   arthritis,                       Anesthesia Plan    ASA 4     MAC   total IV anesthesia    Anesthetic plan, risks, benefits, and alternatives have been provided, discussed and informed consent has been obtained with: patient.    Plan discussed with CRNA.        CODE STATUS:

## 2023-10-19 ENCOUNTER — TELEPHONE (OUTPATIENT)
Dept: GASTROENTEROLOGY | Facility: CLINIC | Age: 68
End: 2023-10-19
Payer: MEDICARE

## 2023-10-19 NOTE — TELEPHONE ENCOUNTER
FRIDA PT TO SCHEDULE PT VERBALIZED UNDERSTANDING WILLING TO WAIT UNTIL CALENDAR 2024 OPENS..OK FOR HUB TO READ

## 2023-10-20 ENCOUNTER — TELEPHONE (OUTPATIENT)
Dept: CARDIOLOGY | Facility: CLINIC | Age: 68
End: 2023-10-20
Payer: MEDICARE

## 2023-10-20 NOTE — TELEPHONE ENCOUNTER
Caller: Angel White Jr.    Relationship to patient: Self    Best call back number: [phone]    Chief complaint[facility]     Type of visit: FOLLOW UP    Requested date: ASAP     If rescheduling, when is the original appointment:      Additional notes:NONE

## 2023-10-24 NOTE — TELEPHONE ENCOUNTER
Patient called back. He was unable to schedule appointments at this time because he is driving. He will call back to schedule these.     Dedra Woods RN  Triage [facility] [place]

## 2023-10-24 NOTE — TELEPHONE ENCOUNTER
10.24.23    Called but was unable to leave voicemail for patient due to mail box being full.     Thanks   Amrit

## 2023-10-25 ENCOUNTER — OFFICE VISIT (OUTPATIENT)
Dept: CARDIOLOGY | Facility: CLINIC | Age: 68
End: 2023-10-25
Payer: MEDICARE

## 2023-10-25 VITALS
HEART RATE: 56 BPM | WEIGHT: 272 LBS | SYSTOLIC BLOOD PRESSURE: 144 MMHG | DIASTOLIC BLOOD PRESSURE: 90 MMHG | HEIGHT: 72 IN | BODY MASS INDEX: 36.84 KG/M2 | OXYGEN SATURATION: 97 %

## 2023-10-25 DIAGNOSIS — I25.10 CORONARY ARTERY DISEASE INVOLVING NATIVE CORONARY ARTERY OF NATIVE HEART WITHOUT ANGINA PECTORIS: ICD-10-CM

## 2023-10-25 DIAGNOSIS — I10 ESSENTIAL HYPERTENSION: ICD-10-CM

## 2023-10-25 DIAGNOSIS — Z95.5 HISTORY OF CORONARY ARTERY STENT PLACEMENT: Primary | ICD-10-CM

## 2023-10-25 DIAGNOSIS — I50.22 CHRONIC SYSTOLIC CONGESTIVE HEART FAILURE: ICD-10-CM

## 2023-10-25 DIAGNOSIS — E11.51 CONTROLLED TYPE 2 DIABETES MELLITUS WITH DIABETIC PERIPHERAL ANGIOPATHY WITHOUT GANGRENE, WITHOUT LONG-TERM CURRENT USE OF INSULIN: ICD-10-CM

## 2023-10-25 RX ORDER — SPIRONOLACTONE 25 MG/1
25 TABLET ORAL DAILY
Qty: 30 TABLET | Refills: 11 | Status: SHIPPED | OUTPATIENT
Start: 2023-10-25

## 2023-10-25 NOTE — PROGRESS NOTES
Date of Office Visit: 10/25/2023  Encounter Provider: TREY Breaux  Place of Service: [facility] CARDIOLOGY  Patient Name: Angel White Jr.  :1955    Chief Complaint   Patient presents with    Follow-up    Atrial Fibrillation    Cardiomyopathy   :     HPI: Angel White Jr. is a 68 y.o. male who is a patient of Dr. Dumont and is known to me from previous.He has a history of coronary disease, hypertension hyperlipidemia, diabetes mellitus and atrial fibrillation.  he was admitted with unstable angina got a drug-eluting stent to his proximal to mid LAD as well as the ramus. Later he had stenting of the PDA and proximal RCA. He recently was admitted on  with a new onset of A-fib and cardiomyopathy. His echocardiogram showed an EF of about 20% presumed to be related to tachyarrhythmia. A LIONEL was cardioversion was attempted but due to thrombus in the left atrial appendage this was abandoned. He was started on medical therapy with beta-blocker and anticoagulated with Eliquis. We plan for cardioversion in a month. However he came back to the office today after discharge, he was really winded his orthopnea had improved blood pressure was stable.      I saw him about 2 weeks ago he was under a lot of stress he is back in A-fib on his EKG he was fatigued like he was before his cardioversion.  I increase his metoprolol started him on amiodarone and clarified his anticoagulation however he had only been taking it once a day instead of twice a day.  So we had to postpone his cardioversion.  I also increased his diuretic therapy to Lasix 40mg BID.    He comes in today and he still feels tired. He is in SR in the 50's. Bp a little elevated in the 140's systolic. He is in SR. He has been short of breath with some exertional symptoms.      Previous testing and notes have been reviewed by me.   Past Medical History:   Diagnosis Date    Anemia     AP (angina pectoris)      unstable    CAD (coronary artery disease)     6 stents placed    DDD (degenerative disc disease), lumbosacral     Diabetes     Essential hypertension     Fatty liver     ?    History of Clostridioides difficile infection 10/2021    History of MI (myocardial infarction)     Hyperlipidemia     Low back pain     Mass of colon     Memory loss     SOME SHORT TERM MEMORY ISSUES AND FORGETFULNESS    Osteoarthritis of knee     Screening for prostate cancer 2010    normal    Skin cancer     left eyebrow, side of face and back     Sleep apnea     NO CPAP    Tachycardia        Past Surgical History:   Procedure Laterality Date    COLON RESECTION N/A 2/3/2022    Procedure: ILEUM COLON RESECTION FOR TERMINAL ILEUM MASS LAPAROSCOPIC;  Surgeon: Roscoe Donovan MD;  Location: [facility] MAIN OR;  Service: General;  Laterality: N/A;    COLONOSCOPY N/A 2016    normal per patient    COLONOSCOPY N/A 8/3/2020    Procedure: COLONOSCOPY to cecum and TI with cold biopsies;  Surgeon: Kelly Ashraf MD;  Location: [facility] ENDOSCOPY;  Service: Gastroenterology;  Laterality: N/A;  pre-change in bowel habits, hx polyps   post-hemorrhoids, lipomas, abnormal cecal tissue     CORONARY ANGIOPLASTY WITH STENT PLACEMENT  08/28/2015    6 stents-Dr. Hall, [facility]    FOOT SURGERY Right 05/23/2017    Dr. Cervantes, [facility]    REPLACEMENT TOTAL KNEE Left 2010    Dr. Arnaud Reynaga, [facility]    REPLACEMENT TOTAL KNEE Right 2009    Dr. Arnaud Reynaga, [facility]    SKIN BIOPSY      SKIN CANCER EXCISION Left 03/30/2016    left ear and left eyebrow, graft took from face, placed on ear    SKIN CANCER EXCISION  2001    back       Social History     Socioeconomic History    Marital status:      Spouse name: Jaylene   Tobacco Use    Smoking status: Never    Smokeless tobacco: Never    Tobacco comments:     Caffeine daily   Vaping Use    Vaping Use: Never used   Substance and Sexual Activity    Alcohol use: No    Drug use: Not Currently    Sexual activity: Defer      Partners: Female       Family History   Problem Relation Age of Onset    Hypertension Mother     Arthritis Mother     Diabetes Mother     Heart disease Father     Hypertension Father     Macular degeneration Father     Arthritis Father     Diabetes Father     Arthritis Maternal Grandmother     Heart disease Maternal Grandmother     Arthritis Maternal Grandfather     Heart disease Maternal Grandfather     Diabetes Maternal Grandfather     Hypertension Maternal Grandfather     Colon cancer Maternal Grandfather     Arthritis Paternal Grandmother     Heart disease Paternal Grandmother     Diabetes Paternal Grandmother     Hypertension Paternal Grandmother     Stroke Paternal Grandmother     Colon cancer Paternal Grandmother     Arthritis Paternal Grandfather     Heart disease Paternal Grandfather     Colon cancer Paternal Grandfather     Macular degeneration Sister     Malig Hyperthermia Neg Hx        Review of Systems   Constitutional: Negative for diaphoresis and malaise/fatigue.   Cardiovascular:  Positive for dyspnea on exertion. Negative for chest pain, claudication, irregular heartbeat, leg swelling, near-syncope, orthopnea, palpitations, paroxysmal nocturnal dyspnea and syncope.   Respiratory:  Negative for cough, shortness of breath and sleep disturbances due to breathing.    Musculoskeletal:  Negative for falls.   Neurological:  Negative for dizziness and weakness.   Psychiatric/Behavioral:  Negative for altered mental status and substance abuse.        Allergies   Allergen Reactions    Ambien [Zolpidem] Other (See Comments)     Sleep walking      Morphine And Related Itching and Rash         Current Outpatient Medications:     amiodarone (PACERONE) 200 MG tablet, Take 1 tablet by mouth Daily., Disp: , Rfl:     apixaban (ELIQUIS) 5 MG tablet tablet, Take 1 tablet by mouth 2 (Two) Times a Day., Disp: 60 tablet, Rfl: 3    citalopram (CeleXA) 20 MG tablet, Take 1 tablet by mouth Daily., Disp: 90 tablet, Rfl: 2     "clopidogrel (PLAVIX) 75 MG tablet, TAKE 1 TABLET BY MOUTH EVERY DAY, Disp: 90 tablet, Rfl: 3    ferrous sulfate 325 (65 FE) MG tablet, TAKE 1 TABLET BY MOUTH EVERY DAY WITH BREAKFAST, Disp: 90 tablet, Rfl: 1    furosemide (LASIX) 40 MG tablet, Take 1 tablet by mouth 2 (Two) Times a Day. Pt takes qd, Disp: 60 tablet, Rfl: 11    metFORMIN ER (GLUCOPHAGE-XR) 500 MG 24 hr tablet, Take 1 tablet by mouth Daily With Breakfast. Pt takes 1 tablet qd, Disp: , Rfl:     metoprolol succinate XL (TOPROL-XL) 50 MG 24 hr tablet, Take 1 tablet by mouth 2 (Two) Times a Day., Disp: 60 tablet, Rfl: 11    Pseudoeph-Doxylamine-DM-APAP (NYQUIL PO), Take  by mouth every night at bedtime., Disp: , Rfl:     sacubitril-valsartan (ENTRESTO) 24-26 MG tablet, Take 1 tablet by mouth 2 (Two) Times a Day., Disp: 60 tablet, Rfl: 11    spironolactone (ALDACTONE) 25 MG tablet, Take 1 tablet by mouth Daily., Disp: 30 tablet, Rfl: 11      Objective:     Vitals:    10/25/23 1143   BP: 144/90   Pulse: 56   SpO2: 97%   Weight: 123 kg (272 lb)   Height: 182.9 cm (72\")     Body mass index is 36.89 kg/m².    PHYSICAL EXAM:    Constitutional:       General: Not in acute distress.     Appearance: Normal appearance. Well-developed.   Eyes:      Pupils: Pupils are equal, round, and reactive to light.   HENT:      Head: Normocephalic.   Neck:      Vascular: No carotid bruit or JVD.   Pulmonary:      Effort: Pulmonary effort is normal. No tachypnea.      Breath sounds: Normal breath sounds. No wheezing. No rales.   Cardiovascular:      Normal rate. Regular rhythm.      No gallop.    Pulses:     Intact distal pulses.   Edema:     Peripheral edema absent.   Abdominal:      General: Bowel sounds are normal.      Palpations: Abdomen is soft.      Tenderness: There is no abdominal tenderness.   Musculoskeletal: Normal range of motion.      Cervical back: Normal range of motion and neck supple. No edema. Skin:     General: Skin is warm and dry.   Neurological:      " Mental Status: Alert and oriented to person, place, and time.           ECG 12 Lead    Date/Time: 10/25/2023 3:03 PM  Performed by: Danielle Robles APRN    Authorized by: Danielle Robles APRN  Comparison: compared with previous ECG from 10/18/2023  Similar to previous ECG  Rhythm: sinus bradycardia  Rate: bradycardic  BPM: 50  Conduction: non-specific intraventricular conduction delay  QRS axis: normal  Other findings: non-specific ST-T wave changes    Clinical impression: non-specific ECG            Assessment:       Diagnosis Plan   1. History of coronary artery stent placement     On plavix and statin   2. Coronary artery disease involving native coronary artery of native heart without angina pectoris     Optimize risk factor modification. No angina.   3. Essential hypertension     BP mildly elevated today. Change losartan to entresto   4. Controlled type 2 diabetes mellitus with diabetic peripheral angiopathy without gangrene, without long-term current use of insulin     Blood sugar stable   5. Chronic systolic congestive heart failure  Basic Metabolic Panel   Euvolemic today. Still short of breath and fatigued most likely due to   Cardiomyopathy. Will optimize meds. Put on aldactone and switch over to Entresto 24/26mg BID. If Ef does not improve with medical therapy and getting back in SR may need to consider cardiac cath to rule out ischemic causes. Plans for labs in a week. I will call with results. Consider adding Jardiance at follow up.   6. Persistent Afib- Now in Sr on amiodarone post cardioversion. Anticoagulated with Eliquis     Orders Placed This Encounter   Procedures    Basic Metabolic Panel     Standing Status:   Future     Standing Expiration Date:   10/25/2024     Order Specific Question:   Release to patient     Answer:   Routine Release [[id number]]    ECG 12 Lead     This order was created via procedure documentation     Order Specific Question:   Release to patient     Answer:   Routine  Release [[id number]]          Plan:       Follow up in a month with Dr. Dumont          Your medication list            Accurate as of October 25, 2023  3:08 PM. If you have any questions, ask your nurse or doctor.                START taking these medications        Instructions Last Dose Given Next Dose Due   sacubitril-valsartan 24-26 MG tablet  Commonly known as: ENTRESTO      Take 1 tablet by mouth 2 (Two) Times a Day.       spironolactone 25 MG tablet  Commonly known as: ALDACTONE      Take 1 tablet by mouth Daily.              CONTINUE taking these medications        Instructions Last Dose Given Next Dose Due   amiodarone 200 MG tablet  Commonly known as: PACERONE      Take 1 tablet by mouth Daily.       apixaban 5 MG tablet tablet  Commonly known as: ELIQUIS      Take 1 tablet by mouth 2 (Two) Times a Day.       citalopram 20 MG tablet  Commonly known as: CeleXA      Take 1 tablet by mouth Daily.       clopidogrel 75 MG tablet  Commonly known as: PLAVIX      TAKE 1 TABLET BY MOUTH EVERY DAY       ferrous sulfate 325 (65 FE) MG tablet      TAKE 1 TABLET BY MOUTH EVERY DAY WITH BREAKFAST       furosemide 40 MG tablet  Commonly known as: LASIX      Take 1 tablet by mouth 2 (Two) Times a Day. Pt takes qd       metFORMIN  MG 24 hr tablet  Commonly known as: GLUCOPHAGE-XR      Take 1 tablet by mouth Daily With Breakfast. Pt takes 1 tablet qd       metoprolol succinate XL 50 MG 24 hr tablet  Commonly known as: TOPROL-XL      Take 1 tablet by mouth 2 (Two) Times a Day.       NYQUIL PO      Take  by mouth every night at bedtime.              STOP taking these medications      losartan 25 MG tablet  Commonly known as: COZAAR                  Where to Get Your Medications        These medications were sent to [facility]/pharmacy #[id number] - [facility] [place] - [address] AT [facility] [phone] [address] [phone]   [address], [facility] [address]      Phone: [phone]    sacubitril-valsartan 24-26 MG tablet  spironolactone 25 MG tablet           As always, it has been a pleasure to participate in your patient's care.      Sincerely,     Danielle YAGN

## 2023-10-27 ENCOUNTER — TELEPHONE (OUTPATIENT)
Dept: GASTROENTEROLOGY | Facility: CLINIC | Age: 68
End: 2023-10-27
Payer: MEDICARE

## 2023-10-27 NOTE — TELEPHONE ENCOUNTER
FRIDA FERNANDEZ PT SCHEDULED 1/4/2024@1:00PM COLONOSCOPY/EGD PREP PACK MAILED TO ADDRESS ON FILE VERIFIED BY PT SPOUSE.OK FOR HUB TO READ

## 2023-10-30 ENCOUNTER — OFFICE VISIT (OUTPATIENT)
Dept: INTERNAL MEDICINE | Facility: CLINIC | Age: 68
End: 2023-10-30
Payer: MEDICARE

## 2023-10-30 VITALS
OXYGEN SATURATION: 97 % | TEMPERATURE: 97.4 F | WEIGHT: 264.4 LBS | SYSTOLIC BLOOD PRESSURE: 130 MMHG | HEIGHT: 72 IN | DIASTOLIC BLOOD PRESSURE: 80 MMHG | BODY MASS INDEX: 35.81 KG/M2 | HEART RATE: 64 BPM

## 2023-10-30 DIAGNOSIS — R68.89 FLU-LIKE SYMPTOMS: Primary | ICD-10-CM

## 2023-10-30 DIAGNOSIS — J01.00 ACUTE NON-RECURRENT MAXILLARY SINUSITIS: ICD-10-CM

## 2023-10-30 LAB
EXPIRATION DATE: NORMAL
FLUAV AG UPPER RESP QL IA.RAPID: NOT DETECTED
FLUBV AG UPPER RESP QL IA.RAPID: NOT DETECTED
INTERNAL CONTROL: NORMAL
Lab: NORMAL
SARS-COV-2 AG UPPER RESP QL IA.RAPID: NOT DETECTED

## 2023-10-30 PROCEDURE — 3051F HG A1C>EQUAL 7.0%<8.0%: CPT | Performed by: FAMILY MEDICINE

## 2023-10-30 PROCEDURE — 87428 SARSCOV & INF VIR A&B AG IA: CPT | Performed by: FAMILY MEDICINE

## 2023-10-30 PROCEDURE — 99213 OFFICE O/P EST LOW 20 MIN: CPT | Performed by: FAMILY MEDICINE

## 2023-10-30 PROCEDURE — 3075F SYST BP GE 130 - 139MM HG: CPT | Performed by: FAMILY MEDICINE

## 2023-10-30 PROCEDURE — 3079F DIAST BP 80-89 MM HG: CPT | Performed by: FAMILY MEDICINE

## 2023-10-30 RX ORDER — FUROSEMIDE 40 MG/1
40 TABLET ORAL 2 TIMES DAILY
Qty: 60 TABLET | Refills: 0 | Status: CANCELLED | OUTPATIENT
Start: 2023-10-30

## 2023-10-30 RX ORDER — AMOXICILLIN 500 MG/1
500 CAPSULE ORAL 3 TIMES DAILY
Qty: 21 CAPSULE | Refills: 0 | Status: SHIPPED | OUTPATIENT
Start: 2023-10-30

## 2023-10-30 NOTE — PROGRESS NOTES
"Chief Complaint  Nasal Congestion and Diarrhea    Subjective        Angel White Jr. presents to [facility] PRIMARY CARE  History of Present Illness  Patient appointment for acute problem of head congestion sore throat no cough  Some loose stool no abdominal pain  Some exposures to acute illness no family  Objective   Vital Signs:  /80   Pulse 64   Temp 97.4 °F (36.3 °C)   Ht 182.9 cm (72.01\")   Wt 120 kg (264 lb 6.4 oz)   SpO2 97%   BMI 35.85 kg/m²   Estimated body mass index is 35.85 kg/m² as calculated from the following:    Height as of this encounter: 182.9 cm (72.01\").    Weight as of this encounter: 120 kg (264 lb 6.4 oz).               Physical Exam  Vitals and nursing note reviewed.   Constitutional:       General: He is not in acute distress.     Appearance: He is well-developed. He is not toxic-appearing or diaphoretic.   HENT:      Head: Normocephalic and atraumatic. Hair is normal.      Right Ear: External ear normal. No drainage, swelling or tenderness. Tympanic membrane is retracted.      Left Ear: External ear normal. No drainage, swelling or tenderness. Tympanic membrane is retracted.      Nose: Mucosal edema present.      Comments: Maxillary sinus tenderness     Mouth/Throat:      Mouth: No oral lesions.      Pharynx: Uvula midline. Posterior oropharyngeal erythema present. No oropharyngeal exudate or uvula swelling.   Eyes:      General: No scleral icterus.        Right eye: No discharge.         Left eye: No discharge.      Conjunctiva/sclera: Conjunctivae normal.      Pupils: Pupils are equal, round, and reactive to light.   Cardiovascular:      Rate and Rhythm: Normal rate and regular rhythm.      Heart sounds: Normal heart sounds. No murmur heard.     No gallop.   Pulmonary:      Effort: No respiratory distress.      Breath sounds: Normal breath sounds. No stridor. No wheezing or rales.   Chest:      Chest wall: No tenderness.   Abdominal:      Palpations: " Abdomen is soft.      Tenderness: There is no abdominal tenderness.   Musculoskeletal:      Cervical back: Normal range of motion and neck supple.   Lymphadenopathy:      Cervical: Cervical adenopathy present.   Skin:     General: Skin is warm and dry.      Findings: No rash.   Neurological:      Mental Status: He is alert and oriented to person, place, and time.      Motor: No abnormal muscle tone.   Psychiatric:         Behavior: Behavior normal.         Thought Content: Thought content normal.         Judgment: Judgment normal.        Result Review :                   Assessment and Plan   Diagnoses and all orders for this visit:    1. Flu-like symptoms (Primary)  -     POCT SARS-CoV-2 + Flu Antigen REMY    2. Acute non-recurrent maxillary sinusitis    Other orders  -     amoxicillin (AMOXIL) 500 MG capsule; Take 1 capsule by mouth 3 (Three) Times a Day.  Dispense: 21 capsule; Refill: 0             Follow Up   Return if symptoms worsen or fail to improve, for Recheck.  Patient was given instructions and counseling regarding his condition or for health maintenance advice. Please see specific information pulled into the AVS if appropriate.

## 2023-11-06 ENCOUNTER — OFFICE VISIT (OUTPATIENT)
Dept: GASTROENTEROLOGY | Facility: CLINIC | Age: 68
End: 2023-11-06
Payer: MEDICARE

## 2023-11-06 VITALS
HEIGHT: 74 IN | WEIGHT: 277.4 LBS | TEMPERATURE: 97.2 F | HEART RATE: 51 BPM | OXYGEN SATURATION: 97 % | SYSTOLIC BLOOD PRESSURE: 176 MMHG | BODY MASS INDEX: 35.6 KG/M2 | DIASTOLIC BLOOD PRESSURE: 104 MMHG

## 2023-11-06 DIAGNOSIS — D50.9 IRON DEFICIENCY ANEMIA, UNSPECIFIED IRON DEFICIENCY ANEMIA TYPE: ICD-10-CM

## 2023-11-06 DIAGNOSIS — R19.4 CHANGE IN BOWEL HABITS: Primary | ICD-10-CM

## 2023-11-06 PROCEDURE — 1159F MED LIST DOCD IN RCRD: CPT | Performed by: PHYSICIAN ASSISTANT

## 2023-11-06 PROCEDURE — 99214 OFFICE O/P EST MOD 30 MIN: CPT | Performed by: PHYSICIAN ASSISTANT

## 2023-11-06 PROCEDURE — 3077F SYST BP >= 140 MM HG: CPT | Performed by: PHYSICIAN ASSISTANT

## 2023-11-06 PROCEDURE — 1160F RVW MEDS BY RX/DR IN RCRD: CPT | Performed by: PHYSICIAN ASSISTANT

## 2023-11-06 PROCEDURE — 3080F DIAST BP >= 90 MM HG: CPT | Performed by: PHYSICIAN ASSISTANT

## 2023-11-06 NOTE — PROGRESS NOTES
"Chief Complaint  Anemia and Change in bowel habits    Subjective          History Of Present Illness:    Angel White Jr. is a  68 y.o. male patient of Dr. 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  Patient was last seen on 6/7/23 in the setting of altered bowel habits and low iron saturation.     Most recent CBC with Hgb 14.2. Patient continues to take iron supplementation.     Patient reports he continues to have pasty loose stools and stool leakage. Patient reports he will have a normal bowel movement about twice a week. Patient reports occasional bright red blood per rectum that he attributes to hemorrhoids. Denies abdominal pain. Reports his weight fluctuates. Appetite is good.     Patient continues to follow with Dr. Velasquez for his neuroendocrine tomorrow.     Additional data reviewed:  C-scope 8/3/20 - normal ileum, NBIH, medium size lipoma in the transverse and ascending colon. Abnormal tissue in the cecum consistent with hyperplastic changes.    CT abdomen and pelvis 9/18/2023 -no bowel wall thickening, diverticula, postsurgical changes of ileocolic resection, normal pancreas, spleen. Evidence of fatty liver and cholelithiasis.    Objective   Vital Signs:   BP (!) 176/104   Pulse 51   Temp 97.2 °F (36.2 °C)   Ht 188 cm (74\")   Wt 126 kg (277 lb 6.4 oz)   SpO2 97%   BMI 35.62 kg/m²       Physical Exam  Vitals reviewed.   Constitutional:       General: He is not in acute distress.     Appearance: Normal appearance. He is not ill-appearing.   HENT:      Head: Normocephalic and atraumatic.      Nose: Nose normal.      Mouth/Throat:      Mouth: Mucous membranes are dry.      Pharynx: Oropharynx is clear.   Eyes:      Extraocular Movements: Extraocular movements intact.      Conjunctiva/sclera: Conjunctivae normal.      Pupils: Pupils are equal, round, and reactive to light.   Cardiovascular:      Heart sounds: Normal heart sounds. "   Pulmonary:      Effort: Pulmonary effort is normal.   Abdominal:      General: There is no distension.      Palpations: Abdomen is soft. There is no mass.      Tenderness: There is no abdominal tenderness.   Musculoskeletal:         General: No swelling. Normal range of motion.      Cervical back: Normal range of motion.   Skin:     General: Skin is warm and dry.      Findings: No bruising or rash.   Neurological:      General: No focal deficit present.      Mental Status: He is alert and oriented to person, place, and time.      Motor: No weakness.      Gait: Gait normal.   Psychiatric:         Mood and Affect: Mood normal.          Result Review :   The following data was reviewed by: Erika Otto PA-C on 11/06/2023:  CMP          9/18/2023    15:00 9/25/2023    14:03 10/18/2023    06:25   CMP   Glucose  161  151    BUN  12  10    Creatinine 1.40  1.24  1.11    EGFR  63.7  72.8    Sodium  133  136    Potassium  4.1  3.6    Chloride  98  101    Calcium  8.7  9.1    Total Protein  6.7     Albumin  3.9     Globulin  2.8     Total Bilirubin  0.5     Alkaline Phosphatase  79     AST (SGOT)  18     ALT (SGPT)  13     Albumin/Globulin Ratio  1.4     BUN/Creatinine Ratio  9.7  9.0    Anion Gap  12.1  10.8      CBC          6/16/2023    15:03 7/17/2023    10:30 9/25/2023    14:03   CBC   WBC  8.19  8.72    RBC  5.24  4.97    Hemoglobin 13.7  14.1  14.2    Hematocrit 42.0  43.2  43.1    MCV  82.4  86.7    MCH  26.9  28.6    MCHC  32.6  32.9    RDW  14.3  14.6    Platelets  271  270            Assessment and Plan    Diagnoses and all orders for this visit:    1. Change in bowel habits (Primary)    2. Iron deficiency anemia, unspecified iron deficiency anemia type       Will proceed with EGD and colonoscopy for evaluation of altered bowel habits and FRANSISCO.   Continue iron supplementation  Start fiber supplement daily in the interim    Follow Up   Return for EGD/Colonoscopy.    Dragon dictation used throughout this  note.            Erika Ambrosio PA-C   [facility]  [facility] [place] [address]  Office: [phone]

## 2023-11-20 ENCOUNTER — OFFICE VISIT (OUTPATIENT)
Age: 68
End: 2023-11-20
Payer: MEDICARE

## 2023-11-20 VITALS
DIASTOLIC BLOOD PRESSURE: 98 MMHG | WEIGHT: 275 LBS | HEART RATE: 52 BPM | BODY MASS INDEX: 35.29 KG/M2 | HEIGHT: 74 IN | SYSTOLIC BLOOD PRESSURE: 158 MMHG

## 2023-11-20 DIAGNOSIS — I42.8 NICM (NONISCHEMIC CARDIOMYOPATHY): Primary | ICD-10-CM

## 2023-11-20 PROCEDURE — 3077F SYST BP >= 140 MM HG: CPT | Performed by: INTERNAL MEDICINE

## 2023-11-20 PROCEDURE — 3080F DIAST BP >= 90 MM HG: CPT | Performed by: INTERNAL MEDICINE

## 2023-11-20 PROCEDURE — 99214 OFFICE O/P EST MOD 30 MIN: CPT | Performed by: INTERNAL MEDICINE

## 2023-11-20 NOTE — PROGRESS NOTES
Subjective:     Encounter Date: 11/20/23      Patient ID: Angel White Jr. is a 68 y.o. male.    Chief Complaint: CAD    HPI:   This is a 68-year-old man who has a history of multivessel coronary disease, hypertension, hyperlipidemia, NAFLD, diabetes, atrial fibrillation.  In 2015 he had unstable angina and underwent drug-eluting stenting of the proximal to mid LAD as well as the ramus.  He also had stenting of the PDA and proximal RCA in 2015.     He was admitted to the hospital in July 2023 with new onset atrial fibrillation and cardiomyopathy,  EF was about 20%, presumed related to tachyarrhythmia. Since then he has had two cardioversions for recurrent AF and has been maintained in NSR on amiodarone.     He returns today for follow up. He states he remains fatigued and dyspneic. He denies angina. He has no edema or orthopnea. No palpitations.     He has 14 cats and one dog. His wife is bed bound and he cares for her. He has a son who has developmental disabilities and has 4 young grandchildren    The following portions of the patient's history were reviewed and updated as appropriate: allergies, current medications, past family history, past medical history, past social history, past surgical history and problem list.     REVIEW OF SYSTEMS:   All systems reviewed.  Pertinent positives identified in HPI.  All other systems are negative.    Past Medical History:   Diagnosis Date    Anemia     AP (angina pectoris)     unstable    CAD (coronary artery disease)     6 stents placed    DDD (degenerative disc disease), lumbosacral     Diabetes     Essential hypertension     Fatty liver     ?    History of Clostridioides difficile infection 10/2021    History of MI (myocardial infarction)     Hyperlipidemia     Low back pain     Mass of colon     Memory loss     SOME SHORT TERM MEMORY ISSUES AND FORGETFULNESS    Osteoarthritis of knee     Screening for prostate cancer 2010    normal    Skin cancer     left eyebrow,  side of face and back     Sleep apnea     NO CPAP    Tachycardia        Family History   Problem Relation Age of Onset    Hypertension Mother     Arthritis Mother     Diabetes Mother     Heart disease Father     Hypertension Father     Macular degeneration Father     Arthritis Father     Diabetes Father     Arthritis Maternal Grandmother     Heart disease Maternal Grandmother     Arthritis Maternal Grandfather     Heart disease Maternal Grandfather     Diabetes Maternal Grandfather     Hypertension Maternal Grandfather     Colon cancer Maternal Grandfather     Arthritis Paternal Grandmother     Heart disease Paternal Grandmother     Diabetes Paternal Grandmother     Hypertension Paternal Grandmother     Stroke Paternal Grandmother     Colon cancer Paternal Grandmother     Arthritis Paternal Grandfather     Heart disease Paternal Grandfather     Colon cancer Paternal Grandfather     Macular degeneration Sister     Malig Hyperthermia Neg Hx        Social History     Socioeconomic History    Marital status:      Spouse name: Jaylene   Tobacco Use    Smoking status: Never    Smokeless tobacco: Never    Tobacco comments:     Caffeine daily   Vaping Use    Vaping Use: Never used   Substance and Sexual Activity    Alcohol use: No    Drug use: Not Currently    Sexual activity: Defer     Partners: Female       Allergies   Allergen Reactions    Ambien [Zolpidem] Other (See Comments)     Sleep walking      Morphine And Related Itching and Rash       Past Surgical History:   Procedure Laterality Date    COLON RESECTION N/A 02/03/2022    Procedure: ILEUM COLON RESECTION FOR TERMINAL ILEUM MASS LAPAROSCOPIC;  Surgeon: Roscoe Donovan MD;  Location: [facility] MAIN OR;  Service: General;  Laterality: N/A;    COLONOSCOPY N/A 2016    normal per patient    COLONOSCOPY N/A 08/03/2020    Procedure: COLONOSCOPY to cecum and TI with cold biopsies;  Surgeon: Kelly Ashraf MD;  Location: [facility] ENDOSCOPY;  Service: Gastroenterology;   Laterality: N/A;  pre-change in bowel habits, hx polyps   post-hemorrhoids, lipomas, abnormal cecal tissue     CORONARY ANGIOPLASTY WITH STENT PLACEMENT  08/28/2015    6 stents-Dr. Hall, [facility]    FOOT SURGERY Right 05/23/2017    Dr. Cervantes, [facility] and [facility]    REPLACEMENT TOTAL KNEE Left 2010    Dr. Arnaud Reynaga, [facility]    REPLACEMENT TOTAL KNEE Right 2009    Dr. Arnaud Reynaga, [facility]    SKIN BIOPSY      SKIN CANCER EXCISION Left 03/30/2016    left ear and left eyebrow, graft took from face, placed on ear    SKIN CANCER EXCISION  2001    back    UPPER GASTROINTESTINAL ENDOSCOPY         Procedures       Objective:         PHYSICAL EXAM:  GEN: VSS, no distress,   Eyes: normal sclera, normal lids and lashes  HENT: moist mucus membranes,   Respiratory: CTAB, no rales or wheezes  CV: RRR, no murmurs, , +2 DP and 2+ carotid pulses b/l  GI: NABS, soft,  Nontender, nondistended  MSK: +1 lower extremity edema with chronic venous stasis changes, no scoliosis or kyphosis  Skin: no rash, warm, dry  Heme/Lymph: no bruising or bleeding  Psych: organized thought, normal behavior and affect  Neuro: Cranial nerves grossly intact, Alert and Oriented x 3.         Assessment:          Diagnosis Plan   1. NICM (nonischemic cardiomyopathy)  Adult Transthoracic Echo Limited W/ Cont if Necessary Per Protocol             Plan:       1.  Coronary artery disease: Remote stenting of the LAD, ramus, right coronary artery in the setting of unstable angina in 2015.  Plavix. If his EF remains low in NSR, or if any concerning echo changes would consider cardiac catheterization.   2.  Atrial fibrillation: Status post cardioversion October 2023. Remains on amiodarone daily to maintain NSR. Continue eliquis 5 BID. Given his extreme symptomatic presentation I will have him see EP Dr. Amado for consideration of AF ablation as I think this will help him.  3.  Presumed nonischemic, dilated cardiomyopathy, EF 20%.  Toprol and losartan.  NYHA class II-III  symptoms currently. Consider cardiac cath as above to rule out ischemic etiology.   4.  Diabetes: Diet controlled, on high intensity statin.    Dr. Meza, thank you very much for referring this kind patient to me. Please call me with any questions or concerns. I will see the patient again in the office in 12 months.       Crys Dumont MD  11/20/23  [facility]    Outpatient Encounter Medications as of 11/20/2023   Medication Sig Dispense Refill    amiodarone (PACERONE) 200 MG tablet Take 1 tablet by mouth Daily.      apixaban (ELIQUIS) 5 MG tablet tablet Take 1 tablet by mouth 2 (Two) Times a Day. 60 tablet 3    citalopram (CeleXA) 20 MG tablet Take 1 tablet by mouth Daily. 90 tablet 2    clopidogrel (PLAVIX) 75 MG tablet TAKE 1 TABLET BY MOUTH EVERY DAY 90 tablet 3    ferrous sulfate 325 (65 FE) MG tablet TAKE 1 TABLET BY MOUTH EVERY DAY WITH BREAKFAST 90 tablet 1    furosemide (LASIX) 40 MG tablet Take 1 tablet by mouth 2 (Two) Times a Day. Pt takes qd 60 tablet 11    metFORMIN ER (GLUCOPHAGE-XR) 500 MG 24 hr tablet Take 1 tablet by mouth Daily With Breakfast. Pt takes 1 tablet qd      metoprolol succinate XL (TOPROL-XL) 50 MG 24 hr tablet Take 1 tablet by mouth 2 (Two) Times a Day. 60 tablet 11    Pseudoeph-Doxylamine-DM-APAP (NYQUIL PO) Take  by mouth every night at bedtime.      sacubitril-valsartan (ENTRESTO) 24-26 MG tablet Take 1 tablet by mouth 2 (Two) Times a Day. 60 tablet 11    spironolactone (ALDACTONE) 25 MG tablet Take 1 tablet by mouth Daily. 30 tablet 11    [DISCONTINUED] amoxicillin (AMOXIL) 500 MG capsule Take 1 capsule by mouth 3 (Three) Times a Day. 21 capsule 0     No facility-administered encounter medications on file as of 11/20/2023.

## 2023-11-27 RX ORDER — METFORMIN HYDROCHLORIDE 500 MG/1
TABLET, EXTENDED RELEASE ORAL
Qty: 180 TABLET | Refills: 1 | Status: SHIPPED | OUTPATIENT
Start: 2023-11-27

## 2023-11-27 RX ORDER — FERROUS SULFATE 325(65) MG
TABLET ORAL
Qty: 90 TABLET | Refills: 1 | OUTPATIENT
Start: 2023-11-27

## 2023-12-06 ENCOUNTER — HOSPITAL ENCOUNTER (OUTPATIENT)
Dept: CARDIOLOGY | Facility: HOSPITAL | Age: 68
Discharge: HOME | End: 2023-12-06
Admitting: INTERNAL MEDICINE
Payer: MEDICARE

## 2023-12-06 VITALS
HEIGHT: 74 IN | HEART RATE: 50 BPM | SYSTOLIC BLOOD PRESSURE: 156 MMHG | DIASTOLIC BLOOD PRESSURE: 84 MMHG | WEIGHT: 275 LBS | OXYGEN SATURATION: 98 % | BODY MASS INDEX: 35.29 KG/M2

## 2023-12-06 DIAGNOSIS — I42.8 NICM (NONISCHEMIC CARDIOMYOPATHY): ICD-10-CM

## 2023-12-06 LAB
BH CV ECHO LEFT VENTRICLE GLOBAL LONGITUDINAL STRAIN: -19.8 %
BH CV ECHO MEAS - ACS: 2.6 CM
BH CV ECHO MEAS - AO MAX PG: 8.2 MMHG
BH CV ECHO MEAS - AO MEAN PG: 4 MMHG
BH CV ECHO MEAS - AO ROOT DIAM: 4 CM
BH CV ECHO MEAS - AO V2 MAX: 143 CM/SEC
BH CV ECHO MEAS - AO V2 VTI: 29.1 CM
BH CV ECHO MEAS - AVA(I,D): 2.7 CM2
BH CV ECHO MEAS - EDV(CUBED): 216 ML
BH CV ECHO MEAS - EDV(MOD-SP2): 275 ML
BH CV ECHO MEAS - EDV(MOD-SP4): 269 ML
BH CV ECHO MEAS - EF(MOD-BP): 53.8 %
BH CV ECHO MEAS - EF(MOD-SP2): 44 %
BH CV ECHO MEAS - EF(MOD-SP4): 41.3 %
BH CV ECHO MEAS - EF_3D-VOL: 43 %
BH CV ECHO MEAS - ESV(CUBED): 91.1 ML
BH CV ECHO MEAS - ESV(MOD-SP2): 154 ML
BH CV ECHO MEAS - ESV(MOD-SP4): 158 ML
BH CV ECHO MEAS - FS: 25 %
BH CV ECHO MEAS - IVS/LVPW: 1.03 CM
BH CV ECHO MEAS - IVSD: 1.1 CM
BH CV ECHO MEAS - LAT PEAK E' VEL: 5.1 CM/SEC
BH CV ECHO MEAS - LV DIASTOLIC VOL/BSA (35-75): 132.1 CM2
BH CV ECHO MEAS - LV MASS(C)D: 274.1 GRAMS
BH CV ECHO MEAS - LV MAX PG: 4.8 MMHG
BH CV ECHO MEAS - LV MEAN PG: 2 MMHG
BH CV ECHO MEAS - LV SYSTOLIC VOL/BSA (12-30): 77.6 CM2
BH CV ECHO MEAS - LV V1 MAX: 109 CM/SEC
BH CV ECHO MEAS - LV V1 VTI: 21 CM
BH CV ECHO MEAS - LVIDD: 6 CM
BH CV ECHO MEAS - LVIDS: 4.5 CM
BH CV ECHO MEAS - LVOT AREA: 3.8 CM2
BH CV ECHO MEAS - LVOT DIAM: 2.2 CM
BH CV ECHO MEAS - LVPWD: 1.07 CM
BH CV ECHO MEAS - MED PEAK E' VEL: 5.3 CM/SEC
BH CV ECHO MEAS - MV A DUR: 0.11 SEC
BH CV ECHO MEAS - MV A MAX VEL: 85.3 CM/SEC
BH CV ECHO MEAS - MV DEC SLOPE: 285 CM/SEC2
BH CV ECHO MEAS - MV DEC TIME: 0.17 SEC
BH CV ECHO MEAS - MV E MAX VEL: 63.6 CM/SEC
BH CV ECHO MEAS - MV E/A: 0.75
BH CV ECHO MEAS - MV MAX PG: 3.9 MMHG
BH CV ECHO MEAS - MV MEAN PG: 1 MMHG
BH CV ECHO MEAS - MV P1/2T: 79.2 MSEC
BH CV ECHO MEAS - MV V2 VTI: 36.6 CM
BH CV ECHO MEAS - MVA(P1/2T): 2.8 CM2
BH CV ECHO MEAS - MVA(VTI): 2.18 CM2
BH CV ECHO MEAS - PA ACC TIME: 0.07 SEC
BH CV ECHO MEAS - PA V2 MAX: 133 CM/SEC
BH CV ECHO MEAS - PULM A REVS DUR: 0.14 SEC
BH CV ECHO MEAS - PULM A REVS VEL: 31.2 CM/SEC
BH CV ECHO MEAS - PULM DIAS VEL: 39.9 CM/SEC
BH CV ECHO MEAS - PULM S/D: 1.39
BH CV ECHO MEAS - PULM SYS VEL: 55.5 CM/SEC
BH CV ECHO MEAS - QP/QS: 0.87
BH CV ECHO MEAS - RV MAX PG: 3.3 MMHG
BH CV ECHO MEAS - RV V1 MAX: 91.4 CM/SEC
BH CV ECHO MEAS - RV V1 VTI: 16.7 CM
BH CV ECHO MEAS - RVOT DIAM: 2.3 CM
BH CV ECHO MEAS - SI(MOD-SP2): 59.4 ML/M2
BH CV ECHO MEAS - SI(MOD-SP4): 54.5 ML/M2
BH CV ECHO MEAS - SV(LVOT): 79.8 ML
BH CV ECHO MEAS - SV(MOD-SP2): 121 ML
BH CV ECHO MEAS - SV(MOD-SP4): 111 ML
BH CV ECHO MEAS - SV(RVOT): 69.4 ML
BH CV ECHO MEAS - TAPSE (>1.6): 1.95 CM
BH CV ECHO MEAS - TR MAX PG: 22.1 MMHG
BH CV ECHO MEAS - TR MAX VEL: 235 CM/SEC
BH CV ECHO MEASUREMENTS AVERAGE E/E' RATIO: 12.23
BH CV XLRA - RV BASE: 2.6 CM
BH CV XLRA - RV LENGTH: 7.3 CM
BH CV XLRA - RV MID: 2.8 CM
BH CV XLRA - TDI S': 11.1 CM/SEC
LEFT ATRIUM VOLUME INDEX: 17.5 ML/M2
SINUS: 3.3 CM
STJ: 2.9 CM

## 2023-12-06 PROCEDURE — 93356 MYOCRD STRAIN IMG SPCKL TRCK: CPT

## 2023-12-06 PROCEDURE — 93308 TTE F-UP OR LMTD: CPT

## 2023-12-06 PROCEDURE — 93325 DOPPLER ECHO COLOR FLOW MAPG: CPT

## 2023-12-06 PROCEDURE — 93321 DOPPLER ECHO F-UP/LMTD STD: CPT

## 2023-12-06 PROCEDURE — 25510000001 PERFLUTREN (DEFINITY) 8.476 MG IN SODIUM CHLORIDE (PF) 0.9 % 10 ML INJECTION: Performed by: INTERNAL MEDICINE

## 2023-12-06 RX ADMIN — PERFLUTREN 1.5 ML: 6.52 INJECTION, SUSPENSION INTRAVENOUS at 13:45

## 2023-12-07 NOTE — PROGRESS NOTES
Please call patient with their test results.  His heart function has improved almost back to normal.  Just mildly reduced function compared to severely reduced function over the summer.  Expect this to continue to improve.

## 2023-12-11 RX ORDER — AMIODARONE HYDROCHLORIDE 200 MG/1
TABLET ORAL
Qty: 198 TABLET | Refills: 1 | Status: SHIPPED | OUTPATIENT
Start: 2023-12-11

## 2024-01-03 ENCOUNTER — TELEPHONE (OUTPATIENT)
Dept: GASTROENTEROLOGY | Facility: CLINIC | Age: 69
End: 2024-01-03
Payer: MEDICARE

## 2024-01-03 NOTE — TELEPHONE ENCOUNTER
Hub staff attempted to follow warm transfer process and was unsuccessful     Caller: Angel White Jr.    Relationship to patient: Self    Best call back number: [phone] OK TO LM    Patient is needing: PATIENT IS NEEDING TO RESCHEDULE HIS SCOPE THAT'S SCHEDULED FOR TOMORROW DUE TO HIM NOT HAVING A RIDE.

## 2024-01-03 NOTE — TELEPHONE ENCOUNTER
Hub staff attempted to follow warm transfer process and was unsuccessful     Caller: Angel White Jr.    Relationship to patient: Self    Best call back number: [phone] OK TO LM    Patient is needing: PATIENT IS NEEDING TO RESCHEDULE HIS SCOPE THAT'S SCHEDULED FOR TOMORROW DUE TO HIM NOT HAVING A RIDE.

## 2024-01-08 RX ORDER — FERROUS SULFATE 325(65) MG
TABLET ORAL
Qty: 90 TABLET | Refills: 1 | OUTPATIENT
Start: 2024-01-08

## 2024-01-15 PROBLEM — R19.7 DIARRHEA: Status: ACTIVE | Noted: 2023-06-07

## 2024-02-29 ENCOUNTER — TELEPHONE (OUTPATIENT)
Dept: GASTROENTEROLOGY | Facility: CLINIC | Age: 69
End: 2024-02-29
Payer: MEDICARE

## 2024-02-29 NOTE — TELEPHONE ENCOUNTER
Caller: Angel White Jr.     Relationship to patient: Self     Best call back number: [phone]     Patient is needing: PATIENT RETURNING MISSED CALL TO CONFIRM SCOPE ON 03/06/24. HE HAS CONFIRMED AND WILL BE THERE.

## 2024-03-04 ENCOUNTER — TELEPHONE (OUTPATIENT)
Dept: GASTROENTEROLOGY | Facility: CLINIC | Age: 69
End: 2024-03-04
Payer: MEDICARE

## 2024-03-04 NOTE — TELEPHONE ENCOUNTER
[facility]IDA TAPIA IN SCHEDULING PT SCHEDULED 04/30/2024 ARRIVING AT 10:00AM [facility]/EGD PREP INSTRUCTIONS HANDED TO PT.OK FOR HUB TO READ

## 2024-03-20 ENCOUNTER — TELEPHONE (OUTPATIENT)
Dept: ONCOLOGY | Facility: CLINIC | Age: 69
End: 2024-03-20
Payer: MEDICARE

## 2024-03-20 NOTE — TELEPHONE ENCOUNTER
"----- Message from Olman Bryant RN sent at 3/19/2024  3:29 PM EDT -----  Regarding: Reschedule CT  Patient no-showed for CT 3/18. Please reschedule. Needs prior to 3/25 visit. Also add \"please draw labs\" on appointment line for CT.  "

## 2024-04-02 DIAGNOSIS — I25.10 ATHEROSCLEROTIC HEART DISEASE OF NATIVE CORONARY ARTERY WITHOUT ANGINA PECTORIS: ICD-10-CM

## 2024-04-02 RX ORDER — CLOPIDOGREL BISULFATE 75 MG/1
TABLET ORAL
Qty: 90 TABLET | Refills: 3 | Status: SHIPPED | OUTPATIENT
Start: 2024-04-02

## 2024-04-03 ENCOUNTER — HOSPITAL ENCOUNTER (OUTPATIENT)
Dept: CT IMAGING | Facility: HOSPITAL | Age: 69
Discharge: HOME | End: 2024-04-03
Admitting: INTERNAL MEDICINE
Payer: MEDICARE

## 2024-04-03 DIAGNOSIS — C7A.012 MALIGNANT CARCINOID TUMOR OF ILEUM: ICD-10-CM

## 2024-04-03 DIAGNOSIS — D50.0 IRON DEFICIENCY ANEMIA DUE TO CHRONIC BLOOD LOSS: ICD-10-CM

## 2024-04-03 DIAGNOSIS — Z98.890 NEUROENDOCRINE TUMOR STATUS POST SURGICAL TREATMENT: ICD-10-CM

## 2024-04-03 PROCEDURE — 25510000001 IOPAMIDOL 61 % SOLUTION: Performed by: INTERNAL MEDICINE

## 2024-04-03 PROCEDURE — 82565 ASSAY OF CREATININE: CPT

## 2024-04-03 PROCEDURE — 0 DIATRIZOATE MEGLUMINE & SODIUM PER 1 ML: Performed by: INTERNAL MEDICINE

## 2024-04-03 PROCEDURE — 71260 CT THORAX DX C+: CPT

## 2024-04-03 PROCEDURE — 74177 CT ABD & PELVIS W/CONTRAST: CPT

## 2024-04-03 RX ADMIN — IOPAMIDOL 85 ML: 612 INJECTION, SOLUTION INTRAVENOUS at 14:03

## 2024-04-03 RX ADMIN — DIATRIZOATE MEGLUMINE AND DIATRIZOATE SODIUM 30 ML: 660; 100 LIQUID ORAL; RECTAL at 13:00

## 2024-04-04 LAB — CREAT BLDA-MCNC: 1.3 MG/DL (ref 0.6–1.3)

## 2024-04-05 NOTE — PROGRESS NOTES
Subjective     REASON FOR CONSULTATION:  Carcinoid tumor ileum  Provide an opinion on any further workup or treatment                             REQUESTING PHYSICIAN:  Dr. Donovan    RECORDS OBTAINED:  Records of the patients history including those obtained from the referring provider were reviewed and summarized in detail.    History of Present Illness   This is a very pleasant 67-year-old man who has medical history pertinent for coronary artery disease, diabetes, hypertension, hyperlipidemia, sleep apnea.  He recently presented with 50 pound weight loss, nausea, decreased appetite, and hematochezia.  The patient was noted to have iron deficiency anemia and was admitted to the hospital in October 2021 with hemoglobin 7.7 requiring transfusion support.  A CT of the abdomen and pelvis on 10/30/2021 showed some fatty infiltration in the liver, small mural lipoma in the wall of the cecum and slight prostamegaly.  The patient continued to have symptoms, and a repeat CT abdomen/pelvis on 11/19/2021 showed a 3 cm mass in the terminal ileum with enlarged lymph nodes adjacent up to 1.4 cm.  There was no evidence of small bowel obstruction.  There was a 2 cm lipoma in the cecum and a 2.4 cm lipoma in the ascending colon which were stable from previous.  The liver was unremarkable.    The patient was taken to the operating room on 2/3/2022 by Dr. Donovan and underwent a laparoscopic ileocolonic resection.  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  The tumor measured 2.5 cm in greatest dimension and invaded the muscularis propria into the subserosal tissue without penetration of the serosa.  There was lymphovascular but no perineural invasion.  12 lymph nodes were resected for of which were positive for tumor.  Final pathology pT3 N2 M0.    A neuroendocrine PET scan ([facility]) on 3/29/2022 was  negative for residual or metastatic disease.    The patient returned today for follow-up.  He denies abdominal pain nausea vomiting.  He has chronic loose stools mostly in the morning.  He denies hematochezia.  He is scheduled for colonoscopy and EGD in the month.  He has been off his oral iron.    Past Medical History:   Diagnosis Date    Anemia     AP (angina pectoris)     unstable    CAD (coronary artery disease)     6 stents placed    DDD (degenerative disc disease), lumbosacral     Diabetes     Essential hypertension     Fatty liver     ?    History of Clostridioides difficile infection 10/2021    History of MI (myocardial infarction)     Hyperlipidemia     Low back pain     Mass of colon     Memory loss     SOME SHORT TERM MEMORY ISSUES AND FORGETFULNESS    Osteoarthritis of knee     Screening for prostate cancer 2010    normal    Skin cancer     left eyebrow, side of face and back     Sleep apnea     NO CPAP    Tachycardia         Past Surgical History:   Procedure Laterality Date    COLON RESECTION N/A 02/03/2022    Procedure: ILEUM COLON RESECTION FOR TERMINAL ILEUM MASS LAPAROSCOPIC;  Surgeon: Roscoe Donovan MD;  Location: [facility] MAIN OR;  Service: General;  Laterality: N/A;    COLONOSCOPY N/A 2016    normal per patient    COLONOSCOPY N/A 08/03/2020    Procedure: COLONOSCOPY to cecum and TI with cold biopsies;  Surgeon: Kelly Ashraf MD;  Location: [facility] ENDOSCOPY;  Service: Gastroenterology;  Laterality: N/A;  pre-change in bowel habits, hx polyps   post-hemorrhoids, lipomas, abnormal cecal tissue     CORONARY ANGIOPLASTY WITH STENT PLACEMENT  08/28/2015    6 stents-Dr. Hall, [facility]    FOOT SURGERY Right 05/23/2017    Dr. Cervantes,  [facility]    REPLACEMENT TOTAL KNEE Left 2010    Dr. Arnaud Reynaga, [facility]    REPLACEMENT TOTAL KNEE Right 2009    Dr. Arnaud Reynaga, [facility]    SKIN BIOPSY      SKIN CANCER EXCISION Left 03/30/2016    left ear and left eyebrow, graft took from face, placed on ear    SKIN  CANCER EXCISION  2001    back    UPPER GASTROINTESTINAL ENDOSCOPY          Current Outpatient Medications on File Prior to Visit   Medication Sig Dispense Refill    amiodarone (PACERONE) 200 MG tablet TAKE 2 TABLETS BY MOUTH TWICE A DAY FOR 3 DAYS THEN 1 TWICE A  tablet 1    citalopram (CeleXA) 20 MG tablet Take 1 tablet by mouth Daily. 90 tablet 2    clopidogrel (PLAVIX) 75 MG tablet TAKE 1 TABLET BY MOUTH EVERY DAY 90 tablet 3    Eliquis 5 MG tablet tablet TAKE 1 TABLET BY MOUTH TWICE A DAY 60 tablet 3    furosemide (LASIX) 40 MG tablet Take 1 tablet by mouth 2 (Two) Times a Day. Pt takes qd 60 tablet 11    losartan (COZAAR) 100 MG tablet TAKE 1 TABLET BY MOUTH EVERY DAY 90 tablet 1    metFORMIN ER (GLUCOPHAGE-XR) 500 MG 24 hr tablet TAKE 2 TABLETS BY MOUTH EVERY DAY WITH BREAKFAST (NEW DOSE) 180 tablet 1    metoprolol succinate XL (TOPROL-XL) 50 MG 24 hr tablet Take 1 tablet by mouth 2 (Two) Times a Day. 60 tablet 11    Pseudoeph-Doxylamine-DM-APAP (NYQUIL PO) Take  by mouth every night at bedtime.      sacubitril-valsartan (ENTRESTO) 24-26 MG tablet Take 1 tablet by mouth 2 (Two) Times a Day. 60 tablet 11    spironolactone (ALDACTONE) 25 MG tablet Take 1 tablet by mouth Daily. 30 tablet 11     No current facility-administered medications on file prior to visit.        ALLERGIES:    Allergies   Allergen Reactions    Ambien [Zolpidem] Other (See Comments)     Sleep walking      Morphine And Related Itching and Rash        Social History     Socioeconomic History    Marital status:      Spouse name: Jaylene   Tobacco Use    Smoking status: Never    Smokeless tobacco: Never    Tobacco comments:     Caffeine daily   Vaping Use    Vaping status: Never Used   Substance and Sexual Activity    Alcohol use: No    Drug use: Not Currently    Sexual activity: Defer     Partners: Female        Family History   Problem Relation Age of Onset    Hypertension Mother     Arthritis Mother     Diabetes Mother     Heart  "disease Father     Hypertension Father     Macular degeneration Father     Arthritis Father     Diabetes Father     Arthritis Maternal Grandmother     Heart disease Maternal Grandmother     Arthritis Maternal Grandfather     Heart disease Maternal Grandfather     Diabetes Maternal Grandfather     Hypertension Maternal Grandfather     Colon cancer Maternal Grandfather     Arthritis Paternal Grandmother     Heart disease Paternal Grandmother     Diabetes Paternal Grandmother     Hypertension Paternal Grandmother     Stroke Paternal Grandmother     Colon cancer Paternal Grandmother     Arthritis Paternal Grandfather     Heart disease Paternal Grandfather     Colon cancer Paternal Grandfather     Macular degeneration Sister     Malig Hyperthermia Neg Hx         Review of Systems   Constitutional:  Positive for fatigue. Negative for appetite change and unexpected weight change.   HENT: Negative.     Respiratory:  Negative for cough and shortness of breath.    Cardiovascular:  Negative for chest pain and palpitations.   Gastrointestinal:  Positive for diarrhea. Negative for blood in stool, constipation, nausea and vomiting.   Genitourinary: Negative.    Musculoskeletal: Negative.    Skin: Negative.    Allergic/Immunologic: Negative.    Neurological: Negative.    Hematological: Negative.    Psychiatric/Behavioral: Negative.     ROS unchanged-4/11/2024    Objective     Vitals:    04/11/24 1513   BP: 164/96   Pulse: 55   Resp: 14   Temp: 98.2 °F (36.8 °C)   TempSrc: Temporal   SpO2: 98%   Weight: 121 kg (267 lb 3.2 oz)   Height: 188 cm (74\")   PainSc: 0-No pain           4/11/2024     3:11 PM   Current Status   ECOG score 0       Physical Exam    CONSTITUTIONAL: pleasant well-developed adult man  HEENT: no icterus, no thrush, moist membranes  LYMPH: no cervical or supraclavicular lad  RESP: cta bilat, no wheezing, no rales  GI: soft, non-tender, no splenomegaly, +bs, laparoscopic scars healing well  MUSC: no edema, normal " gait  NEURO: alert and oriented x3, normal strength  PSYCH: normal mood and affect  Exam unchanged-4/11/2024  RECENT LABS:  Hematology WBC   Date Value Ref Range Status   04/11/2024 7.44 3.40 - 10.80 10*3/mm3 Final   12/23/2021 7.9 3.4 - 10.8 x10E3/uL Final     RBC   Date Value Ref Range Status   04/11/2024 4.07 (L) 4.14 - 5.80 10*6/mm3 Final   12/23/2021 3.97 (L) 4.14 - 5.80 x10E6/uL Final     Hemoglobin   Date Value Ref Range Status   04/11/2024 11.1 (L) 13.0 - 17.7 g/dL Final     Hematocrit   Date Value Ref Range Status   04/11/2024 35.2 (L) 37.5 - 51.0 % Final     Platelets   Date Value Ref Range Status   04/11/2024 262 140 - 450 10*3/mm3 Final        Lab Results   Component Value Date    GLUCOSE 151 (H) 10/18/2023    BUN 10 10/18/2023    CREATININE 1.30 04/03/2024    EGFRIFNONA 93 02/04/2022    EGFRIFAFRI 103 10/28/2021    BCR 9.0 10/18/2023    K 3.6 10/18/2023    CO2 24.2 10/18/2023    CALCIUM 9.1 10/18/2023    PROTENTOTREF 6.1 10/28/2021    ALBUMIN 3.9 09/25/2023    LABIL2 1.7 10/28/2021    AST 18 09/25/2023    ALT 13 09/25/2023              CT Chest Abdomen Pelvis With Contrast 9/18/2023 - IMPRESSION:  1. No significant change from the prior exam.  2. Recommend continued oncologic surveillance imaging.    CT Chest Abdomen Pelvis With Contrast 4/3/2024 - CONCLUSION: Solitary mesenteric lymph node near the anastomosis demonstrates mild enlargement since prior examination. Thickened blind loop or cul-de-sac projecting off the lateral aspect of the ascending colon. Suture line noted at its base. Nominal wall thickening of the terminal ileum just proximal to the anastomosis.    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  The tumor measured 2.5 cm in greatest dimension and invaded the muscularis propria into the subserosal tissue without penetration of the serosa.  There was lymphovascular but no perineural invasion.  12 lymph nodes were resected for of which were positive for tumor.  Final pathology pT3 N2 M0.  [facility] scan 3/29/2022-no evidence of residual disease  CT scan chest abdomen pelvis with contrast 10/13/2022- [facility] (tiny right upper lobe lung nodule to be monitored)  CT chest abdomen pelvis with contrast 4/3/2023-[facility]; CT chest abdomen pelvis 9/18/2023-stable 4 mm right upper lobe pulmonary nodule, mildly prominent ileocolic nodes up to 1.1 cm stable in size, heterogeneously enlarged prostate gland, scattered sclerotic foci thoracolumbar spine and pelvis stable; CT chest abdomen pelvis 4/3/2024-[facility]    *Microcytic, hypochromic anemia  Labs look consistent with iron deficiency anemia secondary to GI blood loss related to his malignancy  Hemoglobin lower today-11.1      *Multiple comorbidities of CAD, hypertension, hyperlipidemia, sleep apnea etc.    Hematology/oncology plan/recommendations:  6-month follow-up CBC CMP  CT chest abdomen and pelvis ordered  Reason oral iron every other day  Patient is due for follow-up colonoscopy and EGD scheduled later this month  Repeat CBC ferritin iron profile CMP 6 months

## 2024-04-08 RX ORDER — LOSARTAN POTASSIUM 100 MG/1
100 TABLET ORAL DAILY
Qty: 90 TABLET | Refills: 1 | Status: SHIPPED | OUTPATIENT
Start: 2024-04-08

## 2024-04-08 RX ORDER — APIXABAN 5 MG/1
5 TABLET, FILM COATED ORAL 2 TIMES DAILY
Qty: 60 TABLET | Refills: 3 | Status: SHIPPED | OUTPATIENT
Start: 2024-04-08

## 2024-04-08 RX ORDER — AMIODARONE HYDROCHLORIDE 200 MG/1
TABLET ORAL
Qty: 198 TABLET | Refills: 1 | Status: SHIPPED | OUTPATIENT
Start: 2024-04-08

## 2024-04-08 RX ORDER — FERROUS SULFATE 325(65) MG
TABLET ORAL
Qty: 90 TABLET | Refills: 1 | OUTPATIENT
Start: 2024-04-08

## 2024-04-11 ENCOUNTER — OFFICE VISIT (OUTPATIENT)
Dept: ONCOLOGY | Facility: CLINIC | Age: 69
End: 2024-04-11
Payer: MEDICARE

## 2024-04-11 ENCOUNTER — LAB (OUTPATIENT)
Dept: LAB | Facility: HOSPITAL | Age: 69
End: 2024-04-11
Payer: MEDICARE

## 2024-04-11 VITALS
HEART RATE: 55 BPM | RESPIRATION RATE: 14 BRPM | TEMPERATURE: 98.2 F | SYSTOLIC BLOOD PRESSURE: 164 MMHG | DIASTOLIC BLOOD PRESSURE: 96 MMHG | OXYGEN SATURATION: 98 % | BODY MASS INDEX: 34.29 KG/M2 | HEIGHT: 74 IN | WEIGHT: 267.2 LBS

## 2024-04-11 DIAGNOSIS — C7A.012 MALIGNANT CARCINOID TUMOR OF ILEUM: Primary | ICD-10-CM

## 2024-04-11 DIAGNOSIS — D50.0 IRON DEFICIENCY ANEMIA DUE TO CHRONIC BLOOD LOSS: ICD-10-CM

## 2024-04-11 DIAGNOSIS — C7A.012 MALIGNANT CARCINOID TUMOR OF ILEUM: ICD-10-CM

## 2024-04-11 DIAGNOSIS — D64.89 ANEMIA DUE TO OTHER CAUSE, NOT CLASSIFIED: ICD-10-CM

## 2024-04-11 DIAGNOSIS — Z98.890 NEUROENDOCRINE TUMOR STATUS POST SURGICAL TREATMENT: ICD-10-CM

## 2024-04-11 LAB
ALBUMIN SERPL-MCNC: 3.5 G/DL (ref 3.5–5.2)
ALBUMIN/GLOB SERPL: 1.3 G/DL
ALP SERPL-CCNC: 82 U/L (ref 39–117)
ALT SERPL W P-5'-P-CCNC: 10 U/L (ref 1–41)
ANION GAP SERPL CALCULATED.3IONS-SCNC: 6.9 MMOL/L (ref 5–15)
AST SERPL-CCNC: 15 U/L (ref 1–40)
BASOPHILS # BLD AUTO: 0.07 10*3/MM3 (ref 0–0.2)
BASOPHILS NFR BLD AUTO: 0.9 % (ref 0–1.5)
BILIRUB SERPL-MCNC: 0.3 MG/DL (ref 0–1.2)
BUN SERPL-MCNC: 10 MG/DL (ref 8–23)
BUN/CREAT SERPL: 8.1 (ref 7–25)
CALCIUM SPEC-SCNC: 8.9 MG/DL (ref 8.6–10.5)
CHLORIDE SERPL-SCNC: 101 MMOL/L (ref 98–107)
CO2 SERPL-SCNC: 26.1 MMOL/L (ref 22–29)
CREAT SERPL-MCNC: 1.23 MG/DL (ref 0.76–1.27)
DEPRECATED RDW RBC AUTO: 49.3 FL (ref 37–54)
EGFRCR SERPLBLD CKD-EPI 2021: 63.9 ML/MIN/1.73
EOSINOPHIL # BLD AUTO: 0.17 10*3/MM3 (ref 0–0.4)
EOSINOPHIL NFR BLD AUTO: 2.3 % (ref 0.3–6.2)
ERYTHROCYTE [DISTWIDTH] IN BLOOD BY AUTOMATED COUNT: 15.9 % (ref 12.3–15.4)
GLOBULIN UR ELPH-MCNC: 2.8 GM/DL
GLUCOSE SERPL-MCNC: 126 MG/DL (ref 65–99)
HCT VFR BLD AUTO: 35.2 % (ref 37.5–51)
HGB BLD-MCNC: 11.1 G/DL (ref 13–17.7)
IMM GRANULOCYTES # BLD AUTO: 0.03 10*3/MM3 (ref 0–0.05)
IMM GRANULOCYTES NFR BLD AUTO: 0.4 % (ref 0–0.5)
LYMPHOCYTES # BLD AUTO: 1.64 10*3/MM3 (ref 0.7–3.1)
LYMPHOCYTES NFR BLD AUTO: 22 % (ref 19.6–45.3)
MCH RBC QN AUTO: 27.3 PG (ref 26.6–33)
MCHC RBC AUTO-ENTMCNC: 31.5 G/DL (ref 31.5–35.7)
MCV RBC AUTO: 86.5 FL (ref 79–97)
MONOCYTES # BLD AUTO: 0.55 10*3/MM3 (ref 0.1–0.9)
MONOCYTES NFR BLD AUTO: 7.4 % (ref 5–12)
NEUTROPHILS NFR BLD AUTO: 4.98 10*3/MM3 (ref 1.7–7)
NEUTROPHILS NFR BLD AUTO: 67 % (ref 42.7–76)
NRBC BLD AUTO-RTO: 0 /100 WBC (ref 0–0.2)
PLATELET # BLD AUTO: 262 10*3/MM3 (ref 140–450)
PMV BLD AUTO: 8.6 FL (ref 6–12)
POTASSIUM SERPL-SCNC: 4.6 MMOL/L (ref 3.5–5.2)
PROT SERPL-MCNC: 6.3 G/DL (ref 6–8.5)
RBC # BLD AUTO: 4.07 10*6/MM3 (ref 4.14–5.8)
SODIUM SERPL-SCNC: 134 MMOL/L (ref 136–145)
WBC NRBC COR # BLD AUTO: 7.44 10*3/MM3 (ref 3.4–10.8)

## 2024-04-11 PROCEDURE — 3077F SYST BP >= 140 MM HG: CPT | Performed by: INTERNAL MEDICINE

## 2024-04-11 PROCEDURE — 36415 COLL VENOUS BLD VENIPUNCTURE: CPT

## 2024-04-11 PROCEDURE — 85025 COMPLETE CBC W/AUTO DIFF WBC: CPT

## 2024-04-11 PROCEDURE — 99214 OFFICE O/P EST MOD 30 MIN: CPT | Performed by: INTERNAL MEDICINE

## 2024-04-11 PROCEDURE — 3080F DIAST BP >= 90 MM HG: CPT | Performed by: INTERNAL MEDICINE

## 2024-04-11 PROCEDURE — 80053 COMPREHEN METABOLIC PANEL: CPT

## 2024-04-11 PROCEDURE — 1126F AMNT PAIN NOTED NONE PRSNT: CPT | Performed by: INTERNAL MEDICINE

## 2024-04-29 RX ORDER — FERROUS SULFATE 325(65) MG
325 TABLET ORAL
COMMUNITY

## 2024-04-30 ENCOUNTER — ANESTHESIA EVENT (OUTPATIENT)
Dept: GASTROENTEROLOGY | Facility: HOSPITAL | Age: 69
End: 2024-04-30
Payer: MEDICARE

## 2024-04-30 ENCOUNTER — ANESTHESIA (OUTPATIENT)
Dept: GASTROENTEROLOGY | Facility: HOSPITAL | Age: 69
End: 2024-04-30
Payer: MEDICARE

## 2024-04-30 ENCOUNTER — HOSPITAL ENCOUNTER (OUTPATIENT)
Facility: HOSPITAL | Age: 69
Setting detail: HOSPITAL OUTPATIENT SURGERY
Discharge: HOME | End: 2024-04-30
Attending: INTERNAL MEDICINE | Admitting: INTERNAL MEDICINE
Payer: MEDICARE

## 2024-04-30 VITALS
SYSTOLIC BLOOD PRESSURE: 128 MMHG | BODY MASS INDEX: 35.08 KG/M2 | DIASTOLIC BLOOD PRESSURE: 84 MMHG | RESPIRATION RATE: 20 BRPM | HEIGHT: 72 IN | OXYGEN SATURATION: 98 % | WEIGHT: 259 LBS | HEART RATE: 51 BPM

## 2024-04-30 DIAGNOSIS — D50.9 IRON DEFICIENCY ANEMIA, UNSPECIFIED IRON DEFICIENCY ANEMIA TYPE: ICD-10-CM

## 2024-04-30 DIAGNOSIS — R19.4 CHANGE IN BOWEL HABITS: ICD-10-CM

## 2024-04-30 LAB — GLUCOSE BLDC GLUCOMTR-MCNC: 109 MG/DL (ref 70–130)

## 2024-04-30 PROCEDURE — 25010000002 PROPOFOL 1000 MG/100ML EMULSION: Performed by: NURSE ANESTHETIST, CERTIFIED REGISTERED

## 2024-04-30 PROCEDURE — 25010000002 PROPOFOL 200 MG/20ML EMULSION: Performed by: NURSE ANESTHETIST, CERTIFIED REGISTERED

## 2024-04-30 PROCEDURE — 88305 TISSUE EXAM BY PATHOLOGIST: CPT | Performed by: INTERNAL MEDICINE

## 2024-04-30 PROCEDURE — 25810000003 LACTATED RINGERS PER 1000 ML: Performed by: INTERNAL MEDICINE

## 2024-04-30 PROCEDURE — 82948 REAGENT STRIP/BLOOD GLUCOSE: CPT

## 2024-04-30 PROCEDURE — 25010000002 GLYCOPYRROLATE 0.2 MG/ML SOLUTION: Performed by: NURSE ANESTHETIST, CERTIFIED REGISTERED

## 2024-04-30 RX ORDER — PROPOFOL 10 MG/ML
INJECTION, EMULSION INTRAVENOUS AS NEEDED
Status: DISCONTINUED | OUTPATIENT
Start: 2024-04-30 | End: 2024-04-30 | Stop reason: SURG

## 2024-04-30 RX ORDER — SODIUM CHLORIDE, SODIUM LACTATE, POTASSIUM CHLORIDE, CALCIUM CHLORIDE 600; 310; 30; 20 MG/100ML; MG/100ML; MG/100ML; MG/100ML
1000 INJECTION, SOLUTION INTRAVENOUS CONTINUOUS
Status: DISCONTINUED | OUTPATIENT
Start: 2024-04-30 | End: 2024-04-30 | Stop reason: HOSPADM

## 2024-04-30 RX ORDER — PROPOFOL 10 MG/ML
INJECTION, EMULSION INTRAVENOUS CONTINUOUS PRN
Status: DISCONTINUED | OUTPATIENT
Start: 2024-04-30 | End: 2024-04-30 | Stop reason: SURG

## 2024-04-30 RX ORDER — LIDOCAINE HYDROCHLORIDE 20 MG/ML
INJECTION, SOLUTION INFILTRATION; PERINEURAL AS NEEDED
Status: DISCONTINUED | OUTPATIENT
Start: 2024-04-30 | End: 2024-04-30 | Stop reason: SURG

## 2024-04-30 RX ORDER — GLYCOPYRROLATE 0.2 MG/ML
INJECTION INTRAMUSCULAR; INTRAVENOUS AS NEEDED
Status: DISCONTINUED | OUTPATIENT
Start: 2024-04-30 | End: 2024-04-30 | Stop reason: SURG

## 2024-04-30 RX ADMIN — PROPOFOL INJECTABLE EMULSION 100 MG: 10 INJECTION, EMULSION INTRAVENOUS at 10:54

## 2024-04-30 RX ADMIN — SODIUM CHLORIDE, POTASSIUM CHLORIDE, SODIUM LACTATE AND CALCIUM CHLORIDE 1000 ML: 600; 310; 30; 20 INJECTION, SOLUTION INTRAVENOUS at 10:42

## 2024-04-30 RX ADMIN — GLYCOPYRROLATE 0.2 MG: 0.2 INJECTION INTRAMUSCULAR; INTRAVENOUS at 10:52

## 2024-04-30 RX ADMIN — LIDOCAINE HYDROCHLORIDE 60 MG: 20 INJECTION, SOLUTION INFILTRATION; PERINEURAL at 10:54

## 2024-04-30 RX ADMIN — PROPOFOL 180 MCG/KG/MIN: 10 INJECTION, EMULSION INTRAVENOUS at 10:55

## 2024-04-30 NOTE — H&P
[facility], [place], operated by [facility]  Pre Procedure History & Physical    Chief Complaint:   Anemia  Change in bowel habit    Subjective     HPI:   69 y.o. male here for EGD/colon for above issues    Past Medical History:   Past Medical History:   Diagnosis Date    Anemia     AP (angina pectoris)     unstable    CAD (coronary artery disease)     6 stents placed    DDD (degenerative disc disease), lumbosacral     Diabetes     Essential hypertension     Fatty liver     ?    History of Clostridioides difficile infection 10/2021    History of MI (myocardial infarction)     Hyperlipidemia     Low back pain     Mass of colon     Memory loss     SOME SHORT TERM MEMORY ISSUES AND FORGETFULNESS    Osteoarthritis of knee     Screening for prostate cancer 2010    normal    Skin cancer     left eyebrow, side of face and back     Sleep apnea     NO CPAP    Tachycardia        Past Surgical History:  Past Surgical History:   Procedure Laterality Date    COLON RESECTION N/A 02/03/2022    Procedure: ILEUM COLON RESECTION FOR TERMINAL ILEUM MASS LAPAROSCOPIC;  Surgeon: Roscoe Donovan MD;  Location: [facility] MAIN OR;  Service: General;  Laterality: N/A;    COLONOSCOPY N/A 2016    normal per patient    COLONOSCOPY N/A 08/03/2020    Procedure: COLONOSCOPY to cecum and TI with cold biopsies;  Surgeon: Kelly Ashraf MD;  Location: [facility] ENDOSCOPY;  Service: Gastroenterology;  Laterality: N/A;  pre-change in bowel habits, hx polyps   post-hemorrhoids, lipomas, abnormal cecal tissue     CORONARY ANGIOPLASTY WITH STENT PLACEMENT  08/28/2015    6 stents-Dr. Hall, [facility]    FOOT SURGERY Right 05/23/2017    Dr. Cervantes, [facility]    REPLACEMENT TOTAL KNEE Left 2010    Dr. Arnaud Reynaga, [facility]    REPLACEMENT TOTAL KNEE Right 2009    Dr. Arnaud Reynaga, [facility]    SKIN BIOPSY      SKIN CANCER EXCISION Left 03/30/2016    left ear and left eyebrow, graft took from face, placed on ear    SKIN CANCER EXCISION  2001    back    UPPER GASTROINTESTINAL  ENDOSCOPY         Family History:  Family History   Problem Relation Age of Onset    Hypertension Mother     Arthritis Mother     Diabetes Mother     Heart disease Father     Hypertension Father     Macular degeneration Father     Arthritis Father     Diabetes Father     Arthritis Maternal Grandmother     Heart disease Maternal Grandmother     Arthritis Maternal Grandfather     Heart disease Maternal Grandfather     Diabetes Maternal Grandfather     Hypertension Maternal Grandfather     Colon cancer Maternal Grandfather     Arthritis Paternal Grandmother     Heart disease Paternal Grandmother     Diabetes Paternal Grandmother     Hypertension Paternal Grandmother     Stroke Paternal Grandmother     Colon cancer Paternal Grandmother     Arthritis Paternal Grandfather     Heart disease Paternal Grandfather     Colon cancer Paternal Grandfather     Macular degeneration Sister     Malig Hyperthermia Neg Hx        Social History:   reports that he has never smoked. He has never used smokeless tobacco. He reports that he does not currently use drugs. He reports that he does not drink alcohol.    Medications:   Medications Prior to Admission   Medication Sig Dispense Refill Last Dose    amiodarone (PACERONE) 200 MG tablet TAKE 2 TABLETS BY MOUTH TWICE A DAY FOR 3 DAYS THEN 1 TWICE A DAY (Patient taking differently: Take 1 tablet by mouth 2 (Two) Times a Day.) 198 tablet 1 4/29/2024    citalopram (CeleXA) 20 MG tablet Take 1 tablet by mouth Daily. 90 tablet 2 4/28/2024    losartan (COZAAR) 100 MG tablet TAKE 1 TABLET BY MOUTH EVERY DAY 90 tablet 1 4/30/2024    metFORMIN ER (GLUCOPHAGE-XR) 500 MG 24 hr tablet TAKE 2 TABLETS BY MOUTH EVERY DAY WITH BREAKFAST (NEW DOSE) 180 tablet 1 4/28/2024    metoprolol succinate XL (TOPROL-XL) 50 MG 24 hr tablet Take 1 tablet by mouth 2 (Two) Times a Day. 60 tablet 11 4/30/2024    Pseudoeph-Doxylamine-DM-APAP (NYQUIL PO) Take  by mouth every night at bedtime.   Past Month    clopidogrel  "(PLAVIX) 75 MG tablet TAKE 1 TABLET BY MOUTH EVERY DAY 90 tablet 3 4/28/2024    Eliquis 5 MG tablet tablet TAKE 1 TABLET BY MOUTH TWICE A DAY 60 tablet 3 4/26/2024    ferrous sulfate 325 (65 FE) MG tablet Take 1 tablet by mouth Daily With Breakfast.   4/28/2024    sacubitril-valsartan (ENTRESTO) 24-26 MG tablet Take 1 tablet by mouth 2 (Two) Times a Day. 60 tablet 11 4/28/2024    spironolactone (ALDACTONE) 25 MG tablet Take 1 tablet by mouth Daily. 30 tablet 11 4/28/2024       Allergies:  Ambien [zolpidem] and Morphine and related    ROS:    Pertinent items are noted in HPI     Objective     Blood pressure 137/92, pulse 51, resp. rate 16, height 182.9 cm (72\"), weight 117 kg (259 lb), SpO2 99%.    Physical Exam   Constitutional: Pt is oriented to person, place, and time and well-developed, well-nourished, and in no distress.   Mouth/Throat: Oropharynx is clear and moist.   Neck: Normal range of motion.   Cardiovascular: Normal rate, regular rhythm and normal heart sounds.    Pulmonary/Chest: Effort normal and breath sounds normal.   Abdominal: Soft. Nontender  Skin: Skin is warm and dry.   Psychiatric: Mood, memory, affect and judgment normal.     Assessment & Plan     Diagnosis:  Anemia  Change in bowel habit    Anticipated Surgical Procedure:  EGD  Colonoscopy    The risks, benefits, and alternatives of this procedure have been discussed with the patient or the responsible party- the patient understands and agrees to proceed.                                                            "

## 2024-04-30 NOTE — ANESTHESIA PREPROCEDURE EVALUATION
Anesthesia Evaluation     Patient summary reviewed and Nursing notes reviewed   no history of anesthetic complications:   NPO Solid Status: > 8 hours  NPO Liquid Status: > 2 hours           Airway   Mallampati: III  TM distance: >3 FB  Neck ROM: full  Dental    (+) poor dentition    Comment: Multiple broken teeth    Pulmonary    (+) ,sleep apnea  Cardiovascular     ECG reviewed  Rhythm: regular  Rate: normal    (+) hypertension, CAD, cardiac stents Drug eluting stent more than 12 months ago , dysrhythmias Atrial Fib, CHF Systolic <55%, PVD, hyperlipidemia    ROS comment: TTE:  ·  Left ventricular systolic function is severely decreased. Calculated left ventricular EF = 18.3%  ·  The left ventricular cavity is moderately dilated.  ·  There is left ventricular global hypokinesis noted.  ·  Left atrial volume is mildly increased.  ·  Estimated right ventricular systolic pressure from tricuspid regurgitation is normal (<35 mmHg).    PE comment: Hr 50s    Neuro/Psych  (+) psychiatric history Anxiety  GI/Hepatic/Renal/Endo    (+) obesity, GERD, diabetes mellitus    Musculoskeletal     Abdominal    Substance History      OB/GYN          Other   arthritis, blood dyscrasia,     (-) history of cancer  ROS/Med Hx Other: Hgb 11.1                    Anesthesia Plan    ASA 4     MAC       Anesthetic plan, risks, benefits, and alternatives have been provided, discussed and informed consent has been obtained with: patient.    Plan discussed with CRNA.        CODE STATUS:

## 2024-04-30 NOTE — ANESTHESIA POSTPROCEDURE EVALUATION
"Patient: Angel White Jr.    Procedure Summary       Date: 04/30/24 Room / Location: [facility] ENDOSCOPY 10 / [facility] ENDOSCOPY    Anesthesia Start: 1050 Anesthesia Stop: 1114    Procedures:       ESOPHAGOGASTRODUODENOSCOPY WITH BIOPSY (Esophagus)      COLONOSCOPY INTO ILIEUM WITH BIOPSIES Diagnosis:       Iron deficiency anemia, unspecified iron deficiency anemia type      Change in bowel habits      (Iron deficiency anemia, unspecified iron deficiency anemia type [D50.9])      (Change in bowel habits [R19.4])    Surgeons: Jose Mai MD Provider: Franco Phillips MD    Anesthesia Type: MAC ASA Status: 4            Anesthesia Type: MAC    Vitals  Vitals Value Taken Time   /90 04/30/24 1151   Temp     Pulse 45 04/30/24 1158   Resp 20 04/30/24 1138   SpO2 100 % 04/30/24 1158   Vitals shown include unfiled device data.        Post Anesthesia Care and Evaluation    Level of consciousness: awake and alert  Pain management: adequate  Anesthetic complications: No anesthetic complications    Cardiovascular status: acceptable  Respiratory status: acceptable  Hydration status: acceptable    Comments: /84   Pulse 51   Resp 20   Ht 182.9 cm (72\")   Wt 117 kg (259 lb)   SpO2 98%   BMI 35.13 kg/m²       "

## 2024-05-02 LAB
LAB AP CASE REPORT: NORMAL
PATH REPORT.FINAL DX SPEC: NORMAL
PATH REPORT.GROSS SPEC: NORMAL

## 2024-05-13 RX ORDER — FERROUS SULFATE 325(65) MG
1 TABLET ORAL
Qty: 90 TABLET | Refills: 1 | Status: SHIPPED | OUTPATIENT
Start: 2024-05-13

## 2024-05-24 ENCOUNTER — TELEPHONE (OUTPATIENT)
Dept: GASTROENTEROLOGY | Facility: CLINIC | Age: 69
End: 2024-05-24
Payer: MEDICARE

## 2024-05-24 NOTE — TELEPHONE ENCOUNTER
Jose Mai MD  [facility]  Negative EGD bx  Colon with some mild inflammation of anastomosis but otherwise normal  Repeat colonoscopy 3yrs  Office f/u 6-8 weeks

## 2024-05-24 NOTE — TELEPHONE ENCOUNTER
Called pt and advised of Dr Mai's note.  Pt verbalized understanding.     Colonoscopy placed in HM and recall for 4/30/27.

## 2024-06-10 ENCOUNTER — OFFICE VISIT (OUTPATIENT)
Dept: INTERNAL MEDICINE | Facility: CLINIC | Age: 69
End: 2024-06-10
Payer: MEDICARE

## 2024-06-10 VITALS
BODY MASS INDEX: 33.24 KG/M2 | WEIGHT: 250.8 LBS | TEMPERATURE: 97.4 F | RESPIRATION RATE: 18 BRPM | SYSTOLIC BLOOD PRESSURE: 110 MMHG | OXYGEN SATURATION: 99 % | HEIGHT: 73 IN | DIASTOLIC BLOOD PRESSURE: 68 MMHG | HEART RATE: 50 BPM

## 2024-06-10 DIAGNOSIS — K04.7 DENTAL ABSCESS: Primary | ICD-10-CM

## 2024-06-10 PROCEDURE — 1159F MED LIST DOCD IN RCRD: CPT | Performed by: FAMILY MEDICINE

## 2024-06-10 PROCEDURE — 3078F DIAST BP <80 MM HG: CPT | Performed by: FAMILY MEDICINE

## 2024-06-10 PROCEDURE — G2211 COMPLEX E/M VISIT ADD ON: HCPCS | Performed by: FAMILY MEDICINE

## 2024-06-10 PROCEDURE — 1126F AMNT PAIN NOTED NONE PRSNT: CPT | Performed by: FAMILY MEDICINE

## 2024-06-10 PROCEDURE — 3074F SYST BP LT 130 MM HG: CPT | Performed by: FAMILY MEDICINE

## 2024-06-10 PROCEDURE — 1160F RVW MEDS BY RX/DR IN RCRD: CPT | Performed by: FAMILY MEDICINE

## 2024-06-10 PROCEDURE — 99213 OFFICE O/P EST LOW 20 MIN: CPT | Performed by: FAMILY MEDICINE

## 2024-06-10 RX ORDER — AMOXICILLIN AND CLAVULANATE POTASSIUM 875; 125 MG/1; MG/1
1 TABLET, FILM COATED ORAL 2 TIMES DAILY
Qty: 14 TABLET | Refills: 0 | Status: SHIPPED | OUTPATIENT
Start: 2024-06-10 | End: 2024-06-17

## 2024-06-10 NOTE — PROGRESS NOTES
"Chief Complaint  Dental Problem (LT JAW SWOLLEN POS INFECTED TOOTH ) and Eye Problem (LT EYE SWOLLEN )    Subjective          Angel White  presents to [facility]IMARY [facility]ARE  History of Present Illness  The patient is a 68-year-old male who presents for evaluation of tooth abscess. He typically sees Dr. Meza.    The patient suspects an abscessed tooth, a condition he has previously encountered, albeit less severe. He has not been to a dentist for the past 3 years, as his dentist has since retired and he cannot afford to go see a dentist as he has mentioned this multiple times.  The onset of the abscess was approximately one week ago, and upon awakening this morning, he noticed swelling extending to his eyes. He also reports discomfort in his teeth on the left side.     The patient has no known drug allergies.    Objective   Vital Signs:   /68 (BP Location: Left arm, Patient Position: Sitting)   Pulse 50   Temp 97.4 °F (36.3 °C)   Resp 18   Ht 185.9 cm (73.19\")   Wt 114 kg (250 lb 12.8 oz)   SpO2 99%   BMI 32.92 kg/m²     Physical Exam  Vitals and nursing note reviewed.   Constitutional:       Appearance: He is well-developed.   HENT:      Head: Normocephalic and atraumatic.      Comments: Poor dentition.  Musculoskeletal:      Cervical back: Normal range of motion and neck supple.   Neurological:      Mental Status: He is alert and oriented to person, place, and time.   Psychiatric:         Behavior: Behavior normal.         Physical Exam  Periorbital swelling noted around the left eye.     Result Review :                 Assessment and Plan    Diagnoses and all orders for this visit:    1. Dental abscess (Primary)  -     amoxicillin-clavulanate (AUGMENTIN) 875-125 MG per tablet; Take 1 tablet by mouth 2 (Two) Times a Day for 7 days.  Dispense: 14 tablet; Refill: 0      Assessment & Plan  1. Tooth abscess.  A prescription for Augmentin has been issued. The patient has been " advised to consult with a dentist.        Follow Up   No follow-ups on file.  Patient was given instructions and counseling regarding his condition or for health maintenance advice. Please see specific information pulled into the AVS if appropriate.           Patient or patient representative verbalized consent for the use of Ambient Listening during the visit with  Suraj Zarco MD for chart documentation. 6/10/2024  13:57 EDT

## 2024-07-15 RX ORDER — METOPROLOL SUCCINATE 50 MG/1
50 TABLET, EXTENDED RELEASE ORAL 2 TIMES DAILY
Qty: 180 TABLET | Refills: 3 | Status: SHIPPED | OUTPATIENT
Start: 2024-07-15

## 2024-07-16 ENCOUNTER — TELEPHONE (OUTPATIENT)
Dept: CARDIOLOGY | Facility: CLINIC | Age: 69
End: 2024-07-16

## 2024-07-16 NOTE — TELEPHONE ENCOUNTER
Caller: Angel White Jr.    Relationship to patient: Self    Best call back number: [phone]    Patient is needing: PATIENT CHECKING FOR ALTERNATIVES TO ENTRESTO, PATIENT IS OUT OF MEDICATION NOW BUT CAN'T AFFORD A REFILL. IF PATIENT NEEDS TO STAY ON MEDICATION WILL NEED SAMPLES.

## 2024-08-14 ENCOUNTER — TELEPHONE (OUTPATIENT)
Age: 69
End: 2024-08-14

## 2024-08-14 NOTE — TELEPHONE ENCOUNTER
Caller: Angel White Jr.    Relationship to patient: Self    Best call back number:       Patient is needing: PATIENT IS OUT OF ENTRESTO SAMPLES, CHECKING TO SEE IF HE CAN BE SWITCHED TO SOMETHING CHEAPER.

## 2024-08-15 NOTE — TELEPHONE ENCOUNTER
Spoke w/ pt and wife and I'm going to initiate Entresto Pt Assistance and send info for [employer] Program through [employer]. What dose of Entresto do you want him taking??    GENEVA Hernandez

## 2024-09-04 ENCOUNTER — OFFICE VISIT (OUTPATIENT)
Dept: CARDIOLOGY | Facility: CLINIC | Age: 69
End: 2024-09-04
Payer: MEDICARE

## 2024-09-04 VITALS
SYSTOLIC BLOOD PRESSURE: 140 MMHG | HEIGHT: 73 IN | DIASTOLIC BLOOD PRESSURE: 80 MMHG | BODY MASS INDEX: 33.93 KG/M2 | WEIGHT: 256 LBS | OXYGEN SATURATION: 97 % | HEART RATE: 43 BPM

## 2024-09-04 DIAGNOSIS — I42.8 NICM (NONISCHEMIC CARDIOMYOPATHY): ICD-10-CM

## 2024-09-04 DIAGNOSIS — I48.19 PERSISTENT ATRIAL FIBRILLATION: Primary | ICD-10-CM

## 2024-09-04 PROCEDURE — 99214 OFFICE O/P EST MOD 30 MIN: CPT | Performed by: NURSE PRACTITIONER

## 2024-09-04 PROCEDURE — 93000 ELECTROCARDIOGRAM COMPLETE: CPT | Performed by: NURSE PRACTITIONER

## 2024-09-04 PROCEDURE — 3077F SYST BP >= 140 MM HG: CPT | Performed by: NURSE PRACTITIONER

## 2024-09-04 PROCEDURE — 3079F DIAST BP 80-89 MM HG: CPT | Performed by: NURSE PRACTITIONER

## 2024-09-04 RX ORDER — AMIODARONE HYDROCHLORIDE 200 MG/1
200 TABLET ORAL DAILY
Qty: 90 TABLET | Refills: 3 | Status: SHIPPED | OUTPATIENT
Start: 2024-09-04

## 2024-09-04 NOTE — PROGRESS NOTES
Date of Office Visit: 2024  Encounter Provider: TREY Breaux  Place of Service: [facility] CARDIOLOGY  Patient Name: Angel White Jr.  :1955    Chief complaint: Coronary disease    HPI: Angel White Jr. is a 69 y.o. male who is a patient of Dr. Dumont and is known to me from previous.  She has a history of multivessel coronary disease since  he had unstable angina and underwent a drug-eluting stent placement to the proximal to mid LAD as well as the ramus.  He also had stenting of the PDA and proximal RCA at that time.  He has a history of hypertension, hyperlipidemia NAFLD, diabetes mellitus and atrial fibrillation.    He was admitted to the hospital in 2023 with a new onset of A-fib and was diagnosed with cardiomyopathy.  His ejection fraction was about 20% and it was assumed to be due to tachyarrhythmia.  Since then he has had a least 2 cardioversions and is maintained sinus with amiodarone therapy.  His last follow-up was in November of last year he remained fatigue and had some dyspnea.  He has 14 cats and a dog his wife is bedbound and he cares for her.  We repeated his echocardiogram and his ejection fraction had improved with medical therapy to 43%.  Previous testing and notes have been reviewed by me.   Past Medical History:   Diagnosis Date    Anemia     AP (angina pectoris)     unstable    CAD (coronary artery disease)     6 stents placed    DDD (degenerative disc disease), lumbosacral     Diabetes     Essential hypertension     Fatty liver     ?    History of Clostridioides difficile infection 10/2021    History of MI (myocardial infarction)     Hyperlipidemia     Low back pain     Mass of colon     Memory loss     SOME SHORT TERM MEMORY ISSUES AND FORGETFULNESS    Osteoarthritis of knee     Screening for prostate cancer 2010    normal    Skin cancer     left eyebrow, side of face and back     Sleep apnea     NO CPAP    Tachycardia         Past Surgical History:   Procedure Laterality Date    COLON RESECTION N/A 02/03/2022    Procedure: ILEUM COLON RESECTION FOR TERMINAL ILEUM MASS LAPAROSCOPIC;  Surgeon: Roscoe Donovan MD;  Location: [facility] MAIN OR;  Service: General;  Laterality: N/A;    COLONOSCOPY N/A 2016    normal per patient    COLONOSCOPY N/A 08/03/2020    Procedure: COLONOSCOPY to cecum and TI with cold biopsies;  Surgeon: Kelly sAhraf MD;  Location: [facility] ENDOSCOPY;  Service: Gastroenterology;  Laterality: N/A;  pre-change in bowel habits, hx polyps   post-hemorrhoids, lipomas, abnormal cecal tissue     COLONOSCOPY W/ POLYPECTOMY N/A 4/30/2024    Procedure: COLONOSCOPY INTO ILIEUM WITH BIOPSIES;  Surgeon: Jose Mai MD;  Location: [facility] ENDOSCOPY;  Service: Gastroenterology;  Laterality: N/A;  PRE: ANEMIA, CHANGE IN BOWEL HABITS, PERSONAL H/O COLON CANCER /  POST: HEMORRHOIDS, ANASTAMOTIC INFLAMATION    CORONARY ANGIOPLASTY WITH STENT PLACEMENT  08/28/2015    6 stents-Dr. Hall, [facility]    ENDOSCOPY N/A 4/30/2024    Procedure: ESOPHAGOGASTRODUODENOSCOPY WITH BIOPSY;  Surgeon: Jose Mai MD;  Location: [facility] ENDOSCOPY;  Service: Gastroenterology;  Laterality: N/A;  PRE:  ANEMIA /  POST: NORMAL    FOOT SURGERY Right 05/23/2017    Dr. Cervantes, [facility] and [facility]    REPLACEMENT TOTAL KNEE Left 2010    Dr. Arnaud Reynaga, [facility]    REPLACEMENT TOTAL KNEE Right 2009    Dr. Arnaud Reynaga, [facility]    SKIN BIOPSY      SKIN CANCER EXCISION Left 03/30/2016    left ear and left eyebrow, graft took from face, placed on ear    SKIN CANCER EXCISION  2001    back    UPPER GASTROINTESTINAL ENDOSCOPY         Social History     Socioeconomic History    Marital status:      Spouse name: Jaylene   Tobacco Use    Smoking status: Never    Smokeless tobacco: Never    Tobacco comments:     Caffeine daily   Vaping Use    Vaping status: Never Used   Substance and Sexual Activity    Alcohol use: No    Drug use: Not Currently    Sexual  activity: Defer     Partners: Female       Family History   Problem Relation Age of Onset    Hypertension Mother     Arthritis Mother     Diabetes Mother     Heart disease Father     Hypertension Father     Macular degeneration Father     Arthritis Father     Diabetes Father     Arthritis Maternal Grandmother     Heart disease Maternal Grandmother     Arthritis Maternal Grandfather     Heart disease Maternal Grandfather     Diabetes Maternal Grandfather     Hypertension Maternal Grandfather     Colon cancer Maternal Grandfather     Arthritis Paternal Grandmother     Heart disease Paternal Grandmother     Diabetes Paternal Grandmother     Hypertension Paternal Grandmother     Stroke Paternal Grandmother     Colon cancer Paternal Grandmother     Arthritis Paternal Grandfather     Heart disease Paternal Grandfather     Colon cancer Paternal Grandfather     Macular degeneration Sister     Malig Hyperthermia Neg Hx        Review of Systems   Constitutional: Negative for diaphoresis and malaise/fatigue.   Cardiovascular:  Negative for chest pain, claudication, dyspnea on exertion, irregular heartbeat, leg swelling, near-syncope, orthopnea, palpitations, paroxysmal nocturnal dyspnea and syncope.   Respiratory:  Negative for cough, shortness of breath and sleep disturbances due to breathing.    Musculoskeletal:  Negative for falls.   Neurological:  Negative for dizziness and weakness.   Psychiatric/Behavioral:  Negative for altered mental status and substance abuse.        Allergies   Allergen Reactions    Ambien [Zolpidem] Other (See Comments)     Sleep walking      Morphine And Codeine Itching and Rash         Current Outpatient Medications:     amiodarone (PACERONE) 200 MG tablet, TAKE 2 TABLETS BY MOUTH TWICE A DAY FOR 3 DAYS THEN 1 TWICE A DAY (Patient taking differently: Take 1 tablet by mouth 2 (Two) Times a Day.), Disp: 198 tablet, Rfl: 1    citalopram (CeleXA) 20 MG tablet, Take 1 tablet by mouth Daily., Disp: 90  "tablet, Rfl: 2    clopidogrel (PLAVIX) 75 MG tablet, TAKE 1 TABLET BY MOUTH EVERY DAY, Disp: 90 tablet, Rfl: 3    Eliquis 5 MG tablet tablet, TAKE 1 TABLET BY MOUTH TWICE A DAY, Disp: 60 tablet, Rfl: 3    ferrous sulfate 325 (65 FE) MG tablet, TAKE 1 TABLET BY MOUTH EVERY DAY WITH BREAKFAST, Disp: 90 tablet, Rfl: 1    losartan (COZAAR) 100 MG tablet, TAKE 1 TABLET BY MOUTH EVERY DAY, Disp: 90 tablet, Rfl: 1    metFORMIN ER (GLUCOPHAGE-XR) 500 MG 24 hr tablet, TAKE 2 TABLETS BY MOUTH EVERY DAY WITH BREAKFAST (NEW DOSE), Disp: 180 tablet, Rfl: 1    metoprolol succinate XL (TOPROL-XL) 50 MG 24 hr tablet, TAKE 1 TABLET BY MOUTH TWICE A DAY, Disp: 180 tablet, Rfl: 3    Pseudoeph-Doxylamine-DM-APAP (NYQUIL PO), Take  by mouth every night at bedtime., Disp: , Rfl:     spironolactone (ALDACTONE) 25 MG tablet, Take 1 tablet by mouth Daily., Disp: 30 tablet, Rfl: 11      Objective:     Vitals:    09/04/24 1152   BP: 140/80   BP Location: Left arm   Patient Position: Sitting   Pulse: (!) 43   SpO2: 97%   Weight: 116 kg (256 lb)   Height: 185.9 cm (73.19\")     Body mass index is 33.6 kg/m².    PHYSICAL EXAM:    Constitutional:       General: Not in acute distress.     Appearance: Normal appearance. Well-developed.   Eyes:      Pupils: Pupils are equal, round, and reactive to light.   HENT:      Head: Normocephalic.   Neck:      Vascular: No carotid bruit or JVD.   Pulmonary:      Effort: Pulmonary effort is normal. No tachypnea.      Breath sounds: Normal breath sounds. No wheezing. No rales.   Cardiovascular:      Normal rate. Regular rhythm.      No gallop.    Pulses:     Intact distal pulses.   Edema:     Peripheral edema absent.   Abdominal:      General: Bowel sounds are normal.      Palpations: Abdomen is soft.      Tenderness: There is no abdominal tenderness.   Musculoskeletal: Normal range of motion.      Cervical back: Normal range of motion and neck supple. No edema. Skin:     General: Skin is warm and dry. "   Neurological:      Mental Status: Alert and oriented to person, place, and time.           ECG 12 Lead    Date/Time: 9/4/2024 12:43 PM  Performed by: Danielle Robles APRN    Authorized by: Danielle Robles APRN  Comparison: compared with previous ECG from 10/25/2023  Similar to previous ECG  Rhythm: sinus bradycardia  Rate: bradycardic  BPM: 43  QRS axis: normal    Clinical impression: abnormal EKG            Assessment:      1.  Paroxysmal atrial fibrillation maintaining sinus rhythm on amiodarone.  Anticoagulated with Eliquis.  Was going to be referred to electrophysiology but it does not look like he ever went to talk about permanent options for management of his A-fib, will discuss with her.  On amiodarone therapy will check thyroid panel, yearly chest x-ray and CMP.    2.  Nonischemic cardiomyopathy.  Related to rapid atrial fibrillation.  Last year EF was improving.  Will recheck to see if it has normalized.    3.  History of coronary disease in 2015.  No angina symptoms on statin last lipid panel at goal    4.  Essential hypertension at goal on current regimen     Plan:       Follow-up in 6 months with Dr. Dumont         Your medication list            Accurate as of September 4, 2024 12:04 PM. If you have any questions, ask your nurse or doctor.                CHANGE how you take these medications        Instructions Last Dose Given Next Dose Due   amiodarone 200 MG tablet  Commonly known as: PACERONE  What changed: See the new instructions.      TAKE 2 TABLETS BY MOUTH TWICE A DAY FOR 3 DAYS THEN 1 TWICE A DAY              CONTINUE taking these medications        Instructions Last Dose Given Next Dose Due   citalopram 20 MG tablet  Commonly known as: CeleXA      Take 1 tablet by mouth Daily.       clopidogrel 75 MG tablet  Commonly known as: PLAVIX      TAKE 1 TABLET BY MOUTH EVERY DAY       Eliquis 5 MG tablet tablet  Generic drug: apixaban      TAKE 1 TABLET BY MOUTH TWICE A DAY       ferrous  sulfate 325 (65 FE) MG tablet      TAKE 1 TABLET BY MOUTH EVERY DAY WITH BREAKFAST       losartan 100 MG tablet  Commonly known as: COZAAR      TAKE 1 TABLET BY MOUTH EVERY DAY       metFORMIN  MG 24 hr tablet  Commonly known as: GLUCOPHAGE-XR      TAKE 2 TABLETS BY MOUTH EVERY DAY WITH BREAKFAST (NEW DOSE)       metoprolol succinate XL 50 MG 24 hr tablet  Commonly known as: TOPROL-XL      TAKE 1 TABLET BY MOUTH TWICE A DAY       NYQUIL PO      Take  by mouth every night at bedtime.       spironolactone 25 MG tablet  Commonly known as: ALDACTONE      Take 1 tablet by mouth Daily.                  As always, it has been a pleasure to participate in your patient's care.      Sincerely,     Danielle YANG

## 2024-09-05 ENCOUNTER — LAB (OUTPATIENT)
Dept: LAB | Facility: HOSPITAL | Age: 69
End: 2024-09-05
Payer: MEDICARE

## 2024-09-05 ENCOUNTER — HOSPITAL ENCOUNTER (OUTPATIENT)
Dept: GENERAL RADIOLOGY | Facility: HOSPITAL | Age: 69
Discharge: HOME | End: 2024-09-05
Payer: MEDICARE

## 2024-09-05 ENCOUNTER — HOSPITAL ENCOUNTER (OUTPATIENT)
Dept: CARDIOLOGY | Facility: HOSPITAL | Age: 69
Discharge: HOME | End: 2024-09-05
Payer: MEDICARE

## 2024-09-05 VITALS
SYSTOLIC BLOOD PRESSURE: 152 MMHG | DIASTOLIC BLOOD PRESSURE: 82 MMHG | HEIGHT: 73 IN | WEIGHT: 256 LBS | BODY MASS INDEX: 33.93 KG/M2

## 2024-09-05 DIAGNOSIS — I48.19 PERSISTENT ATRIAL FIBRILLATION: ICD-10-CM

## 2024-09-05 DIAGNOSIS — I42.8 NICM (NONISCHEMIC CARDIOMYOPATHY): ICD-10-CM

## 2024-09-05 LAB
ALBUMIN SERPL-MCNC: 4 G/DL (ref 3.5–5.2)
ALBUMIN/GLOB SERPL: 1.4 G/DL
ALP SERPL-CCNC: 68 U/L (ref 39–117)
ALT SERPL W P-5'-P-CCNC: 8 U/L (ref 1–41)
ANION GAP SERPL CALCULATED.3IONS-SCNC: 10.2 MMOL/L (ref 5–15)
AORTIC DIMENSIONLESS INDEX: 0.5 (DI)
AST SERPL-CCNC: 12 U/L (ref 1–40)
BH CV ECHO MEAS - ACS: 2.37 CM
BH CV ECHO MEAS - AI P1/2T: 1102 MSEC
BH CV ECHO MEAS - AO MAX PG: 11.8 MMHG
BH CV ECHO MEAS - AO MEAN PG: 6.1 MMHG
BH CV ECHO MEAS - AO ROOT DIAM: 4 CM
BH CV ECHO MEAS - AO V2 MAX: 171.5 CM/SEC
BH CV ECHO MEAS - AO V2 VTI: 39.8 CM
BH CV ECHO MEAS - AVA(I,D): 2.3 CM2
BH CV ECHO MEAS - EDV(CUBED): 143.3 ML
BH CV ECHO MEAS - EDV(MOD-SP2): 128 ML
BH CV ECHO MEAS - EDV(MOD-SP4): 121 ML
BH CV ECHO MEAS - EF(MOD-BP): 62.5 %
BH CV ECHO MEAS - EF(MOD-SP2): 61.7 %
BH CV ECHO MEAS - EF(MOD-SP4): 61.2 %
BH CV ECHO MEAS - ESV(CUBED): 45.3 ML
BH CV ECHO MEAS - ESV(MOD-SP2): 49 ML
BH CV ECHO MEAS - ESV(MOD-SP4): 47 ML
BH CV ECHO MEAS - FS: 31.9 %
BH CV ECHO MEAS - IVS/LVPW: 1.06 CM
BH CV ECHO MEAS - IVSD: 1.13 CM
BH CV ECHO MEAS - LAT PEAK E' VEL: 8.4 CM/SEC
BH CV ECHO MEAS - LV DIASTOLIC VOL/BSA (35-75): 50.6 CM2
BH CV ECHO MEAS - LV MASS(C)D: 223.4 GRAMS
BH CV ECHO MEAS - LV MAX PG: 2.9 MMHG
BH CV ECHO MEAS - LV MEAN PG: 1.95 MMHG
BH CV ECHO MEAS - LV SYSTOLIC VOL/BSA (12-30): 19.7 CM2
BH CV ECHO MEAS - LV V1 MAX: 85.3 CM/SEC
BH CV ECHO MEAS - LV V1 VTI: 21.3 CM
BH CV ECHO MEAS - LVIDD: 5.2 CM
BH CV ECHO MEAS - LVIDS: 3.6 CM
BH CV ECHO MEAS - LVOT AREA: 4.3 CM2
BH CV ECHO MEAS - LVOT DIAM: 2.34 CM
BH CV ECHO MEAS - LVPWD: 1.07 CM
BH CV ECHO MEAS - MED PEAK E' VEL: 18.2 CM/SEC
BH CV ECHO MEAS - MR MAX PG: 70.2 MMHG
BH CV ECHO MEAS - MR MAX VEL: 418.9 CM/SEC
BH CV ECHO MEAS - MV A DUR: 0.12 SEC
BH CV ECHO MEAS - MV A MAX VEL: 73.2 CM/SEC
BH CV ECHO MEAS - MV DEC SLOPE: 248.7 CM/SEC2
BH CV ECHO MEAS - MV DEC TIME: 0.23 SEC
BH CV ECHO MEAS - MV E MAX VEL: 97.9 CM/SEC
BH CV ECHO MEAS - MV E/A: 1.34
BH CV ECHO MEAS - MV MAX PG: 3.4 MMHG
BH CV ECHO MEAS - MV MEAN PG: 1.23 MMHG
BH CV ECHO MEAS - MV P1/2T: 109 MSEC
BH CV ECHO MEAS - MV V2 VTI: 45.9 CM
BH CV ECHO MEAS - MVA(P1/2T): 2.02 CM2
BH CV ECHO MEAS - MVA(VTI): 2 CM2
BH CV ECHO MEAS - PA ACC TIME: 0.1 SEC
BH CV ECHO MEAS - PA V2 MAX: 96.3 CM/SEC
BH CV ECHO MEAS - PULM A REVS DUR: 0.13 SEC
BH CV ECHO MEAS - PULM A REVS VEL: 27 CM/SEC
BH CV ECHO MEAS - PULM DIAS VEL: 46.5 CM/SEC
BH CV ECHO MEAS - PULM S/D: 0.87
BH CV ECHO MEAS - PULM SYS VEL: 40.5 CM/SEC
BH CV ECHO MEAS - RAP SYSTOLE: 3 MMHG
BH CV ECHO MEAS - RV MAX PG: 2.7 MMHG
BH CV ECHO MEAS - RV V1 MAX: 82.3 CM/SEC
BH CV ECHO MEAS - RV V1 VTI: 23 CM
BH CV ECHO MEAS - RVSP: 16 MMHG
BH CV ECHO MEAS - SV(LVOT): 91.7 ML
BH CV ECHO MEAS - SV(MOD-SP2): 79 ML
BH CV ECHO MEAS - SV(MOD-SP4): 74 ML
BH CV ECHO MEAS - SVI(LVOT): 38.4 ML/M2
BH CV ECHO MEAS - SVI(MOD-SP2): 33.1 ML/M2
BH CV ECHO MEAS - SVI(MOD-SP4): 31 ML/M2
BH CV ECHO MEAS - TAPSE (>1.6): 2.08 CM
BH CV ECHO MEAS - TR MAX PG: 12.7 MMHG
BH CV ECHO MEAS - TR MAX VEL: 178.1 CM/SEC
BH CV ECHO MEASUREMENTS AVERAGE E/E' RATIO: 7.36
BH CV XLRA - RV BASE: 3.8 CM
BH CV XLRA - RV LENGTH: 7.7 CM
BH CV XLRA - RV MID: 2.49 CM
BH CV XLRA - TDI S': 15.3 CM/SEC
BILIRUB SERPL-MCNC: 0.5 MG/DL (ref 0–1.2)
BUN SERPL-MCNC: 16 MG/DL (ref 8–23)
BUN/CREAT SERPL: 10.8 (ref 7–25)
CALCIUM SPEC-SCNC: 9 MG/DL (ref 8.6–10.5)
CHLORIDE SERPL-SCNC: 104 MMOL/L (ref 98–107)
CO2 SERPL-SCNC: 25.8 MMOL/L (ref 22–29)
CREAT SERPL-MCNC: 1.48 MG/DL (ref 0.76–1.27)
EGFRCR SERPLBLD CKD-EPI 2021: 51.2 ML/MIN/1.73
GLOBULIN UR ELPH-MCNC: 2.8 GM/DL
GLUCOSE SERPL-MCNC: 79 MG/DL (ref 65–99)
LEFT ATRIUM VOLUME INDEX: 28 ML/M2
POTASSIUM SERPL-SCNC: 4.3 MMOL/L (ref 3.5–5.2)
PROT SERPL-MCNC: 6.8 G/DL (ref 6–8.5)
SINUS: 2.8 CM
SODIUM SERPL-SCNC: 140 MMOL/L (ref 136–145)
T-UPTAKE NFR SERPL: 0.8 TBI (ref 0.8–1.3)
T4 SERPL-MCNC: 9.79 MCG/DL (ref 4.5–11.7)
TSH SERPL DL<=0.05 MIU/L-ACNC: 2.51 UIU/ML (ref 0.27–4.2)

## 2024-09-05 PROCEDURE — 84443 ASSAY THYROID STIM HORMONE: CPT

## 2024-09-05 PROCEDURE — 80053 COMPREHEN METABOLIC PANEL: CPT

## 2024-09-05 PROCEDURE — 93306 TTE W/DOPPLER COMPLETE: CPT

## 2024-09-05 PROCEDURE — 84479 ASSAY OF THYROID (T3 OR T4): CPT

## 2024-09-05 PROCEDURE — 71046 X-RAY EXAM CHEST 2 VIEWS: CPT

## 2024-09-05 PROCEDURE — 36415 COLL VENOUS BLD VENIPUNCTURE: CPT

## 2024-09-05 PROCEDURE — 84436 ASSAY OF TOTAL THYROXINE: CPT

## 2024-09-06 ENCOUNTER — TELEPHONE (OUTPATIENT)
Dept: CARDIOLOGY | Facility: CLINIC | Age: 69
End: 2024-09-06
Payer: MEDICARE

## 2024-09-06 RX ORDER — SPIRONOLACTONE 25 MG/1
12.5 TABLET ORAL DAILY
Start: 2024-09-06

## 2024-09-06 RX ORDER — LOSARTAN POTASSIUM 100 MG/1
100 TABLET ORAL DAILY
Qty: 90 TABLET | Refills: 3 | Status: SHIPPED | OUTPATIENT
Start: 2024-09-06

## 2024-09-06 RX ORDER — SPIRONOLACTONE 25 MG/1
25 TABLET ORAL DAILY
Qty: 90 TABLET | Refills: 3 | OUTPATIENT
Start: 2024-09-06

## 2024-09-06 NOTE — TELEPHONE ENCOUNTER
----- Message from Danielle Robles sent at 9/5/2024  4:24 PM EDT -----  Let patient know that his echocardiogram shows that his heart function has normalized.  Would continue his current medical regimen for now.  I did talk to Dr. Dumont about the amiodarone and she wants to keep him on the once a day dose for now.  He was supposed to get labs and a chest x-ray as he going to do that?  ----- Message -----  From: Helen De Leon MD  Sent: 9/5/2024   4:12 PM EDT  To: TREY Breaux  
Notified patient of results/recommendations. Patient verbalized understanding.    Dedra Richey RN  Triage [facility] [place]   
Notified patient of results/recommendations. Patient verbalized understanding. He did get the chest xray and labs completed.     Dedra Richey RN  [place] [facility] [place]    
Statement Selected

## 2024-09-11 RX ORDER — FERROUS SULFATE 325(65) MG
1 TABLET ORAL
Qty: 90 TABLET | Refills: 1 | Status: SHIPPED | OUTPATIENT
Start: 2024-09-11

## 2024-09-11 RX ORDER — APIXABAN 5 MG/1
5 TABLET, FILM COATED ORAL 2 TIMES DAILY
Qty: 60 TABLET | Refills: 3 | Status: SHIPPED | OUTPATIENT
Start: 2024-09-11

## 2024-09-11 RX ORDER — SPIRONOLACTONE 25 MG/1
25 TABLET ORAL DAILY
Qty: 90 TABLET | Refills: 3 | Status: SHIPPED | OUTPATIENT
Start: 2024-09-11

## 2024-10-16 ENCOUNTER — TELEPHONE (OUTPATIENT)
Dept: ONCOLOGY | Facility: CLINIC | Age: 69
End: 2024-10-16
Payer: MEDICARE

## 2024-10-16 NOTE — TELEPHONE ENCOUNTER
----- Message from Olman VILLANUEVA sent at 10/15/2024  4:10 PM EDT -----  Regarding: CT R/S  6 month md cbc cmp ferritin iron prof; few days prior ct cap with IV/PO    Patient needs CT r/s before 10/21 visit.

## 2024-12-30 ENCOUNTER — HOSPITAL ENCOUNTER (OUTPATIENT)
Facility: HOSPITAL | Age: 69
Discharge: HOME | End: 2024-12-30
Payer: MEDICARE

## 2024-12-30 ENCOUNTER — LAB (OUTPATIENT)
Facility: HOSPITAL | Age: 69
End: 2024-12-30
Payer: MEDICARE

## 2024-12-30 DIAGNOSIS — D64.89 ANEMIA DUE TO OTHER CAUSE, NOT CLASSIFIED: ICD-10-CM

## 2024-12-30 DIAGNOSIS — C7A.012 MALIGNANT CARCINOID TUMOR OF ILEUM: ICD-10-CM

## 2024-12-30 LAB
ALBUMIN SERPL-MCNC: 3.9 G/DL (ref 3.5–5.2)
ALBUMIN/GLOB SERPL: 1.1 G/DL
ALP SERPL-CCNC: 81 U/L (ref 39–117)
ALT SERPL W P-5'-P-CCNC: 14 U/L (ref 1–41)
ANION GAP SERPL CALCULATED.3IONS-SCNC: 8.3 MMOL/L (ref 5–15)
AST SERPL-CCNC: 17 U/L (ref 1–40)
BASOPHILS # BLD AUTO: 0.08 10*3/MM3 (ref 0–0.2)
BASOPHILS NFR BLD AUTO: 1.4 % (ref 0–1.5)
BILIRUB SERPL-MCNC: 0.5 MG/DL (ref 0–1.2)
BUN SERPL-MCNC: 16 MG/DL (ref 8–23)
BUN/CREAT SERPL: 11.3 (ref 7–25)
CALCIUM SPEC-SCNC: 9.1 MG/DL (ref 8.6–10.5)
CHLORIDE SERPL-SCNC: 107 MMOL/L (ref 98–107)
CO2 SERPL-SCNC: 23.7 MMOL/L (ref 22–29)
CREAT SERPL-MCNC: 1.41 MG/DL (ref 0.76–1.27)
DEPRECATED RDW RBC AUTO: 41.1 FL (ref 37–54)
EGFRCR SERPLBLD CKD-EPI 2021: 54.3 ML/MIN/1.73
EOSINOPHIL # BLD AUTO: 0.15 10*3/MM3 (ref 0–0.4)
EOSINOPHIL NFR BLD AUTO: 2.6 % (ref 0.3–6.2)
ERYTHROCYTE [DISTWIDTH] IN BLOOD BY AUTOMATED COUNT: 12.6 % (ref 12.3–15.4)
FERRITIN SERPL-MCNC: 55 NG/ML (ref 30–400)
GLOBULIN UR ELPH-MCNC: 3.6 GM/DL
GLUCOSE SERPL-MCNC: 89 MG/DL (ref 65–99)
HCT VFR BLD AUTO: 43 % (ref 37.5–51)
HGB BLD-MCNC: 14.8 G/DL (ref 13–17.7)
IMM GRANULOCYTES # BLD AUTO: 0.02 10*3/MM3 (ref 0–0.05)
IMM GRANULOCYTES NFR BLD AUTO: 0.3 % (ref 0–0.5)
IRON 24H UR-MRATE: 80 MCG/DL (ref 59–158)
IRON SATN MFR SERPL: 21 % (ref 20–50)
LYMPHOCYTES # BLD AUTO: 1.17 10*3/MM3 (ref 0.7–3.1)
LYMPHOCYTES NFR BLD AUTO: 20.1 % (ref 19.6–45.3)
MCH RBC QN AUTO: 30.6 PG (ref 26.6–33)
MCHC RBC AUTO-ENTMCNC: 34.4 G/DL (ref 31.5–35.7)
MCV RBC AUTO: 89 FL (ref 79–97)
MONOCYTES # BLD AUTO: 0.4 10*3/MM3 (ref 0.1–0.9)
MONOCYTES NFR BLD AUTO: 6.9 % (ref 5–12)
NEUTROPHILS NFR BLD AUTO: 4.01 10*3/MM3 (ref 1.7–7)
NEUTROPHILS NFR BLD AUTO: 68.7 % (ref 42.7–76)
NRBC BLD AUTO-RTO: 0 /100 WBC (ref 0–0.2)
PLATELET # BLD AUTO: 258 10*3/MM3 (ref 140–450)
PMV BLD AUTO: 9.3 FL (ref 6–12)
POTASSIUM SERPL-SCNC: 3.9 MMOL/L (ref 3.5–5.2)
PROT SERPL-MCNC: 7.5 G/DL (ref 6–8.5)
RBC # BLD AUTO: 4.83 10*6/MM3 (ref 4.14–5.8)
SODIUM SERPL-SCNC: 139 MMOL/L (ref 136–145)
TIBC SERPL-MCNC: 387 MCG/DL (ref 298–536)
TRANSFERRIN SERPL-MCNC: 260 MG/DL (ref 200–360)
WBC NRBC COR # BLD AUTO: 5.83 10*3/MM3 (ref 3.4–10.8)

## 2024-12-30 PROCEDURE — 82728 ASSAY OF FERRITIN: CPT | Performed by: INTERNAL MEDICINE

## 2024-12-30 PROCEDURE — 85025 COMPLETE CBC W/AUTO DIFF WBC: CPT

## 2024-12-30 PROCEDURE — 74177 CT ABD & PELVIS W/CONTRAST: CPT

## 2024-12-30 PROCEDURE — 83540 ASSAY OF IRON: CPT | Performed by: INTERNAL MEDICINE

## 2024-12-30 PROCEDURE — 25510000001 IOPAMIDOL 61 % SOLUTION: Performed by: INTERNAL MEDICINE

## 2024-12-30 PROCEDURE — 71260 CT THORAX DX C+: CPT

## 2024-12-30 PROCEDURE — 80053 COMPREHEN METABOLIC PANEL: CPT | Performed by: INTERNAL MEDICINE

## 2024-12-30 PROCEDURE — 84466 ASSAY OF TRANSFERRIN: CPT | Performed by: INTERNAL MEDICINE

## 2024-12-30 PROCEDURE — 25510000002 DIATRIZOATE MEGLUMINE & SODIUM PER 1 ML: Performed by: INTERNAL MEDICINE

## 2024-12-30 RX ORDER — IOPAMIDOL 612 MG/ML
100 INJECTION, SOLUTION INTRAVASCULAR
Status: COMPLETED | OUTPATIENT
Start: 2024-12-30 | End: 2024-12-30

## 2024-12-30 RX ORDER — DIATRIZOATE MEGLUMINE AND DIATRIZOATE SODIUM 660; 100 MG/ML; MG/ML
30 SOLUTION ORAL; RECTAL
Status: COMPLETED | OUTPATIENT
Start: 2024-12-30 | End: 2024-12-30

## 2024-12-30 RX ADMIN — DIATRIZOATE MEGLUMINE AND DIATRIZOATE SODIUM 30 ML: 600; 100 SOLUTION ORAL; RECTAL at 12:55

## 2024-12-30 RX ADMIN — IOPAMIDOL 85 ML: 612 INJECTION, SOLUTION INTRAVENOUS at 14:05

## 2025-01-03 ENCOUNTER — TELEPHONE (OUTPATIENT)
Dept: ONCOLOGY | Facility: CLINIC | Age: 70
End: 2025-01-03
Payer: MEDICARE

## 2025-01-03 RX ORDER — AMIODARONE HYDROCHLORIDE 200 MG/1
TABLET ORAL
Qty: 198 TABLET | Refills: 0 | Status: SHIPPED | OUTPATIENT
Start: 2025-01-03

## 2025-01-03 NOTE — TELEPHONE ENCOUNTER
----- Message from Kiley TIWARI sent at 1/3/2025 10:04 AM EST -----  Regarding: RE: 1/6 video visit  Pt doubts he can do video, coming to office, did move arrival time to 4:30 as vitals only.    Evie  ----- Message -----  From: Olman Bryant RN  Sent: 1/3/2025   9:53 AM EST  To: Isela Kwong Rep  Subject: 1/6 video visit                                  Please offer video visit 1/6 due to potential hazardous weather.

## 2025-01-03 NOTE — TELEPHONE ENCOUNTER
PATIENT CALLED BACK AND STATES HE CAN DO A VIDEO VISIT FOR MONDAY 1-6-25 BUT WE NEED TO USE SPOUSE'S PHONE  [phone]

## 2025-01-07 NOTE — PROGRESS NOTES
Subjective     REASON FOR CONSULTATION:  Carcinoid tumor ileum  Provide an opinion on any further workup or treatment                             REQUESTING PHYSICIAN:  Dr. Donovan    RECORDS OBTAINED:  Records of the patients history including those obtained from the referring provider were reviewed and summarized in detail.    History of Present Illness   This is a very pleasant 67-year-old man who has medical history pertinent for coronary artery disease, diabetes, hypertension, hyperlipidemia, sleep apnea.  He recently presented with 50 pound weight loss, nausea, decreased appetite, and hematochezia.  The patient was noted to have iron deficiency anemia and was admitted to the hospital in October 2021 with hemoglobin 7.7 requiring transfusion support.  A CT of the abdomen and pelvis on 10/30/2021 showed some fatty infiltration in the liver, small mural lipoma in the wall of the cecum and slight prostamegaly.  The patient continued to have symptoms, and a repeat CT abdomen/pelvis on 11/19/2021 showed a 3 cm mass in the terminal ileum with enlarged lymph nodes adjacent up to 1.4 cm.  There was no evidence of small bowel obstruction.  There was a 2 cm lipoma in the cecum and a 2.4 cm lipoma in the ascending colon which were stable from previous.  The liver was unremarkable.    The patient was taken to the operating room on 2/3/2022 by Dr. Donovan and underwent a laparoscopic ileocolonic resection.  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  The tumor measured 2.5 cm in greatest dimension and invaded the muscularis propria into the subserosal tissue without penetration of the serosa.  There was lymphovascular but no perineural invasion.  12 lymph nodes were resected for of which were positive for tumor.  Final pathology pT3 N2 M0.    A neuroendocrine PET scan ([facility]) on 3/29/2022 was  negative for residual or metastatic disease.    The patient returned today for follow-up.  He denies abdominal pain nausea vomiting.  He has chronic loose stools mostly in the morning.  He denies hematochezia.  He denies unusual weight loss.    Past Medical History:   Diagnosis Date    Anemia     AP (angina pectoris)     unstable    CAD (coronary artery disease)     6 stents placed    DDD (degenerative disc disease), lumbosacral     Diabetes     Essential hypertension     Fatty liver     ?    History of Clostridioides difficile infection 10/2021    History of MI (myocardial infarction)     Hyperlipidemia     Low back pain     Mass of colon     Memory loss     SOME SHORT TERM MEMORY ISSUES AND FORGETFULNESS    Osteoarthritis of knee     Screening for prostate cancer 2010    normal    Skin cancer     left eyebrow, side of face and back     Sleep apnea     NO CPAP    Tachycardia         Past Surgical History:   Procedure Laterality Date    COLON RESECTION N/A 02/03/2022    Procedure: ILEUM COLON RESECTION FOR TERMINAL ILEUM MASS LAPAROSCOPIC;  Surgeon: Roscoe Donovan MD;  Location: [facility] MAIN OR;  Service: General;  Laterality: N/A;    COLONOSCOPY N/A 2016    normal per patient    COLONOSCOPY N/A 08/03/2020    Procedure: COLONOSCOPY to cecum and TI with cold biopsies;  Surgeon: Kelly Ashraf MD;  Location: [facility] ENDOSCOPY;  Service: Gastroenterology;  Laterality: N/A;  pre-change in bowel habits, hx polyps   post-hemorrhoids, lipomas, abnormal cecal tissue     COLONOSCOPY W/ POLYPECTOMY N/A 4/30/2024    Procedure: COLONOSCOPY INTO ILIEUM WITH BIOPSIES;  Surgeon: Jose Mai MD;  Location: [facility] ENDOSCOPY;  Service: Gastroenterology;  Laterality: N/A;  PRE: ANEMIA, CHANGE IN BOWEL HABITS, PERSONAL H/O COLON CANCER /  POST: HEMORRHOIDS, ANASTAMOTIC INFLAMATION    CORONARY ANGIOPLASTY WITH STENT PLACEMENT  08/28/2015    6 stents-Dr. Hall, [facility]    ENDOSCOPY N/A 4/30/2024    Procedure:  ESOPHAGOGASTRODUODENOSCOPY WITH BIOPSY;  Surgeon: Jose Mai MD;  Location: [facility] ENDOSCOPY;  Service: Gastroenterology;  Laterality: N/A;  PRE:  ANEMIA /  POST: NORMAL    FOOT SURGERY Right 05/23/2017    Dr. Cervantes, [facility] and [place]    REPLACEMENT TOTAL KNEE Left 2010    Dr. Arnaud Reynaga, [facility]    REPLACEMENT TOTAL KNEE Right 2009    Dr. Arnaud Reynaga, [facility]    SKIN BIOPSY      SKIN CANCER EXCISION Left 03/30/2016    left ear and left eyebrow, graft took from face, placed on ear    SKIN CANCER EXCISION  2001    back    UPPER GASTROINTESTINAL ENDOSCOPY          Current Outpatient Medications on File Prior to Visit   Medication Sig Dispense Refill    amiodarone (PACERONE) 200 MG tablet TAKE 2 TABLETS BY MOUTH TWICE A DAY FOR 3 DAYS THEN 1 TWICE A  tablet 0    citalopram (CeleXA) 20 MG tablet Take 1 tablet by mouth Daily. 90 tablet 2    clopidogrel (PLAVIX) 75 MG tablet TAKE 1 TABLET BY MOUTH EVERY DAY 90 tablet 3    Eliquis 5 MG tablet tablet TAKE 1 TABLET BY MOUTH TWICE A DAY 60 tablet 3    ferrous sulfate 325 (65 FE) MG tablet TAKE 1 TABLET BY MOUTH EVERY DAY WITH BREAKFAST 90 tablet 1    losartan (COZAAR) 100 MG tablet Take 1 tablet by mouth Daily. 90 tablet 3    metFORMIN ER (GLUCOPHAGE-XR) 500 MG 24 hr tablet TAKE 2 TABLETS BY MOUTH EVERY DAY WITH BREAKFAST (NEW DOSE) 180 tablet 1    metoprolol succinate XL (TOPROL-XL) 50 MG 24 hr tablet TAKE 1 TABLET BY MOUTH TWICE A  tablet 3    Pseudoeph-Doxylamine-DM-APAP (NYQUIL PO) Take  by mouth every night at bedtime.      spironolactone (ALDACTONE) 25 MG tablet TAKE 1 TABLET BY MOUTH EVERY DAY 90 tablet 3     No current facility-administered medications on file prior to visit.        ALLERGIES:    Allergies   Allergen Reactions    Ambien [Zolpidem] Other (See Comments)     Sleep walking      Morphine And Codeine Itching and Rash        Social History     Socioeconomic History    Marital status:      Spouse name: Jaylene   Tobacco Use     "Smoking status: Never    Smokeless tobacco: Never    Tobacco comments:     Caffeine daily   Vaping Use    Vaping status: Never Used   Substance and Sexual Activity    Alcohol use: No    Drug use: Not Currently    Sexual activity: Defer     Partners: Female        Family History   Problem Relation Age of Onset    Hypertension Mother     Arthritis Mother     Diabetes Mother     Heart disease Father     Hypertension Father     Macular degeneration Father     Arthritis Father     Diabetes Father     Arthritis Maternal Grandmother     Heart disease Maternal Grandmother     Arthritis Maternal Grandfather     Heart disease Maternal Grandfather     Diabetes Maternal Grandfather     Hypertension Maternal Grandfather     Colon cancer Maternal Grandfather     Arthritis Paternal Grandmother     Heart disease Paternal Grandmother     Diabetes Paternal Grandmother     Hypertension Paternal Grandmother     Stroke Paternal Grandmother     Colon cancer Paternal Grandmother     Arthritis Paternal Grandfather     Heart disease Paternal Grandfather     Colon cancer Paternal Grandfather     Macular degeneration Sister     Malig Hyperthermia Neg Hx         Review of Systems   Constitutional:  Negative for appetite change, fatigue and unexpected weight change.   HENT: Negative.     Respiratory:  Negative for cough and shortness of breath.    Cardiovascular:  Negative for chest pain and palpitations.   Gastrointestinal:  Negative for blood in stool, constipation, diarrhea, nausea and vomiting.   Genitourinary: Negative.    Musculoskeletal: Negative.    Skin: Negative.    Allergic/Immunologic: Negative.    Neurological: Negative.    Hematological: Negative.    Psychiatric/Behavioral: Negative.          Objective     Vitals:    01/09/25 1358   BP: 156/86   Pulse: 55   Resp: 16   Temp: 98.3 °F (36.8 °C)   TempSrc: Oral   SpO2: 95%   Weight: 120 kg (265 lb 3.2 oz)   Height: 185.4 cm (72.99\")   PainSc: 0-No pain             1/9/2025     1:58 PM "   Current Status   ECOG score 0       Physical Exam    CONSTITUTIONAL: pleasant well-developed adult man  HEENT: no icterus, no thrush, moist membranes  LYMPH: no cervical or supraclavicular lad  RESP: cta bilat, no wheezing, no rales  GI: soft, non-tender, no splenomegaly, +bs, laparoscopic scars healing well  MUSC: no edema, normal gait  NEURO: alert and oriented x3, normal strength  PSYCH: normal mood and affect  Exam unchanged-1/8/2025  RECENT LABS:  Hematology WBC   Date Value Ref Range Status   12/30/2024 5.83 3.40 - 10.80 10*3/mm3 Final   12/23/2021 7.9 3.4 - 10.8 x10E3/uL Final     RBC   Date Value Ref Range Status   12/30/2024 4.83 4.14 - 5.80 10*6/mm3 Final   12/23/2021 3.97 (L) 4.14 - 5.80 x10E6/uL Final     Hemoglobin   Date Value Ref Range Status   12/30/2024 14.8 13.0 - 17.7 g/dL Final     Hematocrit   Date Value Ref Range Status   12/30/2024 43.0 37.5 - 51.0 % Final     Platelets   Date Value Ref Range Status   12/30/2024 258 140 - 450 10*3/mm3 Final        Lab Results   Component Value Date    GLUCOSE 89 12/30/2024    BUN 16 12/30/2024    CREATININE 1.41 (H) 12/30/2024    EGFRIFNONA 93 02/04/2022    EGFRIFAFRI 103 10/28/2021    BCR 11.3 12/30/2024    K 3.9 12/30/2024    CO2 23.7 12/30/2024    CALCIUM 9.1 12/30/2024    PROTENTOTREF 6.1 10/28/2021    ALBUMIN 3.9 12/30/2024    LABIL2 1.7 10/28/2021    AST 17 12/30/2024    ALT 14 12/30/2024              CT Chest Abdomen Pelvis With Contrast 9/18/2023 - IMPRESSION:  1. No significant change from the prior exam.  2. Recommend continued oncologic surveillance imaging.    CT Chest Abdomen Pelvis With Contrast 4/3/2024 - CONCLUSION: Solitary mesenteric lymph node near the anastomosis demonstrates mild enlargement since prior examination. Thickened blind loop or cul-de-sac projecting off the lateral aspect of the ascending colon. Suture line noted at its base. Nominal wall thickening of the terminal ileum just proximal to the anastomosis.    CT chest w  contrast (12/30/2024 14:04)  CT abdomen pelvis w contrast (12/30/2024 14:04)  IMPRESSION:  1. No convincing evidence of recurrent malignant/metastatic disease in  the chest, abdomen, or pelvis.  2. Stable postoperative changes with mildly prominent mesenteric lymph  nodes in the right hemiabdomen, not pathologically enlarged.    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  The tumor measured 2.5 cm in greatest dimension and invaded the muscularis propria into the subserosal tissue without penetration of the serosa.  There was lymphovascular but no perineural invasion.  12 lymph nodes were resected for of which were positive for tumor.  Final pathology pT3 N2 M0.  [facility] scan 3/29/2022-no evidence of residual disease  CT scan chest abdomen pelvis with contrast 10/13/2022- [facility] (tiny right upper lobe lung nodule to be monitored)  CT chest abdomen pelvis with contrast 4/3/2023-[facility]; CT chest abdomen pelvis 9/18/2023-stable 4 mm right upper lobe pulmonary nodule, mildly prominent ileocolic nodes up to 1.1 cm stable in size, heterogeneously enlarged prostate gland, scattered sclerotic foci thoracolumbar spine and pelvis stable; CT chest abdomen pelvis 4/3/2024-[facility]; CT chest abdomen pelvis 12/3/2024-[facility]    *Microcytic, hypochromic anemia  Improved with hemoglobin 14.8      *Multiple comorbidities of CAD, hypertension, hyperlipidemia, sleep apnea etc.    Hematology/oncology plan/recommendations:  6-month follow-up  CBC CMP  CT chest abdomen and pelvis ordered  Recheck ferritin iron profile next visit  MD visit after 6-month scans

## 2025-01-09 ENCOUNTER — OFFICE VISIT (OUTPATIENT)
Dept: ONCOLOGY | Facility: CLINIC | Age: 70
End: 2025-01-09
Payer: MEDICARE

## 2025-01-09 VITALS
TEMPERATURE: 98.3 F | HEART RATE: 55 BPM | OXYGEN SATURATION: 95 % | BODY MASS INDEX: 35.15 KG/M2 | WEIGHT: 265.2 LBS | HEIGHT: 73 IN | DIASTOLIC BLOOD PRESSURE: 86 MMHG | SYSTOLIC BLOOD PRESSURE: 156 MMHG | RESPIRATION RATE: 16 BRPM

## 2025-01-09 DIAGNOSIS — D64.89 ANEMIA DUE TO OTHER CAUSE, NOT CLASSIFIED: ICD-10-CM

## 2025-01-09 DIAGNOSIS — C7A.012 MALIGNANT CARCINOID TUMOR OF ILEUM: ICD-10-CM

## 2025-03-07 ENCOUNTER — OFFICE VISIT (OUTPATIENT)
Age: 70
End: 2025-03-07
Payer: MEDICARE

## 2025-03-07 VITALS
WEIGHT: 276.6 LBS | DIASTOLIC BLOOD PRESSURE: 80 MMHG | SYSTOLIC BLOOD PRESSURE: 140 MMHG | HEIGHT: 73 IN | BODY MASS INDEX: 36.66 KG/M2 | HEART RATE: 57 BPM

## 2025-03-07 DIAGNOSIS — I48.0 AF (PAROXYSMAL ATRIAL FIBRILLATION): Primary | ICD-10-CM

## 2025-03-07 RX ORDER — ASPIRIN 81 MG/1
81 TABLET ORAL DAILY
COMMUNITY

## 2025-03-07 NOTE — PROGRESS NOTES
Subjective:     Encounter Date: 03/07/25      Patient ID: Angel White Jr. is a 70 y.o. male.    Chief Complaint: CAD    HPI:   This is a 70-year-old man who has a history of multivessel coronary disease, hypertension, hyperlipidemia, NAFLD, diabetes, atrial fibrillation.  In 2015 he had unstable angina and underwent drug-eluting stenting of the proximal to mid LAD as well as the ramus.  He also had stenting of the PDA and proximal RCA in 2015.     He was admitted to the hospital in July 2023 with new onset atrial fibrillation and cardiomyopathy,  EF was about 20%, presumed related to tachyarrhythmia. Since then he has had two cardioversions for recurrent AF and has been maintained in NSR on amiodarone.  As result, his systolic function normalized in 2024.  He remains on daily amiodarone.    He returns today.  He has no complaints.  He notes that he is only been taking Eliquis once a day and it is quite expensive however he does not want to be on Coumadin due to the frequent blood checks.  He has no orthopnea PND dyspnea or tachycardia.  Energy level is fine    He has 14 cats and one dog. His wife is bed bound and he cares for her. He has a son who has developmental disabilities and has 4 young grandchildren    The following portions of the patient's history were reviewed and updated as appropriate: allergies, current medications, past family history, past medical history, past social history, past surgical history and problem list.     REVIEW OF SYSTEMS:   All systems reviewed.  Pertinent positives identified in HPI.  All other systems are negative.    Past Medical History:   Diagnosis Date    Anemia     AP (angina pectoris)     unstable    CAD (coronary artery disease)     6 stents placed    DDD (degenerative disc disease), lumbosacral     Diabetes     Essential hypertension     Fatty liver     ?    History of Clostridioides difficile infection 10/2021    History of MI (myocardial infarction)      Hyperlipidemia     Low back pain     Mass of colon     Memory loss     SOME SHORT TERM MEMORY ISSUES AND FORGETFULNESS    Osteoarthritis of knee     Screening for prostate cancer 2010    normal    Skin cancer     left eyebrow, side of face and back     Sleep apnea     NO CPAP    Tachycardia        Family History   Problem Relation Age of Onset    Hypertension Mother     Arthritis Mother     Diabetes Mother     Heart disease Father     Hypertension Father     Macular degeneration Father     Arthritis Father     Diabetes Father     Arthritis Maternal Grandmother     Heart disease Maternal Grandmother     Arthritis Maternal Grandfather     Heart disease Maternal Grandfather     Diabetes Maternal Grandfather     Hypertension Maternal Grandfather     Colon cancer Maternal Grandfather     Arthritis Paternal Grandmother     Heart disease Paternal Grandmother     Diabetes Paternal Grandmother     Hypertension Paternal Grandmother     Stroke Paternal Grandmother     Colon cancer Paternal Grandmother     Arthritis Paternal Grandfather     Heart disease Paternal Grandfather     Colon cancer Paternal Grandfather     Macular degeneration Sister     Malig Hyperthermia Neg Hx        Social History     Socioeconomic History    Marital status:      Spouse name: Jaylene   Tobacco Use    Smoking status: Never    Smokeless tobacco: Never    Tobacco comments:     Caffeine daily   Vaping Use    Vaping status: Never Used   Substance and Sexual Activity    Alcohol use: No    Drug use: Not Currently    Sexual activity: Defer     Partners: Female       Allergies   Allergen Reactions    Ambien [Zolpidem] Other (See Comments)     Sleep walking      Morphine And Codeine Itching and Rash       Past Surgical History:   Procedure Laterality Date    COLON RESECTION N/A 02/03/2022    Procedure: ILEUM COLON RESECTION FOR TERMINAL ILEUM MASS LAPAROSCOPIC;  Surgeon: Roscoe Donovan MD;  Location: [facility];  Service: General;  Laterality:  N/A;    COLONOSCOPY N/A 2016    normal per patient    COLONOSCOPY N/A 08/03/2020    Procedure: COLONOSCOPY to cecum and TI with cold biopsies;  Surgeon: Kelly Ashraf MD;  Location: [facility] ENDOSCOPY;  Service: Gastroenterology;  Laterality: N/A;  pre-change in bowel habits, hx polyps   post-hemorrhoids, lipomas, abnormal cecal tissue     COLONOSCOPY W/ POLYPECTOMY N/A 4/30/2024    Procedure: COLONOSCOPY INTO ILIEUM WITH BIOPSIES;  Surgeon: Jose Mai MD;  Location: [facility] ENDOSCOPY;  Service: Gastroenterology;  Laterality: N/A;  PRE: ANEMIA, CHANGE IN BOWEL HABITS, PERSONAL H/O COLON CANCER /  POST: HEMORRHOIDS, ANASTAMOTIC INFLAMATION    CORONARY ANGIOPLASTY WITH STENT PLACEMENT  08/28/2015    6 stents-Dr. Hall, [facility]    ENDOSCOPY N/A 4/30/2024    Procedure: ESOPHAGOGASTRODUODENOSCOPY WITH BIOPSY;  Surgeon: Jose Mai MD;  Location: [facility] ENDOSCOPY;  Service: Gastroenterology;  Laterality: N/A;  PRE:  ANEMIA /  POST: NORMAL    FOOT SURGERY Right 05/23/2017    Dr. Cervantes, [facility] and [place]    REPLACEMENT TOTAL KNEE Left 2010    Dr. Arnaud Reynaga, [facility]    REPLACEMENT TOTAL KNEE Right 2009    Dr. Arnaud Reynaga, [facility]    SKIN BIOPSY      SKIN CANCER EXCISION Left 03/30/2016    left ear and left eyebrow, graft took from face, placed on ear    SKIN CANCER EXCISION  2001    back    UPPER GASTROINTESTINAL ENDOSCOPY         Procedures       Objective:         PHYSICAL EXAM:  GEN: VSS, no distress,   Eyes: normal sclera, normal lids and lashes  HENT: moist mucus membranes,   Respiratory: CTAB, no rales or wheezes  CV: RRR, no murmurs, , +2 DP and 2+ carotid pulses b/l  GI: NABS, soft,  Nontender, nondistended  MSK: +1 lower extremity edema with chronic venous stasis changes, no scoliosis or kyphosis  Skin: no rash, warm, dry  Heme/Lymph: no bruising or bleeding  Psych: organized thought, normal behavior and affect  Neuro: Cranial nerves grossly intact, Alert and Oriented x 3.         Assessment:           Diagnosis Plan   1. AF (paroxysmal atrial fibrillation)  TSH Rfx On Abnormal To Free T4           Plan:       1.  Coronary artery disease: Remote stenting of the LAD, ramus, right coronary artery in the setting of unstable angina in 2015.  Plavix. If his EF remains low in NSR, or if any concerning echo changes would consider cardiac catheterization.   2.  Atrial fibrillation: Status post cardioversion October 2023. Remains on amiodarone daily to maintain NSR. Continue eliquis 5 BID.  He has deferred ablation.  Will get TSH next year last September 2024 was normal.  Chest CT already planned this year for alternate reasons  3.  Presumed nonischemic, dilated cardiomyopathy, normalized, related to tachyarrhythmia.  Continue current meds  4.  Diabetes: Diet controlled, on high intensity statin.    Dr. Meza, thank you very much for referring this kind patient to me. Please call me with any questions or concerns. I will see the patient again in the office in 12 months.       Crys Dumont MD  03/07/25  [facility]    Outpatient Encounter Medications as of 3/7/2025   Medication Sig Dispense Refill    amiodarone (PACERONE) 200 MG tablet TAKE 2 TABLETS BY MOUTH TWICE A DAY FOR 3 DAYS THEN 1 TWICE A  tablet 0    citalopram (CeleXA) 20 MG tablet Take 1 tablet by mouth Daily. 90 tablet 2    clopidogrel (PLAVIX) 75 MG tablet TAKE 1 TABLET BY MOUTH EVERY DAY 90 tablet 3    Eliquis 5 MG tablet tablet TAKE 1 TABLET BY MOUTH TWICE A DAY 60 tablet 3    ferrous sulfate 325 (65 FE) MG tablet TAKE 1 TABLET BY MOUTH EVERY DAY WITH BREAKFAST 90 tablet 1    losartan (COZAAR) 100 MG tablet Take 1 tablet by mouth Daily. 90 tablet 3    metFORMIN ER (GLUCOPHAGE-XR) 500 MG 24 hr tablet TAKE 2 TABLETS BY MOUTH EVERY DAY WITH BREAKFAST (NEW DOSE) 180 tablet 1    metoprolol succinate XL (TOPROL-XL) 50 MG 24 hr tablet TAKE 1 TABLET BY MOUTH TWICE A  tablet 3    Pseudoeph-Doxylamine-DM-APAP (NYQUIL PO) Take  by  mouth every night at bedtime.      spironolactone (ALDACTONE) 25 MG tablet TAKE 1 TABLET BY MOUTH EVERY DAY 90 tablet 3     No facility-administered encounter medications on file as of 3/7/2025.

## 2025-04-03 RX ORDER — METFORMIN HYDROCHLORIDE 500 MG/1
TABLET, EXTENDED RELEASE ORAL
Qty: 180 TABLET | Refills: 1 | OUTPATIENT
Start: 2025-04-03

## 2025-04-06 DIAGNOSIS — I25.10 ATHEROSCLEROTIC HEART DISEASE OF NATIVE CORONARY ARTERY WITHOUT ANGINA PECTORIS: ICD-10-CM

## 2025-04-07 RX ORDER — CLOPIDOGREL BISULFATE 75 MG/1
75 TABLET ORAL DAILY
Qty: 90 TABLET | Refills: 3 | Status: SHIPPED | OUTPATIENT
Start: 2025-04-07

## 2025-05-08 RX ORDER — AMIODARONE HYDROCHLORIDE 200 MG/1
200 TABLET ORAL DAILY
Qty: 30 TABLET | Refills: 11 | Status: SHIPPED | OUTPATIENT
Start: 2025-05-08

## 2025-05-12 RX ORDER — APIXABAN 5 MG/1
5 TABLET, FILM COATED ORAL 2 TIMES DAILY
Qty: 180 TABLET | Refills: 3 | Status: SHIPPED | OUTPATIENT
Start: 2025-05-12

## 2025-06-05 NOTE — TELEPHONE ENCOUNTER
----- Message from Kelly Ashraf MD sent at 6/7/2023  4:34 PM EDT -----  Can you please reach out to Dr. Kwong for holding his Plavix 5 days before his upcoming scopes, thanks     no

## 2025-06-23 ENCOUNTER — TELEPHONE (OUTPATIENT)
Dept: CARDIOLOGY | Age: 70
End: 2025-06-23
Payer: MEDICARE

## 2025-06-23 DIAGNOSIS — I48.0 AF (PAROXYSMAL ATRIAL FIBRILLATION): Primary | ICD-10-CM

## 2025-06-23 NOTE — TELEPHONE ENCOUNTER
Pt called in to triage this afternoon.  Pt states that about 3 months ago (he last saw you on 3/7/25), he decided to stop taking eliquis because he couldn't afford it.  He is requesting a referral to the anticoagulation clinic for warfarin management, as his wife is now on this, and it is much more affordable.  I did highly recommend that pt check with ordering provider prior to stopping any medication in the future, particularly blood thinners.  Pt verbalized understanding, and does state that he has continued his plavix.    Recommendations?    Thanks so much,  Yina Jarvis, RN  Triage RN  06/23/25 14:03 EDT

## 2025-06-24 ENCOUNTER — ANTICOAGULATION VISIT (OUTPATIENT)
Dept: PHARMACY | Facility: HOSPITAL | Age: 70
End: 2025-06-24
Payer: MEDICARE

## 2025-06-24 DIAGNOSIS — I48.0 AF (PAROXYSMAL ATRIAL FIBRILLATION): Primary | ICD-10-CM

## 2025-06-24 RX ORDER — WARFARIN SODIUM 5 MG/1
TABLET ORAL
Qty: 30 TABLET | Refills: 0 | Status: SHIPPED | OUTPATIENT
Start: 2025-06-24

## 2025-06-24 NOTE — PROGRESS NOTES
Anticoagulation Clinic Progress Note    Anticoagulation Summary  As of 6/24/2025      INR goal:  2.0-3.0   TTR:  --   INR used for dosing:  No new INR was available at the time of this encounter.   Warfarin maintenance plan:  No maintenance plan   Weekly warfarin total:  0 mg   Plan last modified:  Bob Abreu, PharmD (6/24/2025)   Next INR check:  6/30/2025   Target end date:  --    Indications    AF (paroxysmal atrial fibrillation) [I48.0]                 Anticoagulation Episode Summary       INR check location:  --    Preferred lab:  --    [facility] [place]    Comments:  --          Anticoagulation Care Providers       Provider Role Specialty Phone number    Crys Dumont MD Referring Cardiology [phone]            Clinic Interview:  Patient Findings     Positives:  Change in medications    Negatives:  Signs/symptoms of thrombosis, Signs/symptoms of bleeding,   Laboratory test error suspected, Change in health, Change in alcohol use,   Change in activity, Upcoming invasive procedure, Emergency department   visit, Upcoming dental procedure, Missed doses, Extra doses, Change in   diet/appetite, Hospital admission, Bruising, Other complaints    Comments:  Mr. White requires anticoagulation for indication of atrial   fibrillation. He had previously been taking apixaban; however, he   self-discontinued this a few months ago due to the cost. He is agreeable   to initiating warfarin at this time.       Clinical Outcomes     Negatives:  Major bleeding event, Thromboembolic event,   Anticoagulation-related hospital admission, Anticoagulation-related ED   visit, Anticoagulation-related fatality    Comments:  Mr. White requires anticoagulation for indication of atrial   fibrillation. He had previously been taking apixaban; however, he   self-discontinued this a few months ago due to the cost. He is agreeable   to initiating warfarin at this time.         INR History:       6/24/2025     9:23 AM   Anticoagulation Monitoring   INR Goal 2.0-3.0   Last Week Total 0 mg   Next Week Total 25 mg   Sun 5 mg (6/29)   Mon -   Tue -   Wed 5 mg (6/25)   Thu 5 mg (6/26)   Fri 5 mg (6/27)   Sat 5 mg (6/28)       Plan:  1. INR is unknown today (presumed to be subtherapeutic, as he is currently not taking an anticoagulant) - see above in Anticoagulation Summary.   Will instruct Angel White Jr. to initiate warfarin at dose of 5 mg daily beginning tomorrow, 6/25/25 - see above in Anticoagulation Summary.  2. Follow up with INR in clinic on Monday, 6/30/25.   3. They have been instructed to call if any changes in medications, doses, concerns, etc. Patient expresses understanding and has no further questions at this time.    Bob Abreu, PharmD

## 2025-06-24 NOTE — TELEPHONE ENCOUNTER
Reviewed recommendations with patient, verbalized understanding, will call with any further questions or complaints.  Pt states that he has already heard from the anticoagulation clinic and has an appointment scheduled for next week.    Yina Jarvis RN  Triage Nurse  06/24/25 09:29 EDT

## 2025-06-30 ENCOUNTER — ANTICOAGULATION VISIT (OUTPATIENT)
Dept: PHARMACY | Facility: HOSPITAL | Age: 70
End: 2025-06-30
Payer: MEDICARE

## 2025-06-30 DIAGNOSIS — I48.0 AF (PAROXYSMAL ATRIAL FIBRILLATION): Primary | ICD-10-CM

## 2025-06-30 LAB
INR PPP: 1.6 (ref 0.91–1.09)
PROTHROMBIN TIME: 19.7 SECONDS (ref 10–13.8)

## 2025-06-30 PROCEDURE — 85610 PROTHROMBIN TIME: CPT

## 2025-06-30 PROCEDURE — G0463 HOSPITAL OUTPT CLINIC VISIT: HCPCS

## 2025-06-30 NOTE — PROGRESS NOTES
Anticoagulation Clinic Progress Note  Anticoagulation Summary  As of 2025      INR goal:  2.0-3.0   TTR:  --   INR used for dosin.6 (2025)   Warfarin maintenance plan:  2.5 mg every Mon, Fri; 5 mg all other days   Weekly warfarin total:  30 mg   Plan last modified:  Bob Abreu, PharmD (2025)   Next INR check:  2025   Target end date:  --    Indications    AF (paroxysmal atrial fibrillation) [I48.0]                 Anticoagulation Episode Summary       INR check location:  --    Preferred lab:  --    Send INR reminders to:   [facility]    Comments:  First INR check 25          Anticoagulation Care Providers       Provider Role Specialty Phone number    Crys Dumont MD Referring Cardiology [phone]            Clinic Interview:  Patient Findings     Positives:  Missed doses    Negatives:  Signs/symptoms of thrombosis, Signs/symptoms of bleeding,   Laboratory test error suspected, Change in health, Change in alcohol use,   Change in activity, Upcoming invasive procedure, Emergency department   visit, Upcoming dental procedure, Extra doses, Change in medications,   Change in diet/appetite, Hospital admission, Bruising, Other complaints    Comments:  He began taking warfarin 5 mg daily on 25; however, he   missed his warfarin dose on 25.      Clinical Outcomes     Negatives:  Major bleeding event, Thromboembolic event,   Anticoagulation-related hospital admission, Anticoagulation-related ED   visit, Anticoagulation-related fatality    Comments:  He began taking warfarin 5 mg daily on 25; however, he   missed his warfarin dose on 25.        Education:  Angel White Jr. is a new start in the [facility]. We discussed the followin) Warfarin's indication, mechanism, and dosing  2) Enforced the importance of taking warfarin as instructed and at the same time every day, preferably in the evening so that we can make dose  adjustments more easily following subsequent clinic visits  3) What he should do about a missed dose; pts can take missed doses within about 12 hours of their usual scheduled dose, but he was instructed on the importance of not doubling up on doses unless told to do so by the [facility]  4) Explained possible side effects of warfarin therapy, including increased risk of bleeding, s/sx of bleeding and s/sx of any additional clots/PE/CVA.   5) Discussed monitoring of warfarin, the INR, goal INR range, and the frequency of monitoring  6) Reviewed drug/food/tobacco/EtOH interactions and provided written information covering these topics in more detail, explaining that green, leafy vegetables interact most heavily with warfarin  7) Instructed the pt not to take or discontinue any medications without informing his physician/pharmacist and reminded him to inform us of any dietary changes, as well  8) Explained that he would be coming into the clinic more frequently in these first few weeks of therapy as we try to adjust his dose and achieve a therapeutic INR x 2 consecutive readings. Once that is achieved, patient will follow up in clinic every 4 weeks, on average.    He stated no problems with transportation or scheduling clinic appts in this manner. he expressed understanding of the information provided and has no additional questions at this time.    Angel White Jr. was presented with a copy of the Patients Rights and Responsibilities. he expressed verbal consent and agreement to receive care in the [facility] under the current collaborative care agreement with [facility].       INR History:      6/24/2025     9:23 AM 6/30/2025    11:00 AM   Anticoagulation Monitoring   INR  1.6   INR Date  6/30/2025   INR Goal 2.0-3.0 2.0-3.0   Last Week Total 0 mg 20 mg   Next Week Total 25 mg 30 mg   Sun 5 mg (6/29) 5 mg   Mon - 2.5 mg   Tue - 5 mg   Wed 5 mg (6/25) 5 mg   Thu 5 mg  (6/26) 5 mg   Fri 5 mg (6/27) 2.5 mg   Sat 5 mg (6/28) 5 mg       Plan:  1. INR is Subtherapeutic today- see above in Anticoagulation Summary.   Will instruct Angel White Jr. to Change their warfarin regimen to 2.5 mg MF, 5 mg all other days - see above in Anticoagulation Summary.  2. Follow up in 1 week.  3. Patient declines warfarin refills.  4. Verbal and written information provided. Patient expresses understanding and has no further questions at this time.    Bob Abreu, PharmD

## 2025-07-07 ENCOUNTER — ANTICOAGULATION VISIT (OUTPATIENT)
Dept: PHARMACY | Facility: HOSPITAL | Age: 70
End: 2025-07-07
Payer: MEDICARE

## 2025-07-07 DIAGNOSIS — I48.0 AF (PAROXYSMAL ATRIAL FIBRILLATION): Primary | ICD-10-CM

## 2025-07-07 LAB
INR PPP: 5.1 (ref 0.91–1.09)
PROTHROMBIN TIME: 60.7 SECONDS (ref 10–13.8)

## 2025-07-07 PROCEDURE — G0463 HOSPITAL OUTPT CLINIC VISIT: HCPCS

## 2025-07-07 PROCEDURE — 85610 PROTHROMBIN TIME: CPT

## 2025-07-07 NOTE — PROGRESS NOTES
Anticoagulation Clinic Progress Note    Anticoagulation Summary  As of 2025      INR goal:  2.0-3.0   TTR:  6.1% (4 d)   INR used for dosin.1 (2025)   Warfarin maintenance plan:  2.5 mg every Mon, Wed, Fri; 5 mg all other days   Weekly warfarin total:  27.5 mg   Plan last modified:  Ca Morris RPH (2025)   Next INR check:  2025   Target end date:  --    Indications    AF (paroxysmal atrial fibrillation) [I48.0]                 Anticoagulation Episode Summary       INR check location:  --    Preferred lab:  --    Send INR reminders to:   [facility]    Comments:  First INR check 25          Anticoagulation Care Providers       Provider Role Specialty Phone number    Crys Dumont MD Referring Cardiology [phone]            Clinic Interview:  Patient Findings     Positives:  Change in diet/appetite    Negatives:  Signs/symptoms of thrombosis, Signs/symptoms of bleeding,   Laboratory test error suspected, Change in health, Change in alcohol use,   Change in activity, Upcoming invasive procedure, Emergency department   visit, Upcoming dental procedure, Missed doses, Extra doses, Change in   medications, Hospital admission, Bruising, Other complaints      Clinical Outcomes     Negatives:  Major bleeding event, Thromboembolic event,   Anticoagulation-related hospital admission, Anticoagulation-related ED   visit, Anticoagulation-related fatality        INR History:      2025     9:23 AM 2025    11:00 AM 2025    11:30 AM   Anticoagulation Monitoring   INR  1.6 5.1   INR Date  2025   INR Goal 2.0-3.0 2.0-3.0 2.0-3.0   Trend   Down   Last Week Total 0 mg 20 mg 30 mg   Next Week Total 25 mg 30 mg 20 mg   Sun 5 mg () 5 mg 5 mg   Mon - 2.5 mg Hold ()   Tue - 5 mg Hold ()   Wed 5 mg () 5 mg 2.5 mg   Thu 5 mg () 5 mg 5 mg   Fri 5 mg () 2.5 mg 2.5 mg   Sat 5 mg () 5 mg 5 mg       Plan:  1. INR is Supratherapeutic today- see  above in Anticoagulation Summary.  Will instruct Angel White Jr. to Change their warfarin regimen- see above in Anticoagulation Summary.      2 Mangos over the weekend and also new to warfarin. Likely elevated due to multiple factors. Hold x 2 days then will trial on 2.5 mg MWF, 5 mg AOD, rck 1 week   2. Follow up in 1 week  3. Patient declines warfarin refills.  4. Verbal and written information provided. Patient expresses understanding and has no further questions at this time.    Ca Morris, [facility]

## 2025-07-11 ENCOUNTER — HOSPITAL ENCOUNTER (OUTPATIENT)
Facility: HOSPITAL | Age: 70
Discharge: HOME | End: 2025-07-11
Payer: MEDICARE

## 2025-07-11 ENCOUNTER — LAB (OUTPATIENT)
Facility: HOSPITAL | Age: 70
End: 2025-07-11
Payer: MEDICARE

## 2025-07-11 DIAGNOSIS — C7A.012 MALIGNANT CARCINOID TUMOR OF ILEUM: ICD-10-CM

## 2025-07-11 DIAGNOSIS — D64.89 ANEMIA DUE TO OTHER CAUSE, NOT CLASSIFIED: ICD-10-CM

## 2025-07-11 LAB
ALBUMIN SERPL-MCNC: 3.7 G/DL (ref 3.5–5.2)
ALBUMIN/GLOB SERPL: 1.1 G/DL
ALP SERPL-CCNC: 90 U/L (ref 39–117)
ALT SERPL W P-5'-P-CCNC: 13 U/L (ref 1–41)
ANION GAP SERPL CALCULATED.3IONS-SCNC: 7.4 MMOL/L (ref 5–15)
AST SERPL-CCNC: 16 U/L (ref 1–40)
BASOPHILS # BLD AUTO: 0.08 10*3/MM3 (ref 0–0.2)
BASOPHILS NFR BLD AUTO: 1.2 % (ref 0–1.5)
BILIRUB SERPL-MCNC: 0.3 MG/DL (ref 0–1.2)
BUN SERPL-MCNC: 15 MG/DL (ref 8–23)
BUN/CREAT SERPL: 13.5 (ref 7–25)
CALCIUM SPEC-SCNC: 8.9 MG/DL (ref 8.6–10.5)
CHLORIDE SERPL-SCNC: 102 MMOL/L (ref 98–107)
CO2 SERPL-SCNC: 27.6 MMOL/L (ref 22–29)
CREAT SERPL-MCNC: 1.11 MG/DL (ref 0.76–1.27)
DEPRECATED RDW RBC AUTO: 41.3 FL (ref 37–54)
EGFRCR SERPLBLD CKD-EPI 2021: 71.9 ML/MIN/1.73
EOSINOPHIL # BLD AUTO: 0.19 10*3/MM3 (ref 0–0.4)
EOSINOPHIL NFR BLD AUTO: 2.9 % (ref 0.3–6.2)
ERYTHROCYTE [DISTWIDTH] IN BLOOD BY AUTOMATED COUNT: 13.3 % (ref 12.3–15.4)
FERRITIN SERPL-MCNC: 45.3 NG/ML (ref 30–400)
GLOBULIN UR ELPH-MCNC: 3.3 GM/DL
GLUCOSE SERPL-MCNC: 159 MG/DL (ref 65–99)
HCT VFR BLD AUTO: 38.2 % (ref 37.5–51)
HGB BLD-MCNC: 12.7 G/DL (ref 13–17.7)
IMM GRANULOCYTES # BLD AUTO: 0.02 10*3/MM3 (ref 0–0.05)
IMM GRANULOCYTES NFR BLD AUTO: 0.3 % (ref 0–0.5)
IRON 24H UR-MRATE: 55 MCG/DL (ref 59–158)
IRON SATN MFR SERPL: 15 % (ref 20–50)
LYMPHOCYTES # BLD AUTO: 1.62 10*3/MM3 (ref 0.7–3.1)
LYMPHOCYTES NFR BLD AUTO: 24.6 % (ref 19.6–45.3)
MCH RBC QN AUTO: 28.4 PG (ref 26.6–33)
MCHC RBC AUTO-ENTMCNC: 33.2 G/DL (ref 31.5–35.7)
MCV RBC AUTO: 85.5 FL (ref 79–97)
MONOCYTES # BLD AUTO: 0.46 10*3/MM3 (ref 0.1–0.9)
MONOCYTES NFR BLD AUTO: 7 % (ref 5–12)
NEUTROPHILS NFR BLD AUTO: 4.22 10*3/MM3 (ref 1.7–7)
NEUTROPHILS NFR BLD AUTO: 64 % (ref 42.7–76)
NRBC BLD AUTO-RTO: 0 /100 WBC (ref 0–0.2)
PLATELET # BLD AUTO: 247 10*3/MM3 (ref 140–450)
PMV BLD AUTO: 9.3 FL (ref 6–12)
POTASSIUM SERPL-SCNC: 4.2 MMOL/L (ref 3.5–5.2)
PROT SERPL-MCNC: 7 G/DL (ref 6–8.5)
RBC # BLD AUTO: 4.47 10*6/MM3 (ref 4.14–5.8)
SODIUM SERPL-SCNC: 137 MMOL/L (ref 136–145)
TIBC SERPL-MCNC: 373 MCG/DL (ref 298–536)
TRANSFERRIN SERPL-MCNC: 250 MG/DL (ref 200–360)
WBC NRBC COR # BLD AUTO: 6.59 10*3/MM3 (ref 3.4–10.8)

## 2025-07-11 PROCEDURE — 80053 COMPREHEN METABOLIC PANEL: CPT | Performed by: INTERNAL MEDICINE

## 2025-07-11 PROCEDURE — 25510000001 IOPAMIDOL 61 % SOLUTION: Performed by: INTERNAL MEDICINE

## 2025-07-11 PROCEDURE — 82728 ASSAY OF FERRITIN: CPT | Performed by: INTERNAL MEDICINE

## 2025-07-11 PROCEDURE — 84466 ASSAY OF TRANSFERRIN: CPT | Performed by: INTERNAL MEDICINE

## 2025-07-11 PROCEDURE — 71260 CT THORAX DX C+: CPT

## 2025-07-11 PROCEDURE — 74177 CT ABD & PELVIS W/CONTRAST: CPT

## 2025-07-11 PROCEDURE — 83540 ASSAY OF IRON: CPT | Performed by: INTERNAL MEDICINE

## 2025-07-11 PROCEDURE — 85025 COMPLETE CBC W/AUTO DIFF WBC: CPT

## 2025-07-11 PROCEDURE — 25510000002 DIATRIZOATE MEGLUMINE & SODIUM PER 1 ML: Performed by: INTERNAL MEDICINE

## 2025-07-11 RX ORDER — DIATRIZOATE MEGLUMINE AND DIATRIZOATE SODIUM 660; 100 MG/ML; MG/ML
30 SOLUTION ORAL; RECTAL
Status: COMPLETED | OUTPATIENT
Start: 2025-07-11 | End: 2025-07-11

## 2025-07-11 RX ORDER — IOPAMIDOL 612 MG/ML
100 INJECTION, SOLUTION INTRAVASCULAR
Status: COMPLETED | OUTPATIENT
Start: 2025-07-11 | End: 2025-07-11

## 2025-07-11 RX ADMIN — DIATRIZOATE MEGLUMINE AND DIATRIZOATE SODIUM 30 ML: 600; 100 SOLUTION ORAL; RECTAL at 11:12

## 2025-07-11 RX ADMIN — IOPAMIDOL 85 ML: 612 INJECTION, SOLUTION INTRAVENOUS at 12:06

## 2025-07-14 ENCOUNTER — ANTICOAGULATION VISIT (OUTPATIENT)
Dept: PHARMACY | Facility: HOSPITAL | Age: 70
End: 2025-07-14
Payer: MEDICARE

## 2025-07-14 DIAGNOSIS — I48.0 AF (PAROXYSMAL ATRIAL FIBRILLATION): Primary | ICD-10-CM

## 2025-07-14 LAB
INR PPP: 2.2 (ref 0.91–1.09)
PROTHROMBIN TIME: 26.2 SECONDS (ref 10–13.8)

## 2025-07-14 PROCEDURE — G0463 HOSPITAL OUTPT CLINIC VISIT: HCPCS

## 2025-07-14 PROCEDURE — 85610 PROTHROMBIN TIME: CPT

## 2025-07-14 NOTE — PROGRESS NOTES
Anticoagulation Clinic Progress Note    Anticoagulation Summary  As of 2025      INR goal:  2.0-3.0   TTR:  19.1% (1.6 wk)   INR used for dosin.2 (2025)   Warfarin maintenance plan:  2.5 mg every Mon, Wed, Fri; 5 mg all other days   Weekly warfarin total:  27.5 mg   No change documented:  Shaista Corbett   Plan last modified:  Ca Morris RPH (2025)   Next INR check:  2025   Target end date:  --    Indications    AF (paroxysmal atrial fibrillation) [I48.0]                 Anticoagulation Episode Summary       INR check location:  --    Preferred lab:  --    Send INR reminders to:  [facility]    Comments:  First INR check 25          Anticoagulation Care Providers       Provider Role Specialty Phone number    Crys Dumont MD Referring Cardiology [phone]            Clinic Interview:  Patient Findings     Negatives:  Signs/symptoms of thrombosis, Signs/symptoms of bleeding,   Laboratory test error suspected, Change in health, Change in alcohol use,   Change in activity, Upcoming invasive procedure, Emergency department   visit, Upcoming dental procedure, Missed doses, Extra doses, Change in   medications, Change in diet/appetite, Hospital admission, Bruising, Other   complaints      Clinical Outcomes     Negatives:  Major bleeding event, Thromboembolic event,   Anticoagulation-related hospital admission, Anticoagulation-related ED   visit, Anticoagulation-related fatality        INR History:      2025     9:23 AM 2025    11:00 AM 2025    11:30 AM 2025    10:30 AM   Anticoagulation Monitoring   INR  1.6 5.1 2.2   INR Date  2025   INR Goal 2.0-3.0 2.0-3.0 2.0-3.0 2.0-3.0   Trend   Down Same   Last Week Total 0 mg 20 mg 30 mg 20 mg   Next Week Total 25 mg 30 mg 20 mg 27.5 mg   Sun 5 mg () 5 mg 5 mg 5 mg   Mon - 2.5 mg Hold () 2.5 mg   Tue - 5 mg Hold () 5 mg   Wed 5 mg () 5 mg 2.5 mg 2.5 mg   Thu 5  mg (6/26) 5 mg 5 mg 5 mg   Fri 5 mg (6/27) 2.5 mg 2.5 mg 2.5 mg   Sat 5 mg (6/28) 5 mg 5 mg 5 mg       Plan:  1. INR is therapeutic today- see above in Anticoagulation Summary.   Will instruct Angel White Jr. to continue their warfarin regimen- see above in Anticoagulation Summary.  2. Follow up in 1 weeks.  3. Patient declines warfarin refills.  4. Verbal and written information provided. Patient expresses understanding and has no further questions at this time.    Shaista Corbett

## 2025-07-14 NOTE — PROGRESS NOTES
I have supervised and reviewed the notes, assessments, and/or procedures performed. I personally performed the assessment and implemented the plan. I concur with the documentation of this patient encounter.    Ca Morris, [facility]

## 2025-07-17 ENCOUNTER — TELEPHONE (OUTPATIENT)
Dept: ONCOLOGY | Facility: CLINIC | Age: 70
End: 2025-07-17

## 2025-07-18 ENCOUNTER — TELEPHONE (OUTPATIENT)
Dept: ONCOLOGY | Facility: CLINIC | Age: 70
End: 2025-07-18
Payer: MEDICARE

## 2025-07-18 ENCOUNTER — TELEPHONE (OUTPATIENT)
Dept: ONCOLOGY | Facility: CLINIC | Age: 70
End: 2025-07-18

## 2025-07-21 ENCOUNTER — APPOINTMENT (OUTPATIENT)
Dept: PHARMACY | Facility: HOSPITAL | Age: 70
End: 2025-07-21
Payer: MEDICARE

## 2025-07-22 ENCOUNTER — ANTICOAGULATION VISIT (OUTPATIENT)
Dept: PHARMACY | Facility: HOSPITAL | Age: 70
End: 2025-07-22
Payer: MEDICARE

## 2025-07-22 DIAGNOSIS — I48.0 AF (PAROXYSMAL ATRIAL FIBRILLATION): Primary | ICD-10-CM

## 2025-07-22 LAB
INR PPP: 3 (ref 0.91–1.09)
PROTHROMBIN TIME: 36 SECONDS (ref 10–13.8)

## 2025-07-22 PROCEDURE — G0463 HOSPITAL OUTPT CLINIC VISIT: HCPCS

## 2025-07-22 PROCEDURE — 85610 PROTHROMBIN TIME: CPT

## 2025-07-22 NOTE — PROGRESS NOTES
I have supervised and reviewed the notes, assessments, and/or procedures performed. I personally performed the assessment and implemented the plan. I concur with the documentation of this patient encounter.    Ca Morris, [facility]

## 2025-07-22 NOTE — PROGRESS NOTES
Anticoagulation Clinic Progress Note    Anticoagulation Summary  As of 7/22/2025      INR goal:  2.0-3.0   TTR:  52.9% (2.7 wk)   INR used for dosing:  3.0 (7/22/2025)   Warfarin maintenance plan:  2.5 mg every Mon, Wed, Fri; 5 mg all other days   Weekly warfarin total:  27.5 mg   No change documented:  Shaista Corbett   Plan last modified:  Ca Morris RPH (7/7/2025)   Next INR check:  8/5/2025   Target end date:  --    Indications    AF (paroxysmal atrial fibrillation) [I48.0]                 Anticoagulation Episode Summary       INR check location:  --    Preferred lab:  --    Send INR reminders to:   [facility] [place]    Comments:  First INR check 6/30/25          Anticoagulation Care Providers       Provider Role Specialty Phone number    Crys Dumont MD Referring Cardiology [phone]            Clinic Interview:  Patient Findings     Negatives:  Signs/symptoms of thrombosis, Signs/symptoms of bleeding,   Laboratory test error suspected, Change in health, Change in alcohol use,   Change in activity, Upcoming invasive procedure, Emergency department   visit, Upcoming dental procedure, Missed doses, Extra doses, Change in   medications, Change in diet/appetite, Hospital admission, Bruising, Other   complaints      Clinical Outcomes     Negatives:  Major bleeding event, Thromboembolic event,   Anticoagulation-related hospital admission, Anticoagulation-related ED   visit, Anticoagulation-related fatality        INR History:      6/24/2025     9:23 AM 6/30/2025    11:00 AM 7/7/2025    11:30 AM 7/14/2025    10:30 AM 7/22/2025     2:30 PM   Anticoagulation Monitoring   INR  1.6 5.1 2.2 3.0   INR Date  6/30/2025 7/7/2025 7/14/2025 7/22/2025   INR Goal 2.0-3.0 2.0-3.0 2.0-3.0 2.0-3.0 2.0-3.0   Trend   Down Same Same   Last Week Total 0 mg 20 mg 30 mg 20 mg 27.5 mg   Next Week Total 25 mg 30 mg 20 mg 27.5 mg 27.5 mg   Sun 5 mg (6/29) 5 mg 5 mg 5 mg 5 mg   Mon - 2.5 mg Hold (7/7) 2.5 mg 2.5  mg   Tue - 5 mg Hold (7/8) 5 mg 5 mg   Wed 5 mg (6/25) 5 mg 2.5 mg 2.5 mg 2.5 mg   Thu 5 mg (6/26) 5 mg 5 mg 5 mg 5 mg   Fri 5 mg (6/27) 2.5 mg 2.5 mg 2.5 mg 2.5 mg   Sat 5 mg (6/28) 5 mg 5 mg 5 mg 5 mg       Plan:  1. INR is therapeutic today- see above in Anticoagulation Summary.   Will instruct Angel White Jr. to continue their warfarin regimen- see above in Anticoagulation Summary.  2. Follow up in 2 weeks.  3. Patient declines warfarin refills.  4. Verbal and written information provided. Patient expresses understanding and has no further questions at this time.    Shaista Corbett

## 2025-07-23 ENCOUNTER — TELEMEDICINE (OUTPATIENT)
Dept: ONCOLOGY | Facility: CLINIC | Age: 70
End: 2025-07-23
Payer: MEDICARE

## 2025-07-23 DIAGNOSIS — D50.0 IRON DEFICIENCY ANEMIA DUE TO CHRONIC BLOOD LOSS: ICD-10-CM

## 2025-07-23 DIAGNOSIS — C7A.012 MALIGNANT CARCINOID TUMOR OF ILEUM: Primary | ICD-10-CM

## 2025-07-23 PROCEDURE — 1126F AMNT PAIN NOTED NONE PRSNT: CPT | Performed by: INTERNAL MEDICINE

## 2025-07-23 PROCEDURE — 99214 OFFICE O/P EST MOD 30 MIN: CPT | Performed by: INTERNAL MEDICINE

## 2025-07-23 NOTE — PROGRESS NOTES
Subjective     REASON FOR CONSULTATION:  Carcinoid tumor ileum  Provide an opinion on any further workup or treatment                             REQUESTING PHYSICIAN:  Dr. Donovan    RECORDS OBTAINED:  Records of the patients history including those obtained from the referring provider were reviewed and summarized in detail.    History of Present Illness   This is a very pleasant 67-year-old man who has medical history pertinent for coronary artery disease, diabetes, hypertension, hyperlipidemia, sleep apnea.  He recently presented with 50 pound weight loss, nausea, decreased appetite, and hematochezia.  The patient was noted to have iron deficiency anemia and was admitted to the hospital in October 2021 with hemoglobin 7.7 requiring transfusion support.  A CT of the abdomen and pelvis on 10/30/2021 showed some fatty infiltration in the liver, small mural lipoma in the wall of the cecum and slight prostamegaly.  The patient continued to have symptoms, and a repeat CT abdomen/pelvis on 11/19/2021 showed a 3 cm mass in the terminal ileum with enlarged lymph nodes adjacent up to 1.4 cm.  There was no evidence of small bowel obstruction.  There was a 2 cm lipoma in the cecum and a 2.4 cm lipoma in the ascending colon which were stable from previous.  The liver was unremarkable.    The patient was taken to the operating room on 2/3/2022 by Dr. Donovan and underwent a laparoscopic ileocolonic resection.  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  The tumor measured 2.5 cm in greatest dimension and invaded the muscularis propria into the subserosal tissue without penetration of the serosa.  There was lymphovascular but no perineural invasion.  12 lymph nodes were resected for of which were positive for tumor.  Final pathology pT3 N2 M0.    A neuroendocrine PET scan ([facility]) on 3/29/2022 was  negative for residual or metastatic disease.    The patient returned today for follow-up.  He denies abdominal pain nausea vomiting.  He has chronic loose stools mostly in the morning.  He denies hematochezia.  He denies unusual weight loss.  No new complaints were brought forth today.    Past Medical History:   Diagnosis Date    Anemia     AP (angina pectoris)     unstable    CAD (coronary artery disease)     6 stents placed    DDD (degenerative disc disease), lumbosacral     Diabetes     Essential hypertension     Fatty liver     ?    History of Clostridioides difficile infection 10/2021    History of MI (myocardial infarction)     Hyperlipidemia     Low back pain     Mass of colon     Memory loss     SOME SHORT TERM MEMORY ISSUES AND FORGETFULNESS    Osteoarthritis of knee     Screening for prostate cancer 2010    normal    Skin cancer     left eyebrow, side of face and back     Sleep apnea     NO CPAP    Tachycardia         Past Surgical History:   Procedure Laterality Date    COLON RESECTION N/A 02/03/2022    Procedure: ILEUM COLON RESECTION FOR TERMINAL ILEUM MASS LAPAROSCOPIC;  Surgeon: Roscoe Donovan MD;  Location: [facility] MAIN OR;  Service: General;  Laterality: N/A;    COLONOSCOPY N/A 2016    normal per patient    COLONOSCOPY N/A 08/03/2020    Procedure: COLONOSCOPY to cecum and TI with cold biopsies;  Surgeon: Kelly Ashraf MD;  Location: [facility] ENDOSCOPY;  Service: Gastroenterology;  Laterality: N/A;  pre-change in bowel habits, hx polyps   post-hemorrhoids, lipomas, abnormal cecal tissue     COLONOSCOPY W/ POLYPECTOMY N/A 4/30/2024    Procedure: COLONOSCOPY INTO ILIEUM WITH BIOPSIES;  Surgeon: Jose aMi MD;  Location: [facility] ENDOSCOPY;  Service: Gastroenterology;  Laterality: N/A;  PRE: ANEMIA, CHANGE IN BOWEL HABITS, PERSONAL H/O COLON CANCER /  POST: HEMORRHOIDS, ANASTAMOTIC INFLAMATION    CORONARY ANGIOPLASTY WITH STENT PLACEMENT  08/28/2015    6 stents-Dr. Hall, [facility]    ENDOSCOPY N/A  4/30/2024    Procedure: ESOPHAGOGASTRODUODENOSCOPY WITH BIOPSY;  Surgeon: Jose Mai MD;  Location: [facility] ENDOSCOPY;  Service: Gastroenterology;  Laterality: N/A;  PRE:  ANEMIA /  POST: NORMAL    FOOT SURGERY Right 05/23/2017    Dr. Cervantes, [facility]    REPLACEMENT TOTAL KNEE Left 2010    Dr. Arnaud Reynaga, [facility]    REPLACEMENT TOTAL KNEE Right 2009    Dr. Arnaud Reynaga, [facility]    SKIN BIOPSY      SKIN CANCER EXCISION Left 03/30/2016    left ear and left eyebrow, graft took from face, placed on ear    SKIN CANCER EXCISION  2001    back    UPPER GASTROINTESTINAL ENDOSCOPY          Current Outpatient Medications on File Prior to Visit   Medication Sig Dispense Refill    amiodarone (PACERONE) 200 MG tablet Take 1 tablet by mouth Daily. 30 tablet 11    citalopram (CeleXA) 20 MG tablet Take 1 tablet by mouth Daily. 90 tablet 2    clopidogrel (PLAVIX) 75 MG tablet TAKE 1 TABLET BY MOUTH EVERY DAY 90 tablet 3    ferrous sulfate 325 (65 FE) MG tablet TAKE 1 TABLET BY MOUTH EVERY DAY WITH BREAKFAST 90 tablet 1    losartan (COZAAR) 100 MG tablet Take 1 tablet by mouth Daily. 90 tablet 3    metoprolol succinate XL (TOPROL-XL) 50 MG 24 hr tablet TAKE 1 TABLET BY MOUTH TWICE A DAY (Patient taking differently: Take 1 tablet by mouth Daily.) 180 tablet 3    Pseudoeph-Doxylamine-DM-APAP (NYQUIL PO) Take  by mouth every night at bedtime.      spironolactone (ALDACTONE) 25 MG tablet TAKE 1 TABLET BY MOUTH EVERY DAY 90 tablet 3    warfarin (COUMADIN) 5 MG tablet Take one tablet by mouth daily or as directed. 30 tablet 0     No current facility-administered medications on file prior to visit.        ALLERGIES:    Allergies   Allergen Reactions    Ambien [Zolpidem] Other (See Comments)     Sleep walking      Morphine And Codeine Itching and Rash        Social History     Socioeconomic History    Marital status:      Spouse name: Jaylene   Tobacco Use    Smoking status: Never    Smokeless tobacco: Never    Tobacco  comments:     Caffeine daily   Vaping Use    Vaping status: Never Used   Substance and Sexual Activity    Alcohol use: No    Drug use: Not Currently    Sexual activity: Defer     Partners: Female        Family History   Problem Relation Age of Onset    Hypertension Mother     Arthritis Mother     Diabetes Mother     Heart disease Father     Hypertension Father     Macular degeneration Father     Arthritis Father     Diabetes Father     Arthritis Maternal Grandmother     Heart disease Maternal Grandmother     Arthritis Maternal Grandfather     Heart disease Maternal Grandfather     Diabetes Maternal Grandfather     Hypertension Maternal Grandfather     Colon cancer Maternal Grandfather     Arthritis Paternal Grandmother     Heart disease Paternal Grandmother     Diabetes Paternal Grandmother     Hypertension Paternal Grandmother     Stroke Paternal Grandmother     Colon cancer Paternal Grandmother     Arthritis Paternal Grandfather     Heart disease Paternal Grandfather     Colon cancer Paternal Grandfather     Macular degeneration Sister     Malig Hyperthermia Neg Hx         Review of Systems   Constitutional:  Negative for appetite change, fatigue and unexpected weight change.   HENT: Negative.     Respiratory:  Negative for cough and shortness of breath.    Cardiovascular:  Negative for chest pain and palpitations.   Gastrointestinal:  Negative for blood in stool, constipation, diarrhea, nausea and vomiting.   Genitourinary: Negative.    Musculoskeletal: Negative.    Skin: Negative.    Allergic/Immunologic: Negative.    Neurological: Negative.    Hematological: Negative.    Psychiatric/Behavioral: Negative.     Unchanged 7/23/2025    Objective     There were no vitals filed for this visit.            1/9/2025     1:58 PM   Current Status   ECOG score 0       Physical Exam    CONSTITUTIONAL: pleasant well-developed adult man    RECENT LABS:  Hematology WBC   Date Value Ref Range Status   07/11/2025 6.59 3.40 -  10.80 10*3/mm3 Final   12/23/2021 7.9 3.4 - 10.8 x10E3/uL Final     RBC   Date Value Ref Range Status   07/11/2025 4.47 4.14 - 5.80 10*6/mm3 Final   12/23/2021 3.97 (L) 4.14 - 5.80 x10E6/uL Final     Hemoglobin   Date Value Ref Range Status   07/11/2025 12.7 (L) 13.0 - 17.7 g/dL Final     Hematocrit   Date Value Ref Range Status   07/11/2025 38.2 37.5 - 51.0 % Final     Platelets   Date Value Ref Range Status   07/11/2025 247 140 - 450 10*3/mm3 Final        Lab Results   Component Value Date    GLUCOSE 159 (H) 07/11/2025    BUN 15.0 07/11/2025    CREATININE 1.11 07/11/2025    EGFRIFNONA 93 02/04/2022    EGFRIFAFRI 103 10/28/2021    BCR 13.5 07/11/2025    K 4.2 07/11/2025    CO2 27.6 07/11/2025    CALCIUM 8.9 07/11/2025    ALBUMIN 3.7 07/11/2025    AST 16 07/11/2025    ALT 13 07/11/2025              CT Chest Abdomen Pelvis With Contrast 9/18/2023 - IMPRESSION:  1. No significant change from the prior exam.  2. Recommend continued oncologic surveillance imaging.    CT Chest Abdomen Pelvis With Contrast 4/3/2024 - CONCLUSION: Solitary mesenteric lymph node near the anastomosis demonstrates mild enlargement since prior examination. Thickened blind loop or cul-de-sac projecting off the lateral aspect of the ascending colon. Suture line noted at its base. Nominal wall thickening of the terminal ileum just proximal to the anastomosis.    CT chest w contrast (12/30/2024 14:04)  CT abdomen pelvis w contrast (12/30/2024 14:04)  IMPRESSION:  1. No convincing evidence of recurrent malignant/metastatic disease in  the chest, abdomen, or pelvis.  2. Stable postoperative changes with mildly prominent mesenteric lymph  nodes in the right hemiabdomen, not pathologically enlarged.    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  The tumor measured 2.5 cm in greatest dimension and invaded the muscularis propria into the subserosal tissue without penetration of the serosa.  There was lymphovascular but no perineural invasion.  12 lymph nodes were resected for of which were positive for tumor.  Final pathology pT3 N2 M0.  [facility] scan 3/29/2022-no evidence of residual disease  CT scan chest abdomen pelvis with contrast 10/13/2022- [facility] (tiny right upper lobe lung nodule to be monitored)  CT chest abdomen pelvis with contrast 4/3/2023-[facility]; CT chest abdomen pelvis 9/18/2023-stable 4 mm right upper lobe pulmonary nodule, mildly prominent ileocolic nodes up to 1.1 cm stable in size, heterogeneously enlarged prostate gland, scattered sclerotic foci thoracolumbar spine and pelvis stable; CT chest abdomen pelvis 4/3/2024-[facility]; CT chest abdomen pelvis 12/3/2024-[facility]; CT chest abdomen pelvis 7/11/2025-[facility]    *Microcytic, hypochromic anemia  Hemoglobin 12.7 ferritin 45 iron sat 15%  EGD colonoscopy performed 2024      *Multiple comorbidities of CAD, hypertension, hyperlipidemia, sleep apnea etc.    Hematology/oncology plan/recommendations:  Resume oral iron daily 3 to 4 months  12-month follow-up CBC CMP ferritin iron profile CT chest abdomen pelvis for surveillance

## 2025-08-05 ENCOUNTER — ANTICOAGULATION VISIT (OUTPATIENT)
Dept: PHARMACY | Facility: HOSPITAL | Age: 70
End: 2025-08-05
Payer: MEDICARE

## 2025-08-05 DIAGNOSIS — I48.0 AF (PAROXYSMAL ATRIAL FIBRILLATION): Primary | ICD-10-CM

## 2025-08-05 LAB
INR PPP: 5.3 (ref 0.91–1.09)
PROTHROMBIN TIME: 64.1 SECONDS (ref 10–13.8)

## 2025-08-05 PROCEDURE — G0463 HOSPITAL OUTPT CLINIC VISIT: HCPCS

## 2025-08-05 PROCEDURE — 85610 PROTHROMBIN TIME: CPT

## 2025-08-12 ENCOUNTER — ANTICOAGULATION VISIT (OUTPATIENT)
Dept: PHARMACY | Facility: HOSPITAL | Age: 70
End: 2025-08-12
Payer: MEDICARE

## 2025-08-12 DIAGNOSIS — I48.0 AF (PAROXYSMAL ATRIAL FIBRILLATION): Primary | ICD-10-CM

## 2025-08-12 LAB
INR PPP: 2.7 (ref 0.91–1.09)
PROTHROMBIN TIME: 32.6 SECONDS (ref 10–13.8)

## 2025-08-12 PROCEDURE — 85610 PROTHROMBIN TIME: CPT

## 2025-08-12 PROCEDURE — G0463 HOSPITAL OUTPT CLINIC VISIT: HCPCS

## 2025-08-26 ENCOUNTER — ANTICOAGULATION VISIT (OUTPATIENT)
Dept: PHARMACY | Facility: HOSPITAL | Age: 70
End: 2025-08-26
Payer: MEDICARE

## 2025-08-26 DIAGNOSIS — I48.0 AF (PAROXYSMAL ATRIAL FIBRILLATION): Primary | ICD-10-CM

## 2025-08-26 LAB
INR PPP: 4.5 (ref 0.91–1.09)
PROTHROMBIN TIME: 54.3 SECONDS (ref 10–13.8)

## 2025-08-26 PROCEDURE — G0463 HOSPITAL OUTPT CLINIC VISIT: HCPCS

## 2025-08-26 PROCEDURE — 85610 PROTHROMBIN TIME: CPT

## 2025-08-26 RX ORDER — WARFARIN SODIUM 5 MG/1
TABLET ORAL
Qty: 65 TABLET | Refills: 0 | Status: SHIPPED | OUTPATIENT
Start: 2025-08-26

## (undated) DEVICE — BLCK/BITE BLOX W/DENTL/RIM W/STRAP 54F

## (undated) DEVICE — LOU LAP SIGMOID COLON: Brand: MEDLINE INDUSTRIES, INC.

## (undated) DEVICE — SENSR O2 OXIMAX FNGR A/ 18IN NONSTR

## (undated) DEVICE — SYR LUERLOK 30CC

## (undated) DEVICE — DEV COND GAS LAP INSUFLOW W/LUER CONN

## (undated) DEVICE — PATIENT RETURN ELECTRODE, SINGLE-USE, CONTACT QUALITY MONITORING, ADULT, WITH 9FT CORD, FOR PATIENTS WEIGING OVER 33LBS. (15KG): Brand: MEGADYNE

## (undated) DEVICE — TUBING, SUCTION, 1/4" X 10', STRAIGHT: Brand: MEDLINE

## (undated) DEVICE — GOWN ,SIRUS,NONREINFORCED SMALL: Brand: MEDLINE

## (undated) DEVICE — SUT SILK 2/0 SH CR8 18IN CR8 C012D

## (undated) DEVICE — SUT SILK 3/0 TIES 18IN A184H

## (undated) DEVICE — SOL NACL 0.9PCT 1000ML

## (undated) DEVICE — SINGLE-USE BIOPSY FORCEPS: Brand: RADIAL JAW 4

## (undated) DEVICE — SUT VIC 0 TN 27IN DYED JTN0G

## (undated) DEVICE — ENDOPATH XCEL BLADELESS TROCARS WITH STABILITY SLEEVES: Brand: ENDOPATH XCEL

## (undated) DEVICE — SUT SILK 2/0 TIES 18IN A185H

## (undated) DEVICE — MSK PROC CURAPLEX O2 2/ADAPT 7FT

## (undated) DEVICE — TRAP FLD MINIVAC MEGADYNE 100ML

## (undated) DEVICE — CANN O2 ETCO2 FITS ALL CONN CO2 SMPL A/ 7IN DISP LF

## (undated) DEVICE — HARMONIC ACE +7 LAPAROSCOPIC SHEARS ADVANCED HEMOSTASIS 5MM DIAMETER 36CM SHAFT LENGTH  FOR USE WITH GRAY HAND PIECE ONLY: Brand: HARMONIC ACE

## (undated) DEVICE — VISUALIZATION SYSTEM: Brand: CLEARIFY

## (undated) DEVICE — PENCL E/S ULTRAVAC TELESCP NOSE HOLSTR 10FT

## (undated) DEVICE — PREP SOL POVIDONE/IODINE BT 4OZ

## (undated) DEVICE — SUT GUT CHRM 3/0 SH 36IN G172H

## (undated) DEVICE — GLV SURG PREMIERPRO ORTHO LTX PF SZ7.5 BRN

## (undated) DEVICE — SUT PDS 0 CT1 36IN Z346H

## (undated) DEVICE — KT ORCA ORCAPOD DISP STRL

## (undated) DEVICE — SUT VIC 5/0 PS2 18IN J495H

## (undated) DEVICE — ENDOCUT SCISSOR TIP, DISPOSABLE: Brand: RENEW

## (undated) DEVICE — THE TORRENT IRRIGATION SCOPE CONNECTOR IS USED WITH THE TORRENT IRRIGATION TUBING TO PROVIDE IRRIGATION FLUIDS SUCH AS STERILE WATER DURING GASTROINTESTINAL ENDOSCOPIC PROCEDURES WHEN USED IN CONJUNCTION WITH AN IRRIGATION PUMP (OR ELECTROSURGICAL UNIT).: Brand: TORRENT

## (undated) DEVICE — PCH TISS LAPSAC 8X5IN

## (undated) DEVICE — ECHELON FLEX 60 ARTICULATING ENDOSCOPIC LINEAR CUTTER (NO CARTRIDGE): Brand: ECHELON FLEX ENDOPATH

## (undated) DEVICE — LAPAROSCOPIC SMOKE ELIMINATION DEVICE: Brand: PNEUVIEW XE

## (undated) DEVICE — ADAPT CLN BIOGUARD AIR/H2O DISP

## (undated) DEVICE — SKIN PREP TRAY W/CHG: Brand: MEDLINE INDUSTRIES, INC.

## (undated) DEVICE — FRCP BX RADJAW4 NDL 2.8 240CM LG OG BX40

## (undated) DEVICE — ENDOPATH PNEUMONEEDLE INSUFFLATION NEEDLES WITH LUER LOCK CONNECTORS 120MM: Brand: ENDOPATH

## (undated) DEVICE — TOTAL TRAY, 16FR 10ML SIL FOLEY, URN: Brand: MEDLINE

## (undated) DEVICE — LN SMPL CO2 SHTRM SD STREAM W/M LUER

## (undated) DEVICE — ELECTRD BLD EZ CLN MOD XLNG 2.75IN

## (undated) DEVICE — DISPOSABLE GRASPER CARTRIDGE: Brand: DIRECT DRIVE REPOSABLE GRASPERS

## (undated) DEVICE — GLV SURG BIOGEL LTX PF 6

## (undated) DEVICE — APPL HEMO SURG ARISTA/AH/FLEXITIP XL 38CM

## (undated) DEVICE — ENDOPOUCH RETRIEVER SPECIMEN RETRIEVAL BAGS: Brand: ENDOPOUCH RETRIEVER

## (undated) DEVICE — ENDOPATH XCEL UNIVERSAL TROCAR STABLILITY SLEEVES: Brand: ENDOPATH XCEL